# Patient Record
Sex: FEMALE | Race: WHITE | NOT HISPANIC OR LATINO | Employment: OTHER | URBAN - METROPOLITAN AREA
[De-identification: names, ages, dates, MRNs, and addresses within clinical notes are randomized per-mention and may not be internally consistent; named-entity substitution may affect disease eponyms.]

---

## 2023-08-30 ENCOUNTER — HOSPITAL ENCOUNTER (OUTPATIENT)
Dept: DATA CONVERSION | Facility: HOSPITAL | Age: 83
Discharge: HOME | End: 2023-08-30
Payer: MEDICARE

## 2023-08-30 DIAGNOSIS — I50.30 UNSPECIFIED DIASTOLIC (CONGESTIVE) HEART FAILURE (MULTI): ICD-10-CM

## 2023-08-30 DIAGNOSIS — E78.5 HYPERLIPIDEMIA, UNSPECIFIED: ICD-10-CM

## 2023-08-30 LAB
ANION GAP SERPL CALCULATED.3IONS-SCNC: 12 MMOL/L (ref 0–19)
APPEARANCE PLAS: CLEAR
BUN SERPL-MCNC: 20 MG/DL (ref 8–25)
BUN/CREAT SERPL: 22.2 RATIO (ref 8–21)
CALCIUM SERPL-MCNC: 9.7 MG/DL (ref 8.5–10.4)
CHLORIDE SERPL-SCNC: 99 MMOL/L (ref 97–107)
CHOLEST SERPL-MCNC: 151 MG/DL (ref 133–200)
CHOLEST/HDLC SERPL: 2.6 RATIO
CO2 SERPL-SCNC: 30 MMOL/L (ref 24–31)
COLOR SPUN FLD: YELLOW
CREAT SERPL-MCNC: 0.9 MG/DL (ref 0.4–1.6)
DEPRECATED RDW RBC AUTO: 47.6 FL (ref 37–54)
ERYTHROCYTE [DISTWIDTH] IN BLOOD BY AUTOMATED COUNT: 14.7 % (ref 11.7–15)
FASTING STATUS PATIENT QL REPORTED: NORMAL
GFR SERPL CREATININE-BSD FRML MDRD: 63 ML/MIN/1.73 M2
GLUCOSE SERPL-MCNC: 107 MG/DL (ref 65–99)
HCT VFR BLD AUTO: 36.3 % (ref 36–44)
HDLC SERPL-MCNC: 58 MG/DL
HGB BLD-MCNC: 11.9 GM/DL (ref 12–15)
LDLC SERPL CALC-MCNC: 74 MG/DL (ref 65–130)
MCH RBC QN AUTO: 29.2 PG (ref 26–34)
MCHC RBC AUTO-ENTMCNC: 32.8 % (ref 31–37)
MCV RBC AUTO: 89 FL (ref 80–100)
NRBC BLD-RTO: 0 /100 WBC
PLATELET # BLD AUTO: 165 K/UL (ref 150–450)
PMV BLD AUTO: 9.6 CU (ref 7–12.6)
POTASSIUM SERPL-SCNC: 3.7 MMOL/L (ref 3.4–5.1)
RBC # BLD AUTO: 4.08 M/UL (ref 4–4.9)
SODIUM SERPL-SCNC: 140 MMOL/L (ref 133–145)
TRIGL SERPL-MCNC: 93 MG/DL (ref 40–150)
WBC # BLD AUTO: 6.8 K/UL (ref 4.5–11)

## 2023-09-01 PROBLEM — K42.9 UMBILICAL HERNIA: Status: ACTIVE | Noted: 2023-09-01

## 2023-09-01 PROBLEM — E66.9 OBESITY: Status: ACTIVE | Noted: 2023-09-01

## 2023-09-01 PROBLEM — I50.30 DIASTOLIC HEART FAILURE (MULTI): Status: ACTIVE | Noted: 2023-09-01

## 2023-09-01 PROBLEM — E11.69 DIABETES MELLITUS TYPE 2 IN OBESE: Status: ACTIVE | Noted: 2023-09-01

## 2023-09-01 PROBLEM — I48.91 ATRIAL FIBRILLATION (MULTI): Status: ACTIVE | Noted: 2023-09-01

## 2023-09-01 PROBLEM — E11.9 DIABETES MELLITUS (MULTI): Status: ACTIVE | Noted: 2023-09-01

## 2023-09-01 PROBLEM — R53.1 ASTHENIA: Status: ACTIVE | Noted: 2023-09-01

## 2023-09-01 PROBLEM — I48.11 LONGSTANDING PERSISTENT ATRIAL FIBRILLATION (MULTI): Status: ACTIVE | Noted: 2023-09-01

## 2023-09-01 PROBLEM — N20.0 KIDNEY STONE: Status: ACTIVE | Noted: 2023-09-01

## 2023-09-01 PROBLEM — F41.9 ANXIETY: Status: ACTIVE | Noted: 2023-09-01

## 2023-09-01 PROBLEM — G89.4 CHRONIC PAIN DISORDER: Status: ACTIVE | Noted: 2023-09-01

## 2023-09-01 PROBLEM — I50.30 HEART FAILURE WITH PRESERVED EJECTION FRACTION (MULTI): Status: ACTIVE | Noted: 2023-09-01

## 2023-09-01 PROBLEM — E78.5 HYPERLIPIDEMIA: Status: ACTIVE | Noted: 2023-09-01

## 2023-09-01 PROBLEM — I50.9 CHRONIC CONGESTIVE HEART FAILURE (MULTI): Status: ACTIVE | Noted: 2023-09-01

## 2023-09-01 PROBLEM — K31.7 MULTIPLE GASTRIC POLYPS: Status: ACTIVE | Noted: 2023-09-01

## 2023-09-01 PROBLEM — R11.2 NAUSEA & VOMITING: Status: ACTIVE | Noted: 2023-09-01

## 2023-09-01 PROBLEM — I51.89 GRADE III DIASTOLIC DYSFUNCTION: Status: ACTIVE | Noted: 2023-09-01

## 2023-09-01 PROBLEM — I10 BENIGN ESSENTIAL HYPERTENSION: Status: ACTIVE | Noted: 2023-09-01

## 2023-09-01 PROBLEM — K58.0 IRRITABLE BOWEL SYNDROME WITH DIARRHEA: Status: ACTIVE | Noted: 2023-09-01

## 2023-09-01 PROBLEM — G25.81 RESTLESS LEG SYNDROME: Status: ACTIVE | Noted: 2023-09-01

## 2023-09-01 PROBLEM — R06.00 DYSPNEA: Status: ACTIVE | Noted: 2023-09-01

## 2023-09-01 PROBLEM — E55.9 VITAMIN D DEFICIENCY: Status: ACTIVE | Noted: 2023-09-01

## 2023-09-01 PROBLEM — R68.83 CHILL: Status: ACTIVE | Noted: 2023-09-01

## 2023-09-01 PROBLEM — R09.02 HYPOXEMIA: Status: ACTIVE | Noted: 2023-09-01

## 2023-09-01 PROBLEM — I20.9 ANGINA PECTORIS (CMS-HCC): Status: ACTIVE | Noted: 2023-09-01

## 2023-09-01 PROBLEM — G47.33 OSA (OBSTRUCTIVE SLEEP APNEA): Status: ACTIVE | Noted: 2023-09-01

## 2023-09-01 PROBLEM — R82.81 PYURIA: Status: ACTIVE | Noted: 2023-09-01

## 2023-09-01 PROBLEM — D50.9 MICROCYTIC ANEMIA: Status: ACTIVE | Noted: 2023-09-01

## 2023-09-01 PROBLEM — Z95.9 CARDIAC DEVICE IN SITU: Status: ACTIVE | Noted: 2023-09-01

## 2023-09-01 PROBLEM — E66.9 DIABETES MELLITUS TYPE 2 IN OBESE: Status: ACTIVE | Noted: 2023-09-01

## 2023-09-01 PROBLEM — F51.04 PSYCHOPHYSIOLOGIC INSOMNIA: Status: ACTIVE | Noted: 2023-09-01

## 2023-09-01 PROBLEM — G89.29 OTHER CHRONIC PAIN: Status: ACTIVE | Noted: 2023-09-01

## 2023-09-01 PROBLEM — I27.81 COR PULMONALE (MULTI): Status: ACTIVE | Noted: 2023-09-01

## 2023-09-01 PROBLEM — F51.04 CHRONIC INSOMNIA: Status: ACTIVE | Noted: 2023-09-01

## 2023-09-01 PROBLEM — K62.5 RECTAL HEMORRHAGE: Status: ACTIVE | Noted: 2023-09-01

## 2023-09-01 PROBLEM — R07.89 OTHER CHEST PAIN: Status: ACTIVE | Noted: 2023-09-01

## 2023-09-01 PROBLEM — G47.00 INSOMNIA: Status: ACTIVE | Noted: 2023-09-01

## 2023-09-01 PROBLEM — J18.9 PNEUMONIA: Status: ACTIVE | Noted: 2023-09-01

## 2023-09-01 PROBLEM — K92.2 LOWER GASTROINTESTINAL HEMORRHAGE: Status: ACTIVE | Noted: 2023-09-01

## 2023-09-01 PROBLEM — E87.6 HYPOKALEMIA: Status: ACTIVE | Noted: 2023-09-01

## 2023-09-01 PROBLEM — M17.11 UNILATERAL PRIMARY OSTEOARTHRITIS, RIGHT KNEE: Status: ACTIVE | Noted: 2023-09-01

## 2023-09-01 PROBLEM — R10.9 FLANK PAIN: Status: ACTIVE | Noted: 2023-09-01

## 2023-09-01 PROBLEM — I49.8 AV NODE ARRHYTHMIA: Status: ACTIVE | Noted: 2023-09-01

## 2023-09-01 PROBLEM — I10 HYPERTENSION: Status: ACTIVE | Noted: 2023-09-01

## 2023-09-01 PROBLEM — R89.9 ABNORMAL LABORATORY TEST RESULT: Status: ACTIVE | Noted: 2023-09-01

## 2023-09-01 PROBLEM — D62 ANEMIA DUE TO ACUTE BLOOD LOSS: Status: ACTIVE | Noted: 2023-09-01

## 2023-09-01 PROBLEM — K57.90 DIVERTICULOSIS: Status: ACTIVE | Noted: 2023-09-01

## 2023-09-01 PROBLEM — R93.89 ABNORMAL CT OF THE CHEST: Status: ACTIVE | Noted: 2023-09-01

## 2023-09-01 PROBLEM — Z95.0 S/P PLACEMENT OF CARDIAC PACEMAKER: Status: ACTIVE | Noted: 2023-09-01

## 2023-09-01 PROBLEM — R06.01 ORTHOPNEA: Status: ACTIVE | Noted: 2023-09-01

## 2023-09-01 PROBLEM — R94.5 ABNORMAL LIVER FUNCTION: Status: ACTIVE | Noted: 2023-09-01

## 2023-09-01 PROBLEM — K92.1 MELENA: Status: ACTIVE | Noted: 2023-09-01

## 2023-09-01 PROBLEM — I44.2 COMPLETE ATRIOVENTRICULAR BLOCK (MULTI): Status: ACTIVE | Noted: 2023-09-01

## 2023-09-01 PROBLEM — M19.90 OSTEOARTHRITIS: Status: ACTIVE | Noted: 2023-09-01

## 2023-09-01 PROBLEM — R09.02 HYPOXIA: Status: ACTIVE | Noted: 2023-09-01

## 2023-09-01 RX ORDER — METFORMIN HYDROCHLORIDE 500 MG/1
500 TABLET ORAL
COMMUNITY
Start: 2023-05-26 | End: 2023-10-05 | Stop reason: SDUPTHER

## 2023-09-01 RX ORDER — LORATADINE 10 MG/1
10 TABLET ORAL EVERY MORNING
COMMUNITY
Start: 2022-10-17 | End: 2024-01-23 | Stop reason: ALTCHOICE

## 2023-09-01 RX ORDER — LOSARTAN POTASSIUM 25 MG/1
25 TABLET ORAL DAILY
COMMUNITY

## 2023-09-01 RX ORDER — OSELTAMIVIR PHOSPHATE 75 MG/1
75 CAPSULE ORAL 2 TIMES DAILY
COMMUNITY
Start: 2022-12-29 | End: 2023-10-27 | Stop reason: ALTCHOICE

## 2023-09-01 RX ORDER — FLUTICASONE PROPIONATE 50 MCG
2 SPRAY, SUSPENSION (ML) NASAL DAILY
COMMUNITY
Start: 2022-10-17 | End: 2023-10-27 | Stop reason: ALTCHOICE

## 2023-09-01 RX ORDER — GUAIFENESIN 1200 MG
325 TABLET, EXTENDED RELEASE 12 HR ORAL EVERY 6 HOURS PRN
COMMUNITY

## 2023-09-01 RX ORDER — METOPROLOL SUCCINATE 50 MG/1
50 TABLET, EXTENDED RELEASE ORAL DAILY
COMMUNITY
Start: 2023-07-28

## 2023-09-01 RX ORDER — ALBUTEROL SULFATE 90 UG/1
2 AEROSOL, METERED RESPIRATORY (INHALATION) EVERY 4 HOURS
COMMUNITY
Start: 2023-01-05 | End: 2023-10-27 | Stop reason: ALTCHOICE

## 2023-09-01 RX ORDER — DILTIAZEM HYDROCHLORIDE 240 MG/1
240 CAPSULE, COATED, EXTENDED RELEASE ORAL DAILY
COMMUNITY
Start: 2023-04-26 | End: 2024-01-23 | Stop reason: ALTCHOICE

## 2023-09-01 RX ORDER — SERTRALINE HYDROCHLORIDE 100 MG/1
100 TABLET, FILM COATED ORAL DAILY
COMMUNITY
Start: 2023-06-14 | End: 2023-12-11 | Stop reason: SDUPTHER

## 2023-09-01 RX ORDER — WARFARIN SODIUM 3 MG/1
3 TABLET ORAL
COMMUNITY
End: 2023-12-11 | Stop reason: SDUPTHER

## 2023-09-01 RX ORDER — FUROSEMIDE 40 MG/1
80 TABLET ORAL EVERY MORNING
COMMUNITY

## 2023-09-01 RX ORDER — SIMVASTATIN 10 MG/1
10 TABLET, FILM COATED ORAL NIGHTLY
COMMUNITY
End: 2024-06-05 | Stop reason: SDUPTHER

## 2023-09-01 RX ORDER — TRAZODONE HYDROCHLORIDE 100 MG/1
200 TABLET ORAL NIGHTLY
COMMUNITY
End: 2023-10-05 | Stop reason: SDUPTHER

## 2023-09-01 RX ORDER — PRAMIPEXOLE DIHYDROCHLORIDE 1.5 MG/1
1.5 TABLET ORAL DAILY
COMMUNITY

## 2023-09-25 LAB
INR IN PPP BY COAGULATION ASSAY EXTERNAL: 2.1
PROTHROMBIN TIME (PT) IN PPP BY COAGULATION ASSAY EXTERNAL: NORMAL SECONDS

## 2023-09-26 DIAGNOSIS — I48.91 ATRIAL FIBRILLATION, UNSPECIFIED TYPE (MULTI): Primary | ICD-10-CM

## 2023-10-05 ENCOUNTER — CLINICAL SUPPORT (OUTPATIENT)
Dept: PRIMARY CARE | Facility: CLINIC | Age: 83
End: 2023-10-05
Payer: MEDICARE

## 2023-10-05 DIAGNOSIS — E66.9 DIABETES MELLITUS TYPE 2 IN OBESE: ICD-10-CM

## 2023-10-05 DIAGNOSIS — Z23 NEED FOR IMMUNIZATION AGAINST PERTUSSIS: ICD-10-CM

## 2023-10-05 DIAGNOSIS — E11.69 DIABETES MELLITUS TYPE 2 IN OBESE: ICD-10-CM

## 2023-10-05 PROCEDURE — 90662 IIV NO PRSV INCREASED AG IM: CPT | Performed by: INTERNAL MEDICINE

## 2023-10-05 RX ORDER — TRAZODONE HYDROCHLORIDE 100 MG/1
200 TABLET ORAL NIGHTLY
Qty: 90 TABLET | Refills: 1 | Status: SHIPPED | OUTPATIENT
Start: 2023-10-05 | End: 2024-01-09 | Stop reason: SDUPTHER

## 2023-10-05 RX ORDER — METFORMIN HYDROCHLORIDE 500 MG/1
500 TABLET ORAL
Qty: 180 TABLET | Refills: 1 | Status: SHIPPED | OUTPATIENT
Start: 2023-10-05 | End: 2024-01-23 | Stop reason: ALTCHOICE

## 2023-10-27 ENCOUNTER — OFFICE VISIT (OUTPATIENT)
Dept: PRIMARY CARE | Facility: CLINIC | Age: 83
End: 2023-10-27
Payer: MEDICARE

## 2023-10-27 VITALS
OXYGEN SATURATION: 96 % | SYSTOLIC BLOOD PRESSURE: 110 MMHG | HEART RATE: 78 BPM | BODY MASS INDEX: 40.32 KG/M2 | WEIGHT: 213.4 LBS | DIASTOLIC BLOOD PRESSURE: 58 MMHG

## 2023-10-27 DIAGNOSIS — T14.8XXA HEMATOMA: Primary | ICD-10-CM

## 2023-10-27 PROCEDURE — 99213 OFFICE O/P EST LOW 20 MIN: CPT | Performed by: INTERNAL MEDICINE

## 2023-10-27 PROCEDURE — 1036F TOBACCO NON-USER: CPT | Performed by: INTERNAL MEDICINE

## 2023-10-27 PROCEDURE — 1126F AMNT PAIN NOTED NONE PRSNT: CPT | Performed by: INTERNAL MEDICINE

## 2023-10-27 PROCEDURE — 3078F DIAST BP <80 MM HG: CPT | Performed by: INTERNAL MEDICINE

## 2023-10-27 PROCEDURE — 1159F MED LIST DOCD IN RCRD: CPT | Performed by: INTERNAL MEDICINE

## 2023-10-27 PROCEDURE — 3074F SYST BP LT 130 MM HG: CPT | Performed by: INTERNAL MEDICINE

## 2023-10-27 ASSESSMENT — ENCOUNTER SYMPTOMS
DEPRESSION: 0
OCCASIONAL FEELINGS OF UNSTEADINESS: 0
LOSS OF SENSATION IN FEET: 0

## 2023-10-27 ASSESSMENT — PAIN SCALES - GENERAL: PAINLEVEL: 0-NO PAIN

## 2023-10-27 ASSESSMENT — PATIENT HEALTH QUESTIONNAIRE - PHQ9
1. LITTLE INTEREST OR PLEASURE IN DOING THINGS: NOT AT ALL
2. FEELING DOWN, DEPRESSED OR HOPELESS: NOT AT ALL
SUM OF ALL RESPONSES TO PHQ9 QUESTIONS 1 AND 2: 0

## 2023-10-27 NOTE — PROGRESS NOTES
Subjective   Patient ID: Raúl Jordan is a 83 y.o. female who presents for bump on leg.    R shin was hit by corner of car door-now has big lump and it's tender. On coumadin chronically. Able to walk an denies SOB             Objective   There were no vitals taken for this visit.    Physical Exam  lateral mid right shin w/ 3 cm hematoma , sl tender, and raised about 1.5 cm. Not hot or having surrounding redness. Posterior calf non tender    Assessment/Plan        Raúl was seen today for bump on leg.  Diagnoses and all orders for this visit:  Hematoma (Primary)   Discussed the causing being the vascular covering of her shin bone while on coumadin. Advise ice for next 2 days then switch to heat. Do not see any risk for DVT.

## 2023-10-30 ENCOUNTER — ANTICOAGULATION - WARFARIN VISIT (OUTPATIENT)
Dept: CARDIOLOGY | Facility: HOSPITAL | Age: 83
End: 2023-10-30
Payer: MEDICARE

## 2023-10-30 DIAGNOSIS — I48.91 ATRIAL FIBRILLATION, UNSPECIFIED TYPE (MULTI): Primary | ICD-10-CM

## 2023-10-30 LAB
POC INR: 2.2
POC PROTHROMBIN TIME: NORMAL

## 2023-10-30 PROCEDURE — 85610 PROTHROMBIN TIME: CPT | Mod: QW

## 2023-10-30 PROCEDURE — 99211 OFF/OP EST MAY X REQ PHY/QHP: CPT | Performed by: INTERNAL MEDICINE

## 2023-10-30 NOTE — PROGRESS NOTES
Patient identification verified with 2 identifiers.    Location: [facility] - 1st Floor Anticoagulation Clinic [address] [address]    Referring Physician: Dr. Lozano  Enrollment/ Re-enrollment date: 08/09/24   INR Goal: 2.0-3.0  INR monitoring is per [facility] protocol.  Anticoagulation Medication: warfarin  Indication: Atrial Fibrillation/Atrial Flutter    Subjective   Bleeding signs/symptoms: No    Bruising: No   Major bleeding event: No  Thrombosis signs/symptoms: No  Thromboembolic event: No  Missed doses: No  Extra doses: No  Medication changes: No  Dietary changes: No  Change in health: No  Change in activity: No  Alcohol: No  Other concerns: No    Upcoming Surgeries:  Does the Patient Have any upcoming surgeries that require interruption in anticoagulation therapy? no  Does the patient require bridging? no      Anticoagulation Summary  As of 10/30/2023      INR goal:  2.0-3.0   TTR:  --   INR used for dosing:     Weekly warfarin total:  21 mg               Assessment/Plan   Therapeutic     1. New dose: no change    2. Next INR: 1 month      Education provided to patient during the visit:  Patient instructed to call in interim with questions, concerns and changes.

## 2023-11-27 ENCOUNTER — ANTICOAGULATION - WARFARIN VISIT (OUTPATIENT)
Dept: CARDIOLOGY | Facility: HOSPITAL | Age: 83
End: 2023-11-27
Payer: MEDICARE

## 2023-11-27 DIAGNOSIS — I48.91 ATRIAL FIBRILLATION, UNSPECIFIED TYPE (MULTI): Primary | ICD-10-CM

## 2023-11-27 LAB
POC INR: 2.5
POC PROTHROMBIN TIME: NORMAL

## 2023-11-27 PROCEDURE — 99211 OFF/OP EST MAY X REQ PHY/QHP: CPT | Performed by: INTERNAL MEDICINE

## 2023-11-27 NOTE — PROGRESS NOTES
Patient identification verified with 2 identifiers.    [facility] Anticoagulation Clinic [address] [address]    Referring Physician: Dr. angel  Enrollment/ Re-enrollment date:    INR Goal: 2.0-3.0  INR monitoring is per [facility] protocol.  Anticoagulation Medication: warfarin  Indication: Atrial Fibrillation/Atrial Flutter    Subjective   Bleeding signs/symptoms: No    Bruising: No   Major bleeding event: No  Thrombosis signs/symptoms: No  Thromboembolic event: No  Missed doses: No  Extra doses: No  Medication changes: No  Dietary changes: No  Change in health: No  Change in activity: No  Alcohol: No  Other concerns: No    Upcoming Surgeries:  Does the Patient Have any upcoming surgeries that require interruption in anticoagulation therapy? no  Does the patient require bridging? no      Anticoagulation Summary  As of 2023      INR goal:  2.0-3.0   TTR:  100.0 % (1.7 mo)   INR used for dosin.50 (2023)   Weekly warfarin total:  21 mg               Assessment/Plan   Therapeutic     1. New dose: no change    2. Next INR: 1 month      Education provided to patient during the visit:  Patient instructed to call in interim with questions, concerns and changes.

## 2023-12-11 DIAGNOSIS — F41.9 ANXIETY: ICD-10-CM

## 2023-12-11 DIAGNOSIS — E11.9 TYPE 2 DIABETES MELLITUS WITHOUT COMPLICATION, WITHOUT LONG-TERM CURRENT USE OF INSULIN (MULTI): Primary | ICD-10-CM

## 2023-12-11 DIAGNOSIS — I48.11 LONGSTANDING PERSISTENT ATRIAL FIBRILLATION (MULTI): ICD-10-CM

## 2023-12-11 RX ORDER — SERTRALINE HYDROCHLORIDE 100 MG/1
100 TABLET, FILM COATED ORAL DAILY
Qty: 30 TABLET | Refills: 1 | Status: SHIPPED | OUTPATIENT
Start: 2023-12-11 | End: 2024-02-26 | Stop reason: SDUPTHER

## 2023-12-11 RX ORDER — WARFARIN SODIUM 3 MG/1
3 TABLET ORAL EVERY EVENING
Qty: 30 TABLET | Refills: 1 | Status: SHIPPED | OUTPATIENT
Start: 2023-12-11 | End: 2024-03-06

## 2023-12-27 ENCOUNTER — ANTICOAGULATION - WARFARIN VISIT (OUTPATIENT)
Dept: CARDIOLOGY | Facility: HOSPITAL | Age: 83
End: 2023-12-27
Payer: MEDICARE

## 2023-12-27 DIAGNOSIS — I48.91 ATRIAL FIBRILLATION, UNSPECIFIED TYPE (MULTI): Primary | ICD-10-CM

## 2023-12-27 LAB
POC INR: 2.2
POC PROTHROMBIN TIME: NORMAL

## 2023-12-27 PROCEDURE — 99211 OFF/OP EST MAY X REQ PHY/QHP: CPT | Performed by: INTERNAL MEDICINE

## 2023-12-27 NOTE — PROGRESS NOTES
Patient identification verified with 2 identifiers.    Location: [facility] - 1st Floor Anticoagulation Clinic [address] [id number]    Referring Physician: Dr Lozano  Enrollment/ Re-enrollment date:    INR Goal: 2.0-3.0  INR monitoring is per [facility] protocol.  Anticoagulation Medication: warfarin  Indication: Atrial Fibrillation/Atrial Flutter    Subjective   Bleeding signs/symptoms: No    Bruising: No   Major bleeding event: No  Thrombosis signs/symptoms: No  Thromboembolic event: No  Missed doses: No  Extra doses: No  Medication changes: No  Dietary changes: No  Change in health: No  Change in activity: No  Alcohol: No  Other concerns: No    Upcoming Surgeries:  Does the Patient Have any upcoming surgeries that require interruption in anticoagulation therapy? no  Does the patient require bridging? no      Anticoagulation Summary  As of 2023      INR goal:  2.0-3.0   TTR:  100.0 % (2.7 mo)   INR used for dosin.20 (2023)   Weekly warfarin total:  21 mg               Assessment/Plan   Therapeutic     1. New dose: no change    2. Next INR: 1 month      Education provided to patient during the visit:  Patient instructed to call in interim with questions, concerns and changes.

## 2024-01-09 DIAGNOSIS — E66.9 DIABETES MELLITUS TYPE 2 IN OBESE: ICD-10-CM

## 2024-01-09 DIAGNOSIS — E11.69 DIABETES MELLITUS TYPE 2 IN OBESE: ICD-10-CM

## 2024-01-09 RX ORDER — TRAZODONE HYDROCHLORIDE 100 MG/1
200 TABLET ORAL NIGHTLY
Qty: 90 TABLET | Refills: 0 | Status: SHIPPED | OUTPATIENT
Start: 2024-01-09 | End: 2024-03-11

## 2024-01-23 ENCOUNTER — OFFICE VISIT (OUTPATIENT)
Dept: PRIMARY CARE | Facility: CLINIC | Age: 84
End: 2024-01-23
Payer: MEDICARE

## 2024-01-23 VITALS
SYSTOLIC BLOOD PRESSURE: 124 MMHG | OXYGEN SATURATION: 93 % | DIASTOLIC BLOOD PRESSURE: 78 MMHG | HEART RATE: 79 BPM | WEIGHT: 213 LBS | TEMPERATURE: 97.2 F | BODY MASS INDEX: 40.25 KG/M2

## 2024-01-23 DIAGNOSIS — G47.33 OSA (OBSTRUCTIVE SLEEP APNEA): ICD-10-CM

## 2024-01-23 DIAGNOSIS — I50.30 HEART FAILURE WITH PRESERVED EJECTION FRACTION, UNSPECIFIED HF CHRONICITY (MULTI): ICD-10-CM

## 2024-01-23 DIAGNOSIS — M81.0 POST-MENOPAUSAL OSTEOPOROSIS: ICD-10-CM

## 2024-01-23 DIAGNOSIS — E55.9 VITAMIN D DEFICIENCY: ICD-10-CM

## 2024-01-23 DIAGNOSIS — E66.01 CLASS 3 SEVERE OBESITY WITH SERIOUS COMORBIDITY AND BODY MASS INDEX (BMI) OF 40.0 TO 44.9 IN ADULT, UNSPECIFIED OBESITY TYPE (MULTI): ICD-10-CM

## 2024-01-23 DIAGNOSIS — Z00.00 MEDICARE ANNUAL WELLNESS VISIT, SUBSEQUENT: Primary | ICD-10-CM

## 2024-01-23 DIAGNOSIS — I48.91 ATRIAL FIBRILLATION, UNSPECIFIED TYPE (MULTI): ICD-10-CM

## 2024-01-23 DIAGNOSIS — I10 PRIMARY HYPERTENSION: ICD-10-CM

## 2024-01-23 DIAGNOSIS — M19.90 OSTEOARTHRITIS, UNSPECIFIED OSTEOARTHRITIS TYPE, UNSPECIFIED SITE: ICD-10-CM

## 2024-01-23 DIAGNOSIS — E11.9 DIABETES MELLITUS WITHOUT COMPLICATION (MULTI): ICD-10-CM

## 2024-01-23 PROBLEM — D62 ANEMIA DUE TO ACUTE BLOOD LOSS: Status: RESOLVED | Noted: 2023-09-01 | Resolved: 2024-01-23

## 2024-01-23 PROBLEM — R10.9 FLANK PAIN: Status: RESOLVED | Noted: 2023-09-01 | Resolved: 2024-01-23

## 2024-01-23 PROBLEM — R09.02 HYPOXEMIA: Status: RESOLVED | Noted: 2023-09-01 | Resolved: 2024-01-23

## 2024-01-23 PROBLEM — R89.9 ABNORMAL LABORATORY TEST RESULT: Status: RESOLVED | Noted: 2023-09-01 | Resolved: 2024-01-23

## 2024-01-23 PROBLEM — R68.83 CHILL: Status: RESOLVED | Noted: 2023-09-01 | Resolved: 2024-01-23

## 2024-01-23 PROBLEM — R09.02 HYPOXIA: Status: RESOLVED | Noted: 2023-09-01 | Resolved: 2024-01-23

## 2024-01-23 PROBLEM — J18.9 PNEUMONIA: Status: RESOLVED | Noted: 2023-09-01 | Resolved: 2024-01-23

## 2024-01-23 PROBLEM — K92.1 MELENA: Status: RESOLVED | Noted: 2023-09-01 | Resolved: 2024-01-23

## 2024-01-23 PROBLEM — R82.81 PYURIA: Status: RESOLVED | Noted: 2023-09-01 | Resolved: 2024-01-23

## 2024-01-23 PROBLEM — G89.29 OTHER CHRONIC PAIN: Status: RESOLVED | Noted: 2023-09-01 | Resolved: 2024-01-23

## 2024-01-23 LAB — POC HEMOGLOBIN A1C: 5.6 % (ref 4.2–6.5)

## 2024-01-23 PROCEDURE — G0439 PPPS, SUBSEQ VISIT: HCPCS | Performed by: INTERNAL MEDICINE

## 2024-01-23 PROCEDURE — 1159F MED LIST DOCD IN RCRD: CPT | Performed by: INTERNAL MEDICINE

## 2024-01-23 PROCEDURE — 3078F DIAST BP <80 MM HG: CPT | Performed by: INTERNAL MEDICINE

## 2024-01-23 PROCEDURE — 3074F SYST BP LT 130 MM HG: CPT | Performed by: INTERNAL MEDICINE

## 2024-01-23 PROCEDURE — 1157F ADVNC CARE PLAN IN RCRD: CPT | Performed by: INTERNAL MEDICINE

## 2024-01-23 PROCEDURE — 1126F AMNT PAIN NOTED NONE PRSNT: CPT | Performed by: INTERNAL MEDICINE

## 2024-01-23 PROCEDURE — 1036F TOBACCO NON-USER: CPT | Performed by: INTERNAL MEDICINE

## 2024-01-23 PROCEDURE — 99215 OFFICE O/P EST HI 40 MIN: CPT | Performed by: INTERNAL MEDICINE

## 2024-01-23 RX ORDER — ALBUTEROL SULFATE 90 UG/1
2 AEROSOL, METERED RESPIRATORY (INHALATION) EVERY 6 HOURS PRN
COMMUNITY

## 2024-01-23 ASSESSMENT — ENCOUNTER SYMPTOMS
COUGH: 0
DYSURIA: 0
VOMITING: 0
SINUS PRESSURE: 0
SEIZURES: 0
TROUBLE SWALLOWING: 0
FREQUENCY: 0
BLOOD IN STOOL: 0
ABDOMINAL PAIN: 0
UNEXPECTED WEIGHT CHANGE: 0
OCCASIONAL FEELINGS OF UNSTEADINESS: 0
SHORTNESS OF BREATH: 0
PALPITATIONS: 0
NUMBNESS: 0
MYALGIAS: 0
FATIGUE: 0
NAUSEA: 0
DEPRESSION: 0
POLYDIPSIA: 0
BACK PAIN: 0
ARTHRALGIAS: 1
TREMORS: 0
POLYPHAGIA: 0
CHILLS: 0
LOSS OF SENSATION IN FEET: 0
WHEEZING: 0

## 2024-01-23 ASSESSMENT — LIFESTYLE VARIABLES: HOW MANY STANDARD DRINKS CONTAINING ALCOHOL DO YOU HAVE ON A TYPICAL DAY: PATIENT DOES NOT DRINK

## 2024-01-23 ASSESSMENT — PATIENT HEALTH QUESTIONNAIRE - PHQ9
1. LITTLE INTEREST OR PLEASURE IN DOING THINGS: NOT AT ALL
SUM OF ALL RESPONSES TO PHQ9 QUESTIONS 1 AND 2: 0
2. FEELING DOWN, DEPRESSED OR HOPELESS: NOT AT ALL

## 2024-01-23 ASSESSMENT — PAIN SCALES - GENERAL: PAINLEVEL: 0-NO PAIN

## 2024-01-23 NOTE — PROGRESS NOTES
Subjective   Patient ID: Raúl Jordan is a 83 y.o. female who presents for Annual Exam.    HPI      She has a history of hypertension, hyperlipidemia, atrial fibrillation, diabetes mellitus, congestive heart failure, anxiety, status post ICD placement, obstructive sleep apnea         H/O congestive heart failure- Sees Dr Lozano. She was admitted in the hospital from 05/6- 05/10/23 for acute heart failure. Stated she stopped her Furosemide for few days, as she got busy with day-to-day activities.          Cardizem was switched to Metoprolol. Stopped Jardiance due to cost.         H/O Diabetes Mellitus-   On metformin 500 mg daily, since dose was reduced during last visit, had no hypoglycemic episodes                 H/O A fib- on Coumadin.   Injury of RLE after she closed car door on her leg accidentally, swelling much better, scab is dry    Review of Systems   Constitutional:  Negative for chills, fatigue and unexpected weight change.   HENT:  Negative for postnasal drip, sinus pressure and trouble swallowing.    Respiratory:  Negative for cough, shortness of breath and wheezing.    Cardiovascular:  Negative for chest pain, palpitations and leg swelling.   Gastrointestinal:  Negative for abdominal pain, blood in stool, nausea and vomiting.   Endocrine: Negative for polydipsia, polyphagia and polyuria.   Genitourinary:  Negative for dysuria and frequency.   Musculoskeletal:  Positive for arthralgias. Negative for back pain and myalgias.   Skin:  Negative for rash.   Neurological:  Negative for tremors, seizures and numbness.   Psychiatric/Behavioral:  Negative for behavioral problems.        Objective   /78 (BP Location: Right arm, Patient Position: Sitting, BP Cuff Size: Adult)   Pulse 79   Temp 36.2 °C (97.2 °F) (Temporal)   Wt 96.6 kg (213 lb)   SpO2 93%   BMI 40.25 kg/m²     Physical Exam  Constitutional:       General: She is not in acute distress.  HENT:      Head: Normocephalic and atraumatic.       Right Ear: Tympanic membrane normal.      Left Ear: Tympanic membrane normal.   Eyes:      Extraocular Movements: Extraocular movements intact.      Conjunctiva/sclera: Conjunctivae normal.      Pupils: Pupils are equal, round, and reactive to light.   Neck:      Vascular: No carotid bruit.   Cardiovascular:      Rate and Rhythm: Normal rate and regular rhythm.      Pulses: Normal pulses.      Heart sounds: No murmur heard.  Pulmonary:      Effort: Pulmonary effort is normal.      Breath sounds: Normal breath sounds. No wheezing or rales.   Abdominal:      General: Bowel sounds are normal.      Palpations: Abdomen is soft. There is no mass.      Tenderness: There is no abdominal tenderness. There is no guarding.   Musculoskeletal:      Cervical back: Normal range of motion and neck supple.      Comments: Trace pedal edema bilaterally, right lower extremity mid shin area of dry scab with surrounding mild erythema   Lymphadenopathy:      Cervical: No cervical adenopathy.   Skin:     General: Skin is warm and dry.      Capillary Refill: Capillary refill takes less than 2 seconds.      Findings: No lesion.   Neurological:      General: No focal deficit present.      Mental Status: She is alert and oriented to person, place, and time.      Sensory: No sensory deficit.      Gait: Gait normal.   Psychiatric:         Mood and Affect: Mood normal.         Assessment/Plan        Raúl was seen today for annual exam.  Diagnoses and all orders for this visit:  Medicare annual wellness visit, subsequent (Primary)  Diabetes mellitus without complication (CMS/HCC)  -     POCT glycosylated hemoglobin (Hb A1C) manually resulted  -     Albumin , Urine Random; Future  Primary hypertension  -     CBC and Auto Differential; Future  -     Comprehensive Metabolic Panel; Future  Heart failure with preserved ejection fraction, unspecified HF chronicity (CMS/HCC)  Atrial fibrillation, unspecified type (CMS/HCC)  Vitamin D deficiency  -      Vitamin D 25-Hydroxy,Total (for eval of Vitamin D levels); Future  Post-menopausal osteoporosis  -     XR DEXA bone density; Future  Osteoarthritis, unspecified osteoarthritis type, unspecified site  LAZARA (obstructive sleep apnea)  Class 3 severe obesity with serious comorbidity and body mass index (BMI) of 40.0 to 44.9 in adult, unspecified obesity type (CMS/[facility])     Stopped metfromin, she is currently taking 500 mg once daily    Lab Results   Component Value Date    HGBA1C 5.6 01/23/2024

## 2024-01-24 ENCOUNTER — ANTICOAGULATION - WARFARIN VISIT (OUTPATIENT)
Dept: CARDIOLOGY | Facility: HOSPITAL | Age: 84
End: 2024-01-24
Payer: MEDICARE

## 2024-01-24 DIAGNOSIS — I48.91 ATRIAL FIBRILLATION, UNSPECIFIED TYPE (MULTI): Primary | ICD-10-CM

## 2024-01-26 ENCOUNTER — HOSPITAL ENCOUNTER (OUTPATIENT)
Dept: RADIOLOGY | Facility: CLINIC | Age: 84
Discharge: HOME | End: 2024-01-26
Payer: MEDICARE

## 2024-01-26 ENCOUNTER — LAB (OUTPATIENT)
Dept: LAB | Facility: LAB | Age: 84
End: 2024-01-26
Payer: MEDICARE

## 2024-01-26 DIAGNOSIS — M81.0 POST-MENOPAUSAL OSTEOPOROSIS: ICD-10-CM

## 2024-01-26 DIAGNOSIS — I10 PRIMARY HYPERTENSION: ICD-10-CM

## 2024-01-26 DIAGNOSIS — E55.9 VITAMIN D DEFICIENCY: ICD-10-CM

## 2024-01-26 LAB
25(OH)D3 SERPL-MCNC: 29 NG/ML (ref 31–100)
ALBUMIN SERPL-MCNC: 4.7 G/DL (ref 3.5–5)
ALP BLD-CCNC: 126 U/L (ref 35–125)
ALT SERPL-CCNC: 15 U/L (ref 5–40)
ANION GAP SERPL CALC-SCNC: 18 MMOL/L
AST SERPL-CCNC: 20 U/L (ref 5–40)
BASOPHILS # BLD AUTO: 0.06 X10*3/UL (ref 0–0.1)
BASOPHILS NFR BLD AUTO: 0.9 %
BILIRUB SERPL-MCNC: 0.7 MG/DL (ref 0.1–1.2)
BUN SERPL-MCNC: 27 MG/DL (ref 8–25)
CALCIUM SERPL-MCNC: 9.6 MG/DL (ref 8.5–10.4)
CHLORIDE SERPL-SCNC: 103 MMOL/L (ref 97–107)
CO2 SERPL-SCNC: 26 MMOL/L (ref 24–31)
CREAT SERPL-MCNC: 1.2 MG/DL (ref 0.4–1.6)
EGFRCR SERPLBLD CKD-EPI 2021: 44 ML/MIN/1.73M*2
EOSINOPHIL # BLD AUTO: 0.2 X10*3/UL (ref 0–0.4)
EOSINOPHIL NFR BLD AUTO: 2.9 %
ERYTHROCYTE [DISTWIDTH] IN BLOOD BY AUTOMATED COUNT: 14.6 % (ref 11.5–14.5)
GLUCOSE SERPL-MCNC: 122 MG/DL (ref 65–99)
HCT VFR BLD AUTO: 37 % (ref 36–46)
HGB BLD-MCNC: 11.4 G/DL (ref 12–16)
IMM GRANULOCYTES # BLD AUTO: 0.04 X10*3/UL (ref 0–0.5)
IMM GRANULOCYTES NFR BLD AUTO: 0.6 % (ref 0–0.9)
LYMPHOCYTES # BLD AUTO: 0.68 X10*3/UL (ref 0.8–3)
LYMPHOCYTES NFR BLD AUTO: 9.8 %
MCH RBC QN AUTO: 29.2 PG (ref 26–34)
MCHC RBC AUTO-ENTMCNC: 30.8 G/DL (ref 32–36)
MCV RBC AUTO: 95 FL (ref 80–100)
MONOCYTES # BLD AUTO: 0.5 X10*3/UL (ref 0.05–0.8)
MONOCYTES NFR BLD AUTO: 7.2 %
NEUTROPHILS # BLD AUTO: 5.44 X10*3/UL (ref 1.6–5.5)
NEUTROPHILS NFR BLD AUTO: 78.6 %
NRBC BLD-RTO: 0 /100 WBCS (ref 0–0)
PLATELET # BLD AUTO: 178 X10*3/UL (ref 150–450)
POTASSIUM SERPL-SCNC: 4.3 MMOL/L (ref 3.4–5.1)
PROT SERPL-MCNC: 7 G/DL (ref 5.9–7.9)
RBC # BLD AUTO: 3.9 X10*6/UL (ref 4–5.2)
SODIUM SERPL-SCNC: 147 MMOL/L (ref 133–145)
WBC # BLD AUTO: 6.9 X10*3/UL (ref 4.4–11.3)

## 2024-01-26 PROCEDURE — 36415 COLL VENOUS BLD VENIPUNCTURE: CPT

## 2024-01-26 PROCEDURE — 82306 VITAMIN D 25 HYDROXY: CPT

## 2024-01-26 PROCEDURE — 85025 COMPLETE CBC W/AUTO DIFF WBC: CPT

## 2024-01-26 PROCEDURE — 77085 DXA BONE DENSITY AXL VRT FX: CPT

## 2024-01-26 PROCEDURE — 80053 COMPREHEN METABOLIC PANEL: CPT

## 2024-01-29 ENCOUNTER — OFFICE VISIT (OUTPATIENT)
Dept: CARDIOLOGY | Facility: CLINIC | Age: 84
End: 2024-01-29
Payer: MEDICARE

## 2024-01-29 ENCOUNTER — ANTICOAGULATION - WARFARIN VISIT (OUTPATIENT)
Dept: CARDIOLOGY | Facility: HOSPITAL | Age: 84
End: 2024-01-29
Payer: MEDICARE

## 2024-01-29 VITALS
WEIGHT: 221 LBS | BODY MASS INDEX: 41.76 KG/M2 | SYSTOLIC BLOOD PRESSURE: 104 MMHG | HEART RATE: 72 BPM | DIASTOLIC BLOOD PRESSURE: 70 MMHG

## 2024-01-29 DIAGNOSIS — E78.2 MIXED HYPERLIPIDEMIA: ICD-10-CM

## 2024-01-29 DIAGNOSIS — I10 PRIMARY HYPERTENSION: ICD-10-CM

## 2024-01-29 DIAGNOSIS — I50.32 CHRONIC HEART FAILURE WITH PRESERVED EJECTION FRACTION (MULTI): Primary | ICD-10-CM

## 2024-01-29 DIAGNOSIS — I48.11 LONGSTANDING PERSISTENT ATRIAL FIBRILLATION (MULTI): ICD-10-CM

## 2024-01-29 DIAGNOSIS — Z95.0 S/P PLACEMENT OF CARDIAC PACEMAKER: ICD-10-CM

## 2024-01-29 DIAGNOSIS — I48.11 LONGSTANDING PERSISTENT ATRIAL FIBRILLATION (MULTI): Primary | ICD-10-CM

## 2024-01-29 PROBLEM — I48.91 ATRIAL FIBRILLATION (MULTI): Status: RESOLVED | Noted: 2023-09-01 | Resolved: 2024-01-29

## 2024-01-29 PROBLEM — I50.30 DIASTOLIC HEART FAILURE (MULTI): Status: RESOLVED | Noted: 2023-09-01 | Resolved: 2024-01-29

## 2024-01-29 LAB
POC INR: 3
POC PROTHROMBIN TIME: NORMAL

## 2024-01-29 PROCEDURE — 1125F AMNT PAIN NOTED PAIN PRSNT: CPT | Performed by: INTERNAL MEDICINE

## 2024-01-29 PROCEDURE — 99213 OFFICE O/P EST LOW 20 MIN: CPT | Performed by: INTERNAL MEDICINE

## 2024-01-29 PROCEDURE — 3074F SYST BP LT 130 MM HG: CPT | Performed by: INTERNAL MEDICINE

## 2024-01-29 PROCEDURE — 99211 OFF/OP EST MAY X REQ PHY/QHP: CPT | Mod: 27 | Performed by: INTERNAL MEDICINE

## 2024-01-29 PROCEDURE — 3078F DIAST BP <80 MM HG: CPT | Performed by: INTERNAL MEDICINE

## 2024-01-29 PROCEDURE — 1157F ADVNC CARE PLAN IN RCRD: CPT | Performed by: INTERNAL MEDICINE

## 2024-01-29 PROCEDURE — 1036F TOBACCO NON-USER: CPT | Performed by: INTERNAL MEDICINE

## 2024-01-29 PROCEDURE — 1159F MED LIST DOCD IN RCRD: CPT | Performed by: INTERNAL MEDICINE

## 2024-01-29 ASSESSMENT — ENCOUNTER SYMPTOMS
DYSPNEA ON EXERTION: 0
PARESTHESIAS: 0
OCCASIONAL FEELINGS OF UNSTEADINESS: 1
LOSS OF SENSATION IN FEET: 0
COUGH: 0
NUMBNESS: 0
DEPRESSION: 0
ABDOMINAL PAIN: 0
DYSURIA: 0
PALPITATIONS: 0
HEMATURIA: 0
SHORTNESS OF BREATH: 0
BLURRED VISION: 0

## 2024-01-29 ASSESSMENT — PATIENT HEALTH QUESTIONNAIRE - PHQ9
2. FEELING DOWN, DEPRESSED OR HOPELESS: NOT AT ALL
SUM OF ALL RESPONSES TO PHQ9 QUESTIONS 1 & 2: 0
1. LITTLE INTEREST OR PLEASURE IN DOING THINGS: NOT AT ALL

## 2024-01-29 ASSESSMENT — PAIN SCALES - GENERAL: PAINLEVEL: 8

## 2024-01-29 NOTE — PROGRESS NOTES
Patient identification verified with 2 identifiers.    Location: [facility] - 1st Floor Anticoagulation Clinic [address] [address]    Referring Physician: Dr angel  Enrollment/ Re-enrollment date: 2024   INR Goal: 2.0-3.0  INR monitoring is per [facility] protocol.  Anticoagulation Medication: warfarin  Indication: Atrial Fibrillation/Atrial Flutter    Subjective   Bleeding signs/symptoms: No    Bruising: No   Major bleeding event: No  Thrombosis signs/symptoms: No  Thromboembolic event: No  Missed doses: No  Extra doses: No  Medication changes: No  Dietary changes: No  Change in health: No  Change in activity: No  Alcohol: No  Other concerns: No    Upcoming Surgeries:  Does the Patient Have any upcoming surgeries that require interruption in anticoagulation therapy? no  Does the patient require bridging? no      Anticoagulation Summary  As of 1/29/2024      INR goal:  2.0-3.0   TTR:  100.0 % (3.8 mo)   INR used for dosing:  3.00 (1/29/2024)   Weekly warfarin total:  21 mg               Assessment/Plan   Therapeutic     1. New dose: no change    2. Next INR: 1 month      Education provided to patient during the visit:  Patient instructed to call in interim with questions, concerns and changes.

## 2024-01-29 NOTE — PROGRESS NOTES
Subjective   Raúl Jordan is a 83 y.o. female.    Chief Complaint:  Follow-up (6 month follow up)    HPI  Doing relatively well overall.  Stays physically active.  More tired on some days compared to other days.    Review of Systems   Constitutional: Negative for malaise/fatigue.   HENT:  Negative for congestion.    Eyes:  Negative for blurred vision.   Cardiovascular:  Negative for chest pain, dyspnea on exertion and palpitations.   Respiratory:  Negative for cough and shortness of breath.    Musculoskeletal:  Negative for joint pain.   Gastrointestinal:  Negative for abdominal pain.   Genitourinary:  Negative for dysuria and hematuria.   Neurological:  Negative for numbness and paresthesias.       Objective   Constitutional:       Appearance: Not in distress.   Eyes:      Conjunctiva/sclera: Conjunctivae normal.   Neck:      Vascular: JVD normal.   Pulmonary:      Breath sounds: Normal breath sounds. No wheezing. No rhonchi. No rales.   Cardiovascular:      Normal rate. Irregularly irregular rhythm.      Murmurs: There is no murmur.      No gallop.  No click. No rub.   Abdominal:      Palpations: Abdomen is soft.   Neurological:      General: No focal deficit present.      Mental Status: Alert.         Lab Review:   Lab on 01/26/2024   Component Date Value    WBC 01/26/2024 6.9     nRBC 01/26/2024 0.0     RBC 01/26/2024 3.90 (L)     Hemoglobin 01/26/2024 11.4 (L)     Hematocrit 01/26/2024 37.0     MCV 01/26/2024 95     MCH 01/26/2024 29.2     MCHC 01/26/2024 30.8 (L)     RDW 01/26/2024 14.6 (H)     Platelets 01/26/2024 178     Neutrophils % 01/26/2024 78.6     Immature Granulocytes %,* 01/26/2024 0.6     Lymphocytes % 01/26/2024 9.8     Monocytes % 01/26/2024 7.2     Eosinophils % 01/26/2024 2.9     Basophils % 01/26/2024 0.9     Neutrophils Absolute 01/26/2024 5.44     Immature Granulocytes Ab* 01/26/2024 0.04     Lymphocytes Absolute 01/26/2024 0.68 (L)     Monocytes Absolute 01/26/2024 0.50     Eosinophils  Absolute 01/26/2024 0.20     Basophils Absolute 01/26/2024 0.06     Glucose 01/26/2024 122 (H)     Sodium 01/26/2024 147 (H)     Potassium 01/26/2024 4.3     Chloride 01/26/2024 103     Bicarbonate 01/26/2024 26     Urea Nitrogen 01/26/2024 27 (H)     Creatinine 01/26/2024 1.20     eGFR 01/26/2024 44 (L)     Calcium 01/26/2024 9.6     Albumin 01/26/2024 4.7     Alkaline Phosphatase 01/26/2024 126 (H)     Total Protein 01/26/2024 7.0     AST 01/26/2024 20     Bilirubin, Total 01/26/2024 0.7     ALT 01/26/2024 15     Anion Gap 01/26/2024 18     Vitamin D, 25-Hydroxy, T* 01/26/2024 29 (L)    Office Visit on 01/23/2024   Component Date Value    POC HEMOGLOBIN A1c 01/23/2024 5.6    Anticoagulation - Warfarin Visit on 12/27/2023   Component Date Value    POC INR 12/27/2023 2.20        Assessment/Plan   The primary encounter diagnosis was Chronic heart failure with preserved ejection fraction (CMS/HCC). Diagnoses of Longstanding persistent atrial fibrillation (CMS/HCC), Primary hypertension, Mixed hyperlipidemia, and S/P placement of cardiac pacemaker were also pertinent to this visit.    Hyperlipidemia  Reviewed lipid panel from 8/23:  Tchol 151 TG 93 HDL 58 LDL 74, very good.    Hypertension  Blood pressure very well-controlled, no changes need to be made.    Heart failure with preserved ejection fraction (CMS/HCC)  Some ALVARADO otherwise good.  No orthopnea or PND.    S/P placement of cardiac pacemaker  Dr. Sheth follows pacemaker.    Longstanding persistent atrial fibrillation (CMS/HCC)  Heart rate well controlled, INRs at the anticoagulation clinic.

## 2024-01-31 ENCOUNTER — HOSPITAL ENCOUNTER (OUTPATIENT)
Dept: CARDIOLOGY | Facility: CLINIC | Age: 84
Discharge: HOME | End: 2024-01-31
Payer: MEDICARE

## 2024-01-31 DIAGNOSIS — Z95.9 CARDIAC DEVICE IN SITU: ICD-10-CM

## 2024-01-31 DIAGNOSIS — I44.2 COMPLETE ATRIOVENTRICULAR BLOCK (MULTI): ICD-10-CM

## 2024-01-31 PROCEDURE — 93296 REM INTERROG EVL PM/IDS: CPT

## 2024-01-31 PROCEDURE — 93294 REM INTERROG EVL PM/LDLS PM: CPT | Performed by: INTERNAL MEDICINE

## 2024-02-26 ENCOUNTER — OFFICE VISIT (OUTPATIENT)
Dept: CARDIOLOGY | Facility: CLINIC | Age: 84
End: 2024-02-26
Payer: MEDICARE

## 2024-02-26 ENCOUNTER — ANTICOAGULATION - WARFARIN VISIT (OUTPATIENT)
Dept: CARDIOLOGY | Facility: HOSPITAL | Age: 84
End: 2024-02-26
Payer: MEDICARE

## 2024-02-26 VITALS — OXYGEN SATURATION: 90 % | BODY MASS INDEX: 41 KG/M2 | HEART RATE: 86 BPM | WEIGHT: 217 LBS

## 2024-02-26 DIAGNOSIS — I48.11 LONGSTANDING PERSISTENT ATRIAL FIBRILLATION (MULTI): Primary | ICD-10-CM

## 2024-02-26 DIAGNOSIS — E11.9 TYPE 2 DIABETES MELLITUS WITHOUT COMPLICATION, WITHOUT LONG-TERM CURRENT USE OF INSULIN (MULTI): ICD-10-CM

## 2024-02-26 DIAGNOSIS — Z95.0 S/P PLACEMENT OF CARDIAC PACEMAKER: ICD-10-CM

## 2024-02-26 DIAGNOSIS — I50.32 CHRONIC HEART FAILURE WITH PRESERVED EJECTION FRACTION (MULTI): ICD-10-CM

## 2024-02-26 DIAGNOSIS — E78.2 MIXED HYPERLIPIDEMIA: ICD-10-CM

## 2024-02-26 DIAGNOSIS — I10 PRIMARY HYPERTENSION: ICD-10-CM

## 2024-02-26 LAB
POC INR: 2.2
POC PROTHROMBIN TIME: NORMAL

## 2024-02-26 PROCEDURE — 1036F TOBACCO NON-USER: CPT | Performed by: INTERNAL MEDICINE

## 2024-02-26 PROCEDURE — 85610 PROTHROMBIN TIME: CPT | Mod: QW

## 2024-02-26 PROCEDURE — 99214 OFFICE O/P EST MOD 30 MIN: CPT | Performed by: INTERNAL MEDICINE

## 2024-02-26 PROCEDURE — 99211 OFF/OP EST MAY X REQ PHY/QHP: CPT | Performed by: INTERNAL MEDICINE

## 2024-02-26 PROCEDURE — 1157F ADVNC CARE PLAN IN RCRD: CPT | Performed by: INTERNAL MEDICINE

## 2024-02-26 PROCEDURE — 1126F AMNT PAIN NOTED NONE PRSNT: CPT | Performed by: INTERNAL MEDICINE

## 2024-02-26 PROCEDURE — 1159F MED LIST DOCD IN RCRD: CPT | Performed by: INTERNAL MEDICINE

## 2024-02-26 RX ORDER — SERTRALINE HYDROCHLORIDE 100 MG/1
100 TABLET, FILM COATED ORAL DAILY
Qty: 30 TABLET | Refills: 2 | Status: SHIPPED | OUTPATIENT
Start: 2024-02-26 | End: 2024-06-07

## 2024-02-26 ASSESSMENT — PAIN SCALES - GENERAL: PAINLEVEL: 0-NO PAIN

## 2024-02-26 ASSESSMENT — ENCOUNTER SYMPTOMS
DYSURIA: 0
DYSPNEA ON EXERTION: 0
COUGH: 0
BLURRED VISION: 0
HEMATURIA: 0
ABDOMINAL PAIN: 0
SHORTNESS OF BREATH: 0
OCCASIONAL FEELINGS OF UNSTEADINESS: 1
DEPRESSION: 1
PALPITATIONS: 0
LOSS OF SENSATION IN FEET: 0
NUMBNESS: 0
PARESTHESIAS: 0

## 2024-02-26 ASSESSMENT — PATIENT HEALTH QUESTIONNAIRE - PHQ9
SUM OF ALL RESPONSES TO PHQ9 QUESTIONS 1 AND 2: 0
1. LITTLE INTEREST OR PLEASURE IN DOING THINGS: NOT AT ALL
2. FEELING DOWN, DEPRESSED OR HOPELESS: NOT AT ALL

## 2024-02-26 NOTE — PROGRESS NOTES
Patient identification verified with 2 identifiers.    Location: [facility] - 1st Floor Anticoagulation Clinic [address] [address]    Referring Physician: dr. Lozano  Enrollment/ Re-enrollment date: 2024   INR Goal: 2.0-3.0  INR monitoring is per [facility] protocol.  Anticoagulation Medication: warfarin  Indication: Atrial Fibrillation/Atrial Flutter    Subjective   Bleeding signs/symptoms: No    Bruising: No   Major bleeding event: No  Thrombosis signs/symptoms: No  Thromboembolic event: No  Missed doses: No  Extra doses: No  Medication changes: No  Dietary changes: No  Change in health: No  Change in activity: No  Alcohol: No  Other concerns: No    Upcoming Procedures:  Does the Patient Have any upcoming procedures that require interruption in anticoagulation therapy? no  Does the patient require bridging? no      Anticoagulation Summary  As of 2/26/2024      INR goal:  2.0-3.0   TTR:  100.0 % (3.8 mo)   INR used for dosing:     Weekly warfarin total:  21 mg               Assessment/Plan   Therapeutic     1. New dose: no change    2. Next INR: 1 month      Education provided to patient during the visit:  Patient instructed to call in interim with questions, concerns and changes.

## 2024-02-26 NOTE — PROGRESS NOTES
Subjective   Raúl Jordan is a 83 y.o. female.    Chief Complaint:  <9>swollen left foot    HPI    Review of Systems   Constitutional: Positive for malaise/fatigue.   HENT:  Negative for congestion.    Eyes:  Negative for blurred vision.   Cardiovascular:  Negative for chest pain, dyspnea on exertion and palpitations.   Respiratory:  Negative for cough and shortness of breath.    Musculoskeletal:  Negative for joint pain.   Gastrointestinal:  Negative for abdominal pain.   Genitourinary:  Negative for dysuria and hematuria.   Neurological:  Negative for numbness and paresthesias.       Objective   Constitutional:       Appearance: Not in distress.   Eyes:      Conjunctiva/sclera: Conjunctivae normal.   Neck:      Vascular: JVD normal.   Pulmonary:      Breath sounds: Normal breath sounds. No wheezing. No rhonchi. No rales.   Cardiovascular:      Normal rate. Regular rhythm.      Murmurs: There is no murmur.      No gallop.  No click. No rub.   Abdominal:      Palpations: Abdomen is soft.   Neurological:      General: No focal deficit present.      Mental Status: Alert.         Lab Review:   Anticoagulation - Warfarin Visit on 01/29/2024   Component Date Value    POC INR 01/29/2024 3.00    Lab on 01/26/2024   Component Date Value    WBC 01/26/2024 6.9     nRBC 01/26/2024 0.0     RBC 01/26/2024 3.90 (L)     Hemoglobin 01/26/2024 11.4 (L)     Hematocrit 01/26/2024 37.0     MCV 01/26/2024 95     MCH 01/26/2024 29.2     MCHC 01/26/2024 30.8 (L)     RDW 01/26/2024 14.6 (H)     Platelets 01/26/2024 178     Neutrophils % 01/26/2024 78.6     Immature Granulocytes %,* 01/26/2024 0.6     Lymphocytes % 01/26/2024 9.8     Monocytes % 01/26/2024 7.2     Eosinophils % 01/26/2024 2.9     Basophils % 01/26/2024 0.9     Neutrophils Absolute 01/26/2024 5.44     Immature Granulocytes Ab* 01/26/2024 0.04     Lymphocytes Absolute 01/26/2024 0.68 (L)     Monocytes Absolute 01/26/2024 0.50     Eosinophils Absolute 01/26/2024 0.20      Basophils Absolute 01/26/2024 0.06     Glucose 01/26/2024 122 (H)     Sodium 01/26/2024 147 (H)     Potassium 01/26/2024 4.3     Chloride 01/26/2024 103     Bicarbonate 01/26/2024 26     Urea Nitrogen 01/26/2024 27 (H)     Creatinine 01/26/2024 1.20     eGFR 01/26/2024 44 (L)     Calcium 01/26/2024 9.6     Albumin 01/26/2024 4.7     Alkaline Phosphatase 01/26/2024 126 (H)     Total Protein 01/26/2024 7.0     AST 01/26/2024 20     Bilirubin, Total 01/26/2024 0.7     ALT 01/26/2024 15     Anion Gap 01/26/2024 18     Vitamin D, 25-Hydroxy, T* 01/26/2024 29 (L)    Office Visit on 01/23/2024   Component Date Value    POC HEMOGLOBIN A1c 01/23/2024 5.6    Anticoagulation - Warfarin Visit on 12/27/2023   Component Date Value    POC INR 12/27/2023 2.20    Anticoagulation - Warfarin Visit on 11/27/2023   Component Date Value    POC INR 11/27/2023 2.50    Anticoagulation - Warfarin Visit on 10/30/2023   Component Date Value    POC INR 10/30/2023 2.20    [facility] Encounter on 09/27/2023   Component Date Value    LH - Cartridge Lot # (Da* 09/27/2023 [id number]     LH - Cartridge Exp Date * 09/27/2023 05/10/2025     LH - Hemoglobin A1c Resu* 09/27/2023 5.6     LH - Time of Test (Data * 09/27/2023 11:55am     LH - Performed by (Data * 09/27/2023 TS    Abstract on 09/26/2023   Component Date Value    INR External 09/25/2023 2.10    Hospital Outpatient Visit on 08/30/2023   Component Date Value    SERUM COLOR 08/30/2023 YELLOW     SERUM CLARITY 08/30/2023 CLEAR     Is the patient fasting? 08/30/2023 FASTING     Cholesterol 08/30/2023 151     Triglycerides 08/30/2023 93     HDL 08/30/2023 58     LDL Calculated 08/30/2023 74     Cholesterol/HDL Ratio 08/30/2023 2.6    Hospital Outpatient Visit on 08/30/2023   Component Date Value    Glucose 08/30/2023 107 (H)     Urea Nitrogen 08/30/2023 20     Creatinine 08/30/2023 0.9     Urea Nitrogen/Creatinine* 08/30/2023 22.2 (H)     Sodium 08/30/2023 140     Potassium 08/30/2023 3.7     Chloride  08/30/2023 99     Bicarbonate 08/30/2023 30     Anion Gap 08/30/2023 12     Calcium 08/30/2023 9.7     ESTIMATED GFR 08/30/2023 63     WBC 08/30/2023 6.8     RBC 08/30/2023 4.08     Hemoglobin 08/30/2023 11.9 (L)     Hematocrit 08/30/2023 36.3     MCV 08/30/2023 89.0     MCH 08/30/2023 29.2     MCHC 08/30/2023 32.8     RDW-SD 08/30/2023 47.6     RDW-CV 08/30/2023 14.7     Platelets 08/30/2023 165     MPV 08/30/2023 9.6     nRBC 08/30/2023 0    There may be more visits with results that are not included.       Assessment/Plan   The primary encounter diagnosis was Longstanding persistent atrial fibrillation (CMS/HCC). Diagnoses of S/P placement of cardiac pacemaker, Chronic heart failure with preserved ejection fraction (CMS/HCC), Primary hypertension, and Mixed hyperlipidemia were also pertinent to this visit.    Longstanding persistent atrial fibrillation (CMS/HCC)  INRs at the anticoagulation clinic, heart rate very well controlled.    S/P placement of cardiac pacemaker  Dr. Sheth follows pacemaker    Hyperlipidemia  Lipids checked in August:  Tchol 151 TG 93 HDL 58 LDL 74, very good.    Hypertension  BP very well controlled.    Heart failure with preserved ejection fraction (CMS/HCC)  Well compensated, Jardiance too expensive.  Will follow clinically.

## 2024-03-06 DIAGNOSIS — I48.11 LONGSTANDING PERSISTENT ATRIAL FIBRILLATION (MULTI): ICD-10-CM

## 2024-03-06 RX ORDER — WARFARIN SODIUM 3 MG/1
3 TABLET ORAL EVERY EVENING
Qty: 30 TABLET | Refills: 1 | Status: SHIPPED | OUTPATIENT
Start: 2024-03-06 | End: 2024-05-20

## 2024-03-11 DIAGNOSIS — E66.9 DIABETES MELLITUS TYPE 2 IN OBESE: ICD-10-CM

## 2024-03-11 DIAGNOSIS — E11.69 DIABETES MELLITUS TYPE 2 IN OBESE: ICD-10-CM

## 2024-03-11 RX ORDER — TRAZODONE HYDROCHLORIDE 100 MG/1
200 TABLET ORAL NIGHTLY
Qty: 90 TABLET | Refills: 0 | Status: SHIPPED | OUTPATIENT
Start: 2024-03-11 | End: 2024-05-06 | Stop reason: SDUPTHER

## 2024-03-25 ENCOUNTER — APPOINTMENT (OUTPATIENT)
Dept: CARDIOLOGY | Facility: HOSPITAL | Age: 84
End: 2024-03-25
Payer: MEDICARE

## 2024-03-27 ENCOUNTER — ANTICOAGULATION - WARFARIN VISIT (OUTPATIENT)
Dept: CARDIOLOGY | Facility: HOSPITAL | Age: 84
End: 2024-03-27
Payer: MEDICARE

## 2024-04-01 ENCOUNTER — ANTICOAGULATION - WARFARIN VISIT (OUTPATIENT)
Dept: CARDIOLOGY | Facility: HOSPITAL | Age: 84
End: 2024-04-01
Payer: MEDICARE

## 2024-04-01 DIAGNOSIS — I48.11 LONGSTANDING PERSISTENT ATRIAL FIBRILLATION (MULTI): Primary | ICD-10-CM

## 2024-04-01 LAB
POC INR: 2.5
POC PROTHROMBIN TIME: NORMAL

## 2024-04-01 PROCEDURE — 85610 PROTHROMBIN TIME: CPT | Mod: QW

## 2024-04-01 PROCEDURE — 99211 OFF/OP EST MAY X REQ PHY/QHP: CPT | Performed by: INTERNAL MEDICINE

## 2024-04-01 NOTE — PROGRESS NOTES
Patient identification verified with 2 identifiers.    Location: [facility] - 1st Floor Anticoagulation Clinic [address] [id number]    Referring Physician: DR Lozano  Enrollment/ Re-enrollment date:    INR Goal: 2.0-3.0  INR monitoring is per [facility] protocol.  Anticoagulation Medication: warfarin  Indication: Atrial Fibrillation/Atrial Flutter    Subjective   Bleeding signs/symptoms: No    Bruising: No   Major bleeding event: No  Thrombosis signs/symptoms: No  Thromboembolic event: No  Missed doses: No  Extra doses: No  Medication changes: No  Dietary changes: No  Change in health: No  Change in activity: No  Alcohol: No  Other concerns: No    Upcoming Procedures:  Does the Patient Have any upcoming procedures that require interruption in anticoagulation therapy? no  Does the patient require bridging? no      Anticoagulation Summary  As of 2024      INR goal:  2.0-3.0   TTR:  100.0 % (5.9 mo)   INR used for dosin.50 (2024)   Weekly warfarin total:  21 mg               Assessment/Plan   Therapeutic     1. New dose: no change    2. Next INR: 1 month      Education provided to patient during the visit:  Patient instructed to call in interim with questions, concerns and changes.

## 2024-04-29 ENCOUNTER — ANTICOAGULATION - WARFARIN VISIT (OUTPATIENT)
Dept: CARDIOLOGY | Facility: HOSPITAL | Age: 84
End: 2024-04-29
Payer: MEDICARE

## 2024-04-29 DIAGNOSIS — I48.11 LONGSTANDING PERSISTENT ATRIAL FIBRILLATION (MULTI): Primary | ICD-10-CM

## 2024-04-29 LAB
POC INR: 2.3
POC PROTHROMBIN TIME: NORMAL

## 2024-04-29 PROCEDURE — 85610 PROTHROMBIN TIME: CPT | Mod: QW

## 2024-04-29 PROCEDURE — 99211 OFF/OP EST MAY X REQ PHY/QHP: CPT | Performed by: INTERNAL MEDICINE

## 2024-04-29 NOTE — PROGRESS NOTES
Patient identification verified with 2 identifiers.    Location: [facility] - 1st Floor Anticoagulation Clinic [address] [id number]    Referring Physician: Dr Lozano  Enrollment/ Re-enrollment date:    INR Goal: 2.0-3.0  INR monitoring is per [facility] protocol.  Anticoagulation Medication: warfarin  Indication: Atrial Fibrillation/Atrial Flutter    Subjective   Bleeding signs/symptoms: No    Bruising: No   Major bleeding event: No  Thrombosis signs/symptoms: No  Thromboembolic event: No  Missed doses: No  Extra doses: No  Medication changes: No  Dietary changes: No  Change in health: No  Change in activity: No  Alcohol: No  Other concerns: No    Upcoming Procedures:  Does the Patient Have any upcoming procedures that require interruption in anticoagulation therapy? no  Does the patient require bridging? no      Anticoagulation Summary  As of 2024      INR goal:  2.0-3.0   TTR:  100.0% (6.9 mo)   INR used for dosin.30 (2024)   Weekly warfarin total:  21 mg               Assessment/Plan   Therapeutic     1. New dose: no change    2. Next INR: 1 month      Education provided to patient during the visit:  Patient instructed to call in interim with questions, concerns and changes.

## 2024-05-01 ENCOUNTER — HOSPITAL ENCOUNTER (EMERGENCY)
Facility: HOSPITAL | Age: 84
Discharge: HOME | End: 2024-05-01
Attending: EMERGENCY MEDICINE
Payer: MEDICARE

## 2024-05-01 ENCOUNTER — APPOINTMENT (OUTPATIENT)
Dept: RADIOLOGY | Facility: HOSPITAL | Age: 84
End: 2024-05-01
Payer: MEDICARE

## 2024-05-01 ENCOUNTER — APPOINTMENT (OUTPATIENT)
Dept: CARDIOLOGY | Facility: HOSPITAL | Age: 84
End: 2024-05-01
Payer: MEDICARE

## 2024-05-01 VITALS
HEART RATE: 78 BPM | HEIGHT: 62 IN | RESPIRATION RATE: 17 BRPM | TEMPERATURE: 97.9 F | SYSTOLIC BLOOD PRESSURE: 132 MMHG | BODY MASS INDEX: 40.48 KG/M2 | DIASTOLIC BLOOD PRESSURE: 77 MMHG | WEIGHT: 220 LBS | OXYGEN SATURATION: 93 %

## 2024-05-01 DIAGNOSIS — N39.0 ACUTE UTI: ICD-10-CM

## 2024-05-01 DIAGNOSIS — R53.1 GENERALIZED WEAKNESS: Primary | ICD-10-CM

## 2024-05-01 LAB
ALBUMIN SERPL-MCNC: 4.2 G/DL (ref 3.5–5)
ALP BLD-CCNC: 123 U/L (ref 35–125)
ALT SERPL-CCNC: 12 U/L (ref 5–40)
ANION GAP SERPL CALC-SCNC: 11 MMOL/L
APPEARANCE UR: CLEAR
AST SERPL-CCNC: 19 U/L (ref 5–40)
BACTERIA #/AREA URNS AUTO: ABNORMAL /HPF
BASOPHILS # BLD AUTO: 0.04 X10*3/UL (ref 0–0.1)
BASOPHILS NFR BLD AUTO: 0.6 %
BILIRUB SERPL-MCNC: 0.7 MG/DL (ref 0.1–1.2)
BILIRUB UR STRIP.AUTO-MCNC: NEGATIVE MG/DL
BUN SERPL-MCNC: 23 MG/DL (ref 8–25)
CALCIUM SERPL-MCNC: 9.8 MG/DL (ref 8.5–10.4)
CHLORIDE SERPL-SCNC: 103 MMOL/L (ref 97–107)
CO2 SERPL-SCNC: 25 MMOL/L (ref 24–31)
COLOR UR: YELLOW
CREAT SERPL-MCNC: 0.9 MG/DL (ref 0.4–1.6)
EGFRCR SERPLBLD CKD-EPI 2021: 63 ML/MIN/1.73M*2
EOSINOPHIL # BLD AUTO: 0.12 X10*3/UL (ref 0–0.4)
EOSINOPHIL NFR BLD AUTO: 1.8 %
ERYTHROCYTE [DISTWIDTH] IN BLOOD BY AUTOMATED COUNT: 14.7 % (ref 11.5–14.5)
GLUCOSE SERPL-MCNC: 113 MG/DL (ref 65–99)
GLUCOSE UR STRIP.AUTO-MCNC: NORMAL MG/DL
HCT VFR BLD AUTO: 35.1 % (ref 36–46)
HGB BLD-MCNC: 11.5 G/DL (ref 12–16)
IMM GRANULOCYTES # BLD AUTO: 0.04 X10*3/UL (ref 0–0.5)
IMM GRANULOCYTES NFR BLD AUTO: 0.6 % (ref 0–0.9)
KETONES UR STRIP.AUTO-MCNC: NEGATIVE MG/DL
LEUKOCYTE ESTERASE UR QL STRIP.AUTO: ABNORMAL
LYMPHOCYTES # BLD AUTO: 0.53 X10*3/UL (ref 0.8–3)
LYMPHOCYTES NFR BLD AUTO: 8 %
MCH RBC QN AUTO: 29.6 PG (ref 26–34)
MCHC RBC AUTO-ENTMCNC: 32.8 G/DL (ref 32–36)
MCV RBC AUTO: 90 FL (ref 80–100)
MONOCYTES # BLD AUTO: 0.51 X10*3/UL (ref 0.05–0.8)
MONOCYTES NFR BLD AUTO: 7.7 %
NEUTROPHILS # BLD AUTO: 5.39 X10*3/UL (ref 1.6–5.5)
NEUTROPHILS NFR BLD AUTO: 81.3 %
NITRITE UR QL STRIP.AUTO: ABNORMAL
NRBC BLD-RTO: 0 /100 WBCS (ref 0–0)
PH UR STRIP.AUTO: 6 [PH]
PLATELET # BLD AUTO: 137 X10*3/UL (ref 150–450)
POTASSIUM SERPL-SCNC: 3.9 MMOL/L (ref 3.4–5.1)
PROT SERPL-MCNC: 6.8 G/DL (ref 5.9–7.9)
PROT UR STRIP.AUTO-MCNC: ABNORMAL MG/DL
RBC # BLD AUTO: 3.89 X10*6/UL (ref 4–5.2)
RBC # UR STRIP.AUTO: NEGATIVE /UL
RBC #/AREA URNS AUTO: ABNORMAL /HPF
SODIUM SERPL-SCNC: 139 MMOL/L (ref 133–145)
SP GR UR STRIP.AUTO: 1.02
SQUAMOUS #/AREA URNS AUTO: ABNORMAL /HPF
TROPONIN T SERPL-MCNC: 16 NG/L
TROPONIN T SERPL-MCNC: 16 NG/L
UROBILINOGEN UR STRIP.AUTO-MCNC: NORMAL MG/DL
WBC # BLD AUTO: 6.6 X10*3/UL (ref 4.4–11.3)
WBC #/AREA URNS AUTO: ABNORMAL /HPF
WBC CLUMPS #/AREA URNS AUTO: ABNORMAL /HPF

## 2024-05-01 PROCEDURE — 36415 COLL VENOUS BLD VENIPUNCTURE: CPT | Performed by: EMERGENCY MEDICINE

## 2024-05-01 PROCEDURE — 93005 ELECTROCARDIOGRAM TRACING: CPT

## 2024-05-01 PROCEDURE — 71045 X-RAY EXAM CHEST 1 VIEW: CPT | Performed by: RADIOLOGY

## 2024-05-01 PROCEDURE — 81001 URINALYSIS AUTO W/SCOPE: CPT | Performed by: EMERGENCY MEDICINE

## 2024-05-01 PROCEDURE — 87086 URINE CULTURE/COLONY COUNT: CPT | Mod: TRILAB,WESLAB | Performed by: EMERGENCY MEDICINE

## 2024-05-01 PROCEDURE — 71045 X-RAY EXAM CHEST 1 VIEW: CPT

## 2024-05-01 PROCEDURE — 2500000001 HC RX 250 WO HCPCS SELF ADMINISTERED DRUGS (ALT 637 FOR MEDICARE OP): Performed by: EMERGENCY MEDICINE

## 2024-05-01 PROCEDURE — 80053 COMPREHEN METABOLIC PANEL: CPT | Performed by: EMERGENCY MEDICINE

## 2024-05-01 PROCEDURE — 2500000001 HC RX 250 WO HCPCS SELF ADMINISTERED DRUGS (ALT 637 FOR MEDICARE OP)

## 2024-05-01 PROCEDURE — 84484 ASSAY OF TROPONIN QUANT: CPT | Performed by: EMERGENCY MEDICINE

## 2024-05-01 PROCEDURE — 85025 COMPLETE CBC W/AUTO DIFF WBC: CPT | Performed by: EMERGENCY MEDICINE

## 2024-05-01 PROCEDURE — 99283 EMERGENCY DEPT VISIT LOW MDM: CPT | Mod: 25

## 2024-05-01 RX ORDER — DIAZEPAM 5 MG/ML
INJECTION, SOLUTION INTRAMUSCULAR; INTRAVENOUS ONCE
Status: CANCELLED | OUTPATIENT
Start: 2024-05-01 | End: 2024-05-01

## 2024-05-01 RX ORDER — DIAZEPAM 2 MG/1
2 TABLET ORAL ONCE
Status: COMPLETED | OUTPATIENT
Start: 2024-05-01 | End: 2024-05-01

## 2024-05-01 RX ORDER — CEPHALEXIN 250 MG/1
250 CAPSULE ORAL ONCE
Status: COMPLETED | OUTPATIENT
Start: 2024-05-01 | End: 2024-05-01

## 2024-05-01 RX ORDER — CEPHALEXIN 250 MG/5ML
250 POWDER, FOR SUSPENSION ORAL ONCE
Status: DISCONTINUED | OUTPATIENT
Start: 2024-05-01 | End: 2024-05-01 | Stop reason: ALTCHOICE

## 2024-05-01 RX ORDER — DIAZEPAM 2 MG/1
TABLET ORAL ONCE
Status: CANCELLED | OUTPATIENT
Start: 2024-05-01 | End: 2024-05-01

## 2024-05-01 RX ORDER — DIAZEPAM 2 MG/1
2 TABLET ORAL ONCE
Status: CANCELLED | OUTPATIENT
Start: 2024-05-01 | End: 2024-05-01

## 2024-05-01 RX ORDER — CEPHALEXIN 250 MG/1
250 CAPSULE ORAL 2 TIMES DAILY
Qty: 14 CAPSULE | Refills: 0 | Status: SHIPPED | OUTPATIENT
Start: 2024-05-01 | End: 2024-05-08

## 2024-05-01 RX ADMIN — DIAZEPAM 2 MG: 2 TABLET ORAL at 08:27

## 2024-05-01 RX ADMIN — CEPHALEXIN 250 MG: 250 CAPSULE ORAL at 12:23

## 2024-05-01 ASSESSMENT — PAIN - FUNCTIONAL ASSESSMENT
PAIN_FUNCTIONAL_ASSESSMENT: 0-10
PAIN_FUNCTIONAL_ASSESSMENT: 0-10

## 2024-05-01 ASSESSMENT — COLUMBIA-SUICIDE SEVERITY RATING SCALE - C-SSRS
1. IN THE PAST MONTH, HAVE YOU WISHED YOU WERE DEAD OR WISHED YOU COULD GO TO SLEEP AND NOT WAKE UP?: NO
6. HAVE YOU EVER DONE ANYTHING, STARTED TO DO ANYTHING, OR PREPARED TO DO ANYTHING TO END YOUR LIFE?: NO
2. HAVE YOU ACTUALLY HAD ANY THOUGHTS OF KILLING YOURSELF?: NO

## 2024-05-01 ASSESSMENT — PAIN SCALES - GENERAL
PAINLEVEL_OUTOF10: 0 - NO PAIN
PAINLEVEL_OUTOF10: 0 - NO PAIN

## 2024-05-01 NOTE — ED PROVIDER NOTES
HPI   Chief Complaint   Patient presents with    Weakness, Gen     X 3 days of generalized weakness, restless, shaking, unable to sleep , no congestion, vomit or diarrhea, denies CP denies SOB       HPI     83-year-old female who presents emergency department with generalized weakness and not feeling well for the last 2 weeks or so.  She says her cough is any worse than normal.  She denies any headache, chest pain or shortness of breath.  She was concerned that her potassium might be low?  She says she has felt anxious and has not been sleeping well               No data recorded                   Patient History   Past Medical History:   Diagnosis Date    Abnormal laboratory test result 09/01/2023    Anemia due to acute blood loss 09/01/2023    CHF (congestive heart failure) (Multi)     Chill 09/01/2023    Diabetes mellitus (Multi)     Flank pain 09/01/2023    Hypertension     Hypoxemia 09/01/2023    Hypoxia 09/01/2023    Melena 09/01/2023    Other chronic pain 09/01/2023    Pneumonia 09/01/2023    Pyuria 09/01/2023     History reviewed. No pertinent surgical history.  Family History   Problem Relation Name Age of Onset    Leukemia Mother      Cancer Mother      Heart disease Father      Other (heart problems) Father       Social History     Tobacco Use    Smoking status: Never     Passive exposure: Never    Smokeless tobacco: Never   Vaping Use    Vaping status: Never Used   Substance Use Topics    Alcohol use: Never    Drug use: Never       Physical Exam   ED Triage Vitals [05/01/24 0750]   Temperature Heart Rate Respirations BP   36.8 °C (98.2 °F) 96 18 (!) 150/101      Pulse Ox Temp Source Heart Rate Source Patient Position   (!) 93 % Oral Monitor Sitting      BP Location FiO2 (%)     Right arm --       Physical Exam  Vitals and nursing note reviewed.   Constitutional:       General: She is not in acute distress.     Appearance: She is well-developed.      Comments: 83-year-old pleasant white female sitting  quietly and speaking clearly.  She is mildly anxious appearing.  She is accompanied by her daughter.  GCS is 15   HENT:      Head: Normocephalic and atraumatic.   Eyes:      Conjunctiva/sclera: Conjunctivae normal.   Cardiovascular:      Rate and Rhythm: Normal rate and regular rhythm.      Heart sounds: No murmur heard.  Pulmonary:      Effort: Pulmonary effort is normal. No respiratory distress.      Breath sounds: Normal breath sounds.   Abdominal:      Palpations: Abdomen is soft.      Tenderness: There is no abdominal tenderness.   Musculoskeletal:         General: No swelling.      Cervical back: Neck supple.   Skin:     General: Skin is warm and dry.      Capillary Refill: Capillary refill takes less than 2 seconds.   Neurological:      Mental Status: She is alert.   Psychiatric:         Mood and Affect: Mood is anxious.         Cognition and Memory: Cognition and memory normal.         Judgment: Judgment normal.         ED Course & MDM   ED Course as of 05/01/24 1200   Wed May 01, 2024   0840 Atrial fibrillation with premature ventricular or a aberrantly conducted complexes.  Rate of 90.  Rightward axis.  Low voltage QRS.  No evidence of ischemia  Similar to EKG 1/12/2023 [JN]      ED Course User Index  [JN] Mahendra Can MD         Diagnoses as of 05/01/24 1200   Generalized weakness   Acute UTI       Medical Decision Making       83-year-old female who presents emergency department with generalized weakness and not feeling well for the last 2 weeks or so.  She says her cough is any worse than normal.  She denies any headache, chest pain or shortness of breath.  She was concerned that her potassium might be low?  She says she has felt anxious and has not been sleeping well       Physical Exam  Vitals and nursing note reviewed.   Constitutional:       General: She is not in acute distress.     Appearance: She is well-developed.      Comments: 83-year-old pleasant white female sitting quietly and  speaking clearly.  She is mildly anxious appearing.  She is accompanied by her daughter.  GCS is 15   HENT:      Head: Normocephalic and atraumatic.   Eyes:      Conjunctiva/sclera: Conjunctivae normal.   Cardiovascular:      Rate and Rhythm: Normal rate and regular rhythm.      Heart sounds: No murmur heard.  Pulmonary:      Effort: Pulmonary effort is normal. No respiratory distress.      Breath sounds: Normal breath sounds.     11:58 AM update  Patient's had some generalized anxiety and has not been feeling well for the last several days.  Urine shows 21-50 white blood cells along with positive leukocytes and nitrites    Other chest x-ray shows some mild congestion her EKG does not show any evidence of ischemia.  High-sensitivity troponins are flat at 16 making an cardiac etiology very unlikely  CBC and CMP are unremarkable    Patient was given 2 mg of Valium which helped considerably.  We discussed that it looks like she has a UTI  She is started on Keflex in the emergency department and discharged with a prescription for the same    Daughter says she does fine with this drug    She is to follow-up with her family doctor or return if worse      Procedure  Procedures     Mahendra Can MD  05/01/24 1200

## 2024-05-04 LAB — BACTERIA UR CULT: ABNORMAL

## 2024-05-05 ENCOUNTER — TELEPHONE (OUTPATIENT)
Dept: PHARMACY | Facility: HOSPITAL | Age: 84
End: 2024-05-05
Payer: MEDICARE

## 2024-05-05 NOTE — PROGRESS NOTES
EDPD Note: Antibiotics Reviewed and Warranted    Contacted Ms. Raúl Jordan regarding a positive urine culture that was taken during their recent emergency room visit. I completed education with patient . The patient is being treated with cephalexin 250 mg BID x 7 days. Patient seen in the ED for weakness and decreased appetite. She did not endorse any urinary symptoms in the ED, though she believes her weakness is related to the UTI. The appropriate dose of cephalexin for acute cystitis is 500 mg BID x 7 days. However, the patient states that her symptoms are resolved and she is feeling much better, so no need to increase dose of cephalexin at this time. She will reach out to her PCP if she develops any new symptoms.     Susceptibility data from last 90 days.  Collected Specimen Info Organism Amoxicillin/Clavulanate Ampicillin Ampicillin/Sulbactam Cefazolin Cefazolin (uncomplicated UTIs only) Ciprofloxacin Gentamicin Nitrofurantoin Piperacillin/Tazobactam Trimethoprim/Sulfamethoxazole   05/01/24 Urine from Clean Catch/Voided Klebsiella pneumoniae/variicola S R S S S S S S S S        No further follow up needed from EDPD Team.     Christiane Saini, PharmD

## 2024-05-06 DIAGNOSIS — G47.00 INSOMNIA, UNSPECIFIED TYPE: Primary | ICD-10-CM

## 2024-05-06 RX ORDER — TRAZODONE HYDROCHLORIDE 100 MG/1
200 TABLET ORAL NIGHTLY
Qty: 90 TABLET | Refills: 0 | Status: SHIPPED | OUTPATIENT
Start: 2024-05-06

## 2024-05-14 ENCOUNTER — APPOINTMENT (OUTPATIENT)
Dept: PRIMARY CARE | Facility: CLINIC | Age: 84
End: 2024-05-14
Payer: MEDICARE

## 2024-05-20 DIAGNOSIS — I48.11 LONGSTANDING PERSISTENT ATRIAL FIBRILLATION (MULTI): ICD-10-CM

## 2024-05-20 RX ORDER — WARFARIN SODIUM 3 MG/1
TABLET ORAL
Qty: 90 TABLET | Refills: 0 | Status: SHIPPED | OUTPATIENT
Start: 2024-05-20

## 2024-06-03 ENCOUNTER — APPOINTMENT (OUTPATIENT)
Dept: CARDIOLOGY | Facility: HOSPITAL | Age: 84
End: 2024-06-03
Payer: MEDICARE

## 2024-06-03 ENCOUNTER — ANTICOAGULATION - WARFARIN VISIT (OUTPATIENT)
Dept: CARDIOLOGY | Facility: HOSPITAL | Age: 84
End: 2024-06-03
Payer: MEDICARE

## 2024-06-05 ENCOUNTER — OFFICE VISIT (OUTPATIENT)
Dept: PRIMARY CARE | Facility: CLINIC | Age: 84
End: 2024-06-05
Payer: MEDICARE

## 2024-06-05 ENCOUNTER — ANTICOAGULATION - WARFARIN VISIT (OUTPATIENT)
Dept: CARDIOLOGY | Facility: HOSPITAL | Age: 84
End: 2024-06-05
Payer: MEDICARE

## 2024-06-05 VITALS
SYSTOLIC BLOOD PRESSURE: 130 MMHG | TEMPERATURE: 97.2 F | HEART RATE: 100 BPM | WEIGHT: 221 LBS | DIASTOLIC BLOOD PRESSURE: 62 MMHG | OXYGEN SATURATION: 94 % | BODY MASS INDEX: 40.42 KG/M2

## 2024-06-05 DIAGNOSIS — I48.11 LONGSTANDING PERSISTENT ATRIAL FIBRILLATION (MULTI): Primary | ICD-10-CM

## 2024-06-05 DIAGNOSIS — E11.69 TYPE 2 DIABETES MELLITUS WITH OBESITY (MULTI): ICD-10-CM

## 2024-06-05 DIAGNOSIS — E78.2 MIXED HYPERLIPIDEMIA: ICD-10-CM

## 2024-06-05 DIAGNOSIS — R30.0 DYSURIA: Primary | ICD-10-CM

## 2024-06-05 DIAGNOSIS — E66.9 TYPE 2 DIABETES MELLITUS WITH OBESITY (MULTI): ICD-10-CM

## 2024-06-05 DIAGNOSIS — E11.9 DIABETES MELLITUS WITHOUT COMPLICATION (MULTI): ICD-10-CM

## 2024-06-05 LAB
HOLD SPECIMEN: NORMAL
POC APPEARANCE, URINE: CLEAR
POC BILIRUBIN, URINE: NEGATIVE
POC BLOOD, URINE: ABNORMAL
POC COLOR, URINE: ABNORMAL
POC GLUCOSE, URINE: NEGATIVE MG/DL
POC HEMOGLOBIN A1C: 6 % (ref 4.2–6.5)
POC INR: 3.2
POC KETONES, URINE: NEGATIVE MG/DL
POC LEUKOCYTES, URINE: ABNORMAL
POC NITRITE,URINE: NEGATIVE
POC PH, URINE: 5.5 PH
POC PROTEIN, URINE: ABNORMAL MG/DL
POC PROTHROMBIN TIME: NORMAL
POC SPECIFIC GRAVITY, URINE: 1.02
POC UROBILINOGEN, URINE: 0.2 EU/DL

## 2024-06-05 PROCEDURE — G2211 COMPLEX E/M VISIT ADD ON: HCPCS | Performed by: INTERNAL MEDICINE

## 2024-06-05 PROCEDURE — 1157F ADVNC CARE PLAN IN RCRD: CPT | Performed by: INTERNAL MEDICINE

## 2024-06-05 PROCEDURE — 3078F DIAST BP <80 MM HG: CPT | Performed by: INTERNAL MEDICINE

## 2024-06-05 PROCEDURE — 99213 OFFICE O/P EST LOW 20 MIN: CPT | Performed by: INTERNAL MEDICINE

## 2024-06-05 PROCEDURE — 3075F SYST BP GE 130 - 139MM HG: CPT | Performed by: INTERNAL MEDICINE

## 2024-06-05 PROCEDURE — 83036 HEMOGLOBIN GLYCOSYLATED A1C: CPT | Mod: QW | Performed by: INTERNAL MEDICINE

## 2024-06-05 PROCEDURE — 1158F ADVNC CARE PLAN TLK DOCD: CPT | Performed by: INTERNAL MEDICINE

## 2024-06-05 PROCEDURE — 85610 PROTHROMBIN TIME: CPT | Mod: QW

## 2024-06-05 PROCEDURE — 99211 OFF/OP EST MAY X REQ PHY/QHP: CPT | Performed by: INTERNAL MEDICINE

## 2024-06-05 PROCEDURE — 81002 URINALYSIS NONAUTO W/O SCOPE: CPT | Performed by: INTERNAL MEDICINE

## 2024-06-05 PROCEDURE — 1123F ACP DISCUSS/DSCN MKR DOCD: CPT | Performed by: INTERNAL MEDICINE

## 2024-06-05 PROCEDURE — 1159F MED LIST DOCD IN RCRD: CPT | Performed by: INTERNAL MEDICINE

## 2024-06-05 PROCEDURE — 1125F AMNT PAIN NOTED PAIN PRSNT: CPT | Performed by: INTERNAL MEDICINE

## 2024-06-05 RX ORDER — SIMVASTATIN 10 MG/1
10 TABLET, FILM COATED ORAL NIGHTLY
Qty: 90 TABLET | Refills: 3 | Status: SHIPPED | OUTPATIENT
Start: 2024-06-05 | End: 2025-05-31

## 2024-06-05 ASSESSMENT — PATIENT HEALTH QUESTIONNAIRE - PHQ9
2. FEELING DOWN, DEPRESSED OR HOPELESS: NOT AT ALL
1. LITTLE INTEREST OR PLEASURE IN DOING THINGS: NOT AT ALL
SUM OF ALL RESPONSES TO PHQ9 QUESTIONS 1 AND 2: 0

## 2024-06-05 ASSESSMENT — ENCOUNTER SYMPTOMS
VOMITING: 0
ABDOMINAL PAIN: 0
TREMORS: 0
BLOOD IN STOOL: 0
SHORTNESS OF BREATH: 0
UNEXPECTED WEIGHT CHANGE: 0
LOSS OF SENSATION IN FEET: 0
TROUBLE SWALLOWING: 0
WHEEZING: 0
OCCASIONAL FEELINGS OF UNSTEADINESS: 0
NUMBNESS: 0
SEIZURES: 0
FATIGUE: 0
POLYDIPSIA: 0
SINUS PRESSURE: 0
MYALGIAS: 0
BACK PAIN: 0
CHILLS: 0
DYSURIA: 0
DEPRESSION: 0
POLYPHAGIA: 0
NAUSEA: 0
FREQUENCY: 0
PALPITATIONS: 0
ARTHRALGIAS: 1
COUGH: 0

## 2024-06-05 ASSESSMENT — PAIN SCALES - GENERAL: PAINLEVEL: 2

## 2024-06-05 NOTE — PROGRESS NOTES
Patient identification verified with 2 identifiers.    Location: [facility] [place] - 1st Floor Anticoagulation Clinic [address] [id number]    Referring Physician: DR Lozano  Enrollment/ Re-enrollment date: 2024   INR Goal: 2.0-3.0  INR monitoring is per [facility] protocol.  Anticoagulation Medication: warfarin  Indication: Atrial Fibrillation/Atrial Flutter    Subjective   Bleeding signs/symptoms:      Bruising:     Major bleeding event:    Thrombosis signs/symptoms:    Thromboembolic event:    Missed doses:    Extra doses:    Medication changes:    Dietary changes:    Change in health:    Change in activity:    Alcohol:    Other concerns:      Upcoming Procedures:  Does the Patient Have any upcoming procedures that require interruption in anticoagulation therapy? no  Does the patient require bridging? no      Anticoagulation Summary  As of 6/5/2024      INR goal:  2.0-3.0   TTR:  100.0% (6.9 mo)   INR used for dosing:                 Assessment/Plan   Therapeutic     1. New dose: no change    2. Next INR: 1 month      Education provided to patient during the visit:  Patient instructed to call in interim with questions, concerns and changes.

## 2024-06-05 NOTE — PROGRESS NOTES
Subjective   Patient ID: Raúl Jordan is a 83 y.o. female who presents for Follow-up (4MON FOLLOW UP, possible uti).    HPI      She has a history of hypertension, hyperlipidemia, atrial fibrillation, diabetes mellitus, congestive heart failure, anxiety, status post ICD placement, obstructive sleep apnea         H/O congestive heart failure- Sees Dr Lozano. She was admitted in the hospital from 05/6- 05/10/23 for acute heart failure.          Cardizem was switched to Metoprolol. Stopped Jardiance due to cost.         H/O Diabetes Mellitus-   Stopped metformin early 2024                 H/O A fib- on Coumadin.   Injury of RLE after she closed car door on her leg accidentally in October 2023, swelling much better, scab is dry    Last month was treated for UTI went to ER, feels like she has similar symptoms    Review of Systems   Constitutional:  Negative for chills, fatigue and unexpected weight change.   HENT:  Negative for postnasal drip, sinus pressure and trouble swallowing.    Respiratory:  Negative for cough, shortness of breath and wheezing.    Cardiovascular:  Negative for chest pain, palpitations and leg swelling.   Gastrointestinal:  Negative for abdominal pain, blood in stool, nausea and vomiting.   Endocrine: Negative for polydipsia, polyphagia and polyuria.   Genitourinary:  Negative for dysuria and frequency.   Musculoskeletal:  Positive for arthralgias. Negative for back pain and myalgias.   Skin:  Negative for rash.   Neurological:  Negative for tremors, seizures and numbness.   Psychiatric/Behavioral:  Negative for behavioral problems.        Objective   /62 (BP Location: Left arm, Patient Position: Sitting, BP Cuff Size: Adult)   Pulse 100   Temp 36.2 °C (97.2 °F) (Temporal)   Wt 100 kg (221 lb)   SpO2 94%   BMI 40.42 kg/m²     Physical Exam  Constitutional:       General: She is not in acute distress.  HENT:      Head: Normocephalic and atraumatic.   Eyes:      Extraocular Movements:  Extraocular movements intact.      Conjunctiva/sclera: Conjunctivae normal.   Cardiovascular:      Rate and Rhythm: Normal rate and regular rhythm.      Pulses: Normal pulses.      Heart sounds: No murmur heard.  Pulmonary:      Effort: Pulmonary effort is normal.      Breath sounds: Normal breath sounds. No wheezing or rales.   Abdominal:      General: Bowel sounds are normal.      Palpations: Abdomen is soft. There is no mass.      Tenderness: There is no abdominal tenderness. There is no guarding.   Musculoskeletal:      Comments: Trace pedal edema bilaterally, right lower extremity mid shin area hyperpigmentation   Skin:     General: Skin is warm and dry.      Capillary Refill: Capillary refill takes less than 2 seconds.      Findings: No lesion.   Neurological:      General: No focal deficit present.      Mental Status: She is alert and oriented to person, place, and time.      Cranial Nerves: No cranial nerve deficit.      Gait: Gait normal.   Psychiatric:         Mood and Affect: Mood normal.         Assessment/Plan        Raúl was seen today for follow-up.  Diagnoses and all orders for this visit:  Dysuria (Primary)  -     Cancel: Urinalysis with Reflex Culture and Microscopic; Future  -     Urinalysis with Reflex Culture and Microscopic  -     POCT UA (nonautomated) manually resulted  Diabetes mellitus without complication (Multi)  -     POCT glycosylated hemoglobin (Hb A1C) manually resulted  Mixed hyperlipidemia  -     simvastatin (Zocor) 10 mg tablet; Take 1 tablet (10 mg) by mouth once daily at bedtime.  Type 2 diabetes mellitus with obesity (Multi)           Lab Results   Component Value Date    HGBA1C 6.0 06/05/2024        Lab Results   Component Value Date    WBC 6.6 05/01/2024    HGB 11.5 (L) 05/01/2024    HCT 35.1 (L) 05/01/2024    MCV 90 05/01/2024     (L) 05/01/2024        Lab Results   Component Value Date    GLUCOSE 113 (H) 05/01/2024    CALCIUM 9.8 05/01/2024     05/01/2024    K  3.9 05/01/2024    CO2 25 05/01/2024     05/01/2024    BUN 23 05/01/2024    CREATININE 0.90 05/01/2024       Current Outpatient Medications   Medication Instructions    acetaminophen (TYLENOL) 325 mg, oral, Every 6 hours PRN    albuterol 90 mcg/actuation inhaler 2 puffs, inhalation, Every 6 hours PRN    furosemide (LASIX) 80 mg, oral, Every morning,  AS DIRECTED<BR>    Jantoven 3 mg tablet TAKE ONE TABLET BY MOUTH ONCE A DAY IN THE EVENING    losartan (COZAAR) 25 mg, oral, Daily    metoprolol succinate XL (TOPROL-XL) 50 mg, oral, Daily    pramipexole (MIRAPEX) 1.5 mg, oral, Daily    sertraline (ZOLOFT) 100 mg, oral, Daily    simvastatin (ZOCOR) 10 mg, oral, Nightly    traZODone (DESYREL) 200 mg, oral, Nightly

## 2024-06-07 DIAGNOSIS — E11.9 TYPE 2 DIABETES MELLITUS WITHOUT COMPLICATION, WITHOUT LONG-TERM CURRENT USE OF INSULIN (MULTI): ICD-10-CM

## 2024-06-07 PROBLEM — I20.9 ANGINA PECTORIS (CMS-HCC): Status: RESOLVED | Noted: 2023-09-01 | Resolved: 2024-06-07

## 2024-06-07 RX ORDER — SERTRALINE HYDROCHLORIDE 100 MG/1
100 TABLET, FILM COATED ORAL DAILY
Qty: 90 TABLET | Refills: 2 | Status: SHIPPED | OUTPATIENT
Start: 2024-06-07

## 2024-06-25 DIAGNOSIS — G47.00 INSOMNIA, UNSPECIFIED TYPE: ICD-10-CM

## 2024-06-25 DIAGNOSIS — G25.81 RESTLESS LEGS: Primary | ICD-10-CM

## 2024-06-25 RX ORDER — PRAMIPEXOLE DIHYDROCHLORIDE 1.5 MG/1
1.5 TABLET ORAL DAILY
Qty: 90 TABLET | Refills: 1 | Status: SHIPPED | OUTPATIENT
Start: 2024-06-25 | End: 2024-12-22

## 2024-06-25 RX ORDER — TRAZODONE HYDROCHLORIDE 100 MG/1
200 TABLET ORAL NIGHTLY
Qty: 180 TABLET | Refills: 1 | Status: SHIPPED | OUTPATIENT
Start: 2024-06-25 | End: 2024-12-22

## 2024-06-26 LAB
ATRIAL RATE: 312 BPM
Q ONSET: 227 MS
QRS COUNT: 15 BEATS
QRS DURATION: 70 MS
QT INTERVAL: 384 MS
QTC CALCULATION(BAZETT): 469 MS
QTC FREDERICIA: 439 MS
R AXIS: 100 DEGREES
T AXIS: 74 DEGREES
T OFFSET: 419 MS
VENTRICULAR RATE: 90 BPM

## 2024-07-03 ENCOUNTER — ANTICOAGULATION - WARFARIN VISIT (OUTPATIENT)
Dept: CARDIOLOGY | Facility: HOSPITAL | Age: 84
End: 2024-07-03
Payer: MEDICARE

## 2024-07-03 DIAGNOSIS — I48.11 LONGSTANDING PERSISTENT ATRIAL FIBRILLATION (MULTI): Primary | ICD-10-CM

## 2024-07-03 LAB
POC INR: 3.7
POC PROTHROMBIN TIME: NORMAL

## 2024-07-03 PROCEDURE — 99211 OFF/OP EST MAY X REQ PHY/QHP: CPT | Performed by: INTERNAL MEDICINE

## 2024-07-03 PROCEDURE — 85610 PROTHROMBIN TIME: CPT | Mod: QW

## 2024-07-03 NOTE — PROGRESS NOTES
Patient identification verified with 2 identifiers.    Location: [facility] [place] - 1st Floor Anticoagulation Clinic [address] [id number]    Referring Physician: DR Lozano  Enrollment/ Re-enrollment date: 2024   INR Goal: 2.0-3.0  INR monitoring is per [facility] protocol.  Anticoagulation Medication: warfarin  Indication: Atrial Fibrillation/Atrial Flutter    Subjective   Bleeding signs/symptoms: No    Bruising: No   Major bleeding event: No  Thrombosis signs/symptoms: No  Thromboembolic event: No  Missed doses: No  Extra doses: No  Medication changes: No  Dietary changes: No  Change in health: No  Change in activity: No  Alcohol: No  Other concerns: No    Upcoming Procedures:  Does the Patient Have any upcoming procedures that require interruption in anticoagulation therapy? no  Does the patient require bridging? no      Anticoagulation Summary  As of 7/3/2024      INR goal:  2.0-3.0   TTR:  86.6% (9 mo)   INR used for dosing:  3.70 (7/3/2024)   Weekly warfarin total:  21 mg               Assessment/Plan   Supratherapeutic     1. New dose: no change    2. Next INR: 2 weeks      Education provided to patient during the visit:  Patient instructed to call in interim with questions, concerns and changes.

## 2024-07-15 ENCOUNTER — APPOINTMENT (OUTPATIENT)
Dept: RADIOLOGY | Facility: HOSPITAL | Age: 84
End: 2024-07-15
Payer: MEDICARE

## 2024-07-15 ENCOUNTER — HOSPITAL ENCOUNTER (EMERGENCY)
Facility: HOSPITAL | Age: 84
Discharge: HOME | End: 2024-07-15
Payer: MEDICARE

## 2024-07-15 VITALS
HEIGHT: 64 IN | WEIGHT: 223.11 LBS | HEART RATE: 80 BPM | BODY MASS INDEX: 38.09 KG/M2 | RESPIRATION RATE: 15 BRPM | TEMPERATURE: 98.7 F | DIASTOLIC BLOOD PRESSURE: 55 MMHG | OXYGEN SATURATION: 94 % | SYSTOLIC BLOOD PRESSURE: 137 MMHG

## 2024-07-15 DIAGNOSIS — M54.41 ACUTE RIGHT-SIDED LOW BACK PAIN WITH RIGHT-SIDED SCIATICA: Primary | ICD-10-CM

## 2024-07-15 PROCEDURE — 99284 EMERGENCY DEPT VISIT MOD MDM: CPT

## 2024-07-15 PROCEDURE — 73502 X-RAY EXAM HIP UNI 2-3 VIEWS: CPT | Mod: RT

## 2024-07-15 PROCEDURE — 72100 X-RAY EXAM L-S SPINE 2/3 VWS: CPT | Performed by: RADIOLOGY

## 2024-07-15 PROCEDURE — 2500000004 HC RX 250 GENERAL PHARMACY W/ HCPCS (ALT 636 FOR OP/ED): Performed by: PHYSICIAN ASSISTANT

## 2024-07-15 PROCEDURE — 73502 X-RAY EXAM HIP UNI 2-3 VIEWS: CPT | Mod: RIGHT SIDE | Performed by: RADIOLOGY

## 2024-07-15 PROCEDURE — 2500000005 HC RX 250 GENERAL PHARMACY W/O HCPCS: Performed by: PHYSICIAN ASSISTANT

## 2024-07-15 PROCEDURE — 72100 X-RAY EXAM L-S SPINE 2/3 VWS: CPT

## 2024-07-15 PROCEDURE — 2500000001 HC RX 250 WO HCPCS SELF ADMINISTERED DRUGS (ALT 637 FOR MEDICARE OP): Performed by: PHYSICIAN ASSISTANT

## 2024-07-15 RX ORDER — LIDOCAINE 50 MG/G
1 PATCH TOPICAL DAILY
Qty: 10 PATCH | Refills: 0 | Status: SHIPPED | OUTPATIENT
Start: 2024-07-15 | End: 2024-07-25

## 2024-07-15 RX ORDER — PREDNISONE 20 MG/1
40 TABLET ORAL ONCE
Status: COMPLETED | OUTPATIENT
Start: 2024-07-15 | End: 2024-07-15

## 2024-07-15 RX ORDER — LIDOCAINE 560 MG/1
1 PATCH PERCUTANEOUS; TOPICAL; TRANSDERMAL DAILY
Status: DISCONTINUED | OUTPATIENT
Start: 2024-07-15 | End: 2024-07-15 | Stop reason: HOSPADM

## 2024-07-15 RX ORDER — PREDNISONE 20 MG/1
20 TABLET ORAL DAILY
Qty: 10 TABLET | Refills: 0 | Status: SHIPPED | OUTPATIENT
Start: 2024-07-15 | End: 2024-07-25

## 2024-07-15 RX ORDER — ACETAMINOPHEN 325 MG/1
975 TABLET ORAL ONCE
Status: COMPLETED | OUTPATIENT
Start: 2024-07-15 | End: 2024-07-15

## 2024-07-15 RX ORDER — ACETAMINOPHEN 325 MG/1
650 TABLET ORAL EVERY 6 HOURS PRN
Qty: 30 TABLET | Refills: 0 | Status: SHIPPED | OUTPATIENT
Start: 2024-07-15 | End: 2024-07-25

## 2024-07-15 RX ORDER — TRAMADOL HYDROCHLORIDE 50 MG/1
50 TABLET ORAL EVERY 6 HOURS PRN
Qty: 10 TABLET | Refills: 0 | Status: SHIPPED | OUTPATIENT
Start: 2024-07-15 | End: 2024-07-18

## 2024-07-15 RX ORDER — TRAMADOL HYDROCHLORIDE 50 MG/1
50 TABLET ORAL ONCE
Status: COMPLETED | OUTPATIENT
Start: 2024-07-15 | End: 2024-07-15

## 2024-07-15 ASSESSMENT — PAIN SCALES - GENERAL
PAINLEVEL_OUTOF10: 4
PAINLEVEL_OUTOF10: 9

## 2024-07-15 ASSESSMENT — PAIN - FUNCTIONAL ASSESSMENT: PAIN_FUNCTIONAL_ASSESSMENT: 0-10

## 2024-07-15 NOTE — ED PROVIDER NOTES
HPI   Chief Complaint   Patient presents with    Back Pain     Pt having pain in right lower back/buttock area shooting down her right leg.        83-year-old female presented emergency department with a chief complaint of pain that starts in her right low back radiates down the right lateral leg.  No specific injury or trauma but does have some knee pain that has been altering her gait as of recent.  She denies saddle anesthesia or bowel bladder dysfunction.  She is ambulatory.  She denies numbness or weakness.  Denies any direct injury or trauma.  No other complaint.                          Harris Coma Scale Score: 15                     Patient History   Past Medical History:   Diagnosis Date    Abnormal laboratory test result 09/01/2023    Anemia due to acute blood loss 09/01/2023    CHF (congestive heart failure) (Multi)     Chill 09/01/2023    Diabetes mellitus (Multi)     Flank pain 09/01/2023    Hypertension     Hypoxemia 09/01/2023    Hypoxia 09/01/2023    Melena 09/01/2023    Other chronic pain 09/01/2023    Pneumonia 09/01/2023    Pyuria 09/01/2023     History reviewed. No pertinent surgical history.  Family History   Problem Relation Name Age of Onset    Leukemia Mother      Cancer Mother      Heart disease Father      Other (heart problems) Father       Social History     Tobacco Use    Smoking status: Never     Passive exposure: Never    Smokeless tobacco: Never   Vaping Use    Vaping status: Never Used   Substance Use Topics    Alcohol use: Never    Drug use: Never       Physical Exam   ED Triage Vitals [07/15/24 1332]   Temperature Heart Rate Respirations BP   37.1 °C (98.7 °F) 80 15 137/55      Pulse Ox Temp Source Heart Rate Source Patient Position   94 % Temporal Monitor Lying      BP Location FiO2 (%)     Left arm --       Physical Exam  Vitals and nursing note reviewed.   Constitutional:       Appearance: Normal appearance.   HENT:      Head: Normocephalic.      Nose: Nose normal.       Mouth/Throat:      Mouth: Mucous membranes are moist.   Cardiovascular:      Rate and Rhythm: Normal rate.      Pulses: Normal pulses.   Pulmonary:      Effort: Pulmonary effort is normal.   Abdominal:      General: Abdomen is flat.   Musculoskeletal:      Cervical back: Normal range of motion.      Comments: Tenderness over right sacroiliac joint, forage motion of lower extremity bilaterally   Skin:     General: Skin is warm.   Neurological:      General: No focal deficit present.      Mental Status: She is alert and oriented to person, place, and time.   Psychiatric:         Mood and Affect: Mood normal.         ED Course & MDM   Diagnoses as of 07/15/24 1624   Acute right-sided low back pain with right-sided sciatica       Medical Decision Making  I have seen and evaluated this patient.  Physician available for consultation.  Vital signs have been reviewed.  All laboratory and diagnostic imaging is reviewed by myself and interpreted by myself unless otherwise stated.  Additionally imaging is interpreted by radiologist.    X-ray showed degenerative changes of sacroiliac joint and hip.  No acute fracture or dislocation overall impression is sacroiliac joint inflammation sciatica.  Placed on symptomatic pain control and steroid.  Released in good condition from emergency standpoint.  No evidence of cauda equina or compression injury.        Labs Reviewed - No data to display  XR lumbar spine 2-3 views   Final Result   Osteopenia degenerative changes             MACRO:   None        Signed by: Erna Min 7/15/2024 4:03 PM   Dictation workstation:   [id number]      XR hip right with pelvis when performed 2 or 3 views   Final Result   Degenerative changes of bilateral hips.        Degenerative changes of bilateral SI joints and lower lumbar spine.             MACRO:   None        Signed by: Erna Min 7/15/2024 4:03 PM   Dictation workstation:   [id number]        Medications   lidocaine 4 % patch 1 patch (1  patch transdermal Medication Applied 7/15/24 1929)   traMADol (Ultram) tablet 50 mg (50 mg oral Given 7/15/24 1524)   predniSONE (Deltasone) tablet 40 mg (40 mg oral Given 7/15/24 1524)   acetaminophen (Tylenol) tablet 975 mg (975 mg oral Given 7/15/24 1523)     New Prescriptions    ACETAMINOPHEN (TYLENOL) 325 MG TABLET    Take 2 tablets (650 mg) by mouth every 6 hours if needed for mild pain (1 - 3) for up to 10 days.    LIDOCAINE (LIDODERM) 5 % PATCH    Place 1 patch over 12 hours on the skin once daily for 10 doses. Remove & discard patch within 12 hours or as directed by MD.    PREDNISONE (DELTASONE) 20 MG TABLET    Take 1 tablet (20 mg) by mouth once daily for 10 days.    TRAMADOL (ULTRAM) 50 MG TABLET    Take 1 tablet (50 mg) by mouth every 6 hours if needed for severe pain (7 - 10) for up to 3 days.         Procedure  Procedures     Mahendra Koch PA-C  07/15/24 5974

## 2024-07-18 ENCOUNTER — APPOINTMENT (OUTPATIENT)
Dept: CARDIOLOGY | Facility: CLINIC | Age: 84
End: 2024-07-18
Payer: MEDICARE

## 2024-07-22 DIAGNOSIS — I48.11 LONGSTANDING PERSISTENT ATRIAL FIBRILLATION (MULTI): ICD-10-CM

## 2024-07-22 RX ORDER — WARFARIN SODIUM 3 MG/1
TABLET ORAL
Qty: 90 TABLET | Refills: 0 | Status: SHIPPED | OUTPATIENT
Start: 2024-07-22 | End: 2024-07-26 | Stop reason: SDUPTHER

## 2024-07-26 DIAGNOSIS — I48.11 LONGSTANDING PERSISTENT ATRIAL FIBRILLATION (MULTI): ICD-10-CM

## 2024-07-26 RX ORDER — WARFARIN SODIUM 3 MG/1
3 TABLET ORAL EVERY EVENING
Qty: 90 TABLET | Refills: 1 | Status: SHIPPED | OUTPATIENT
Start: 2024-07-26 | End: 2025-01-22

## 2024-07-31 ENCOUNTER — HOSPITAL ENCOUNTER (OUTPATIENT)
Dept: CARDIOLOGY | Facility: CLINIC | Age: 84
Discharge: HOME | End: 2024-07-31
Payer: MEDICARE

## 2024-07-31 ENCOUNTER — APPOINTMENT (OUTPATIENT)
Dept: CARDIOLOGY | Facility: HOSPITAL | Age: 84
End: 2024-07-31
Payer: MEDICARE

## 2024-07-31 DIAGNOSIS — I44.2 CHB (COMPLETE HEART BLOCK) (MULTI): ICD-10-CM

## 2024-07-31 DIAGNOSIS — Z95.0 PRESENCE OF CARDIAC PACEMAKER: ICD-10-CM

## 2024-07-31 PROCEDURE — 93294 REM INTERROG EVL PM/LDLS PM: CPT | Performed by: INTERNAL MEDICINE

## 2024-07-31 PROCEDURE — 93296 REM INTERROG EVL PM/IDS: CPT

## 2024-08-02 PROBLEM — Z95.9 CARDIAC DEVICE IN SITU: Status: RESOLVED | Noted: 2023-09-01 | Resolved: 2024-08-02

## 2024-08-02 NOTE — ASSESSMENT & PLAN NOTE
Last LDL chol 74 on panel from 8/23  Patient will be seeing Dr. Ricketts in October, I will place an order for lipid panel to be checked at that time.

## 2024-08-05 ENCOUNTER — ANTICOAGULATION - WARFARIN VISIT (OUTPATIENT)
Dept: CARDIOLOGY | Facility: HOSPITAL | Age: 84
End: 2024-08-05
Payer: MEDICARE

## 2024-08-05 DIAGNOSIS — I48.91 ATRIAL FIBRILLATION, UNSPECIFIED TYPE (MULTI): Primary | ICD-10-CM

## 2024-08-05 LAB
POC INR: 3.1
POC PROTHROMBIN TIME: NORMAL

## 2024-08-05 PROCEDURE — 99211 OFF/OP EST MAY X REQ PHY/QHP: CPT | Performed by: INTERNAL MEDICINE

## 2024-08-05 PROCEDURE — 85610 PROTHROMBIN TIME: CPT | Mod: QW

## 2024-08-05 NOTE — PROGRESS NOTES
Patient identification verified with 2 identifiers.    Location: [facility] - 1st Floor Anticoagulation Clinic [address] [id number]    Referring Physician: DR angel  Enrollment/ Re-enrollment date: 2024   INR Goal: 2.0-3.0  INR monitoring is per [facility] protocol.  Anticoagulation Medication: warfarin  Indication: Atrial Fibrillation/Atrial Flutter    Subjective   Bleeding signs/symptoms: No    Bruising: No   Major bleeding event: No  Thrombosis signs/symptoms: No  Thromboembolic event: No  Missed doses: No  Extra doses: No  Medication changes: No  Dietary changes: No  Change in health: No  Change in activity: No  Alcohol: No  Other concerns: No    Upcoming Procedures:  Does the Patient Have any upcoming procedures that require interruption in anticoagulation therapy? no  Does the patient require bridging? no      Anticoagulation Summary  As of 8/5/2024      INR goal:  2.0-3.0   TTR:  77.2% (10.1 mo)   INR used for dosing:  3.10 (8/5/2024)   Weekly warfarin total:  19.5 mg               Assessment/Plan   Therapeutic     1. New dose: no change    2. Next INR: 1 month      Education provided to patient during the visit:  Patient instructed to call in interim with questions, concerns and changes.

## 2024-08-12 ENCOUNTER — OFFICE VISIT (OUTPATIENT)
Dept: CARDIOLOGY | Facility: CLINIC | Age: 84
End: 2024-08-12
Payer: MEDICARE

## 2024-08-12 VITALS
WEIGHT: 228 LBS | SYSTOLIC BLOOD PRESSURE: 126 MMHG | HEART RATE: 70 BPM | DIASTOLIC BLOOD PRESSURE: 60 MMHG | BODY MASS INDEX: 39.14 KG/M2

## 2024-08-12 DIAGNOSIS — I50.32 CHRONIC HEART FAILURE WITH PRESERVED EJECTION FRACTION (MULTI): ICD-10-CM

## 2024-08-12 DIAGNOSIS — E78.2 MIXED HYPERLIPIDEMIA: ICD-10-CM

## 2024-08-12 DIAGNOSIS — Z95.0 S/P PLACEMENT OF CARDIAC PACEMAKER: ICD-10-CM

## 2024-08-12 DIAGNOSIS — I48.11 LONGSTANDING PERSISTENT ATRIAL FIBRILLATION (MULTI): Primary | ICD-10-CM

## 2024-08-12 DIAGNOSIS — I10 PRIMARY HYPERTENSION: ICD-10-CM

## 2024-08-12 PROCEDURE — 99213 OFFICE O/P EST LOW 20 MIN: CPT | Performed by: INTERNAL MEDICINE

## 2024-08-12 PROCEDURE — 1157F ADVNC CARE PLAN IN RCRD: CPT | Performed by: INTERNAL MEDICINE

## 2024-08-12 PROCEDURE — 1159F MED LIST DOCD IN RCRD: CPT | Performed by: INTERNAL MEDICINE

## 2024-08-12 PROCEDURE — 3074F SYST BP LT 130 MM HG: CPT | Performed by: INTERNAL MEDICINE

## 2024-08-12 PROCEDURE — 1125F AMNT PAIN NOTED PAIN PRSNT: CPT | Performed by: INTERNAL MEDICINE

## 2024-08-12 PROCEDURE — 3078F DIAST BP <80 MM HG: CPT | Performed by: INTERNAL MEDICINE

## 2024-08-12 PROCEDURE — 1036F TOBACCO NON-USER: CPT | Performed by: INTERNAL MEDICINE

## 2024-08-12 ASSESSMENT — ENCOUNTER SYMPTOMS
HEMATURIA: 0
SHORTNESS OF BREATH: 0
BLURRED VISION: 0
DEPRESSION: 0
DYSPNEA ON EXERTION: 0
NUMBNESS: 0
OCCASIONAL FEELINGS OF UNSTEADINESS: 1
DYSURIA: 0
PALPITATIONS: 0
LOSS OF SENSATION IN FEET: 0
PARESTHESIAS: 0
ABDOMINAL PAIN: 0
COUGH: 0

## 2024-08-12 ASSESSMENT — PAIN SCALES - GENERAL: PAINLEVEL: 8

## 2024-08-12 NOTE — PROGRESS NOTES
Subjective   Raúl Jordan is a 84 y.o. female.    Chief Complaint:  Follow-up    HPI  Patient doing well with the right knee pain which limits her mobility significantly.    Review of Systems   Constitutional: Negative for malaise/fatigue.   HENT:  Negative for congestion.    Eyes:  Negative for blurred vision.   Cardiovascular:  Negative for chest pain, dyspnea on exertion and palpitations.   Respiratory:  Negative for cough and shortness of breath.    Musculoskeletal:  Positive for arthritis and joint pain.   Gastrointestinal:  Negative for abdominal pain.   Genitourinary:  Negative for dysuria and hematuria.   Neurological:  Negative for numbness and paresthesias.       Objective   Constitutional:       Appearance: Not in distress.   Eyes:      Conjunctiva/sclera: Conjunctivae normal.   Neck:      Vascular: JVD normal.   Pulmonary:      Breath sounds: Normal breath sounds. No wheezing. No rhonchi. No rales.   Cardiovascular:      Normal rate. Irregularly irregular rhythm.      Murmurs: There is no murmur.      No gallop.  No click. No rub.   Abdominal:      Palpations: Abdomen is soft.   Neurological:      General: No focal deficit present.      Mental Status: Alert.         Lab Review:   Anticoagulation - Warfarin Visit on 08/05/2024   Component Date Value    POC INR 08/05/2024 3.10    Anticoagulation - Warfarin Visit on 07/03/2024   Component Date Value    POC INR 07/03/2024 3.70        Assessment/Plan   The primary encounter diagnosis was Longstanding persistent atrial fibrillation (Multi). Diagnoses of Chronic heart failure with preserved ejection fraction (Multi), Mixed hyperlipidemia, Primary hypertension, and S/P placement of cardiac pacemaker were also pertinent to this visit.    S/P placement of cardiac pacemaker  Dr. Sheth follows pacemaker    Hyperlipidemia  Last LDL chol 74 on panel from 8/23  Patient will be seeing Dr. Ricketts in October, I will place an order for lipid panel to be checked at that  time.    Heart failure with preserved ejection fraction (Multi)  Appears to be well compensated at this time.  We work on to attempt a trial of Jardiance but medication just cost prohibitive.  Will thus continue to follow on a clinical basis on current medications.    Hypertension  Blood pressure adequately controlled on current regimen will not make a change.    Longstanding persistent atrial fibrillation (Multi)  Heart rate well-controlled on current regimen.  INR to be checked at the anticoagulation clinic.

## 2024-08-12 NOTE — ASSESSMENT & PLAN NOTE
Appears to be well compensated at this time.  We work on to attempt a trial of Jardiance but medication just cost prohibitive.  Will thus continue to follow on a clinical basis on current medications.

## 2024-09-09 ENCOUNTER — APPOINTMENT (OUTPATIENT)
Dept: CARDIOLOGY | Facility: HOSPITAL | Age: 84
End: 2024-09-09
Payer: MEDICARE

## 2024-09-10 ENCOUNTER — ANTICOAGULATION - WARFARIN VISIT (OUTPATIENT)
Dept: CARDIOLOGY | Facility: HOSPITAL | Age: 84
End: 2024-09-10
Payer: MEDICARE

## 2024-09-11 ENCOUNTER — ANTICOAGULATION - WARFARIN VISIT (OUTPATIENT)
Dept: CARDIOLOGY | Facility: HOSPITAL | Age: 84
End: 2024-09-11
Payer: MEDICARE

## 2024-09-11 DIAGNOSIS — I48.11 LONGSTANDING PERSISTENT ATRIAL FIBRILLATION (MULTI): ICD-10-CM

## 2024-09-11 DIAGNOSIS — I48.11 LONGSTANDING PERSISTENT ATRIAL FIBRILLATION (MULTI): Primary | ICD-10-CM

## 2024-09-11 LAB
POC INR: 4.3
POC PROTHROMBIN TIME: NORMAL

## 2024-09-11 PROCEDURE — 85610 PROTHROMBIN TIME: CPT | Mod: QW

## 2024-09-11 PROCEDURE — 99211 OFF/OP EST MAY X REQ PHY/QHP: CPT | Performed by: INTERNAL MEDICINE

## 2024-09-11 NOTE — PROGRESS NOTES
Patient identification verified with 2 identifiers.    Location: [facility] - 1st Floor Anticoagulation Clinic [address] [id number]    Referring Physician: Dr Lozano  Enrollment/ Re-enrollment date: 2024   INR Goal: 2.0-3.0  INR monitoring is per [facility] protocol.  Anticoagulation Medication: warfarin  Indication: Atrial Fibrillation/Atrial Flutter    Subjective   Bleeding signs/symptoms:      Bruising:     Major bleeding event:    Thrombosis signs/symptoms:    Thromboembolic event:    Missed doses:    Extra doses:    Medication changes:    Dietary changes:    Change in health:    Change in activity:    Alcohol:    Other concerns:      Upcoming Procedures:  Does the Patient Have any upcoming procedures that require interruption in anticoagulation therapy? no  Does the patient require bridging? no      Anticoagulation Summary  As of 9/11/2024      INR goal:  2.0-3.0   TTR:  77.2% (10.1 mo)   INR used for dosing:  --               Assessment/Plan   Supratherapeutic     1. New dose:  dose decreased     2. Next INR: 1 week      Education provided to patient during the visit:  Patient instructed to call in interim with questions, concerns and changes.

## 2024-09-17 ENCOUNTER — APPOINTMENT (OUTPATIENT)
Dept: RADIOLOGY | Facility: HOSPITAL | Age: 84
End: 2024-09-17
Payer: MEDICARE

## 2024-09-17 ENCOUNTER — HOSPITAL ENCOUNTER (EMERGENCY)
Facility: HOSPITAL | Age: 84
Discharge: HOME | End: 2024-09-17
Payer: MEDICARE

## 2024-09-17 VITALS
SYSTOLIC BLOOD PRESSURE: 116 MMHG | TEMPERATURE: 98.6 F | HEIGHT: 63 IN | DIASTOLIC BLOOD PRESSURE: 70 MMHG | HEART RATE: 69 BPM | RESPIRATION RATE: 18 BRPM | WEIGHT: 220 LBS | OXYGEN SATURATION: 91 % | BODY MASS INDEX: 38.98 KG/M2

## 2024-09-17 DIAGNOSIS — R60.9 EDEMA, UNSPECIFIED TYPE: Primary | ICD-10-CM

## 2024-09-17 LAB
ANION GAP SERPL CALCULATED.3IONS-SCNC: 14 MMOL/L (ref 10–20)
BASOPHILS # BLD AUTO: 0.05 X10*3/UL (ref 0–0.1)
BASOPHILS NFR BLD AUTO: 0.8 %
BUN SERPL-MCNC: 32 MG/DL (ref 6–23)
CALCIUM SERPL-MCNC: 9.4 MG/DL (ref 8.6–10.3)
CHLORIDE SERPL-SCNC: 100 MMOL/L (ref 98–107)
CO2 SERPL-SCNC: 30 MMOL/L (ref 21–32)
CREAT SERPL-MCNC: 1.3 MG/DL (ref 0.5–1.05)
EGFRCR SERPLBLD CKD-EPI 2021: 40 ML/MIN/1.73M*2
EOSINOPHIL # BLD AUTO: 0.08 X10*3/UL (ref 0–0.4)
EOSINOPHIL NFR BLD AUTO: 1.3 %
ERYTHROCYTE [DISTWIDTH] IN BLOOD BY AUTOMATED COUNT: 14.8 % (ref 11.5–14.5)
GLUCOSE SERPL-MCNC: 105 MG/DL (ref 74–99)
HCT VFR BLD AUTO: 34.5 % (ref 36–46)
HGB BLD-MCNC: 11 G/DL (ref 12–16)
IMM GRANULOCYTES # BLD AUTO: 0.04 X10*3/UL (ref 0–0.5)
IMM GRANULOCYTES NFR BLD AUTO: 0.6 % (ref 0–0.9)
LYMPHOCYTES # BLD AUTO: 0.49 X10*3/UL (ref 0.8–3)
LYMPHOCYTES NFR BLD AUTO: 7.9 %
MCH RBC QN AUTO: 29.7 PG (ref 26–34)
MCHC RBC AUTO-ENTMCNC: 31.9 G/DL (ref 32–36)
MCV RBC AUTO: 93 FL (ref 80–100)
MONOCYTES # BLD AUTO: 0.57 X10*3/UL (ref 0.05–0.8)
MONOCYTES NFR BLD AUTO: 9.2 %
NEUTROPHILS # BLD AUTO: 4.95 X10*3/UL (ref 1.6–5.5)
NEUTROPHILS NFR BLD AUTO: 80.2 %
NRBC BLD-RTO: 0 /100 WBCS (ref 0–0)
PLATELET # BLD AUTO: 137 X10*3/UL (ref 150–450)
POTASSIUM SERPL-SCNC: 3.1 MMOL/L (ref 3.5–5.3)
RBC # BLD AUTO: 3.7 X10*6/UL (ref 4–5.2)
SODIUM SERPL-SCNC: 141 MMOL/L (ref 136–145)
WBC # BLD AUTO: 6.2 X10*3/UL (ref 4.4–11.3)

## 2024-09-17 PROCEDURE — 2550000001 HC RX 255 CONTRASTS: Performed by: NURSE PRACTITIONER

## 2024-09-17 PROCEDURE — 96360 HYDRATION IV INFUSION INIT: CPT | Mod: 59

## 2024-09-17 PROCEDURE — 70491 CT SOFT TISSUE NECK W/DYE: CPT | Performed by: RADIOLOGY

## 2024-09-17 PROCEDURE — 2500000004 HC RX 250 GENERAL PHARMACY W/ HCPCS (ALT 636 FOR OP/ED): Performed by: NURSE PRACTITIONER

## 2024-09-17 PROCEDURE — 85025 COMPLETE CBC W/AUTO DIFF WBC: CPT | Performed by: NURSE PRACTITIONER

## 2024-09-17 PROCEDURE — 99284 EMERGENCY DEPT VISIT MOD MDM: CPT | Mod: 25

## 2024-09-17 PROCEDURE — 36415 COLL VENOUS BLD VENIPUNCTURE: CPT | Performed by: NURSE PRACTITIONER

## 2024-09-17 PROCEDURE — 80048 BASIC METABOLIC PNL TOTAL CA: CPT | Performed by: NURSE PRACTITIONER

## 2024-09-17 PROCEDURE — 70491 CT SOFT TISSUE NECK W/DYE: CPT

## 2024-09-17 ASSESSMENT — PAIN - FUNCTIONAL ASSESSMENT: PAIN_FUNCTIONAL_ASSESSMENT: 0-10

## 2024-09-17 ASSESSMENT — COLUMBIA-SUICIDE SEVERITY RATING SCALE - C-SSRS
6. HAVE YOU EVER DONE ANYTHING, STARTED TO DO ANYTHING, OR PREPARED TO DO ANYTHING TO END YOUR LIFE?: NO
1. IN THE PAST MONTH, HAVE YOU WISHED YOU WERE DEAD OR WISHED YOU COULD GO TO SLEEP AND NOT WAKE UP?: NO
2. HAVE YOU ACTUALLY HAD ANY THOUGHTS OF KILLING YOURSELF?: NO

## 2024-09-17 ASSESSMENT — PAIN SCALES - GENERAL: PAINLEVEL_OUTOF10: 4

## 2024-09-17 NOTE — ED PROVIDER NOTES
HPI   Chief Complaint   Patient presents with    Mass     I noticed a lump in my rt neck it is not painful        HPI  See my MDM      Patient History   Past Medical History:   Diagnosis Date    Abnormal laboratory test result 09/01/2023    Anemia due to acute blood loss 09/01/2023    CHF (congestive heart failure) (Multi)     Chill 09/01/2023    Diabetes mellitus (Multi)     Flank pain 09/01/2023    Hypertension     Hypoxemia 09/01/2023    Hypoxia 09/01/2023    Melena 09/01/2023    Other chronic pain 09/01/2023    Pneumonia 09/01/2023    Pyuria 09/01/2023     No past surgical history on file.  Family History   Problem Relation Name Age of Onset    Leukemia Mother      Cancer Mother      Heart disease Father      Other (heart problems) Father       Social History     Tobacco Use    Smoking status: Never     Passive exposure: Never    Smokeless tobacco: Never   Vaping Use    Vaping status: Never Used   Substance Use Topics    Alcohol use: Never    Drug use: Never       Physical Exam   ED Triage Vitals [09/17/24 1243]   Temperature Heart Rate Respirations BP   37 °C (98.6 °F) 69 18 116/70      Pulse Ox Temp Source Heart Rate Source Patient Position   (!) 91 % Temporal Monitor Sitting      BP Location FiO2 (%)     Left arm --       Physical Exam  CONSTITUTIONAL: Vital signs reviewed as charted, well-developed and in no distress  Eyes: Extraocular muscles are intact. Pupils equal round and reactive to light. Conjunctiva are pink.    ENT: Mucous membranes are moist. Tongue in the midline. Pharynx was without erythema or exudates, uvula midline.  Exam the right side of the neck that shows some edema present what appears to be surrounding the sternocleidomastoid muscle.  No tenderness to palpation no fluctuance.  There is a varicose vein in the area as well.  LUNGS: Breath sounds equal and clear to auscultation. Good air exchange, no wheezes rales or retractions, pulse oximetry is charted.  HEART: Regular rate and rhythm  without murmur thrill or rub, strong tones, auscultation is normal.  ABDOMEN: Soft and nontender without guarding rebound rigidity or mass. Bowel sounds are present and normal in all quadrants. There is no palpable masses or aneurysms identified. No hepatosplenomegaly, normal abdominal exam.  Neuro: The patient is awake, alert and oriented ×3. Moving all 4 extremities and answering questions appropriately.   MUSCULOSKELETAL: The calves are nontender to palpation. Full gross active range of motion.   PSYCH: Awake alert oriented, normal mood and affect.  Skin:  Dry, normal color, warm to the touch, no rash present.        ED Course & MDM   Diagnoses as of 09/17/24 1449   Edema, unspecified type                 No data recorded     Harris Coma Scale Score: 15 (09/17/24 1255 : Gem Smith RN)                           Medical Decision Making  History obtained from: patient    Vital signs, nursing notes, current medications, past medical history, Surgical history, allergies, social history, family History were reviewed.         HPI:  Patient 84-year-old female present emergency room today for evaluation of swelling of the right side of her neck.  States she just noticed it today.  Atraumatic, nonpainful.  No discoloration.  Patient denies dizziness, chest pain, shortness of breath, abdominal pain extremity edema.  Nontoxic well-appearing denies visual changes.  Denies feeling off balance denies headache      10 point ROS was reviewed and negative except Noted above in HPI.  DDX: as listed above          MDM Summary/considerations:  Labs Reviewed   CBC WITH AUTO DIFFERENTIAL - Abnormal       Result Value    WBC 6.2      nRBC 0.0      RBC 3.70 (*)     Hemoglobin 11.0 (*)     Hematocrit 34.5 (*)     MCV 93      MCH 29.7      MCHC 31.9 (*)     RDW 14.8 (*)     Platelets 137 (*)     Neutrophils % 80.2      Immature Granulocytes %, Automated 0.6      Lymphocytes % 7.9      Monocytes % 9.2      Eosinophils % 1.3       Basophils % 0.8      Neutrophils Absolute 4.95      Immature Granulocytes Absolute, Automated 0.04      Lymphocytes Absolute 0.49 (*)     Monocytes Absolute 0.57      Eosinophils Absolute 0.08      Basophils Absolute 0.05     BASIC METABOLIC PANEL - Abnormal    Glucose 105 (*)     Sodium 141      Potassium 3.1 (*)     Chloride 100      Bicarbonate 30      Anion Gap 14      Urea Nitrogen 32 (*)     Creatinine 1.30 (*)     eGFR 40 (*)     Calcium 9.4       CT soft tissue neck w IV contrast   Final Result   1. No right-sided neck mass identified with no soft tissue marker   provided to indicate area of concern. No significant soft tissue   edema or focal fluid collection within the neck soft tissues. No   lymphadenopathy.   2. There is slight asymmetric enhancement along the right palatine   tonsil with extensive beam hardening artifact also evident in this   region, possibly artifactual. There is no significant masslike   component identified. Based on clinical concern, correlate with   direct visualization.        I personally reviewed the images/study and I agree with the findings   as stated above by resident physician, Dr. Jon Brito.        MACRO:   None        Signed by: Car Ross 9/17/2024 2:45 PM   Dictation workstation:   [id number]        Medications   sodium chloride 0.9 % bolus 500 mL (500 mL intravenous New Bag 9/17/24 1343)   iohexol (OMNIPaque) 350 mg iodine/mL solution 75 mL (75 mL intravenous Given 9/17/24 1410)     New Prescriptions    No medications on file       I estimate there is LOW risk for CAUDA EQUINA or CENTRAL CORD SYNDROME, EPIDURAL MASS LESION, MENINGITIS, SPINAL STENOSIS, OR HERNIATED DISK CAUSING SEVERE STENOSIS, thus I consider the discharge disposition reasonable. We have discussed the diagnosis and risks, and we agree with discharging home to follow- up with their primary doctor. We also discussed returning to the Emergency Department immediately if new or worsening  symptoms occur. We have discussed the symptoms which are most concerning (e.g., saddle anesthesia, urinary or bowel incontinence or retention, changing or worsening pain) that necessitate immediate return.      CT scan shows no gross pathology.  Incidental note of a small thyroid nodule discussed with the patient and her daughter.  Will follow with PCP for ultrasound for further evaluation.  Was discharged home in stable condition.        All of the patient's questions were answered to the best of my ability.  Patient states understanding that they have been screened for an emergency today and we have not found any etiology of symptoms that requires emergent treatment or admission to the hospital at this point. They understand that they have not had definitive care day and require follow-up for treatment of their condition. They also state understanding that they may have an emergent condition that may potentially have not of detected at this visit and they must return to the emergency department if they develop any worsening of symptoms or new complaints.      I have evaluated this patient, my supervising physician was available for consultation.  Not warranted at this time          Critical Care:        This chart was completed using voice recognition transcription software. Please excuse any errors of transcription including grammatical, punctuation, syntax and spelling errors.  Please contact me with any questions regarding this chart.    Procedure  Procedures     THERESA Luna-LYDIA  09/17/24 4722

## 2024-09-17 NOTE — DISCHARGE INSTRUCTIONS
Please follow-up with your primary care doctor for an ultrasound of your thyroid for the nodule that we discussed.

## 2024-09-23 ENCOUNTER — APPOINTMENT (OUTPATIENT)
Dept: RADIOLOGY | Facility: HOSPITAL | Age: 84
DRG: 193 | End: 2024-09-23
Payer: MEDICARE

## 2024-09-23 ENCOUNTER — HOSPITAL ENCOUNTER (INPATIENT)
Facility: HOSPITAL | Age: 84
DRG: 193 | End: 2024-09-23
Attending: STUDENT IN AN ORGANIZED HEALTH CARE EDUCATION/TRAINING PROGRAM | Admitting: INTERNAL MEDICINE
Payer: MEDICARE

## 2024-09-23 DIAGNOSIS — J18.9 PNEUMONIA OF BOTH LOWER LOBES DUE TO INFECTIOUS ORGANISM: Primary | ICD-10-CM

## 2024-09-23 DIAGNOSIS — J18.9 PNEUMONIA OF LEFT LOWER LOBE DUE TO INFECTIOUS ORGANISM: ICD-10-CM

## 2024-09-23 DIAGNOSIS — B59: ICD-10-CM

## 2024-09-23 DIAGNOSIS — I50.33 ACUTE ON CHRONIC DIASTOLIC HEART FAILURE: ICD-10-CM

## 2024-09-23 DIAGNOSIS — J18.9 PNEUMONIA OF BOTH UPPER LOBES DUE TO INFECTIOUS ORGANISM: ICD-10-CM

## 2024-09-23 DIAGNOSIS — I50.32 CHRONIC HEART FAILURE WITH PRESERVED EJECTION FRACTION: ICD-10-CM

## 2024-09-23 DIAGNOSIS — J96.01 ACUTE HYPOXIC RESPIRATORY FAILURE (MULTI): ICD-10-CM

## 2024-09-23 PROBLEM — K30 INDIGESTION: Status: ACTIVE | Noted: 2024-09-23

## 2024-09-23 PROBLEM — D50.0 IRON DEFICIENCY ANEMIA DUE TO CHRONIC BLOOD LOSS: Status: ACTIVE | Noted: 2024-09-23

## 2024-09-23 PROBLEM — D62 ANEMIA DUE TO ACUTE BLOOD LOSS: Status: ACTIVE | Noted: 2024-09-23

## 2024-09-23 PROBLEM — K74.60 HEPATIC CIRRHOSIS (MULTI): Status: ACTIVE | Noted: 2024-09-23

## 2024-09-23 PROBLEM — Z20.822 CONTACT WITH AND (SUSPECTED) EXPOSURE TO COVID-19: Status: ACTIVE | Noted: 2023-01-12

## 2024-09-23 PROBLEM — R05.9 COUGH, UNSPECIFIED: Status: ACTIVE | Noted: 2022-10-17

## 2024-09-23 LAB
ALBUMIN SERPL BCP-MCNC: 4.1 G/DL (ref 3.4–5)
ALBUMIN SERPL BCP-MCNC: 4.1 G/DL (ref 3.4–5)
ALP SERPL-CCNC: 88 U/L (ref 33–136)
ALP SERPL-CCNC: 91 U/L (ref 33–136)
ALT SERPL W P-5'-P-CCNC: 11 U/L (ref 7–45)
ALT SERPL W P-5'-P-CCNC: 11 U/L (ref 7–45)
ANION GAP BLDA CALCULATED.4IONS-SCNC: 8 MMO/L (ref 10–25)
ANION GAP SERPL CALCULATED.3IONS-SCNC: 13 MMOL/L (ref 10–20)
ANION GAP SERPL CALCULATED.3IONS-SCNC: 13 MMOL/L (ref 10–20)
APPARATUS: ABNORMAL
APPEARANCE UR: CLEAR
ARTERIAL PATENCY WRIST A: POSITIVE
AST SERPL W P-5'-P-CCNC: 16 U/L (ref 9–39)
AST SERPL W P-5'-P-CCNC: 16 U/L (ref 9–39)
BACTERIA #/AREA URNS AUTO: ABNORMAL /HPF
BASE EXCESS BLDA CALC-SCNC: 7.3 MMOL/L (ref -2–3)
BASOPHILS # BLD AUTO: 0.03 X10*3/UL (ref 0–0.1)
BASOPHILS NFR BLD AUTO: 0.5 %
BILIRUB SERPL-MCNC: 1.1 MG/DL (ref 0–1.2)
BILIRUB SERPL-MCNC: 1.2 MG/DL (ref 0–1.2)
BILIRUB UR STRIP.AUTO-MCNC: NEGATIVE MG/DL
BNP SERPL-MCNC: 242 PG/ML (ref 0–99)
BODY TEMPERATURE: 37 DEGREES CELSIUS
BUN SERPL-MCNC: 28 MG/DL (ref 6–23)
BUN SERPL-MCNC: 31 MG/DL (ref 6–23)
CA-I BLDA-SCNC: 1.24 MMOL/L (ref 1.1–1.33)
CALCIUM SERPL-MCNC: 9.2 MG/DL (ref 8.6–10.3)
CALCIUM SERPL-MCNC: 9.4 MG/DL (ref 8.6–10.3)
CARDIAC TROPONIN I PNL SERPL HS: 17 NG/L (ref 0–13)
CARDIAC TROPONIN I PNL SERPL HS: 18 NG/L (ref 0–13)
CHLORIDE BLDA-SCNC: 102 MMOL/L (ref 98–107)
CHLORIDE SERPL-SCNC: 102 MMOL/L (ref 98–107)
CHLORIDE SERPL-SCNC: 102 MMOL/L (ref 98–107)
CO2 SERPL-SCNC: 30 MMOL/L (ref 21–32)
CO2 SERPL-SCNC: 31 MMOL/L (ref 21–32)
COLOR UR: YELLOW
CREAT SERPL-MCNC: 1.2 MG/DL (ref 0.5–1.05)
CREAT SERPL-MCNC: 1.28 MG/DL (ref 0.5–1.05)
EGFRCR SERPLBLD CKD-EPI 2021: 41 ML/MIN/1.73M*2
EGFRCR SERPLBLD CKD-EPI 2021: 44 ML/MIN/1.73M*2
EOSINOPHIL # BLD AUTO: 0.1 X10*3/UL (ref 0–0.4)
EOSINOPHIL NFR BLD AUTO: 1.7 %
ERYTHROCYTE [DISTWIDTH] IN BLOOD BY AUTOMATED COUNT: 14.8 % (ref 11.5–14.5)
FLOW: 3 LPM
FLUAV RNA RESP QL NAA+PROBE: NOT DETECTED
FLUBV RNA RESP QL NAA+PROBE: NOT DETECTED
GLUCOSE BLDA-MCNC: 97 MG/DL (ref 74–99)
GLUCOSE SERPL-MCNC: 112 MG/DL (ref 74–99)
GLUCOSE SERPL-MCNC: 121 MG/DL (ref 74–99)
GLUCOSE UR STRIP.AUTO-MCNC: NORMAL MG/DL
HCO3 BLDA-SCNC: 32 MMOL/L (ref 22–26)
HCT VFR BLD AUTO: 32.9 % (ref 36–46)
HCT VFR BLD EST: 32 % (ref 36–46)
HGB BLD-MCNC: 10.5 G/DL (ref 12–16)
HGB BLDA-MCNC: 10.6 G/DL (ref 12–16)
IMM GRANULOCYTES # BLD AUTO: 0.04 X10*3/UL (ref 0–0.5)
IMM GRANULOCYTES NFR BLD AUTO: 0.7 % (ref 0–0.9)
INHALED O2 CONCENTRATION: 32 %
INR PPP: 2.5 (ref 0.9–1.2)
KETONES UR STRIP.AUTO-MCNC: NEGATIVE MG/DL
LACTATE BLDA-SCNC: 0.5 MMOL/L (ref 0.4–2)
LEUKOCYTE ESTERASE UR QL STRIP.AUTO: ABNORMAL
LYMPHOCYTES # BLD AUTO: 0.43 X10*3/UL (ref 0.8–3)
LYMPHOCYTES NFR BLD AUTO: 7.5 %
MAGNESIUM SERPL-MCNC: 2.11 MG/DL (ref 1.6–2.4)
MCH RBC QN AUTO: 29.7 PG (ref 26–34)
MCHC RBC AUTO-ENTMCNC: 31.9 G/DL (ref 32–36)
MCV RBC AUTO: 93 FL (ref 80–100)
MONOCYTES # BLD AUTO: 0.47 X10*3/UL (ref 0.05–0.8)
MONOCYTES NFR BLD AUTO: 8.1 %
NEUTROPHILS # BLD AUTO: 4.7 X10*3/UL (ref 1.6–5.5)
NEUTROPHILS NFR BLD AUTO: 81.5 %
NITRITE UR QL STRIP.AUTO: NEGATIVE
NRBC BLD-RTO: 0 /100 WBCS (ref 0–0)
OXYHGB MFR BLDA: 95.5 % (ref 94–98)
PCO2 BLDA: 45 MM HG (ref 38–42)
PH BLDA: 7.46 PH (ref 7.38–7.42)
PH UR STRIP.AUTO: 6 [PH]
PLATELET # BLD AUTO: 132 X10*3/UL (ref 150–450)
PO2 BLDA: 116 MM HG (ref 85–95)
POTASSIUM BLDA-SCNC: 3.6 MMOL/L (ref 3.5–5.3)
POTASSIUM SERPL-SCNC: 3.5 MMOL/L (ref 3.5–5.3)
POTASSIUM SERPL-SCNC: 3.5 MMOL/L (ref 3.5–5.3)
PROT SERPL-MCNC: 6.2 G/DL (ref 6.4–8.2)
PROT SERPL-MCNC: 6.9 G/DL (ref 6.4–8.2)
PROT UR STRIP.AUTO-MCNC: ABNORMAL MG/DL
PROTHROMBIN TIME: 24.4 SECONDS (ref 9.3–12.7)
RBC # BLD AUTO: 3.53 X10*6/UL (ref 4–5.2)
RBC # UR STRIP.AUTO: NEGATIVE /UL
RBC #/AREA URNS AUTO: ABNORMAL /HPF
SAO2 % BLDA: 99 % (ref 94–100)
SARS-COV-2 RNA RESP QL NAA+PROBE: NOT DETECTED
SODIUM BLDA-SCNC: 138 MMOL/L (ref 136–145)
SODIUM SERPL-SCNC: 141 MMOL/L (ref 136–145)
SODIUM SERPL-SCNC: 142 MMOL/L (ref 136–145)
SP GR UR STRIP.AUTO: 1.02
SPECIMEN DRAWN FROM PATIENT: ABNORMAL
SQUAMOUS #/AREA URNS AUTO: ABNORMAL /HPF
UROBILINOGEN UR STRIP.AUTO-MCNC: NORMAL MG/DL
WBC # BLD AUTO: 5.8 X10*3/UL (ref 4.4–11.3)
WBC #/AREA URNS AUTO: ABNORMAL /HPF

## 2024-09-23 PROCEDURE — 96365 THER/PROPH/DIAG IV INF INIT: CPT

## 2024-09-23 PROCEDURE — 84484 ASSAY OF TROPONIN QUANT: CPT

## 2024-09-23 PROCEDURE — 80053 COMPREHEN METABOLIC PANEL: CPT

## 2024-09-23 PROCEDURE — 96367 TX/PROPH/DG ADDL SEQ IV INF: CPT

## 2024-09-23 PROCEDURE — 83880 ASSAY OF NATRIURETIC PEPTIDE: CPT

## 2024-09-23 PROCEDURE — 1200000002 HC GENERAL ROOM WITH TELEMETRY DAILY

## 2024-09-23 PROCEDURE — 2500000001 HC RX 250 WO HCPCS SELF ADMINISTERED DRUGS (ALT 637 FOR MEDICARE OP): Performed by: NURSE PRACTITIONER

## 2024-09-23 PROCEDURE — 84132 ASSAY OF SERUM POTASSIUM: CPT

## 2024-09-23 PROCEDURE — 71045 X-RAY EXAM CHEST 1 VIEW: CPT

## 2024-09-23 PROCEDURE — 87040 BLOOD CULTURE FOR BACTERIA: CPT | Mod: TRILAB

## 2024-09-23 PROCEDURE — 36600 WITHDRAWAL OF ARTERIAL BLOOD: CPT

## 2024-09-23 PROCEDURE — 2500000004 HC RX 250 GENERAL PHARMACY W/ HCPCS (ALT 636 FOR OP/ED)

## 2024-09-23 PROCEDURE — 99285 EMERGENCY DEPT VISIT HI MDM: CPT

## 2024-09-23 PROCEDURE — 36415 COLL VENOUS BLD VENIPUNCTURE: CPT

## 2024-09-23 PROCEDURE — 94640 AIRWAY INHALATION TREATMENT: CPT

## 2024-09-23 PROCEDURE — 2500000002 HC RX 250 W HCPCS SELF ADMINISTERED DRUGS (ALT 637 FOR MEDICARE OP, ALT 636 FOR OP/ED): Performed by: NURSE PRACTITIONER

## 2024-09-23 PROCEDURE — 84132 ASSAY OF SERUM POTASSIUM: CPT | Performed by: NURSE PRACTITIONER

## 2024-09-23 PROCEDURE — 85025 COMPLETE CBC W/AUTO DIFF WBC: CPT

## 2024-09-23 PROCEDURE — 87086 URINE CULTURE/COLONY COUNT: CPT | Mod: TRILAB,WESLAB

## 2024-09-23 PROCEDURE — 9420000001 HC RT PATIENT EDUCATION 5 MIN

## 2024-09-23 PROCEDURE — 81003 URINALYSIS AUTO W/O SCOPE: CPT

## 2024-09-23 PROCEDURE — 85610 PROTHROMBIN TIME: CPT

## 2024-09-23 PROCEDURE — 83735 ASSAY OF MAGNESIUM: CPT

## 2024-09-23 PROCEDURE — 96366 THER/PROPH/DIAG IV INF ADDON: CPT

## 2024-09-23 PROCEDURE — 71045 X-RAY EXAM CHEST 1 VIEW: CPT | Performed by: RADIOLOGY

## 2024-09-23 PROCEDURE — 87636 SARSCOV2 & INF A&B AMP PRB: CPT

## 2024-09-23 PROCEDURE — 94664 DEMO&/EVAL PT USE INHALER: CPT

## 2024-09-23 RX ORDER — POLYETHYLENE GLYCOL 3350 17 G/17G
17 POWDER, FOR SOLUTION ORAL AS NEEDED
Status: DISCONTINUED | OUTPATIENT
Start: 2024-09-23 | End: 2024-10-01 | Stop reason: HOSPADM

## 2024-09-23 RX ORDER — PROCHLORPERAZINE 25 MG/1
25 SUPPOSITORY RECTAL EVERY 12 HOURS PRN
Status: DISCONTINUED | OUTPATIENT
Start: 2024-09-23 | End: 2024-10-01 | Stop reason: HOSPADM

## 2024-09-23 RX ORDER — ONDANSETRON 4 MG/1
4 TABLET, ORALLY DISINTEGRATING ORAL EVERY 8 HOURS PRN
Status: DISCONTINUED | OUTPATIENT
Start: 2024-09-23 | End: 2024-10-01 | Stop reason: HOSPADM

## 2024-09-23 RX ORDER — PANTOPRAZOLE SODIUM 40 MG/1
40 TABLET, DELAYED RELEASE ORAL
Status: DISCONTINUED | OUTPATIENT
Start: 2024-09-24 | End: 2024-10-01 | Stop reason: HOSPADM

## 2024-09-23 RX ORDER — ACETAMINOPHEN 325 MG/1
325 TABLET ORAL EVERY 6 HOURS PRN
Status: DISCONTINUED | OUTPATIENT
Start: 2024-09-23 | End: 2024-09-25

## 2024-09-23 RX ORDER — PRAMIPEXOLE DIHYDROCHLORIDE 1.5 MG/1
1.5 TABLET ORAL DAILY
Status: DISCONTINUED | OUTPATIENT
Start: 2024-09-23 | End: 2024-10-01 | Stop reason: HOSPADM

## 2024-09-23 RX ORDER — PANTOPRAZOLE SODIUM 40 MG/10ML
40 INJECTION, POWDER, LYOPHILIZED, FOR SOLUTION INTRAVENOUS
Status: DISCONTINUED | OUTPATIENT
Start: 2024-09-24 | End: 2024-10-01 | Stop reason: HOSPADM

## 2024-09-23 RX ORDER — CEFTRIAXONE 1 G/50ML
1 INJECTION, SOLUTION INTRAVENOUS EVERY 24 HOURS
Status: COMPLETED | OUTPATIENT
Start: 2024-09-24 | End: 2024-09-29

## 2024-09-23 RX ORDER — TRAZODONE HYDROCHLORIDE 100 MG/1
200 TABLET ORAL NIGHTLY
Status: DISCONTINUED | OUTPATIENT
Start: 2024-09-23 | End: 2024-10-01 | Stop reason: HOSPADM

## 2024-09-23 RX ORDER — GUAIFENESIN/DEXTROMETHORPHAN 100-10MG/5
5 SYRUP ORAL EVERY 4 HOURS PRN
Status: DISCONTINUED | OUTPATIENT
Start: 2024-09-23 | End: 2024-10-01 | Stop reason: HOSPADM

## 2024-09-23 RX ORDER — SIMVASTATIN 10 MG/1
10 TABLET, FILM COATED ORAL NIGHTLY
Status: DISCONTINUED | OUTPATIENT
Start: 2024-09-23 | End: 2024-10-01 | Stop reason: HOSPADM

## 2024-09-23 RX ORDER — ALBUTEROL SULFATE 0.83 MG/ML
2.5 SOLUTION RESPIRATORY (INHALATION)
Status: DISCONTINUED | OUTPATIENT
Start: 2024-09-23 | End: 2024-09-24

## 2024-09-23 RX ORDER — ALBUTEROL SULFATE 90 UG/1
2 INHALANT RESPIRATORY (INHALATION)
Status: DISCONTINUED | OUTPATIENT
Start: 2024-09-23 | End: 2024-09-23

## 2024-09-23 RX ORDER — BISACODYL 5 MG
10 TABLET, DELAYED RELEASE (ENTERIC COATED) ORAL DAILY PRN
Status: DISCONTINUED | OUTPATIENT
Start: 2024-09-23 | End: 2024-10-01 | Stop reason: HOSPADM

## 2024-09-23 RX ORDER — LOSARTAN POTASSIUM 25 MG/1
25 TABLET ORAL DAILY
Status: DISCONTINUED | OUTPATIENT
Start: 2024-09-23 | End: 2024-10-01 | Stop reason: HOSPADM

## 2024-09-23 RX ORDER — ALBUTEROL SULFATE 90 UG/1
2 INHALANT RESPIRATORY (INHALATION) EVERY 6 HOURS PRN
Status: DISCONTINUED | OUTPATIENT
Start: 2024-09-23 | End: 2024-09-23

## 2024-09-23 RX ORDER — FUROSEMIDE 80 MG/1
80 TABLET ORAL EVERY MORNING
Status: DISCONTINUED | OUTPATIENT
Start: 2024-09-24 | End: 2024-10-01 | Stop reason: HOSPADM

## 2024-09-23 RX ORDER — CEFTRIAXONE 1 G/50ML
1 INJECTION, SOLUTION INTRAVENOUS ONCE
Status: COMPLETED | OUTPATIENT
Start: 2024-09-23 | End: 2024-09-23

## 2024-09-23 RX ORDER — WARFARIN 3 MG/1
3 TABLET ORAL DAILY
Status: DISCONTINUED | OUTPATIENT
Start: 2024-09-23 | End: 2024-10-01 | Stop reason: HOSPADM

## 2024-09-23 RX ORDER — PROCHLORPERAZINE MALEATE 10 MG
10 TABLET ORAL EVERY 6 HOURS PRN
Status: DISCONTINUED | OUTPATIENT
Start: 2024-09-23 | End: 2024-10-01 | Stop reason: HOSPADM

## 2024-09-23 RX ORDER — SERTRALINE HYDROCHLORIDE 100 MG/1
100 TABLET, FILM COATED ORAL DAILY
Status: DISCONTINUED | OUTPATIENT
Start: 2024-09-23 | End: 2024-10-01 | Stop reason: HOSPADM

## 2024-09-23 RX ORDER — ALBUTEROL SULFATE 0.83 MG/ML
2.5 SOLUTION RESPIRATORY (INHALATION) EVERY 6 HOURS PRN
Status: DISCONTINUED | OUTPATIENT
Start: 2024-09-23 | End: 2024-10-01 | Stop reason: HOSPADM

## 2024-09-23 RX ORDER — BISACODYL 10 MG/1
10 SUPPOSITORY RECTAL DAILY PRN
Status: DISCONTINUED | OUTPATIENT
Start: 2024-09-23 | End: 2024-10-01 | Stop reason: HOSPADM

## 2024-09-23 RX ORDER — PROCHLORPERAZINE EDISYLATE 5 MG/ML
10 INJECTION INTRAMUSCULAR; INTRAVENOUS EVERY 6 HOURS PRN
Status: DISCONTINUED | OUTPATIENT
Start: 2024-09-23 | End: 2024-10-01 | Stop reason: HOSPADM

## 2024-09-23 RX ORDER — ONDANSETRON HYDROCHLORIDE 2 MG/ML
4 INJECTION, SOLUTION INTRAVENOUS EVERY 8 HOURS PRN
Status: DISCONTINUED | OUTPATIENT
Start: 2024-09-23 | End: 2024-10-01 | Stop reason: HOSPADM

## 2024-09-23 SDOH — SOCIAL STABILITY: SOCIAL INSECURITY: DOES ANYONE TRY TO KEEP YOU FROM HAVING/CONTACTING OTHER FRIENDS OR DOING THINGS OUTSIDE YOUR HOME?: NO

## 2024-09-23 SDOH — SOCIAL STABILITY: SOCIAL INSECURITY: DO YOU FEEL ANYONE HAS EXPLOITED OR TAKEN ADVANTAGE OF YOU FINANCIALLY OR OF YOUR PERSONAL PROPERTY?: NO

## 2024-09-23 SDOH — SOCIAL STABILITY: SOCIAL INSECURITY: ARE THERE ANY APPARENT SIGNS OF INJURIES/BEHAVIORS THAT COULD BE RELATED TO ABUSE/NEGLECT?: NO

## 2024-09-23 SDOH — SOCIAL STABILITY: SOCIAL INSECURITY: HAVE YOU HAD ANY THOUGHTS OF HARMING ANYONE ELSE?: NO

## 2024-09-23 SDOH — SOCIAL STABILITY: SOCIAL INSECURITY: ABUSE: ADULT

## 2024-09-23 SDOH — SOCIAL STABILITY: SOCIAL INSECURITY: HAVE YOU HAD THOUGHTS OF HARMING ANYONE ELSE?: NO

## 2024-09-23 SDOH — SOCIAL STABILITY: SOCIAL INSECURITY: ARE YOU OR HAVE YOU BEEN THREATENED OR ABUSED PHYSICALLY, EMOTIONALLY, OR SEXUALLY BY ANYONE?: NO

## 2024-09-23 SDOH — SOCIAL STABILITY: SOCIAL INSECURITY: DO YOU FEEL UNSAFE GOING BACK TO THE PLACE WHERE YOU ARE LIVING?: NO

## 2024-09-23 SDOH — SOCIAL STABILITY: SOCIAL INSECURITY: WERE YOU ABLE TO COMPLETE ALL THE BEHAVIORAL HEALTH SCREENINGS?: YES

## 2024-09-23 SDOH — SOCIAL STABILITY: SOCIAL INSECURITY: HAS ANYONE EVER THREATENED TO HURT YOUR FAMILY OR YOUR PETS?: NO

## 2024-09-23 ASSESSMENT — COGNITIVE AND FUNCTIONAL STATUS - GENERAL
DRESSING REGULAR LOWER BODY CLOTHING: A LITTLE
PATIENT BASELINE BEDBOUND: NO
MOVING FROM LYING ON BACK TO SITTING ON SIDE OF FLAT BED WITH BEDRAILS: A LITTLE
DAILY ACTIVITIY SCORE: 20
TURNING FROM BACK TO SIDE WHILE IN FLAT BAD: A LITTLE
MOVING TO AND FROM BED TO CHAIR: A LITTLE
CLIMB 3 TO 5 STEPS WITH RAILING: A LITTLE
TOILETING: A LITTLE
HELP NEEDED FOR BATHING: A LITTLE
WALKING IN HOSPITAL ROOM: A LITTLE
DRESSING REGULAR UPPER BODY CLOTHING: A LITTLE
MOBILITY SCORE: 18
STANDING UP FROM CHAIR USING ARMS: A LITTLE

## 2024-09-23 ASSESSMENT — ACTIVITIES OF DAILY LIVING (ADL)
HEARING - RIGHT EAR: FUNCTIONAL
BATHING: INDEPENDENT
GROOMING: INDEPENDENT
LACK_OF_TRANSPORTATION: NO
ASSISTIVE_DEVICE: WALKER;CANE
HEARING - LEFT EAR: FUNCTIONAL
WALKS IN HOME: INDEPENDENT
ADEQUATE_TO_COMPLETE_ADL: YES
DRESSING YOURSELF: INDEPENDENT
TOILETING: INDEPENDENT
JUDGMENT_ADEQUATE_SAFELY_COMPLETE_DAILY_ACTIVITIES: YES
FEEDING YOURSELF: INDEPENDENT
PATIENT'S MEMORY ADEQUATE TO SAFELY COMPLETE DAILY ACTIVITIES?: YES
LACK_OF_TRANSPORTATION: NO

## 2024-09-23 ASSESSMENT — PAIN SCALES - GENERAL
PAINLEVEL_OUTOF10: 0 - NO PAIN

## 2024-09-23 ASSESSMENT — ENCOUNTER SYMPTOMS
PSYCHIATRIC NEGATIVE: 1
SORE THROAT: 0
CHEST TIGHTNESS: 0
DIFFICULTY URINATING: 0
MYALGIAS: 0
RESPIRATORY NEGATIVE: 1
SEIZURES: 0
PALPITATIONS: 0
APNEA: 0
JOINT SWELLING: 0
TREMORS: 0
DIARRHEA: 0
APPETITE CHANGE: 1
NUMBNESS: 0
NAUSEA: 0
RHINORRHEA: 0
FREQUENCY: 0
BACK PAIN: 0
HEADACHES: 0
FEVER: 0
EYES NEGATIVE: 1
DIZZINESS: 0
VOICE CHANGE: 0
HEMATURIA: 0
COUGH: 0
ALLERGIC/IMMUNOLOGIC NEGATIVE: 1
CHILLS: 1
SPEECH DIFFICULTY: 0
UNEXPECTED WEIGHT CHANGE: 0
NECK STIFFNESS: 0
ACTIVITY CHANGE: 1
NECK PAIN: 0
MUSCULOSKELETAL NEGATIVE: 1
WEAKNESS: 0
HEMATOLOGIC/LYMPHATIC NEGATIVE: 1
FATIGUE: 1
VOMITING: 0
ABDOMINAL DISTENTION: 0

## 2024-09-23 ASSESSMENT — PAIN - FUNCTIONAL ASSESSMENT
PAIN_FUNCTIONAL_ASSESSMENT: 0-10
PAIN_FUNCTIONAL_ASSESSMENT: 0-10

## 2024-09-23 ASSESSMENT — LIFESTYLE VARIABLES
AUDIT-C TOTAL SCORE: 0
HOW OFTEN DO YOU HAVE 6 OR MORE DRINKS ON ONE OCCASION: NEVER
SUBSTANCE_ABUSE_PAST_12_MONTHS: NO
HOW MANY STANDARD DRINKS CONTAINING ALCOHOL DO YOU HAVE ON A TYPICAL DAY: PATIENT DOES NOT DRINK
AUDIT-C TOTAL SCORE: 0
PRESCIPTION_ABUSE_PAST_12_MONTHS: NO
HOW OFTEN DO YOU HAVE A DRINK CONTAINING ALCOHOL: NEVER
SKIP TO QUESTIONS 9-10: 1

## 2024-09-23 ASSESSMENT — COLUMBIA-SUICIDE SEVERITY RATING SCALE - C-SSRS
6. HAVE YOU EVER DONE ANYTHING, STARTED TO DO ANYTHING, OR PREPARED TO DO ANYTHING TO END YOUR LIFE?: NO
1. IN THE PAST MONTH, HAVE YOU WISHED YOU WERE DEAD OR WISHED YOU COULD GO TO SLEEP AND NOT WAKE UP?: NO
2. HAVE YOU ACTUALLY HAD ANY THOUGHTS OF KILLING YOURSELF?: NO
2. HAVE YOU ACTUALLY HAD ANY THOUGHTS OF KILLING YOURSELF?: NO
6. HAVE YOU EVER DONE ANYTHING, STARTED TO DO ANYTHING, OR PREPARED TO DO ANYTHING TO END YOUR LIFE?: NO
1. IN THE PAST MONTH, HAVE YOU WISHED YOU WERE DEAD OR WISHED YOU COULD GO TO SLEEP AND NOT WAKE UP?: NO

## 2024-09-23 ASSESSMENT — PATIENT HEALTH QUESTIONNAIRE - PHQ9
2. FEELING DOWN, DEPRESSED OR HOPELESS: NOT AT ALL
1. LITTLE INTEREST OR PLEASURE IN DOING THINGS: NOT AT ALL
SUM OF ALL RESPONSES TO PHQ9 QUESTIONS 1 & 2: 0

## 2024-09-23 NOTE — ASSESSMENT & PLAN NOTE
Chronic shortness of breath with exertion  Last EF 55--60%   I held furosemide for now  Follow-up with Dr. Lozano as outpatient

## 2024-09-23 NOTE — CARE PLAN
The patient's goals for the shift include Wean off O2    The clinical goals for the shift include Safety, monitor labs and vitals antibiotics      Problem: Diabetes  Goal: Achieve decreasing blood glucose levels by end of shift  Outcome: Progressing  Goal: Increase stability of blood glucose readings by end of shift  Outcome: Progressing  Goal: Decrease in ketones present in urine by end of shift  Outcome: Progressing  Goal: Maintain electrolyte levels within acceptable range throughout shift  Outcome: Progressing  Goal: Maintain glucose levels >70mg/dl to <250mg/dl throughout shift  Outcome: Progressing  Goal: No changes in neurological exam by end of shift  Outcome: Progressing  Goal: Learn about and adhere to nutrition recommendations by end of shift  Outcome: Progressing  Goal: Vital signs within normal range for age by end of shift  Outcome: Progressing  Goal: Increase self care and/or family involovement by end of shift  Outcome: Progressing  Goal: Receive DSME education by end of shift  Outcome: Progressing     Problem: Pain - Adult  Goal: Verbalizes/displays adequate comfort level or baseline comfort level  Outcome: Progressing     Problem: Safety - Adult  Goal: Free from fall injury  Outcome: Progressing     Problem: Discharge Planning  Goal: Discharge to home or other facility with appropriate resources  Outcome: Progressing     Problem: Chronic Conditions and Co-morbidities  Goal: Patient's chronic conditions and co-morbidity symptoms are monitored and maintained or improved  Outcome: Progressing     Problem: Fall/Injury  Goal: Not fall by end of shift  Outcome: Progressing  Goal: Be free from injury by end of the shift  Outcome: Progressing  Goal: Verbalize understanding of personal risk factors for fall in the hospital  Outcome: Progressing  Goal: Verbalize understanding of risk factor reduction measures to prevent injury from fall in the home  Outcome: Progressing  Goal: Use assistive devices by end of  the shift  Outcome: Progressing  Goal: Pace activities to prevent fatigue by end of the shift  Outcome: Progressing     Problem: Heart Failure  Goal: Improved gas exchange this shift  Outcome: Progressing  Goal: Improved urinary output this shift  Outcome: Progressing  Goal: Reduction in peripheral edema within 24 hours  Outcome: Progressing  Goal: Report improvement of dyspnea/breathlessness this shift  Outcome: Progressing  Goal: Weight from fluid excess reduced over 2-3 days, then stabilize  Outcome: Progressing  Goal: Increase self care and/or family involvement in 24 hours  Outcome: Progressing

## 2024-09-23 NOTE — ED PROVIDER NOTES
Patient was seen by both myself and advanced practitioner.  I personally saw the patient and made/approved the management plan and take responsibility for the patient management     Patient is an 84-year-old female that presents emergency room for evaluation of generalized bodyaches, chills, fatigue and decreased appetite.  Patient states that his symptoms have been going on for the last week with increased fatigue and some shortness of breath on exertion.  She has had a mild intermittent cough.  She states she has had similar symptoms with urinary tract infections in the past and came in for further evaluation.  She denies abdominal pain, dysuria, hematuria, fevers.    On exam patient uncomfortable appearing but in no obvious distress.  She was found to be hypoxic on initial presentation with an oxygen saturation down to 87% on room air and was placed on 3 L nasal cannula.  Remainder of vital signs are stable at this time.  She is in no respiratory distress and is speaking in full sentences.  Blood work ordered including CBC, CMP, COVID-19, influenza testing as well as arterial blood gas, PT/INR, blood cultures.  Blood work was remarkable for minimally elevated creatinine of 1.28 however this is consistent with patient's baseline.  She does have normal electrolytes, normal white count of 5.8.  She did test negative for COVID-19 and influenza.  Chest x-ray does show bilateral infiltrates and given her symptoms I am concerned for likely pneumonia.  Urinalysis does show findings concerning for also a slight urinary tract infection.  Patient treated with IV ceftriaxone, IV azithromycin.  I have very low suspicion for pulmonary embolism at this time as patient is on Coumadin and has a therapeutic INR of 2.5.  She only has mildly elevated BNP of 242 and I have low suspicion for congestive heart failure at this time as patient source of shortness of breath.  Given her new oxygen requirements to be admitted for further  antibiotic treatment.    I independently interpreted the following study(s) chest x-ray which show bilateral diffuse interstitial infiltrates throughout the lungs without evidence of pneumothorax, rib fracture.         Mahendra Pulido, DO  09/23/24 6415

## 2024-09-23 NOTE — ED PROVIDER NOTES
HPI   Chief Complaint   Patient presents with    Chills     For the past week I have had the chills weakness I have a hard time laying down and I feel anxiety these are the signs and symptoms I get when I get a uti        HPI  Patient is an 84-year-old female who presents to ED for multiple complaints.  She reports chills, generalized weakness, shortness of breath.  Patient believes that she may have a UTI.  She states the symptoms are similar to UTIs in her past.  She denies any specific urinary symptoms.  She denies any chest pain, abdominal pain, NVD.  Denies any recent hospitalizations or surgeries.  Denies any recent travel or sick contacts.  Patient does have a history of CHF, diabetes, hypertension.  Does not use blood thinners.      Patient History   Past Medical History:   Diagnosis Date    Abnormal laboratory test result 09/01/2023    Anemia due to acute blood loss 09/01/2023    CHF (congestive heart failure) (Multi)     Chill 09/01/2023    Diabetes mellitus (Multi)     Flank pain 09/01/2023    Hypertension     Hypoxemia 09/01/2023    Hypoxia 09/01/2023    Melena 09/01/2023    Other chronic pain 09/01/2023    Pneumonia 09/01/2023    Pyuria 09/01/2023     History reviewed. No pertinent surgical history.  Family History   Problem Relation Name Age of Onset    Leukemia Mother      Cancer Mother      Heart disease Father      Other (heart problems) Father       Social History     Tobacco Use    Smoking status: Never     Passive exposure: Never    Smokeless tobacco: Never   Vaping Use    Vaping status: Never Used   Substance Use Topics    Alcohol use: Never    Drug use: Never       Physical Exam   ED Triage Vitals [09/23/24 1023]   Temp Heart Rate Resp BP   36.4 °C (97.5 °F) 80 18 128/80      SpO2 Temp Source Heart Rate Source Patient Position   (!) 90 % Oral Monitor Sitting      BP Location FiO2 (%)     Left arm --       Physical Exam  Vitals reviewed.   Constitutional:       General: She is not in acute  distress.     Appearance: Normal appearance. She is ill-appearing.   HENT:      Head: Normocephalic and atraumatic.   Eyes:      Extraocular Movements: Extraocular movements intact.   Cardiovascular:      Rate and Rhythm: Normal rate and regular rhythm.      Heart sounds: Normal heart sounds.   Pulmonary:      Effort: Pulmonary effort is normal.      Breath sounds: Normal breath sounds.   Abdominal:      General: Abdomen is flat.      Palpations: Abdomen is soft.      Tenderness: There is no abdominal tenderness.   Musculoskeletal:         General: Normal range of motion.      Cervical back: Normal range of motion and neck supple.   Skin:     General: Skin is warm and dry.   Neurological:      General: No focal deficit present.      Mental Status: She is alert and oriented to person, place, and time.   Psychiatric:         Mood and Affect: Mood normal.         Behavior: Behavior normal.           ED Course & MDM   Diagnoses as of 09/23/24 1728   Acute hypoxic respiratory failure (Multi)   Pneumonia of both upper lobes due to infectious organism                 No data recorded     Harris Coma Scale Score: 15 (09/23/24 1036 : Kaylee Flynn RN)                           Medical Decision Making  Parts of this chart have been completed using voice recognition software. Please excuse any errors of transcription.  My thought process and reason for plan has been formulated from the time that I saw the patient until the time of disposition and is not specific to one specific moment during their visit and furthermore my MDM encompasses this entire chart and not only this text box.    HPI:   Detailed above.    Exam:   A medically appropriate exam performed, outlined above, given the known history and presentation.    History obtained from:   Patient    EKG/Cardiac monitor:   Interpreted by attending physician, see their note for ED course for more detail.    Social Determinants of Health considered during this visit:    Housing, Family or social support    Medications given during visit:  Medications   acetaminophen (Tylenol) tablet 325 mg ( oral Held by provider 9/23/24 1714)   furosemide (Lasix) tablet 80 mg ( oral Dose Auto Held 9/28/24 0900)   warfarin (Coumadin) tablet 3 mg (has no administration in time range)   losartan (Cozaar) tablet 25 mg (has no administration in time range)   pramipexole (Mirapex) tablet 1.5 mg (has no administration in time range)   sertraline (Zoloft) tablet 100 mg (has no administration in time range)   simvastatin (Zocor) tablet 10 mg (has no administration in time range)   traZODone (Desyrel) tablet 200 mg (has no administration in time range)   prochlorperazine (Compazine) tablet 10 mg (has no administration in time range)     Or   prochlorperazine (Compazine) injection 10 mg (has no administration in time range)     Or   prochlorperazine (Compazine) suppository 25 mg (has no administration in time range)   polyethylene glycol (Glycolax, Miralax) packet 17 g (has no administration in time range)   bisacodyl (Dulcolax) EC tablet 10 mg (has no administration in time range)   bisacodyl (Dulcolax) suppository 10 mg (has no administration in time range)   benzocaine-menthol (Cepastat Sore Throat) lozenge 1 lozenge (has no administration in time range)   dextromethorphan-guaifenesin (Robitussin DM)  mg/5 mL oral liquid 5 mL (has no administration in time range)   pantoprazole (ProtoNix) EC tablet 40 mg (has no administration in time range)     Or   pantoprazole (ProtoNix) injection 40 mg (has no administration in time range)   ondansetron ODT (Zofran-ODT) disintegrating tablet 4 mg (has no administration in time range)     Or   ondansetron (Zofran) injection 4 mg (has no administration in time range)   azithromycin (Zithromax) 500 mg in dextrose 5% 250 mL IV (has no administration in time range)   cefTRIAXone (Rocephin) 1 g in dextrose (iso) IV 50 mL (has no administration in time range)    albuterol 2.5 mg /3 mL (0.083 %) nebulizer solution 2.5 mg (has no administration in time range)   albuterol 2.5 mg /3 mL (0.083 %) nebulizer solution 2.5 mg (has no administration in time range)   cefTRIAXone (Rocephin) 1 g in dextrose (iso) IV 50 mL (0 g intravenous Stopped 9/23/24 1446)   azithromycin 500 mg in dextrose 5% 250 mL IV (0 mg intravenous Stopped 9/23/24 1650)        Diagnostic/tests:  Labs Reviewed   URINALYSIS WITH REFLEX CULTURE AND MICROSCOPIC - Abnormal       Result Value    Color, Urine Yellow      Appearance, Urine Clear      Specific Gravity, Urine 1.019      pH, Urine 6.0      Protein, Urine 30 (1+) (*)     Glucose, Urine Normal      Blood, Urine NEGATIVE      Ketones, Urine NEGATIVE      Bilirubin, Urine NEGATIVE      Urobilinogen, Urine Normal      Nitrite, Urine NEGATIVE      Leukocyte Esterase, Urine 250 Padmini/µL (*)    CBC WITH AUTO DIFFERENTIAL - Abnormal    WBC 5.8      nRBC 0.0      RBC 3.53 (*)     Hemoglobin 10.5 (*)     Hematocrit 32.9 (*)     MCV 93      MCH 29.7      MCHC 31.9 (*)     RDW 14.8 (*)     Platelets 132 (*)     Neutrophils % 81.5      Immature Granulocytes %, Automated 0.7      Lymphocytes % 7.5      Monocytes % 8.1      Eosinophils % 1.7      Basophils % 0.5      Neutrophils Absolute 4.70      Immature Granulocytes Absolute, Automated 0.04      Lymphocytes Absolute 0.43 (*)     Monocytes Absolute 0.47      Eosinophils Absolute 0.10      Basophils Absolute 0.03     COMPREHENSIVE METABOLIC PANEL - Abnormal    Glucose 112 (*)     Sodium 142      Potassium 3.5      Chloride 102      Bicarbonate 31      Anion Gap 13      Urea Nitrogen 31 (*)     Creatinine 1.28 (*)     eGFR 41 (*)     Calcium 9.2      Albumin 4.1      Alkaline Phosphatase 91      Total Protein 6.2 (*)     AST 16      Bilirubin, Total 1.2      ALT 11     B-TYPE NATRIURETIC PEPTIDE - Abnormal     (*)     Narrative:        <100 pg/mL - Heart failure unlikely  100-299 pg/mL - Intermediate probability  of acute heart                  failure exacerbation. Correlate with clinical                  context and patient history.    >=300 pg/mL - Heart Failure likely. Correlate with clinical                  context and patient history.    BNP testing is performed using different testing methodology at [facility] than at other [facility]. Direct result comparisons should only be made within the same method.      SERIAL TROPONIN-INITIAL - Abnormal    Troponin I, High Sensitivity 18 (*)     Narrative:     Less than 99th percentile of normal range cutoff-  Female and children under 18 years old <14 ng/L; Male <21 ng/L: Negative  Repeat testing should be performed if clinically indicated.     Female and children under 18 years old 14-50 ng/L; Male 21-50 ng/L:  Consistent with possible cardiac damage and possible increased clinical   risk. Serial measurements may help to assess extent of myocardial damage.     >50 ng/L: Consistent with cardiac damage, increased clinical risk and  myocardial infarction. Serial measurements may help assess extent of   myocardial damage.      NOTE: Children less than 1 year old may have higher baseline troponin   levels and results should be interpreted in conjunction with the overall   clinical context.     NOTE: Troponin I testing is performed using a different   testing methodology at [facility] than at other   [facility]. Direct result comparisons should only   be made within the same method.   MICROSCOPIC ONLY, URINE - Abnormal    WBC, Urine 11-20 (*)     RBC, Urine 1-2      Squamous Epithelial Cells, Urine 1-9 (SPARSE)      Bacteria, Urine 1+ (*)    SERIAL TROPONIN, 1 HOUR - Abnormal    Troponin I, High Sensitivity 17 (*)     Narrative:     Less than 99th percentile of normal range cutoff-  Female and children under 18 years old <14 ng/L; Male <21 ng/L: Negative  Repeat testing should be performed if clinically indicated.     Female and children  under 18 years old 14-50 ng/L; Male 21-50 ng/L:  Consistent with possible cardiac damage and possible increased clinical   risk. Serial measurements may help to assess extent of myocardial damage.     >50 ng/L: Consistent with cardiac damage, increased clinical risk and  myocardial infarction. Serial measurements may help assess extent of   myocardial damage.      NOTE: Children less than 1 year old may have higher baseline troponin   levels and results should be interpreted in conjunction with the overall   clinical context.     NOTE: Troponin I testing is performed using a different   testing methodology at [facility] than at other   [facility]. Direct result comparisons should only   be made within the same method.   PROTIME-INR - Abnormal    Protime 24.4 (*)     INR 2.5 (*)     Narrative:     INR Therapeutic Range: 2.0-3.5   BLOOD GAS ARTERIAL FULL PANEL - Abnormal    POCT pH, Arterial 7.46 (*)     POCT pCO2, Arterial 45 (*)     POCT pO2, Arterial 116 (*)     POCT SO2, Arterial 99      POCT Oxy Hemoglobin, Arterial 95.5      POCT Hematocrit Calculated, Arterial 32.0 (*)     POCT Sodium, Arterial 138      POCT Potassium, Arterial 3.6      POCT Chloride, Arterial 102      POCT Ionized Calcium, Arterial 1.24      POCT Glucose, Arterial 97      POCT Lactate, Arterial 0.5      POCT Base Excess, Arterial 7.3 (*)     POCT HCO3 Calculated, Arterial 32.0 (*)     POCT Hemoglobin, Arterial 10.6 (*)     POCT Anion Gap, Arterial 8 (*)     Patient Temperature 37.0      FiO2 32      Apparatus CANNULA      Flow 3.0      Site of Arterial Puncture Radial Right      Kentrell's Test Positive     MAGNESIUM - Normal    Magnesium 2.11     SARS-COV-2 PCR - Normal    Coronavirus 2019, PCR Not Detected      Narrative:     This assay has received FDA Emergency Use Authorization (EUA) and is only authorized for the duration of time that circumstances exist to justify the authorization of the emergency use of in vitro  diagnostic tests for the detection of SARS-CoV-2 virus and/or diagnosis of COVID-19 infection under section 564(b)(1) of the Act, 21 U.S.C. 360bbb-3(b)(1). This assay is an in vitro diagnostic nucleic acid amplification test for the qualitative detection of SARS-CoV-2 from nasopharyngeal specimens and has been validated for use at [facility]. Negative results do not preclude COVID-19 infections and should not be used as the sole basis for diagnosis, treatment, or other management decisions.     INFLUENZA A AND B PCR - Normal    Flu A Result Not Detected      Flu B Result Not Detected      Narrative:     This assay is an in vitro diagnostic multiplex nucleic acid amplification test for the detection and discrimination of Influenza A & B from nasopharyngeal specimens, and has been validated for use at [facility]. Negative results do not preclude Influenza A/B infections, and should not be used as the sole basis for diagnosis, treatment, or other management decisions. If Influenza A/B and RSV PCR results are negative, testing for Parainfluenza virus, Adenovirus and Metapneumovirus is routinely performed for [facility] pediatric oncology and intensive care inpatients, and is available on other patients by placing an add-on request.   URINE CULTURE   BLOOD CULTURE   BLOOD CULTURE   RESPIRATORY CULTURE/SMEAR   BLOOD CULTURE   TROPONIN SERIES- (INITIAL, 1 HR)    Narrative:     The following orders were created for panel order Troponin I Series, High Sensitivity (0, 1 HR).  Procedure                               Abnormality         Status                     ---------                               -----------         ------                     Troponin I, High Sensiti...[[id number]]  Abnormal            Final result               Troponin, High Sensitivi...[[id number]]  Abnormal            Final result                 Please view results for these tests on the individual orders.    URINALYSIS WITH REFLEX CULTURE AND MICROSCOPIC    Narrative:     The following orders were created for panel order Urinalysis with Reflex Culture and Microscopic.  Procedure                               Abnormality         Status                     ---------                               -----------         ------                     Urinalysis with Reflex C...[[id number]]  Abnormal            Final result               Extra Urine Gray Tube[[id number]]                                                         Please view results for these tests on the individual orders.   EXTRA URINE GRAY TUBE   COMPREHENSIVE METABOLIC PANEL      XR chest 1 view   Final Result   Diffuse interstitial infiltrates bilaterally, as above. Clinical   correlation and continued follow-up until clearing is recommended.        MACRO:   None.        Signed by: Mike Calixto 9/23/2024 12:57 PM   Dictation workstation:   [id number]           Critical Care:  Upon my evaluation, this patient had a high probability of imminent or life-threatening deterioration due to respiratory failure, which required my direct attention, intervention, and personal management.    I have personally provided 30 minutes of non-concurrent critical care time exclusive of time spent on separately billable procedures. Time includes review of laboratory data, radiology results, discussion with consultants, and monitoring for potential decompensation. Interventions were performed as documented above.    MDM Summary:  Patient has new oxygen requirement.  Will be full admission to medical service.  Chest x-ray shows diffuse bilateral interstitial infiltrates.  Will start patient on azithromycin and ceftriaxone.  Lab workup is otherwise unremarkable.  I spoke with the admitting physician Dr. Cabello. We thoroughly discussed the patient's medical history, physical exam, laboratory and imaging studies, as well as, emergency department course. This also includes the  patient's comorbidities, surgical history, medications, and living situation. Based upon that discussion, we've decided to admit for further management and evaluation of their respiratory failure. The patient will be admitted to RNF unit as determined by myself and the admitting physician. The admitting physician has been fully informed regarding this case and agreed to place the admission order for the patient. Please see the admission H&P authored by Dr. Cabello for further detail.      Procedure  Procedures     Huy Boland PA-C  09/23/24 3469

## 2024-09-23 NOTE — ASSESSMENT & PLAN NOTE
Pt is on 3 L nasal cannula normally does not wear oxygen at home no history of COPD asthma.  Might be caused by pneumonia  wean patient from O2 as tolerated

## 2024-09-23 NOTE — H&P
History Of Present Illness  Raúl Jordan is a 84 y.o. female presenting with of chills, fatigue and shortness of breath exertion.  This is an 84 years old female with past medical history of CHF diabetes hypertension who presented to [facility] ED with complaint with general fatigue shortness of breath with exertion.  She believes that she has an UTI.  She could not sleep last night and not feeling good.  Denies any nausea vomiting or abdominal pain.  Denies any change in her medication.  Denies any neurological deficit able to move all her extremities, good strength and no issues with the sensation.  No headache or light headache.  No dizziness or fall. No neurological deficit   Past Medical History  Past Medical History:   Diagnosis Date    Abnormal laboratory test result 09/01/2023    Anemia due to acute blood loss 09/01/2023    CHF (congestive heart failure) (Multi)     Chill 09/01/2023    Diabetes mellitus (Multi)     Flank pain 09/01/2023    Hypertension     Hypoxemia 09/01/2023    Hypoxia 09/01/2023    Melena 09/01/2023    Other chronic pain 09/01/2023    Pneumonia 09/01/2023    Pyuria 09/01/2023       Surgical History  History reviewed. No pertinent surgical history.     Social History  She reports that she has never smoked. She has never been exposed to tobacco smoke. She has never used smokeless tobacco. She reports that she does not drink alcohol and does not use drugs.    Family History  Family History   Problem Relation Name Age of Onset    Leukemia Mother      Cancer Mother      Heart disease Father      Other (heart problems) Father          Allergies  Penicillin v, Sulfamethoxazole-trimethoprim, Levofloxacin, Oxycodone-acetaminophen, and Penicillin    Review of Systems   Constitutional:  Positive for activity change, appetite change, chills and fatigue. Negative for fever and unexpected weight change.   HENT:  Negative for congestion, mouth sores, rhinorrhea, sore throat and voice change.    Eyes:  "Negative.    Respiratory: Negative.  Negative for apnea, cough and chest tightness.    Cardiovascular:  Negative for chest pain, palpitations and leg swelling.   Gastrointestinal:  Negative for abdominal distention, diarrhea, nausea and vomiting.   Genitourinary:  Negative for decreased urine volume, difficulty urinating, frequency, hematuria and urgency.   Musculoskeletal: Negative.  Negative for back pain, joint swelling, myalgias, neck pain and neck stiffness.   Skin: Negative.    Allergic/Immunologic: Negative.    Neurological:  Negative for dizziness, tremors, seizures, syncope, speech difficulty, weakness, numbness and headaches.   Hematological: Negative.    Psychiatric/Behavioral: Negative.          Physical Exam  Vitals and nursing note reviewed.   Constitutional:       Appearance: Normal appearance.   HENT:      Head: Normocephalic and atraumatic.      Nose: Nose normal.   Eyes:      Extraocular Movements: Extraocular movements intact.      Pupils: Pupils are equal, round, and reactive to light.   Cardiovascular:      Rate and Rhythm: Normal rate.      Pulses: Normal pulses.   Pulmonary:      Effort: Pulmonary effort is normal. No respiratory distress.      Breath sounds: Rhonchi present. No rales.   Chest:      Chest wall: No tenderness.   Abdominal:      General: Bowel sounds are normal.      Palpations: Abdomen is soft.      Tenderness: There is no right CVA tenderness or left CVA tenderness.   Musculoskeletal:         General: Normal range of motion.      Cervical back: Normal range of motion and neck supple.   Skin:     General: Skin is warm and dry.   Neurological:      General: No focal deficit present.      Mental Status: She is alert and oriented to person, place, and time.   Psychiatric:         Mood and Affect: Mood normal.          Last Recorded Vitals  Blood pressure 130/70, pulse 69, temperature 36.4 °C (97.5 °F), temperature source Oral, resp. rate 18, height 1.6 m (5' 3\"), weight 92.1 kg " (203 lb), SpO2 94%.    Relevant Results             Assessment/Plan   Admitted with acute respiratory failure and pneumonia started on IV antibiotic.  Patient is on Coumadin due to A-fib. I held her Lasix for now due to JC.  Assessment & Plan  Acute hypoxic respiratory failure (Multi)  Pt is on 3 L nasal cannula normally does not wear oxygen at home no history of COPD asthma.  Might be caused by pneumonia  wean patient from O2 as tolerated  Type 2 diabetes mellitus with obesity (Multi)  Not on any medication  Diastolic heart failure  Chronic shortness of breath with exertion  Last EF 55--60%   I held furosemide for now  Follow-up with Dr. Lozano as outpatient  Hypertension  Continue with home meds  Pneumonia of both lower lobes  Chest x-ray shows possible pneumonia bilaterally  Order blood cultures and sputum  Start on azithromycin and Rocephin    Longstanding persistent atrial fibrillation (Multi)  Patient on Coumadin therapy on 3 L milligram of Coumadin daily INR today 2.5.  PT/INR daily    DVT prophylaxis  Patient already on Coumadin       I spent 65 minutes in the professional and overall care of this patient.      Alfreda Maki, APRN-CNP

## 2024-09-23 NOTE — NURSING NOTE
Patient arrived to room from ED. Not in any acute distress. Oriented to Call light system and room. Denies any needs. Call light and possessions within reach. Bed alarm on.

## 2024-09-23 NOTE — ASSESSMENT & PLAN NOTE
Chest x-ray shows possible pneumonia bilaterally  Order blood cultures and sputum  Start on azithromycin and Rocephin

## 2024-09-24 LAB
ANION GAP SERPL CALCULATED.3IONS-SCNC: 11 MMOL/L (ref 10–20)
BACTERIA UR CULT: NORMAL
BUN SERPL-MCNC: 27 MG/DL (ref 6–23)
CALCIUM SERPL-MCNC: 8.7 MG/DL (ref 8.6–10.3)
CHLORIDE SERPL-SCNC: 103 MMOL/L (ref 98–107)
CO2 SERPL-SCNC: 32 MMOL/L (ref 21–32)
CREAT SERPL-MCNC: 1.11 MG/DL (ref 0.5–1.05)
EGFRCR SERPLBLD CKD-EPI 2021: 49 ML/MIN/1.73M*2
ERYTHROCYTE [DISTWIDTH] IN BLOOD BY AUTOMATED COUNT: 14.8 % (ref 11.5–14.5)
GLUCOSE SERPL-MCNC: 99 MG/DL (ref 74–99)
HCT VFR BLD AUTO: 32.1 % (ref 36–46)
HGB BLD-MCNC: 10.1 G/DL (ref 12–16)
INR PPP: 2.6 (ref 0.9–1.2)
MCH RBC QN AUTO: 29.8 PG (ref 26–34)
MCHC RBC AUTO-ENTMCNC: 31.5 G/DL (ref 32–36)
MCV RBC AUTO: 95 FL (ref 80–100)
NRBC BLD-RTO: 0 /100 WBCS (ref 0–0)
PLATELET # BLD AUTO: 126 X10*3/UL (ref 150–450)
POTASSIUM SERPL-SCNC: 3.6 MMOL/L (ref 3.5–5.3)
PROTHROMBIN TIME: 25.6 SECONDS (ref 9.3–12.7)
RBC # BLD AUTO: 3.39 X10*6/UL (ref 4–5.2)
SODIUM SERPL-SCNC: 142 MMOL/L (ref 136–145)
WBC # BLD AUTO: 6.5 X10*3/UL (ref 4.4–11.3)

## 2024-09-24 PROCEDURE — 80048 BASIC METABOLIC PNL TOTAL CA: CPT | Performed by: NURSE PRACTITIONER

## 2024-09-24 PROCEDURE — 85610 PROTHROMBIN TIME: CPT | Performed by: NURSE PRACTITIONER

## 2024-09-24 PROCEDURE — 1200000002 HC GENERAL ROOM WITH TELEMETRY DAILY

## 2024-09-24 PROCEDURE — 97165 OT EVAL LOW COMPLEX 30 MIN: CPT | Mod: GO

## 2024-09-24 PROCEDURE — 94640 AIRWAY INHALATION TREATMENT: CPT

## 2024-09-24 PROCEDURE — 36415 COLL VENOUS BLD VENIPUNCTURE: CPT | Performed by: NURSE PRACTITIONER

## 2024-09-24 PROCEDURE — 2500000004 HC RX 250 GENERAL PHARMACY W/ HCPCS (ALT 636 FOR OP/ED): Performed by: NURSE PRACTITIONER

## 2024-09-24 PROCEDURE — 85027 COMPLETE CBC AUTOMATED: CPT | Performed by: NURSE PRACTITIONER

## 2024-09-24 PROCEDURE — 2500000002 HC RX 250 W HCPCS SELF ADMINISTERED DRUGS (ALT 637 FOR MEDICARE OP, ALT 636 FOR OP/ED): Performed by: INTERNAL MEDICINE

## 2024-09-24 PROCEDURE — 2500000001 HC RX 250 WO HCPCS SELF ADMINISTERED DRUGS (ALT 637 FOR MEDICARE OP): Performed by: NURSE PRACTITIONER

## 2024-09-24 PROCEDURE — 2500000002 HC RX 250 W HCPCS SELF ADMINISTERED DRUGS (ALT 637 FOR MEDICARE OP, ALT 636 FOR OP/ED): Performed by: NURSE PRACTITIONER

## 2024-09-24 PROCEDURE — 97161 PT EVAL LOW COMPLEX 20 MIN: CPT | Mod: GP

## 2024-09-24 PROCEDURE — 9420000001 HC RT PATIENT EDUCATION 5 MIN

## 2024-09-24 PROCEDURE — 94760 N-INVAS EAR/PLS OXIMETRY 1: CPT

## 2024-09-24 RX ORDER — ALBUTEROL SULFATE 0.83 MG/ML
2.5 SOLUTION RESPIRATORY (INHALATION)
Status: DISCONTINUED | OUTPATIENT
Start: 2024-09-24 | End: 2024-10-01 | Stop reason: HOSPADM

## 2024-09-24 ASSESSMENT — COGNITIVE AND FUNCTIONAL STATUS - GENERAL
HELP NEEDED FOR BATHING: A LITTLE
PERSONAL GROOMING: A LITTLE
TOILETING: A LITTLE
DAILY ACTIVITIY SCORE: 18
EATING MEALS: A LITTLE
MOBILITY SCORE: 20
WALKING IN HOSPITAL ROOM: A LITTLE
STANDING UP FROM CHAIR USING ARMS: A LITTLE
CLIMB 3 TO 5 STEPS WITH RAILING: A LITTLE
WALKING IN HOSPITAL ROOM: A LITTLE
DRESSING REGULAR LOWER BODY CLOTHING: A LITTLE
DAILY ACTIVITIY SCORE: 20
DAILY ACTIVITIY SCORE: 20
TOILETING: A LITTLE
MOBILITY SCORE: 19
CLIMB 3 TO 5 STEPS WITH RAILING: A LITTLE
WALKING IN HOSPITAL ROOM: A LITTLE
DRESSING REGULAR UPPER BODY CLOTHING: A LITTLE
DRESSING REGULAR LOWER BODY CLOTHING: A LITTLE
MOVING TO AND FROM BED TO CHAIR: A LITTLE
STANDING UP FROM CHAIR USING ARMS: A LITTLE
MOVING TO AND FROM BED TO CHAIR: A LITTLE
DRESSING REGULAR UPPER BODY CLOTHING: A LITTLE
MOVING TO AND FROM BED TO CHAIR: A LITTLE
TURNING FROM BACK TO SIDE WHILE IN FLAT BAD: A LITTLE
TOILETING: A LITTLE
HELP NEEDED FOR BATHING: A LITTLE
CLIMB 3 TO 5 STEPS WITH RAILING: A LITTLE
MOBILITY SCORE: 19
HELP NEEDED FOR BATHING: A LITTLE
TURNING FROM BACK TO SIDE WHILE IN FLAT BAD: A LITTLE
DRESSING REGULAR LOWER BODY CLOTHING: A LITTLE
DRESSING REGULAR UPPER BODY CLOTHING: A LITTLE
STANDING UP FROM CHAIR USING ARMS: A LITTLE

## 2024-09-24 ASSESSMENT — ACTIVITIES OF DAILY LIVING (ADL)
BATHING_ASSISTANCE: STAND BY
ADL_ASSISTANCE: INDEPENDENT
ADL_ASSISTANCE: INDEPENDENT

## 2024-09-24 ASSESSMENT — PAIN SCALES - GENERAL
PAINLEVEL_OUTOF10: 0 - NO PAIN

## 2024-09-24 ASSESSMENT — PAIN - FUNCTIONAL ASSESSMENT
PAIN_FUNCTIONAL_ASSESSMENT: 0-10
PAIN_FUNCTIONAL_ASSESSMENT: 0-10

## 2024-09-24 NOTE — CARE PLAN
The patient's goals for the shift include Wean off O2    The clinical goals for the shift include IV antibiotics, breathing treatment, remain free from falls      Problem: Safety - Adult  Goal: Free from fall injury  Outcome: Progressing     Problem: Discharge Planning  Goal: Discharge to home or other facility with appropriate resources  Outcome: Progressing     Problem: Chronic Conditions and Co-morbidities  Goal: Patient's chronic conditions and co-morbidity symptoms are monitored and maintained or improved  Outcome: Progressing     Problem: Fall/Injury  Goal: Not fall by end of shift  Outcome: Progressing  Goal: Be free from injury by end of the shift  Outcome: Progressing  Goal: Verbalize understanding of personal risk factors for fall in the hospital  Outcome: Progressing  Goal: Verbalize understanding of risk factor reduction measures to prevent injury from fall in the home  Outcome: Progressing  Goal: Use assistive devices by end of the shift  Outcome: Progressing  Goal: Pace activities to prevent fatigue by end of the shift  Outcome: Progressing     Problem: Respiratory  Goal: No signs of respiratory distress (eg. Use of accessory muscles. Peds grunting)  Outcome: Progressing

## 2024-09-24 NOTE — PROGRESS NOTES
Physical Therapy    Physical Therapy Evaluation    Patient Name: Raúl Jordan  MRN: [id number]  Department: [address]  Room: [address] [place] [address]  Today's Date: 9/24/2024   Time Calculation  Start Time: 0825  Stop Time: 0845  Time Calculation (min): 20 min    Assessment/Plan   PT Assessment  PT Assessment Results: Decreased strength, Decreased range of motion, Decreased endurance  Rehab Prognosis: Good  Barriers to Discharge: none  Evaluation/Treatment Tolerance: Patient tolerated treatment well  Medical Staff Made Aware: Yes  Strengths: Ability to acquire knowledge, Attitude of self, Housing layout, Premorbid level of function, Support of Caregivers  Barriers to Participation: Comorbidities  End of Session Communication: Bedside nurse  Assessment Comment: Pt moving well, reports feeling better, currently on 2 L O2 w/ amb < household distance and O2 sat ~95%. Pt not quite to IND baseline, would benefit from continued PT services to progress functional endurance and mobility.  End of Session Patient Position: Up in chair, Alarm on  IP OR SWING BED PT PLAN  Inpatient or Swing Bed: Inpatient  PT Plan  Treatment/Interventions: Transfer training, Gait training, Stair training, Strengthening, Endurance training, Range of motion, Therapeutic exercise, Therapeutic activity  PT Plan: Ongoing PT  PT Frequency: 4 times per week  PT Discharge Recommendations: Low intensity level of continued care  Equipment Recommended upon Discharge: Wheeled walker  PT Recommended Transfer Status: Assist x1, Assistive device  PT - OK to Discharge: Yes      Subjective   General Visit Information:  General  Reason for Referral: impaired mobility, acute hypoxic resp failure  Referred By: Dr Cabello  Past Medical History Relevant to Rehab: CHF, DM, HTN, Rt TKA, OA  Family/Caregiver Present: No  Prior to Session Communication: Bedside nurse  Patient Position Received: Bed, 3 rail up, Alarm on (pt sitting EOB)  Preferred Learning Style: verbal, visual  General  Comment: Pt is an 84 y.o. female adm for c/o chills, fatigue, ALVARADO. Pt agreeable to PT eval.  Home Living:  Home Living  Type of Home: House  Lives With: Adult children (dtr, S-I-L)  Home Adaptive Equipment: Walker rolling or standard, Reacher, Long-handled shoehorn, Cane (rollator)  Home Layout: Two level, Able to live on main level with bedroom/bathroom (has basement, does not need to use)  Home Access: Stairs to enter with rails  Entrance Stairs-Rails:  (unilateral)  Entrance Stairs-Number of Steps: 2  Bathroom Shower/Tub: Walk-in shower  Bathroom Toilet: Standard  Bathroom Equipment: Grab bars in shower (has a shower chair in basement if needed)  Home Living Comments: reports has her own living area/suite that she maintains  Prior Level of Function:  Prior Function Per Pt/Caregiver Report  Level of [place]: Independent with ADLs and functional transfers, Independent with homemaking with ambulation  Receives Help From: Family  ADL Assistance: Independent  Homemaking Assistance: Independent  Ambulatory Assistance:  (uses cane or rollator prn when outside)  Hand Dominance: Right  Prior Function Comments: Pt drives, denies falls, IND w/ own living area w/in home  Precautions:  Precautions  Hearing/Visual Limitations: reading glasses, hearing decreased  Medical Precautions: Fall precautions, Oxygen therapy device and L/min (2L O2)    Vital Signs (Past 2hrs)        Date/Time Vitals Session Patient Position Pulse Resp SpO2 BP MAP (mmHg)    09/24/24 0749 --  --  86  18  98 %  123/65  90     09/24/24 0825 During PT  Sitting  94  --  95 %  --  --     09/24/24 0850 --  --  --  --  95 %  --  --                   Objective   Pain:  Pain Assessment  Pain Assessment: 0-10  0-10 (Numeric) Pain Score: 0 - No pain  Cognition:  Cognition  Overall Cognitive Status: Within Functional Limits  Orientation Level: Oriented X4    General Assessments:  Activity Tolerance  Endurance:  (Fair, mild SOB, SpO2 stable)  Activity Tolerance  Comments: Good    Sensation  Sensation Comment: pt denied abn sensation    Strength  Strength Comments: BLEs grossly 3/5 or >; Rt knee w/in available but limited knee flexion ROM (to ~ 80 degrees flexion)  Coordination  Movements are Fluid and Coordinated: Yes    Dynamic Standing Balance  Dynamic Standing-Balance Support: Bilateral upper extremity supported  Dynamic Standing-Level of Assistance: Close supervision  Dynamic Standing-Balance: Turning (amb)  Dynamic Standing-Comments: Fair, no LOB, no dizziness  Functional Assessments:  Bed Mobility  Bed Mobility: No (pt sitting EOB upon therapist's arrival)    Transfers  Transfer: Yes  Transfer 1  Technique 1: Sit to stand, Stand to sit  Transfer Device 1: Walker  Transfer Level of Assistance 1: Close supervision  Trials/Comments 1: from EOB, to/from chair w/ arms. Limited Rt knee flexion w/ transfers.    Ambulation/Gait Training  Ambulation/Gait Training Performed: Yes  Ambulation/Gait Training 1  Surface 1: Level tile  Device 1: Rolling walker  Assistance 1: Close supervision  Comments/Distance (ft) 1: Pt amb ~ 30' x 1, slow to fair pace, steady, mild SOB on 2 L O2, no complaints per pt, SpO2 95% after seated ~ 1 minute w/ difficulty obtaining SpO2 reading.  Extremity/Trunk Assessments:  RLE   RLE :  (limited Rt knee flexion to ~ 80 degrees sitting in bedside chair.)  LLE   LLE : Within Functional Limits  Outcome Measures:  [facility] Basic Mobility  Turning from your back to your side while in a flat bed without using bedrails: None  Moving from lying on your back to sitting on the side of a flat bed without using bedrails: None  Moving to and from bed to chair (including a wheelchair): A little  Standing up from a chair using your arms (e.g. wheelchair or bedside chair): A little  To walk in hospital room: A little  Climbing 3-5 steps with railing: A little  Basic Mobility - Total Score: 20    Encounter Problems       Encounter Problems (Active)       Mobility       STG  - Patient will ambulate 100' w/ LRAD prn and distant supervision  (Progressing)       Start:  09/24/24    Expected End:  10/07/24            STG - Patient will ascend and descend four stairs w/ supervision, 1 rail and cane prn (Progressing)       Start:  09/24/24    Expected End:  10/07/24            Pt will tolerate BLE exercises consistently to promote functional strength, ROM and endurance (Progressing)       Start:  09/24/24    Expected End:  10/07/24               PT Transfers       STG - Patient to transfer to and from sit to supine IND (Progressing)       Start:  09/24/24    Expected End:  10/07/24            STG - Patient will transfer sit to and from stand MOD I (Progressing)       Start:  09/24/24    Expected End:  10/07/24                   Education Documentation  Mobility Training, taught by Sue Gusman, PT at 9/24/2024  9:17 AM.  Learner: Patient  Readiness: Acceptance  Method: Explanation  Response: Verbalizes Understanding    Education Comments  No comments found.

## 2024-09-24 NOTE — PROGRESS NOTES
Raúl Jordan is a 84 y.o. female on day 1 of admission presenting with Acute hypoxic respiratory failure (Multi).      Subjective   Patient resting in bed, she denies pain or sob. She endorses a harsh nonproductive cough.       Objective     Last Recorded Vitals  /58 (BP Location: Left arm, Patient Position: Lying)   Pulse 85   Temp 36.9 °C (98.4 °F) (Temporal)   Resp 17   Wt 92.1 kg (203 lb)   SpO2 97%   Intake/Output last 3 Shifts:    Intake/Output Summary (Last 24 hours) at 9/24/2024 1809  Last data filed at 9/24/2024 1806  Gross per 24 hour   Intake 560 ml   Output 600 ml   Net -40 ml       Admission Weight  Weight: 92.1 kg (203 lb) (09/23/24 1023)    Daily Weight  09/23/24 : 92.1 kg (203 lb)    Image Results  XR chest 1 view  Narrative: Interpreted By:  Mike Calixto,   STUDY:  XR CHEST 1 VIEW  9/23/2024 12:48 pm      INDICATION:  Signs/Symptoms:hypoxic      COMPARISON:  05/01/2024      ACCESSION NUMBER(S):  [id number]      ORDERING CLINICIAN:  TANNER MILAN      TECHNIQUE:  A single AP portable radiograph of the chest was obtained.      FINDINGS:  Multiple cardiac monitoring leads are seen over the chest.  Mild-to-moderate diffuse interstitial infiltrates are seen throughout  the lungs bilaterally, and may represent fibrosis, edema and/or  pneumonia. No pneumothorax is identified. The cardiac silhouette is  enlarged, similar to prior studies.      Impression: Diffuse interstitial infiltrates bilaterally, as above. Clinical  correlation and continued follow-up until clearing is recommended.      MACRO:  None.      Signed by: Mike Calixto 9/23/2024 12:57 PM  Dictation workstation:   [id number]      Physical Exam  Constitutional:       Appearance: Normal appearance.   Cardiovascular:      Rate and Rhythm: Normal rate and regular rhythm.      Pulses: Normal pulses.      Heart sounds: Normal heart sounds.   Pulmonary:      Effort: Pulmonary effort is normal.      Breath sounds: Rhonchi present.    Abdominal:      General: Bowel sounds are normal.      Palpations: Abdomen is soft.   Musculoskeletal:         General: Normal range of motion.   Skin:     General: Skin is warm and dry.   Neurological:      Mental Status: She is alert and oriented to person, place, and time.         Relevant Results             Results for orders placed or performed during the hospital encounter of 09/23/24 (from the past 24 hour(s))   Comprehensive metabolic panel   Result Value Ref Range    Glucose 121 (H) 74 - 99 mg/dL    Sodium 141 136 - 145 mmol/L    Potassium 3.5 3.5 - 5.3 mmol/L    Chloride 102 98 - 107 mmol/L    Bicarbonate 30 21 - 32 mmol/L    Anion Gap 13 10 - 20 mmol/L    Urea Nitrogen 28 (H) 6 - 23 mg/dL    Creatinine 1.20 (H) 0.50 - 1.05 mg/dL    eGFR 44 (L) >60 mL/min/1.73m*2    Calcium 9.4 8.6 - 10.3 mg/dL    Albumin 4.1 3.4 - 5.0 g/dL    Alkaline Phosphatase 88 33 - 136 U/L    Total Protein 6.9 6.4 - 8.2 g/dL    AST 16 9 - 39 U/L    Bilirubin, Total 1.1 0.0 - 1.2 mg/dL    ALT 11 7 - 45 U/L   CBC   Result Value Ref Range    WBC 6.5 4.4 - 11.3 x10*3/uL    nRBC 0.0 0.0 - 0.0 /100 WBCs    RBC 3.39 (L) 4.00 - 5.20 x10*6/uL    Hemoglobin 10.1 (L) 12.0 - 16.0 g/dL    Hematocrit 32.1 (L) 36.0 - 46.0 %    MCV 95 80 - 100 fL    MCH 29.8 26.0 - 34.0 pg    MCHC 31.5 (L) 32.0 - 36.0 g/dL    RDW 14.8 (H) 11.5 - 14.5 %    Platelets 126 (L) 150 - 450 x10*3/uL   Basic metabolic panel   Result Value Ref Range    Glucose 99 74 - 99 mg/dL    Sodium 142 136 - 145 mmol/L    Potassium 3.6 3.5 - 5.3 mmol/L    Chloride 103 98 - 107 mmol/L    Bicarbonate 32 21 - 32 mmol/L    Anion Gap 11 10 - 20 mmol/L    Urea Nitrogen 27 (H) 6 - 23 mg/dL    Creatinine 1.11 (H) 0.50 - 1.05 mg/dL    eGFR 49 (L) >60 mL/min/1.73m*2    Calcium 8.7 8.6 - 10.3 mg/dL   Protime-INR   Result Value Ref Range    Protime 25.6 (H) 9.3 - 12.7 seconds    INR 2.6 (H) 0.9 - 1.2         Assessment/Plan                  Assessment & Plan  Acute hypoxic respiratory failure  (Multi)  Pt is on 3 L nasal cannula normally does not wear oxygen at home no history of COPD asthma.  Might be caused by pneumonia  wean patient from O2 as tolerated  Type 2 diabetes mellitus with obesity (Multi)  Not on any medication  Diastolic heart failure  Chronic shortness of breath with exertion  Last EF 55--60%   Restart lasix tomorrow  Follow-up with Dr. Lozano as outpatient  Hypertension  Continue with home meds  Pneumonia of both lower lobes  Chest x-ray shows possible pneumonia bilaterally  Order blood cultures and sputum  Start on azithromycin and Rocephin    Longstanding persistent atrial fibrillation (Multi)  Patient on Coumadin therapy 2.5 INR  PT/INR daily  Plan of Care  Patient lives at home with family she plans to return there at discharge without any needs.    Plan of care discussed with patient and collaborating physician.                  Jazz Melton, APRN-CNP

## 2024-09-24 NOTE — PROGRESS NOTES
Occupational Therapy    Evaluation    Patient Name: Raúl Jordan  MRN: [id number]  Department: [address]  Room: [address] [place] [address]  Today's Date: 9/24/2024  Time Calculation  Start Time: 0848  Stop Time: 0911  Time Calculation (min): 23 min    Assessment  IP OT Assessment  OT Assessment: Pt is 83 y/o female admitted for ac. hypoxic resp. failure. Pt presents w/ decreased ADL skills/ functional mobility, decreased activity tolerance, decreased BUE strength. Recommend OT services to address above deficits.  Prognosis: Good  Barriers to Discharge: None  Evaluation/Treatment Tolerance: Patient tolerated treatment well  End of Session Communication: Bedside nurse  End of Session Patient Position: Up in chair, Alarm on  Plan:  Treatment Interventions: ADL retraining, Functional transfer training, UE strengthening/ROM, Endurance training, Patient/family training, Equipment evaluation/education, Compensatory technique education  OT Frequency: 3 times per week  OT Discharge Recommendations: Low intensity level of continued care  Equipment Recommended upon Discharge: Wheeled walker  OT - OK to Discharge: Yes    Subjective   Current Problem:  1. Acute hypoxic respiratory failure (Multi)        2. Pneumonia of both upper lobes due to infectious organism          General:  General  Reason for Referral: decreased ADL's  Referred By: Dr Cabello  Past Medical History Relevant to Rehab: CHF, DM, HTN, Rt TKA, OA  Family/Caregiver Present: No  Prior to Session Communication: Bedside nurse  Patient Position Received: Up in chair, Alarm on  Preferred Learning Style: verbal, visual  General Comment: Cleared to see for eval, pt agreeable to therapy  Precautions:  Hearing/Visual Limitations: reading glasses, decreased hearing  Medical Precautions: Fall precautions, Oxygen therapy device and L/min (on 2L O2)           Pain:  Pain Assessment  Pain Assessment: 0-10  0-10 (Numeric) Pain Score: 0 - No pain    Objective   Cognition:  Overall Cognitive  Status: Within Functional Limits  Orientation Level: Oriented X4           Home Living:  Type of Home: House  Lives With: Adult children (Dtr, SOHAIL)  Home Adaptive Equipment: Walker rolling or standard, Cane, Reacher, Long-handled shoehorn (rollator)  Home Layout: Two level, Able to live on main level with bedroom/bathroom  Home Access: Stairs to enter with rails  Entrance Stairs-Rails:  (unilateral)  Entrance Stairs-Number of Steps: 2  Bathroom Shower/Tub: Walk-in shower  Bathroom Toilet: Standard  Bathroom Equipment: Grab bars in shower (has shower seat in basement if needed)  Home Living Comments: Pt reports that she has her own suite that she takes care of   Prior Function:  Level of [place]: Independent with ADLs and functional transfers, Independent with homemaking with ambulation  Receives Help From: Family  ADL Assistance: Independent  Homemaking Assistance: Independent  Ambulatory Assistance:  (uses cane or rollator as needed when out side)  Hand Dominance: Right  Prior Function Comments: Pt reports that she still drives  IADL History:  Homemaking Responsibilities: Yes  Meal Prep Responsibility: Secondary  Laundry Responsibility: Primary  Cleaning Responsibility: Primary (cleans her own suite)  Current License: Yes  Mode of Transportation: Car  ADL:  Eating Assistance: Stand by  Eating Deficit: Setup (per clinical judgement)  Grooming Assistance: Stand by  Grooming Deficit: Supervision/safety, Wash/dry hands, Wash/dry face, Teeth care, Brushing hair  Bathing Assistance: Stand by  Bathing Deficit: Supervision/safety, Setup (for UB bathing in stance at sink)  UE Dressing Assistance: Minimal  UE Dressing Deficit: Pull around back, Fasteners (to don/ doff gown d/t telemetry ,O2 line)  LE Dressing Assistance: Minimal  LE Dressing Deficit: Steadying, Supervision/safety (to don/ doff underwear, pull underwear up/ down)  Toileting Assistance with Device: Not performed  Functional Assistance: Stand  by  Functional Deficit: Toilet transfer, Supervision/safety  Activity Tolerance:  Endurance:  (mild shortness of breath w/ increased activity in stance)  Activity Tolerance Comments: good  Bed Mobility/Transfers: Transfers  Transfer: Yes  Transfer 1  Transfer From 1: Chair with arms to  Transfer to 1: Stand  Technique 1: Sit to stand, Stand to sit  Transfer Device 1: Walker  Transfer Level of Assistance 1: Close supervision  Trials/Comments 1: to/from chair ,verbal cuesa for hand placement and to align completely with chair , keeping walker with her  Transfers 2  Transfer From 2: Stand to  Transfer to 2: Sit, Toilet  Technique 2: Stand to sit, Sit to stand  Transfer Device 2: Walker  Transfer Level of Assistance 2: Close supervision  Trials/Comments 2: to/ from toilet, verbal cues for hand placement      Functional Mobility:  Functional Mobility  Functional Mobility Performed: Yes  Functional Mobility 1  Surface 1: Level tile  Device 1: Rolling walker  Assistance 1: Close supervision  Comments 1: Pt ambulated from chair to bathroom and back to chair w/ FWW and close supeervison  Modalities:     IADL's:   Homemaking Responsibilities: Yes  Meal Prep Responsibility: Secondary  Laundry Responsibility: Primary  Cleaning Responsibility: Primary (cleans her own suite)  Current License: Yes  Mode of Transportation: Car  Vision: Vision - Basic Assessment  Current Vision: Wears glasses only for reading  Sensation:  Light Touch: No apparent deficits  Strength:  Strength Comments: INO's 4-/5  Perception:     Coordination:  Movements are Fluid and Coordinated: Yes   Hand Function:  Hand Function  Gross Grasp: Functional  Coordination: Functional  Extremities: RUE   RUE : Within Functional Limits and LUE   LUE: Within Functional Limits      Outcome Measures: [facility] Daily Activity  Putting on and taking off regular lower body clothing: A little  Bathing (including washing, rinsing, drying): A little  Putting on and taking off  regular upper body clothing: A little  Toileting, which includes using toilet, bedpan or urinal: A little  Taking care of personal grooming such as brushing teeth: A little  Eating Meals: A little  Daily Activity - Total Score: 18      Education Documentation  ADL Training, taught by Ce Rubio OT at 9/24/2024 10:49 AM.  Learner: Patient  Readiness: Acceptance  Method: Explanation  Response: Demonstrated Understanding, Needs Reinforcement    Education Comments  No comments found.      Goals:   Encounter Problems       Encounter Problems (Active)       OT Goals       ADL's (Progressing)       Start:  09/24/24    Expected End:  10/08/24       Pt will complete ADL tasks w/ Mod I w/ AE/ compensatory tech for safety and increased independence in self care tasks.         Functional transfers (Progressing)       Start:  09/24/24    Expected End:  10/08/24       Pt will demon bed, chair and toilet transfers w/ Mod I w/ LRD, elev seat heights using B arm supports for safety and increased independence in daily tasks.         Functional endurance  (Progressing)       Start:  09/24/24    Expected End:  10/08/24       Pt will tolerate 20 minutes of functional activity to improve functional endurance for daily tasks and functional mobility.            OT Goals       BUE strengthening (Progressing)       Start:  09/24/24    Expected End:  10/08/24       Pt will demon BUE exercises to improve strength to 4/5 for functional transfers.

## 2024-09-24 NOTE — ASSESSMENT & PLAN NOTE
Chronic shortness of breath with exertion  Last EF 55--60%   Restart lasix tomorrow  Follow-up with Dr. Lozano as outpatient

## 2024-09-24 NOTE — ASSESSMENT & PLAN NOTE
Patient on Coumadin therapy 2.5 INR  PT/INR daily  Plan of Care  Patient lives at home with family she plans to return there at discharge without any needs.    Plan of care discussed with patient and collaborating physician.

## 2024-09-25 ENCOUNTER — APPOINTMENT (OUTPATIENT)
Dept: CARDIOLOGY | Facility: HOSPITAL | Age: 84
End: 2024-09-25
Payer: MEDICARE

## 2024-09-25 LAB
ERYTHROCYTE [DISTWIDTH] IN BLOOD BY AUTOMATED COUNT: 14.7 % (ref 11.5–14.5)
HCT VFR BLD AUTO: 31 % (ref 36–46)
HGB BLD-MCNC: 9.7 G/DL (ref 12–16)
MCH RBC QN AUTO: 30.1 PG (ref 26–34)
MCHC RBC AUTO-ENTMCNC: 31.3 G/DL (ref 32–36)
MCV RBC AUTO: 96 FL (ref 80–100)
NRBC BLD-RTO: 0 /100 WBCS (ref 0–0)
PLATELET # BLD AUTO: 118 X10*3/UL (ref 150–450)
RBC # BLD AUTO: 3.22 X10*6/UL (ref 4–5.2)
WBC # BLD AUTO: 5.7 X10*3/UL (ref 4.4–11.3)

## 2024-09-25 PROCEDURE — 2500000002 HC RX 250 W HCPCS SELF ADMINISTERED DRUGS (ALT 637 FOR MEDICARE OP, ALT 636 FOR OP/ED): Performed by: INTERNAL MEDICINE

## 2024-09-25 PROCEDURE — 2500000002 HC RX 250 W HCPCS SELF ADMINISTERED DRUGS (ALT 637 FOR MEDICARE OP, ALT 636 FOR OP/ED): Performed by: NURSE PRACTITIONER

## 2024-09-25 PROCEDURE — 2500000001 HC RX 250 WO HCPCS SELF ADMINISTERED DRUGS (ALT 637 FOR MEDICARE OP): Performed by: NURSE PRACTITIONER

## 2024-09-25 PROCEDURE — 94760 N-INVAS EAR/PLS OXIMETRY 1: CPT

## 2024-09-25 PROCEDURE — 97110 THERAPEUTIC EXERCISES: CPT | Mod: GP,CQ

## 2024-09-25 PROCEDURE — 2500000004 HC RX 250 GENERAL PHARMACY W/ HCPCS (ALT 636 FOR OP/ED): Performed by: NURSE PRACTITIONER

## 2024-09-25 PROCEDURE — 97110 THERAPEUTIC EXERCISES: CPT | Mod: GO,CO

## 2024-09-25 PROCEDURE — 85027 COMPLETE CBC AUTOMATED: CPT | Performed by: NURSE PRACTITIONER

## 2024-09-25 PROCEDURE — 97535 SELF CARE MNGMENT TRAINING: CPT | Mod: GO,CO

## 2024-09-25 PROCEDURE — 9420000001 HC RT PATIENT EDUCATION 5 MIN

## 2024-09-25 PROCEDURE — 94640 AIRWAY INHALATION TREATMENT: CPT

## 2024-09-25 PROCEDURE — 36415 COLL VENOUS BLD VENIPUNCTURE: CPT | Performed by: NURSE PRACTITIONER

## 2024-09-25 PROCEDURE — 1200000002 HC GENERAL ROOM WITH TELEMETRY DAILY

## 2024-09-25 PROCEDURE — 97116 GAIT TRAINING THERAPY: CPT | Mod: GP,CQ

## 2024-09-25 RX ORDER — ACETAMINOPHEN 325 MG/1
325 TABLET ORAL EVERY 6 HOURS PRN
Status: DISCONTINUED | OUTPATIENT
Start: 2024-09-25 | End: 2024-10-01 | Stop reason: HOSPADM

## 2024-09-25 SDOH — ECONOMIC STABILITY: FOOD INSECURITY: WITHIN THE PAST 12 MONTHS, YOU WORRIED THAT YOUR FOOD WOULD RUN OUT BEFORE YOU GOT MONEY TO BUY MORE.: NEVER TRUE

## 2024-09-25 SDOH — ECONOMIC STABILITY: FOOD INSECURITY: WITHIN THE PAST 12 MONTHS, THE FOOD YOU BOUGHT JUST DIDN'T LAST AND YOU DIDN'T HAVE MONEY TO GET MORE.: NEVER TRUE

## 2024-09-25 SDOH — HEALTH STABILITY: MENTAL HEALTH
HOW OFTEN DO YOU NEED TO HAVE SOMEONE HELP YOU WHEN YOU READ INSTRUCTIONS, PAMPHLETS, OR OTHER WRITTEN MATERIAL FROM YOUR DOCTOR OR PHARMACY?: NEVER

## 2024-09-25 SDOH — SOCIAL STABILITY: SOCIAL INSECURITY: WITHIN THE LAST YEAR, HAVE YOU BEEN AFRAID OF YOUR PARTNER OR EX-PARTNER?: NO

## 2024-09-25 SDOH — ECONOMIC STABILITY: INCOME INSECURITY: IN THE PAST 12 MONTHS, HAS THE ELECTRIC, GAS, OIL, OR WATER COMPANY THREATENED TO SHUT OFF SERVICE IN YOUR HOME?: NO

## 2024-09-25 SDOH — SOCIAL STABILITY: SOCIAL NETWORK
IN A TYPICAL WEEK, HOW MANY TIMES DO YOU TALK ON THE PHONE WITH FAMILY, FRIENDS, OR NEIGHBORS?: MORE THAN THREE TIMES A WEEK

## 2024-09-25 SDOH — ECONOMIC STABILITY: TRANSPORTATION INSECURITY
IN THE PAST 12 MONTHS, HAS LACK OF TRANSPORTATION KEPT YOU FROM MEETINGS, WORK, OR FROM GETTING THINGS NEEDED FOR DAILY LIVING?: NO

## 2024-09-25 SDOH — HEALTH STABILITY: MENTAL HEALTH
STRESS IS WHEN SOMEONE FEELS TENSE, NERVOUS, ANXIOUS, OR CAN'T SLEEP AT NIGHT BECAUSE THEIR MIND IS TROUBLED. HOW STRESSED ARE YOU?: NOT AT ALL

## 2024-09-25 SDOH — SOCIAL STABILITY: SOCIAL INSECURITY
WITHIN THE LAST YEAR, HAVE TO BEEN RAPED OR FORCED TO HAVE ANY KIND OF SEXUAL ACTIVITY BY YOUR PARTNER OR EX-PARTNER?: NO

## 2024-09-25 SDOH — SOCIAL STABILITY: SOCIAL NETWORK
DO YOU BELONG TO ANY CLUBS OR ORGANIZATIONS SUCH AS CHURCH GROUPS UNIONS, FRATERNAL OR ATHLETIC GROUPS, OR SCHOOL GROUPS?: PATIENT DECLINED

## 2024-09-25 SDOH — HEALTH STABILITY: PHYSICAL HEALTH: ON AVERAGE, HOW MANY DAYS PER WEEK DO YOU ENGAGE IN MODERATE TO STRENUOUS EXERCISE (LIKE A BRISK WALK)?: 0 DAYS

## 2024-09-25 SDOH — ECONOMIC STABILITY: INCOME INSECURITY: IN THE LAST 12 MONTHS, WAS THERE A TIME WHEN YOU WERE NOT ABLE TO PAY THE MORTGAGE OR RENT ON TIME?: NO

## 2024-09-25 SDOH — SOCIAL STABILITY: SOCIAL NETWORK: ARE YOU MARRIED, WIDOWED, DIVORCED, SEPARATED, NEVER MARRIED, OR LIVING WITH A PARTNER?: WIDOWED

## 2024-09-25 SDOH — SOCIAL STABILITY: SOCIAL INSECURITY
WITHIN THE LAST YEAR, HAVE YOU BEEN KICKED, HIT, SLAPPED, OR OTHERWISE PHYSICALLY HURT BY YOUR PARTNER OR EX-PARTNER?: NO

## 2024-09-25 SDOH — SOCIAL STABILITY: SOCIAL NETWORK: HOW OFTEN DO YOU GET TOGETHER WITH FRIENDS OR RELATIVES?: MORE THAN THREE TIMES A WEEK

## 2024-09-25 SDOH — SOCIAL STABILITY: SOCIAL INSECURITY: WITHIN THE LAST YEAR, HAVE YOU BEEN HUMILIATED OR EMOTIONALLY ABUSED IN OTHER WAYS BY YOUR PARTNER OR EX-PARTNER?: NO

## 2024-09-25 SDOH — ECONOMIC STABILITY: TRANSPORTATION INSECURITY
IN THE PAST 12 MONTHS, HAS THE LACK OF TRANSPORTATION KEPT YOU FROM MEDICAL APPOINTMENTS OR FROM GETTING MEDICATIONS?: NO

## 2024-09-25 SDOH — ECONOMIC STABILITY: HOUSING INSECURITY: IN THE PAST 12 MONTHS, HOW MANY TIMES HAVE YOU MOVED WHERE YOU WERE LIVING?: 0

## 2024-09-25 SDOH — SOCIAL STABILITY: SOCIAL NETWORK: HOW OFTEN DO YOU ATTEND CHURCH OR RELIGIOUS SERVICES?: PATIENT DECLINED

## 2024-09-25 SDOH — HEALTH STABILITY: PHYSICAL HEALTH: ON AVERAGE, HOW MANY MINUTES DO YOU ENGAGE IN EXERCISE AT THIS LEVEL?: 0 MIN

## 2024-09-25 SDOH — SOCIAL STABILITY: SOCIAL NETWORK: HOW OFTEN DO YOU ATTENT MEETINGS OF THE CLUB OR ORGANIZATION YOU BELONG TO?: PATIENT DECLINED

## 2024-09-25 ASSESSMENT — COGNITIVE AND FUNCTIONAL STATUS - GENERAL
DRESSING REGULAR UPPER BODY CLOTHING: A LITTLE
CLIMB 3 TO 5 STEPS WITH RAILING: A LITTLE
WALKING IN HOSPITAL ROOM: A LITTLE
DAILY ACTIVITIY SCORE: 20
MOBILITY SCORE: 20
MOVING TO AND FROM BED TO CHAIR: A LITTLE
HELP NEEDED FOR BATHING: A LITTLE
MOBILITY SCORE: 19
DRESSING REGULAR UPPER BODY CLOTHING: A LITTLE
DRESSING REGULAR LOWER BODY CLOTHING: A LITTLE
DAILY ACTIVITIY SCORE: 20
MOVING TO AND FROM BED TO CHAIR: A LITTLE
MOVING FROM LYING ON BACK TO SITTING ON SIDE OF FLAT BED WITH BEDRAILS: A LITTLE
HELP NEEDED FOR BATHING: A LITTLE
TURNING FROM BACK TO SIDE WHILE IN FLAT BAD: A LITTLE
DRESSING REGULAR LOWER BODY CLOTHING: A LITTLE
STANDING UP FROM CHAIR USING ARMS: A LITTLE
TOILETING: A LITTLE
STANDING UP FROM CHAIR USING ARMS: A LITTLE
TOILETING: A LITTLE
WALKING IN HOSPITAL ROOM: A LITTLE

## 2024-09-25 ASSESSMENT — PAIN SCALES - GENERAL
PAINLEVEL_OUTOF10: 0 - NO PAIN

## 2024-09-25 ASSESSMENT — PAIN - FUNCTIONAL ASSESSMENT
PAIN_FUNCTIONAL_ASSESSMENT: 0-10

## 2024-09-25 NOTE — PROGRESS NOTES
Occupational Therapy    OT Treatment    Patient Name: Raúl Jordan  MRN: [id number]  Department: [address]  Room: [address] [place] [address]  Today's Date: 9/25/2024  Time Calculation  Start Time: 1427  Stop Time: 1450  Time Calculation (min): 23 min        Assessment:  OT Assessment: Pt making good progress toward POC goals, presents with fair activity tolerance on 2Lnc. 02 sat 93%, HR 74 bpm post mobility.  Barriers to Discharge: None  End of Session Communication: Bedside nurse  End of Session Patient Position:  (Pt sitting edge of bed)  OT Assessment Results: Decreased ADL status, Decreased upper extremity strength, Decreased endurance, Decreased functional mobility  Barriers to Discharge: None  Plan:  Treatment Interventions: ADL retraining, Functional transfer training, UE strengthening/ROM  OT Frequency: 3 times per week  OT Discharge Recommendations: Low intensity level of continued care  Equipment Recommended upon Discharge: Wheeled walker  OT Recommended Transfer Status: Assist of 1  OT - OK to Discharge: Yes  Treatment Interventions: ADL retraining, Functional transfer training, UE strengthening/ROM    Subjective   Previous Visit Info:  OT Last Visit  OT Received On: 09/25/24  General:  General  Reason for Referral: Impaired mobility, acute hypoxic resp failure.  Referred By: Dr Cabello  Past Medical History Relevant to Rehab: CHF, DM, HTN, Rt TKA, OA  Prior to Session Communication: Bedside nurse  Patient Position Received:  (sitting on edge of bed)  General Comment: Agreeable to treatment  Precautions:  Hearing/Visual Limitations: reading glasses, decreased hearing  Medical Precautions: Fall precautions, Oxygen therapy device and L/min    Pain:  Pain Assessment  Pain Assessment: 0-10  0-10 (Numeric) Pain Score: 0 - No pain    Objective    Cognition:  Cognition  Overall Cognitive Status: Within Functional Limits  Orientation Level: Oriented X4     Activities of Daily Living: Grooming  Grooming Level of Assistance: Close  supervision  Grooming Where Assessed: Standing sinkside  Grooming Comments: hand hygiene, cues for safe walker placement    Toileting  Toileting Level of Assistance: Close supervision  Where Assessed: Toilet  Toileting Comments: hygiene, clothing management  Bed Mobility/Transfers: Transfer 1  Transfer From 1: Bed to  Transfer to 1: Bed  Technique 1: To right, To left  Transfer Device 1: Walker  Transfer Level of Assistance 1: Close supervision    Toilet Transfers  Toilet Transfer From: Rolling walker  Toilet Transfer Type: To and from  Toilet Transfer to: Standard toilet  Toilet Transfer Technique: Ambulating  Toilet Transfers: Supervision  Toilet Transfers Comments: use of grab bars    Functional Mobility:  Functional Mobility 1  Surface 1: Level tile  Device 1: Rolling walker  Assistance 1: Close supervision  Quality of Functional Mobility 1:  (steady pace, reciprocal steps, fair balance)  Comments 1: short household distance in room     Therapy/Activity: Therapeutic Exercise  Therapeutic Exercise Activity 1: scap protraction/retraction  Therapeutic Exercise Activity 2: shoulder flexion  Therapeutic Exercise Activity 3: shoulder IR/ER  Therapeutic Exercise Activity 4: 1 set x 15 reps, 1 lb free wt.  Outcome Measures:[facility] Daily Activity  Putting on and taking off regular lower body clothing: A little  Bathing (including washing, rinsing, drying): A little  Putting on and taking off regular upper body clothing: A little  Toileting, which includes using toilet, bedpan or urinal: A little  Taking care of personal grooming such as brushing teeth: None  Eating Meals: None  Daily Activity - Total Score: 20    IP EDUCATION:  Education  Individual(s) Educated: Patient  Education Provided: Risk and benefits of OT discussed with patient or other, POC discussed and agreed upon, Fall precautons  Patient Response to Education: Patient/Caregiver Verbalized Understanding of Information, Patient/Caregiver Performed Return  Demonstration of Exercises/Activities    Goals:  Encounter Problems       Encounter Problems (Active)       OT Goals       ADL's (Progressing)       Start:  09/24/24    Expected End:  10/08/24       Pt will complete ADL tasks w/ Mod I w/ AE/ compensatory tech for safety and increased independence in self care tasks.         Functional transfers (Progressing)       Start:  09/24/24    Expected End:  10/08/24       Pt will demon bed, chair and toilet transfers w/ Mod I w/ LRD, elev seat heights using B arm supports for safety and increased independence in daily tasks.         Functional endurance  (Progressing)       Start:  09/24/24    Expected End:  10/08/24       Pt will tolerate 20 minutes of functional activity to improve functional endurance for daily tasks and functional mobility.            OT Goals       BUE strengthening (Progressing)       Start:  09/24/24    Expected End:  10/08/24       Pt will demon BUE exercises to improve strength to 4/5 for functional transfers.

## 2024-09-25 NOTE — PROGRESS NOTES
09/25/24 1540   Discharge Planning   Living Arrangements Spouse/significant other   Support Systems Spouse/significant other   Assistance Needed independent - cane out in community   Type of Residence Private residence   Number of Stairs to Enter Residence 2   Number of Stairs Within Residence 0  (all needs first floor)   Do you have animals or pets at home? Yes   Type of Animals or Pets 1 dog 1 cat   Who is requesting discharge planning? Provider   Home or Post Acute Services None   Expected Discharge Disposition Home   Does the patient need discharge transport arranged? No     Home no needs

## 2024-09-25 NOTE — NURSING NOTE
Agree with previous assessment. Patient awake OOB sitting in chair watching TV. Call light within reach. Will continue plan of care.

## 2024-09-25 NOTE — CARE PLAN
The patient's goals for the shift include see providers.    The clinical goals for the shift include comfort, maintain safety, IV antibiotics, breathing treatment, attempt to wean off supplemental O2, encourage OOB in chair, and monitor labs/VS.     Problem: Diabetes  Goal: Achieve decreasing blood glucose levels by end of shift  Outcome: Progressing     Problem: Diabetes  Goal: Increase stability of blood glucose readings by end of shift  Outcome: Progressing     Problem: Diabetes  Goal: Maintain electrolyte levels within acceptable range throughout shift  Outcome: Progressing     Problem: Diabetes  Goal: No changes in neurological exam by end of shift  Outcome: Progressing     Problem: Diabetes  Goal: Learn about and adhere to nutrition recommendations by end of shift  Outcome: Progressing     Problem: Diabetes  Goal: Vital signs within normal range for age by end of shift  Outcome: Progressing     Problem: Diabetes  Goal: Increase self care and/or family involovement by end of shift  Outcome: Progressing     Problem: Safety - Adult  Goal: Free from fall injury  Outcome: Progressing     Problem: Chronic Conditions and Co-morbidities  Goal: Patient's chronic conditions and co-morbidity symptoms are monitored and maintained or improved  Outcome: Progressing     Problem: Fall/Injury  Goal: Not fall by end of shift  Outcome: Progressing  Goal: Be free from injury by end of the shift  Outcome: Progressing  Goal: Verbalize understanding of personal risk factors for fall in the hospital  Outcome: Progressing  Goal: Verbalize understanding of risk factor reduction measures to prevent injury from fall in the home  Outcome: Progressing  Goal: Use assistive devices by end of the shift  Outcome: Progressing  Goal: Pace activities to prevent fatigue by end of the shift  Outcome: Progressing     Problem: Heart Failure  Goal: Improved gas exchange this shift  Outcome: Progressing  Goal: Improved urinary output this  shift  Outcome: Progressing  Goal: Reduction in peripheral edema within 24 hours  Outcome: Progressing  Goal: Report improvement of dyspnea/breathlessness this shift  Outcome: Progressing  Goal: Weight from fluid excess reduced over 2-3 days, then stabilize  Outcome: Progressing  Goal: Increase self care and/or family involvement in 24 hours  Outcome: Progressing     Problem: Respiratory  Goal: No signs of respiratory distress (eg. Use of accessory muscles. Peds grunting)  Outcome: Progressing     Problem: Deep Vein Thrombosis  Goal: I will remain free from complications of deep vein thrombosis and maintain current level of mobility  Outcome: Progressing     Problem: Discharge Planning  Goal: Discharge to home or other facility with appropriate resources  Outcome: Progressing

## 2024-09-26 ENCOUNTER — APPOINTMENT (OUTPATIENT)
Dept: RADIOLOGY | Facility: HOSPITAL | Age: 84
DRG: 193 | End: 2024-09-26
Payer: MEDICARE

## 2024-09-26 ENCOUNTER — ANTICOAGULATION - WARFARIN VISIT (OUTPATIENT)
Dept: CARDIOLOGY | Facility: HOSPITAL | Age: 84
End: 2024-09-26
Payer: MEDICARE

## 2024-09-26 DIAGNOSIS — I48.11 LONGSTANDING PERSISTENT ATRIAL FIBRILLATION (MULTI): ICD-10-CM

## 2024-09-26 LAB
ANION GAP SERPL CALCULATED.3IONS-SCNC: 11 MMOL/L (ref 10–20)
BUN SERPL-MCNC: 26 MG/DL (ref 6–23)
CALCIUM SERPL-MCNC: 8.4 MG/DL (ref 8.6–10.3)
CHLORIDE SERPL-SCNC: 102 MMOL/L (ref 98–107)
CO2 SERPL-SCNC: 32 MMOL/L (ref 21–32)
CREAT SERPL-MCNC: 1.31 MG/DL (ref 0.5–1.05)
EGFRCR SERPLBLD CKD-EPI 2021: 40 ML/MIN/1.73M*2
ERYTHROCYTE [DISTWIDTH] IN BLOOD BY AUTOMATED COUNT: 14.7 % (ref 11.5–14.5)
GLUCOSE SERPL-MCNC: 104 MG/DL (ref 74–99)
HCT VFR BLD AUTO: 31.1 % (ref 36–46)
HGB BLD-MCNC: 9.6 G/DL (ref 12–16)
INR PPP: 2.8 (ref 0.9–1.2)
LACTATE SERPL-SCNC: 1.2 MMOL/L (ref 0.4–2)
MCH RBC QN AUTO: 29.5 PG (ref 26–34)
MCHC RBC AUTO-ENTMCNC: 30.9 G/DL (ref 32–36)
MCV RBC AUTO: 96 FL (ref 80–100)
NRBC BLD-RTO: 0 /100 WBCS (ref 0–0)
PLATELET # BLD AUTO: 114 X10*3/UL (ref 150–450)
POTASSIUM SERPL-SCNC: 3.9 MMOL/L (ref 3.5–5.3)
PROTHROMBIN TIME: 27.5 SECONDS (ref 9.3–12.7)
RBC # BLD AUTO: 3.25 X10*6/UL (ref 4–5.2)
SODIUM SERPL-SCNC: 141 MMOL/L (ref 136–145)
WBC # BLD AUTO: 5.5 X10*3/UL (ref 4.4–11.3)

## 2024-09-26 PROCEDURE — 2500000001 HC RX 250 WO HCPCS SELF ADMINISTERED DRUGS (ALT 637 FOR MEDICARE OP): Performed by: NURSE PRACTITIONER

## 2024-09-26 PROCEDURE — 87899 AGENT NOS ASSAY W/OPTIC: CPT | Mod: TRILAB,WESLAB | Performed by: NURSE PRACTITIONER

## 2024-09-26 PROCEDURE — 94762 N-INVAS EAR/PLS OXIMTRY CONT: CPT

## 2024-09-26 PROCEDURE — 2500000005 HC RX 250 GENERAL PHARMACY W/O HCPCS: Performed by: NURSE PRACTITIONER

## 2024-09-26 PROCEDURE — 83605 ASSAY OF LACTIC ACID: CPT | Performed by: INTERNAL MEDICINE

## 2024-09-26 PROCEDURE — 87449 NOS EACH ORGANISM AG IA: CPT | Mod: TRILAB,WESLAB | Performed by: NURSE PRACTITIONER

## 2024-09-26 PROCEDURE — 1200000002 HC GENERAL ROOM WITH TELEMETRY DAILY

## 2024-09-26 PROCEDURE — 71046 X-RAY EXAM CHEST 2 VIEWS: CPT

## 2024-09-26 PROCEDURE — 94760 N-INVAS EAR/PLS OXIMETRY 1: CPT

## 2024-09-26 PROCEDURE — 2500000004 HC RX 250 GENERAL PHARMACY W/ HCPCS (ALT 636 FOR OP/ED): Performed by: NURSE PRACTITIONER

## 2024-09-26 PROCEDURE — 85027 COMPLETE CBC AUTOMATED: CPT | Performed by: NURSE PRACTITIONER

## 2024-09-26 PROCEDURE — 36415 COLL VENOUS BLD VENIPUNCTURE: CPT | Performed by: NURSE PRACTITIONER

## 2024-09-26 PROCEDURE — 94640 AIRWAY INHALATION TREATMENT: CPT

## 2024-09-26 PROCEDURE — 2500000002 HC RX 250 W HCPCS SELF ADMINISTERED DRUGS (ALT 637 FOR MEDICARE OP, ALT 636 FOR OP/ED): Performed by: NURSE PRACTITIONER

## 2024-09-26 PROCEDURE — 94660 CPAP INITIATION&MGMT: CPT

## 2024-09-26 PROCEDURE — 87081 CULTURE SCREEN ONLY: CPT | Mod: TRILAB,WESLAB | Performed by: NURSE PRACTITIONER

## 2024-09-26 PROCEDURE — 2500000002 HC RX 250 W HCPCS SELF ADMINISTERED DRUGS (ALT 637 FOR MEDICARE OP, ALT 636 FOR OP/ED): Performed by: INTERNAL MEDICINE

## 2024-09-26 PROCEDURE — 9420000001 HC RT PATIENT EDUCATION 5 MIN

## 2024-09-26 PROCEDURE — 99222 1ST HOSP IP/OBS MODERATE 55: CPT | Performed by: NURSE PRACTITIONER

## 2024-09-26 PROCEDURE — 85610 PROTHROMBIN TIME: CPT | Performed by: NURSE PRACTITIONER

## 2024-09-26 PROCEDURE — 71046 X-RAY EXAM CHEST 2 VIEWS: CPT | Performed by: RADIOLOGY

## 2024-09-26 PROCEDURE — 80048 BASIC METABOLIC PNL TOTAL CA: CPT | Performed by: NURSE PRACTITIONER

## 2024-09-26 PROCEDURE — 97116 GAIT TRAINING THERAPY: CPT | Mod: GP,CQ

## 2024-09-26 PROCEDURE — 97110 THERAPEUTIC EXERCISES: CPT | Mod: GP,CQ

## 2024-09-26 ASSESSMENT — COGNITIVE AND FUNCTIONAL STATUS - GENERAL
MOBILITY SCORE: 20
DAILY ACTIVITIY SCORE: 19
MOVING FROM LYING ON BACK TO SITTING ON SIDE OF FLAT BED WITH BEDRAILS: A LITTLE
DAILY ACTIVITIY SCORE: 20
HELP NEEDED FOR BATHING: A LITTLE
CLIMB 3 TO 5 STEPS WITH RAILING: A LITTLE
CLIMB 3 TO 5 STEPS WITH RAILING: A LITTLE
STANDING UP FROM CHAIR USING ARMS: A LITTLE
PERSONAL GROOMING: A LITTLE
DRESSING REGULAR LOWER BODY CLOTHING: A LITTLE
MOBILITY SCORE: 18
CLIMB 3 TO 5 STEPS WITH RAILING: A LITTLE
DRESSING REGULAR UPPER BODY CLOTHING: A LITTLE
HELP NEEDED FOR BATHING: A LITTLE
TOILETING: A LITTLE
STANDING UP FROM CHAIR USING ARMS: A LITTLE
TURNING FROM BACK TO SIDE WHILE IN FLAT BAD: A LITTLE
DRESSING REGULAR LOWER BODY CLOTHING: A LITTLE
TURNING FROM BACK TO SIDE WHILE IN FLAT BAD: A LITTLE
STANDING UP FROM CHAIR USING ARMS: A LITTLE
DRESSING REGULAR UPPER BODY CLOTHING: A LITTLE
TOILETING: A LITTLE
MOVING TO AND FROM BED TO CHAIR: A LITTLE
WALKING IN HOSPITAL ROOM: A LITTLE
MOVING FROM LYING ON BACK TO SITTING ON SIDE OF FLAT BED WITH BEDRAILS: A LITTLE
MOBILITY SCORE: 18
MOVING TO AND FROM BED TO CHAIR: A LITTLE
WALKING IN HOSPITAL ROOM: A LITTLE
WALKING IN HOSPITAL ROOM: A LITTLE
MOVING TO AND FROM BED TO CHAIR: A LITTLE

## 2024-09-26 ASSESSMENT — ENCOUNTER SYMPTOMS
SHORTNESS OF BREATH: 1
ENDOCRINE NEGATIVE: 1
GASTROINTESTINAL NEGATIVE: 1
FATIGUE: 1
EYES NEGATIVE: 1
APPETITE CHANGE: 1
ALLERGIC/IMMUNOLOGIC NEGATIVE: 1
PSYCHIATRIC NEGATIVE: 1
HEMATOLOGIC/LYMPHATIC NEGATIVE: 1
WEAKNESS: 1
CARDIOVASCULAR NEGATIVE: 1
CHILLS: 1
MYALGIAS: 1
COUGH: 1

## 2024-09-26 ASSESSMENT — PAIN SCALES - GENERAL
PAINLEVEL_OUTOF10: 0 - NO PAIN

## 2024-09-26 ASSESSMENT — PAIN - FUNCTIONAL ASSESSMENT
PAIN_FUNCTIONAL_ASSESSMENT: 0-10
PAIN_FUNCTIONAL_ASSESSMENT: 0-10
PAIN_FUNCTIONAL_ASSESSMENT: UNABLE TO SELF-REPORT

## 2024-09-26 NOTE — PROGRESS NOTES
Raúl Jordan is a 84 y.o. female on day 3 of admission presenting with Acute hypoxic respiratory failure (Multi).      Subjective   Patient laying in bed after walking in halls on room air. She has a dusky color and is sob, lowest SPO2 86%, returned to 2 liters and slowly returned to 92%       Objective     Last Recorded Vitals  /62 (BP Location: Left arm, Patient Position: Sitting)   Pulse 91   Temp 36.4 °C (97.5 °F) (Temporal)   Resp 18   Wt 92.1 kg (203 lb)   SpO2 95%   Intake/Output last 3 Shifts:    Intake/Output Summary (Last 24 hours) at 9/26/2024 1709  Last data filed at 9/26/2024 1300  Gross per 24 hour   Intake 640 ml   Output 950 ml   Net -310 ml       Admission Weight  Weight: 92.1 kg (203 lb) (09/23/24 1023)    Daily Weight  09/23/24 : 92.1 kg (203 lb)    Image Results  XR chest 2 views  Narrative: Interpreted By:  Daniel Ny,   STUDY:  XR CHEST 2 VIEWS; 9/26/2024 2:09 pm      INDICATION:  Signs/Symptoms:Acute hypoxia.      COMPARISON:  09/23/2024      ACCESSION NUMBER(S):  [id number]      ORDERING CLINICIAN:  SLOAN FLORENCE      TECHNIQUE:  AP and lateral views of the chest were obtained.      FINDINGS:  The cardiac silhouette is quite prominent suggesting cardiomegaly.  The pulmonary vasculature is slightly indistinct suggesting mild  pulmonary vascular congestion. Right pleural effusion is suspected on  the lateral view. Wireless cardiac pacemaker overlies the right  ventricle. No alveolar consolidation is noted.      Impression: 1. Probable cardiomegaly with pulmonary vascular congestion and right  effusion suggesting CHF.  2. Electronic device overlying the heart likely representing a  wireless internal pacemaker.          MACRO:  none      Signed by: Daniel Ny 9/26/2024 4:15 PM  Dictation workstation:   [id number]      Physical Exam  Constitutional:       Appearance: Normal appearance.   Cardiovascular:      Rate and Rhythm: Tachycardia present. Rhythm irregular.      Pulses:  Normal pulses.      Heart sounds: Normal heart sounds.   Pulmonary:      Effort: Pulmonary effort is normal.      Breath sounds: Rhonchi present.   Abdominal:      General: Bowel sounds are normal.      Palpations: Abdomen is soft.   Musculoskeletal:         General: Normal range of motion.   Skin:     General: Skin is warm and dry.   Neurological:      Mental Status: She is alert and oriented to person, place, and time.         Relevant Results             Results for orders placed or performed during the hospital encounter of 09/23/24 (from the past 24 hour(s))   CBC   Result Value Ref Range    WBC 5.5 4.4 - 11.3 x10*3/uL    nRBC 0.0 0.0 - 0.0 /100 WBCs    RBC 3.25 (L) 4.00 - 5.20 x10*6/uL    Hemoglobin 9.6 (L) 12.0 - 16.0 g/dL    Hematocrit 31.1 (L) 36.0 - 46.0 %    MCV 96 80 - 100 fL    MCH 29.5 26.0 - 34.0 pg    MCHC 30.9 (L) 32.0 - 36.0 g/dL    RDW 14.7 (H) 11.5 - 14.5 %    Platelets 114 (L) 150 - 450 x10*3/uL   Basic Metabolic Panel   Result Value Ref Range    Glucose 104 (H) 74 - 99 mg/dL    Sodium 141 136 - 145 mmol/L    Potassium 3.9 3.5 - 5.3 mmol/L    Chloride 102 98 - 107 mmol/L    Bicarbonate 32 21 - 32 mmol/L    Anion Gap 11 10 - 20 mmol/L    Urea Nitrogen 26 (H) 6 - 23 mg/dL    Creatinine 1.31 (H) 0.50 - 1.05 mg/dL    eGFR 40 (L) >60 mL/min/1.73m*2    Calcium 8.4 (L) 8.6 - 10.3 mg/dL       Assessment/Plan                  Assessment & Plan  Acute hypoxic respiratory failure (Multi)  Pt is on 3 L nasal cannula normally does not wear oxygen at home no history of COPD asthma.  Might be caused by pneumonia  wean patient from O2 as tolerated, SPO2 86% on room air with ambulation  Type 2 diabetes mellitus with obesity (Multi)  Not on any medication  Diastolic heart failure  Chronic shortness of breath with exertion  Last EF 55--60%   Lasix restarted today   Follow-up with Dr. Lozano as outpatient  Hypertension  Continue with home meds  Pneumonia of both lower lobes  Chest x-ray shows possible pneumonia  bilaterally  Order blood cultures and sputum  Start on azithromycin and Rocephin    Longstanding persistent atrial fibrillation (Multi)  Patient on Coumadin therapy 2.5 INR  PT/INR daily  Plan of Care  Patient lives at home with family she plans to return there at discharge without any needs.    Plan of care discussed with patient and collaborating physician.                  Jazz Melton, APRN-CNP

## 2024-09-26 NOTE — ASSESSMENT & PLAN NOTE
Pt is on 3 L nasal cannula normally does not wear oxygen at home no history of COPD asthma.  Might be caused by pneumonia  wean patient from O2 as tolerated, SPO2 86% on room air with ambulation

## 2024-09-26 NOTE — PROGRESS NOTES
09/26/24 1133   Discharge Planning   Living Arrangements Children   Support Systems Children   Home or Post Acute Services None   Expected Discharge Disposition Home   Does the patient need discharge transport arranged? No     Weening oxygen today.  Was on 2 LNC.    Home no needs when medically clear.

## 2024-09-26 NOTE — CONSULTS
Inpatient consult to Pulmonology  Consult performed by: ROSIBEL Berry  Consult ordered by: ROSIBEL Green  Reason for consult: Pneumonia, hypoxia          Reason For Consult  Pneumonia, hypoxia    History Of Present Illness  Raúl Jordan is a 84 y.o. female to [facility] 9/23 for evaluation of generalized malaise, myalgias, shortness of breath on exertion mild intermittent cough, chills and decreased appetite.  She was placed on 3 L nasal cannula oxygen for hypoxia; 87% on room air.  Chest x-ray was obtained and showed mild-to-moderate diffuse interstitial infiltrates throughout the lungs bilaterally.  She denies fevers, nausea, vomiting, diarrhea, chest or abdominal pain.     Past Medical History  Past Medical History:   Diagnosis Date    Abnormal laboratory test result 09/01/2023    Anemia due to acute blood loss 09/01/2023    CHF (congestive heart failure) (Multi)     Chill 09/01/2023    Diabetes mellitus (Multi)     Flank pain 09/01/2023    Hypertension     Hypoxemia 09/01/2023    Hypoxia 09/01/2023    Melena 09/01/2023    Other chronic pain 09/01/2023    Pneumonia 09/01/2023    Pyuria 09/01/2023       Surgical History  History reviewed. No pertinent surgical history.     Social History  Social History     Tobacco Use    Smoking status: Never     Passive exposure: Never    Smokeless tobacco: Never   Vaping Use    Vaping status: Never Used   Substance Use Topics    Alcohol use: Never    Drug use: Never       Family History  Family History   Problem Relation Name Age of Onset    Leukemia Mother      Cancer Mother      Heart disease Father      Other (heart problems) Father           Allergies  Penicillin v, Sulfamethoxazole-trimethoprim, Levofloxacin, Oxycodone-acetaminophen, and Penicillin    Review of Systems   Constitutional:  Positive for appetite change, chills and fatigue.   HENT: Negative.     Eyes: Negative.    Respiratory:  Positive for cough and  "shortness of breath.    Cardiovascular: Negative.    Gastrointestinal: Negative.    Endocrine: Negative.    Genitourinary: Negative.    Musculoskeletal:  Positive for myalgias.   Allergic/Immunologic: Negative.    Neurological:  Positive for weakness.   Hematological: Negative.    Psychiatric/Behavioral: Negative.          Physical Exam  Vitals and nursing note reviewed.   Constitutional:       Appearance: She is obese.   HENT:      Head: Normocephalic and atraumatic.      Nose: Nose normal.      Mouth/Throat:      Mouth: Mucous membranes are moist.   Eyes:      Extraocular Movements: Extraocular movements intact.      Conjunctiva/sclera: Conjunctivae normal.      Pupils: Pupils are equal, round, and reactive to light.   Cardiovascular:      Rate and Rhythm: Normal rate and regular rhythm.      Pulses: Normal pulses.      Heart sounds: Normal heart sounds.   Pulmonary:      Effort: Pulmonary effort is normal.      Breath sounds: Rales present.      Comments: Bibasilar rales.  Abdominal:      General: Bowel sounds are normal.      Palpations: Abdomen is soft.   Musculoskeletal:         General: Normal range of motion.      Right lower leg: Edema present.      Left lower leg: Edema present.   Skin:     General: Skin is warm and dry.      Capillary Refill: Capillary refill takes less than 2 seconds.   Neurological:      General: No focal deficit present.      Mental Status: She is alert and oriented to person, place, and time.   Psychiatric:         Mood and Affect: Mood normal.         Behavior: Behavior normal.          Vital Signs  Blood pressure 124/71, pulse 98, temperature 36.5 °C (97.7 °F), temperature source Temporal, resp. rate 18, height 1.6 m (5' 3\"), weight 92.1 kg (203 lb), SpO2 95%.  Oxygen Therapy  SpO2: 95 %  Medical Gas Therapy: Supplemental oxygen  Medical Gas Delivery Method: Nasal cannula  FiO2 (%): 28 %    Intake/Output Summary (Last 24 hours) at 9/26/2024 1311  Last data filed at 9/26/2024 " 1300  Gross per 24 hour   Intake 240 ml   Output 2350 ml   Net -2110 ml     Scheduled medications:  albuterol, 2.5 mg, nebulization, TID  azithromycin, 500 mg, intravenous, q24h  cefTRIAXone, 1 g, intravenous, q24h  furosemide, 80 mg, oral, q AM  losartan, 25 mg, oral, Daily  pantoprazole, 40 mg, oral, Daily before breakfast   Or  pantoprazole, 40 mg, intravenous, Daily before breakfast  pramipexole, 1.5 mg, oral, Daily  sertraline, 100 mg, oral, Daily  simvastatin, 10 mg, oral, Nightly  traZODone, 200 mg, oral, Nightly  warfarin, 3 mg, oral, Daily     PRN medications: acetaminophen, albuterol, benzocaine-menthol, bisacodyl, bisacodyl, dextromethorphan-guaifenesin, ondansetron ODT **OR** ondansetron, oxygen, polyethylene glycol, prochlorperazine **OR** prochlorperazine **OR** prochlorperazine       Relevant Result   Results for orders placed or performed during the hospital encounter of 09/23/24 (from the past 24 hour(s))   CBC   Result Value Ref Range    WBC 5.5 4.4 - 11.3 x10*3/uL    nRBC 0.0 0.0 - 0.0 /100 WBCs    RBC 3.25 (L) 4.00 - 5.20 x10*6/uL    Hemoglobin 9.6 (L) 12.0 - 16.0 g/dL    Hematocrit 31.1 (L) 36.0 - 46.0 %    MCV 96 80 - 100 fL    MCH 29.5 26.0 - 34.0 pg    MCHC 30.9 (L) 32.0 - 36.0 g/dL    RDW 14.7 (H) 11.5 - 14.5 %    Platelets 114 (L) 150 - 450 x10*3/uL   Basic Metabolic Panel   Result Value Ref Range    Glucose 104 (H) 74 - 99 mg/dL    Sodium 141 136 - 145 mmol/L    Potassium 3.9 3.5 - 5.3 mmol/L    Chloride 102 98 - 107 mmol/L    Bicarbonate 32 21 - 32 mmol/L    Anion Gap 11 10 - 20 mmol/L    Urea Nitrogen 26 (H) 6 - 23 mg/dL    Creatinine 1.31 (H) 0.50 - 1.05 mg/dL    eGFR 40 (L) >60 mL/min/1.73m*2    Calcium 8.4 (L) 8.6 - 10.3 mg/dL      XR chest 1 view  Result Date: 9/23/2024  FINDINGS: Multiple cardiac monitoring leads are seen over the chest. Mild-to-moderate diffuse interstitial infiltrates are seen throughout the lungs bilaterally, and may represent fibrosis, edema and/or pneumonia. No  pneumothorax is identified. The cardiac silhouette is enlarged, similar to prior studies.  Diffuse interstitial infiltrates bilaterally, as above. Clinical correlation and continued follow-up until clearing is recommended.       Assessment/Plan   Acute hypoxic respiratory failure  Continue IBD/ICS  Continuous pulse oximetry  Incentive spirometry/pulmonary hygiene    Pneumonia  Continue IV azithromycin, ceftriaxone  Sputum if able  Urine Legionella, urine strep pneumo, MRSA, procalcitonin  Follow-up cultures  PA and lateral chest x-ray     Urinary tract infection  Continue antibiotics  Follow-up urine culture    Acute on chronic diastolic congestive heart failure/Longstanding persistent atrial fibrillation  Oral Lasix   Coumadin  Follow-up with Dr. Mclaughlin as outpatient    Obstructive sleep apnea/Obesity  CPAP nightly-patient admits to inconsistent use at home but agrees to wear hospital unit  Polysomnogram as outpatient    Prophylaxis  Coumadin   Protonix    PT/OT/out of bed    THERESA Berry-CNP

## 2024-09-26 NOTE — CARE PLAN
Problem: Diabetes  Goal: Achieve decreasing blood glucose levels by end of shift  Outcome: Progressing  Goal: Increase stability of blood glucose readings by end of shift  Outcome: Progressing  Goal: Decrease in ketones present in urine by end of shift  Outcome: Progressing  Goal: Maintain electrolyte levels within acceptable range throughout shift  Outcome: Progressing  Goal: Maintain glucose levels >70mg/dl to <250mg/dl throughout shift  Outcome: Progressing  Goal: No changes in neurological exam by end of shift  Outcome: Progressing  Goal: Learn about and adhere to nutrition recommendations by end of shift  Outcome: Progressing  Goal: Vital signs within normal range for age by end of shift  Outcome: Progressing  Goal: Increase self care and/or family involovement by end of shift  Outcome: Progressing  Goal: Receive DSME education by end of shift  Outcome: Progressing     Problem: Safety - Adult  Goal: Free from fall injury  Outcome: Progressing     Problem: Discharge Planning  Goal: Discharge to home or other facility with appropriate resources  Outcome: Progressing     Problem: Chronic Conditions and Co-morbidities  Goal: Patient's chronic conditions and co-morbidity symptoms are monitored and maintained or improved  Outcome: Progressing     Problem: Fall/Injury  Goal: Not fall by end of shift  Outcome: Progressing  Goal: Be free from injury by end of the shift  Outcome: Progressing  Goal: Verbalize understanding of personal risk factors for fall in the hospital  Outcome: Progressing  Goal: Verbalize understanding of risk factor reduction measures to prevent injury from fall in the home  Outcome: Progressing  Goal: Use assistive devices by end of the shift  Outcome: Progressing  Goal: Pace activities to prevent fatigue by end of the shift  Outcome: Progressing     Problem: Heart Failure  Goal: Improved gas exchange this shift  Outcome: Progressing  Goal: Improved urinary output this shift  Outcome:  Progressing  Goal: Reduction in peripheral edema within 24 hours  Outcome: Progressing  Goal: Report improvement of dyspnea/breathlessness this shift  Outcome: Progressing  Goal: Weight from fluid excess reduced over 2-3 days, then stabilize  Outcome: Progressing  Goal: Increase self care and/or family involvement in 24 hours  Outcome: Progressing     Problem: Respiratory  Goal: No signs of respiratory distress (eg. Use of accessory muscles. Peds grunting)  Outcome: Progressing     Problem: Deep Vein Thrombosis  Goal: I will remain free from complications of deep vein thrombosis and maintain current level of mobility  Outcome: Progressing   The patient's goals for the shift include Wean off O2    The clinical goals for the shift include comfort, maintain safety, IV antibiotics, breathing treatment, attempt to wean off supplemental O2, encourage OOB in chair, and monitor labs/VS    Over the shift, the patient did not make progress toward the following goals. Barriers to progression include . Recommendations to address these barriers include .

## 2024-09-26 NOTE — PROGRESS NOTES
"Physical Therapy    Physical Therapy Treatment    Patient Name: Raúl Jordan  MRN: [id number]  Department: [address]  Room: [address] [place] [address]  Today's Date: 9/26/2024  Time Calculation  Start Time: 0847  Stop Time: 0918  Time Calculation (min): 31 min         Assessment/Plan   PT Assessment  PT Assessment Results: Decreased strength, Decreased range of motion, Decreased endurance  Rehab Prognosis: Good  Barriers to Discharge: none  Evaluation/Treatment Tolerance: Patient tolerated treatment well  Medical Staff Made Aware: Yes  End of Session Communication: Bedside nurse  End of Session Patient Position: Up in chair, Alarm on     PT Plan  Treatment/Interventions: Bed mobility, Transfer training, Gait training, Strengthening, Endurance training, Therapeutic exercise, Therapeutic activity  PT Plan: Ongoing PT  PT Frequency: 4 times per week  PT Discharge Recommendations: Low intensity level of continued care  Equipment Recommended upon Discharge: Wheeled walker  PT Recommended Transfer Status: Stand by assist, Assistive device  PT - OK to Discharge: Yes      General Visit Information:   PT  Visit  PT Received On: 09/26/24  Response to Previous Treatment: Patient with no complaints from previous session.  General  Reason for Referral: Impaired mobility, acute hypoxic resp failure.  Referred By: Dr Cabello  Past Medical History Relevant to Rehab: CHF, DM, HTN, Rt TKA, OA  Prior to Session Communication: Bedside nurse  Patient Position Received: Bed, 3 rail up, Alarm off, not on at start of session  Preferred Learning Style: verbal, visual  General Comment: Agreeable to treatment    Subjective   Precautions:  Precautions  Hearing/Visual Limitations: reading glasses, decreased hearing  Medical Precautions: Fall precautions, Oxygen therapy device and L/min (2L oxygen via nc.)        Objective   Pain:  Pain Assessment  Pain Assessment: 0-10  0-10 (Numeric) Pain Score: 0 - No pain (\"Just stiff knee\" " Right)  Cognition:  Cognition  Overall Cognitive Status: Within Functional Limits  Orientation Level: Oriented X4    Treatments:  Therapeutic Exercise  Therapeutic Exercise Performed: Yes  Therapeutic Exercise Activity 1: Pt performed seated bilat LE ankle pumps, heel raises, LAQ, cas hip adduction and resisted hip abduction x20 reps each. Seated hip flexion x15-less reps due to fatigue and c/o pain.    Bed Mobility 1  Bed Mobility 1: Supine to sitting  Level of Assistance 1: Modified independent  Bed Mobility Comments 1: Head of bed elevated, pt uses bedrail to assist.    Ambulation/Gait Training 1  Surface 1: Level tile  Device 1: Rolling walker  Assistance 1: Close supervision  Comments/Distance (ft) 1: Pt ambulated with RW and 2L oxygen ~60' x1, ~85' x1 and ~15' x1 close supervision, seated rest break between trials. Pt ambulated at slow pace, mild fatigue. Mild SOB, pulse ox down to 89%, up to 91% with seated rest and cues for pursed lip breathing.    Transfer 1  Transfer From 1: Bed to  Transfer to 1: Stand  Technique 1: Sit to stand  Transfer Device 1: Walker  Transfer Level of Assistance 1: Close supervision  Transfers 2  Transfer From 2: Stand to  Transfer to 2: Sit, Chair with arms  Technique 2: Stand to sit, Sit to stand  Transfer Device 2: Walker  Transfer Level of Assistance 2: Close supervision  Trials/Comments 2: x2 STS trials from chair with arms.  Transfers 3  Transfer From 3: Stand to  Transfer to 3: Sit, Toilet  Technique 3: Stand to sit, Sit to stand  Transfer Device 3: Walker  Transfer Level of Assistance 3: Close supervision    Outcome Measures:  [facility] Basic Mobility  Turning from your back to your side while in a flat bed without using bedrails: None  Moving from lying on your back to sitting on the side of a flat bed without using bedrails: None  Moving to and from bed to chair (including a wheelchair): A little  Standing up from a chair using your arms (e.g. wheelchair or bedside chair): A  little  To walk in hospital room: A little  Climbing 3-5 steps with railing: A little  Basic Mobility - Total Score: 20    Education Documentation  Home Exercise Program, taught by Arelis Dozier PTA at 9/26/2024  9:39 AM.  Learner: Patient  Readiness: Acceptance  Method: Explanation  Response: Verbalizes Understanding    Mobility Training, taught by Arelis Dozier PTA at 9/26/2024  9:39 AM.  Learner: Patient  Readiness: Acceptance  Method: Explanation  Response: Verbalizes Understanding    Education Comments  No comments found.        OP EDUCATION:       Encounter Problems       Encounter Problems (Active)       Mobility       STG - Patient will ambulate 100' w/ LRAD prn and distant supervision  (Progressing)       Start:  09/24/24    Expected End:  10/07/24            STG - Patient will ascend and descend four stairs w/ supervision, 1 rail and cane prn (Progressing)       Start:  09/24/24    Expected End:  10/07/24            Pt will tolerate BLE exercises consistently to promote functional strength, ROM and endurance (Progressing)       Start:  09/24/24    Expected End:  10/07/24               PT Transfers       STG - Patient to transfer to and from sit to supine IND (Progressing)       Start:  09/24/24    Expected End:  10/07/24            STG - Patient will transfer sit to and from stand MOD I (Progressing)       Start:  09/24/24    Expected End:  10/07/24

## 2024-09-26 NOTE — PROGRESS NOTES
Patient called to cancel appointment as she was admitted with UTI and pneumonia. She will call back with discharge plan and to reschedule

## 2024-09-26 NOTE — ASSESSMENT & PLAN NOTE
Chronic shortness of breath with exertion  Last EF 55--60%   Lasix restarted today   Follow-up with Dr. Lozano as outpatient

## 2024-09-27 ENCOUNTER — APPOINTMENT (OUTPATIENT)
Dept: CARDIOLOGY | Facility: HOSPITAL | Age: 84
DRG: 193 | End: 2024-09-27
Payer: MEDICARE

## 2024-09-27 LAB
BACTERIA BLD CULT: NORMAL
BACTERIA BLD CULT: NORMAL
EJECTION FRACTION APICAL 4 CHAMBER: 56.9
EJECTION FRACTION: 68 %
LEFT ATRIUM VOLUME AREA LENGTH INDEX BSA: 60.6 ML/M2
LEFT VENTRICULAR OUTFLOW TRACT DIAMETER: 2 CM
LEGIONELLA AG UR QL: NEGATIVE
LV EJECTION FRACTION BIPLANE: 59 %
MITRAL VALVE E/A RATIO: 3.63
PROCALCITONIN SERPL-MCNC: 0.11 NG/ML
RIGHT VENTRICLE FREE WALL PEAK S': 12.3 CM/S
RIGHT VENTRICLE PEAK SYSTOLIC PRESSURE: 37.4 MMHG
S PNEUM AG UR QL: NEGATIVE
TRICUSPID ANNULAR PLANE SYSTOLIC EXCURSION: 2.1 CM

## 2024-09-27 PROCEDURE — 94762 N-INVAS EAR/PLS OXIMTRY CONT: CPT

## 2024-09-27 PROCEDURE — 93325 DOPPLER ECHO COLOR FLOW MAPG: CPT | Performed by: INTERNAL MEDICINE

## 2024-09-27 PROCEDURE — 93321 DOPPLER ECHO F-UP/LMTD STD: CPT | Performed by: INTERNAL MEDICINE

## 2024-09-27 PROCEDURE — 36415 COLL VENOUS BLD VENIPUNCTURE: CPT | Performed by: NURSE PRACTITIONER

## 2024-09-27 PROCEDURE — 97110 THERAPEUTIC EXERCISES: CPT | Mod: GO

## 2024-09-27 PROCEDURE — 97530 THERAPEUTIC ACTIVITIES: CPT | Mod: GP,CQ

## 2024-09-27 PROCEDURE — 2500000005 HC RX 250 GENERAL PHARMACY W/O HCPCS: Performed by: NURSE PRACTITIONER

## 2024-09-27 PROCEDURE — 94640 AIRWAY INHALATION TREATMENT: CPT

## 2024-09-27 PROCEDURE — 97116 GAIT TRAINING THERAPY: CPT | Mod: GP,CQ

## 2024-09-27 PROCEDURE — 97110 THERAPEUTIC EXERCISES: CPT | Mod: GP,CQ

## 2024-09-27 PROCEDURE — 94660 CPAP INITIATION&MGMT: CPT

## 2024-09-27 PROCEDURE — 84145 PROCALCITONIN (PCT): CPT | Mod: TRILAB,WESLAB | Performed by: NURSE PRACTITIONER

## 2024-09-27 PROCEDURE — 93325 DOPPLER ECHO COLOR FLOW MAPG: CPT

## 2024-09-27 PROCEDURE — 2500000002 HC RX 250 W HCPCS SELF ADMINISTERED DRUGS (ALT 637 FOR MEDICARE OP, ALT 636 FOR OP/ED): Performed by: NURSE PRACTITIONER

## 2024-09-27 PROCEDURE — 2500000001 HC RX 250 WO HCPCS SELF ADMINISTERED DRUGS (ALT 637 FOR MEDICARE OP): Performed by: NURSE PRACTITIONER

## 2024-09-27 PROCEDURE — 1200000002 HC GENERAL ROOM WITH TELEMETRY DAILY

## 2024-09-27 PROCEDURE — 9420000001 HC RT PATIENT EDUCATION 5 MIN

## 2024-09-27 PROCEDURE — 2500000004 HC RX 250 GENERAL PHARMACY W/ HCPCS (ALT 636 FOR OP/ED): Performed by: NURSE PRACTITIONER

## 2024-09-27 PROCEDURE — 99233 SBSQ HOSP IP/OBS HIGH 50: CPT | Performed by: STUDENT IN AN ORGANIZED HEALTH CARE EDUCATION/TRAINING PROGRAM

## 2024-09-27 PROCEDURE — 2500000002 HC RX 250 W HCPCS SELF ADMINISTERED DRUGS (ALT 637 FOR MEDICARE OP, ALT 636 FOR OP/ED): Performed by: INTERNAL MEDICINE

## 2024-09-27 PROCEDURE — 93308 TTE F-UP OR LMTD: CPT | Performed by: INTERNAL MEDICINE

## 2024-09-27 RX ORDER — FUROSEMIDE 10 MG/ML
20 INJECTION INTRAMUSCULAR; INTRAVENOUS ONCE
Status: COMPLETED | OUTPATIENT
Start: 2024-09-27 | End: 2024-09-27

## 2024-09-27 ASSESSMENT — COGNITIVE AND FUNCTIONAL STATUS - GENERAL
HELP NEEDED FOR BATHING: A LITTLE
STANDING UP FROM CHAIR USING ARMS: A LITTLE
TOILETING: A LITTLE
DRESSING REGULAR LOWER BODY CLOTHING: A LITTLE
CLIMB 3 TO 5 STEPS WITH RAILING: A LITTLE
DAILY ACTIVITIY SCORE: 20
MOVING TO AND FROM BED TO CHAIR: A LITTLE
CLIMB 3 TO 5 STEPS WITH RAILING: A LITTLE
MOVING FROM LYING ON BACK TO SITTING ON SIDE OF FLAT BED WITH BEDRAILS: A LITTLE
WALKING IN HOSPITAL ROOM: A LITTLE
MOVING TO AND FROM BED TO CHAIR: A LITTLE
HELP NEEDED FOR BATHING: A LITTLE
DRESSING REGULAR LOWER BODY CLOTHING: A LITTLE
WALKING IN HOSPITAL ROOM: A LITTLE
HELP NEEDED FOR BATHING: A LITTLE
DRESSING REGULAR UPPER BODY CLOTHING: A LITTLE
MOVING TO AND FROM BED TO CHAIR: A LITTLE
TOILETING: A LITTLE
DAILY ACTIVITIY SCORE: 20
MOBILITY SCORE: 20
MOBILITY SCORE: 18
TURNING FROM BACK TO SIDE WHILE IN FLAT BAD: A LITTLE
TOILETING: A LITTLE
DRESSING REGULAR UPPER BODY CLOTHING: A LITTLE
STANDING UP FROM CHAIR USING ARMS: A LITTLE
CLIMB 3 TO 5 STEPS WITH RAILING: A LITTLE
DRESSING REGULAR UPPER BODY CLOTHING: A LITTLE
WALKING IN HOSPITAL ROOM: A LITTLE
MOBILITY SCORE: 20
STANDING UP FROM CHAIR USING ARMS: A LITTLE
DAILY ACTIVITIY SCORE: 20
DRESSING REGULAR LOWER BODY CLOTHING: A LITTLE

## 2024-09-27 ASSESSMENT — PAIN SCALES - GENERAL
PAINLEVEL_OUTOF10: 0 - NO PAIN
PAINLEVEL_OUTOF10: 2

## 2024-09-27 ASSESSMENT — PAIN - FUNCTIONAL ASSESSMENT
PAIN_FUNCTIONAL_ASSESSMENT: 0-10

## 2024-09-27 NOTE — PROGRESS NOTES
Occupational Therapy    Occupational Therapy Treatment    Name: Raúl Jordan  MRN: [id number]  Department: [address]  Room: [address] [place] [address]  Date: 09/27/24  Time Calculation  Start Time: 1249  Stop Time: 1305  Time Calculation (min): 16 min    Assessment:  OT Assessment: Patient continues to demonstrate progress towards OT POC; primarily limited today by decreased activity tolerance. Will continue to follow OT POC to maximize pt’s independence in ADL/IADL’s, endurance, functional mobility and transfers.  Prognosis: Good  Barriers to Discharge:  (decreased  activity tolerance)  Evaluation/Treatment Tolerance: Patient limited by fatigue  Medical Staff Made Aware: Yes  End of Session Communication: Bedside nurse  End of Session Patient Position: Up in chair, Alarm on  Plan:  Treatment Interventions: ADL retraining, Functional transfer training, UE strengthening/ROM, Endurance training, Patient/family training, Equipment evaluation/education, Neuromuscular reeducation, Compensatory technique education  OT Frequency: 3 times per week  OT Discharge Recommendations: Low intensity level of continued care  Equipment Recommended upon Discharge: Wheeled walker  OT Recommended Transfer Status: Assist of 1  OT - OK to Discharge: Yes    Subjective   Previous Visit Info:  OT Last Visit  OT Received On: 09/27/24  General:  General  Reason for Referral: decreased ADLs d/t acute hypoxic resp failure  Referred By: Dr Cabello  Past Medical History Relevant to Rehab: CHF, DM, HTN, Rt TKA, OA  Family/Caregiver Present: Yes  Caregiver Feedback: daughter, sister  Prior to Session Communication: Bedside nurse  Patient Position Received: Up in chair, Alarm on  Preferred Learning Style: verbal, visual  General Comment: pt cleared by RN, pt seated chair, agreeable for OT  Precautions:  Hearing/Visual Limitations: reading glasses, decreased hearing  Medical Precautions: Fall precautions, Oxygen therapy device and L/min (O2 2L via NC)    Vital Signs  (Past 2hrs)        Date/Time Vitals Session Patient Position Pulse Resp SpO2 BP MAP (mmHg)    09/27/24 1246 --  --  --  --  92 %  --  --     09/27/24 1249 --  --  --  --  94 %  --  --                   Pain Assessment:  Pain Assessment  Pain Assessment: 0-10  0-10 (Numeric) Pain Score: 2  Pain Type: Chronic pain  Pain Location: Knee  Pain Orientation: Left     Objective   Cognition:  Overall Cognitive Status: Within Functional Limits  Orientation Level: Oriented X4    Bed Mobility/Transfers: Bed Mobility  Bed Mobility: No    Transfers  Transfer: No    Functional Mobility:  Functional Mobility  Functional Mobility Performed: No    Therapy/Activity: Therapeutic Exercise  Therapeutic Exercise Performed: Yes  Therapeutic Exercise Activity 1: Pt completed 1 sets x 10 reps (green band), shoulder and back resistance band stretch (midline pull apart), resistance band front rise/ lateral raise/ triceps extension, seated, close supervision. OT educated and demonstrated. Pt with decreased activity tolerance. Pt O2 88% with ther ex. 4 x Rest breaks with ther ex. to maximize participation    Therapeutic Activity  Therapeutic Activity Performed: Yes  Therapeutic Activity 1: pt educated on pursed lip breathing activity to maximize participation in ther ex, seated chair. OT educated and demo. Pt demonstrate understanding by demo pursed lip breathing back.    Outcome Measures:  [facility] Daily Activity  Putting on and taking off regular lower body clothing: A little  Bathing (including washing, rinsing, drying): A little  Putting on and taking off regular upper body clothing: A little  Toileting, which includes using toilet, bedpan or urinal: A little  Taking care of personal grooming such as brushing teeth: None  Eating Meals: None  Daily Activity - Total Score: 20    Education Documentation  Body Mechanics, taught by Jose Forbes OT at 9/27/2024  1:21 PM.  Learner: Patient  Readiness: Acceptance  Method: Explanation  Response:  Verbalizes Understanding    Home Exercise Program, taught by Jose Forbes OT at 9/27/2024  1:21 PM.  Learner: Patient  Readiness: Acceptance  Method: Explanation  Response: Verbalizes Understanding    Education Comments  No comments found.      Goals:  Encounter Problems       Encounter Problems (Active)       OT Goals       ADL's (Progressing)       Start:  09/24/24    Expected End:  10/08/24       Pt will complete ADL tasks w/ Mod I w/ AE/ compensatory tech for safety and increased independence in self care tasks.         Functional transfers (Progressing)       Start:  09/24/24    Expected End:  10/08/24       Pt will demon bed, chair and toilet transfers w/ Mod I w/ LRD, elev seat heights using B arm supports for safety and increased independence in daily tasks.         Functional endurance  (Progressing)       Start:  09/24/24    Expected End:  10/08/24       Pt will tolerate 20 minutes of functional activity to improve functional endurance for daily tasks and functional mobility.            OT Goals       BUE strengthening (Progressing)       Start:  09/24/24    Expected End:  10/08/24       Pt will demon BUE exercises to improve strength to 4/5 for functional transfers.

## 2024-09-27 NOTE — ASSESSMENT & PLAN NOTE
- patient states she has had increased swelling of her LE for the last few weeks despite taking her lasix at home  - agree with diuresis, would aim to be about 1L net negative for the next few days

## 2024-09-27 NOTE — CARE PLAN
Problem: Diabetes  Goal: Increase stability of blood glucose readings by end of shift  Outcome: Progressing  Goal: Maintain glucose levels >70mg/dl to <250mg/dl throughout shift  Outcome: Progressing  Goal: No changes in neurological exam by end of shift  Outcome: Progressing  Goal: Vital signs within normal range for age by end of shift  Outcome: Progressing     Problem: Discharge Planning  Goal: Discharge to home or other facility with appropriate resources  Outcome: Progressing     Problem: Chronic Conditions and Co-morbidities  Goal: Patient's chronic conditions and co-morbidity symptoms are monitored and maintained or improved  Outcome: Progressing     Problem: Fall/Injury  Goal: Not fall by end of shift  Outcome: Progressing  Goal: Be free from injury by end of the shift  Outcome: Progressing  Goal: Verbalize understanding of personal risk factors for fall in the hospital  Outcome: Progressing  Goal: Verbalize understanding of risk factor reduction measures to prevent injury from fall in the home  Outcome: Progressing  Goal: Use assistive devices by end of the shift  Outcome: Progressing  Goal: Pace activities to prevent fatigue by end of the shift  Outcome: Progressing     Problem: Heart Failure  Goal: Improved gas exchange this shift  Outcome: Progressing  Goal: Improved urinary output this shift  Outcome: Progressing  Goal: Report improvement of dyspnea/breathlessness this shift  Outcome: Progressing     Problem: Deep Vein Thrombosis  Goal: I will remain free from complications of deep vein thrombosis and maintain current level of mobility  Outcome: Progressing   The patient's goals for the shift include Wean off O2, rest    The clinical goals for the shift include comfort, promote rest, monitor oxygen demand, monitor vitals      09/27/24 at 5:15 AM - Janessa Cunningham RN

## 2024-09-27 NOTE — PROGRESS NOTES
"Raúl Jordan is a 84 y.o. female on day 4 of admission presenting with Acute hypoxic respiratory failure (Multi).    Subjective   Feeling better today       Objective     GENERAL APPEARANCE: Healthy appearing, in no acute distress  SKIN: no rashes, skin discoloration on shins and ankles consistent with chronic venous status  HEAD, EYES, EARS, NOSE, THROAT: Without sinus tenderness. Conjunctiva and sclera clear. No rhinitis Oropharynx unremarkable  NECK: No lymphadenopathy  CHEST: AP Diameter normal. No retractions. Auscultation reveals good air exchange with bibasilar fine crackles. No wheezing.  HEART: Normal rhythm, II/VI holosystolic murmur  ABDOMEN: Not distended. Normal bowel sounds. Soft and non-tender to palpation, without hepatomegaly or splenomegaly. No masses  EXTREMITIES: Without cyanosis. No clubbing. 2+ pitting edema in lower extremities  LYMPHATIC: No lymphadenopathy  MUSCULOSKELETAL: Unremarkable   NEUROLOGIC: Grossly intact      Last Recorded Vitals  Blood pressure (!) 112/46, pulse 79, temperature 37 °C (98.6 °F), temperature source Temporal, resp. rate 16, height 1.6 m (5' 3\"), weight 92.1 kg (203 lb), SpO2 90%.  Intake/Output last 3 Shifts:  I/O last 3 completed shifts:  In: 1040 (11.3 mL/kg) [P.O.:1040]  Out: 900 (9.8 mL/kg) [Urine:900 (0.3 mL/kg/hr)]  Weight: 92.1 kg     Relevant Results  Echo done today with normal systolic function but plump IVC with minimal respiratory variation      Assessment/Plan   Assessment & Plan  Acute hypoxic respiratory failure (Multi)  - currently requiring 2L NC, does not need O2 at home  - CXR and exam are consistent with pulmonary edema  - agree with gentle diuresis  Diastolic heart failure  - patient states she has had increased swelling of her LE for the last few weeks despite taking her lasix at home  - agree with diuresis, would aim to be about 1L net negative for the next few days  Pneumonia of both lower lobes  - Pt denies productive cough   - Imaging more " consistent with pulmonary edema vs. Infection  - On abx for UTI which will also treat pna  - agree with urine legionella and strep antigen - if negative can discontinue azithromycin         I spent 45 minutes in the professional and overall care of this patient.      Karelne Maloney MD PhD

## 2024-09-27 NOTE — PROGRESS NOTES
Physical Therapy    Physical Therapy Treatment    Patient Name: Raúl Jordan  MRN: [id number]  Department: [address]  Room: [address] [place] [address]  Today's Date: 9/27/2024  Time Calculation  Start Time: 0834  Stop Time: 0913  Time Calculation (min): 39 min         Assessment/Plan   PT Assessment  PT Assessment Results: Decreased strength, Decreased range of motion, Decreased endurance  Rehab Prognosis: Good  Barriers to Discharge: none  Evaluation/Treatment Tolerance: Patient tolerated treatment well  Assessment Comment: Pt moving well, tolerating extended ambulation distances. Pt mildly SOB with activity on 2L oxygen. SpO2 dipping to 88-89%, quick recovery with rest and cues for pursed lip breathing.  End of Session Patient Position: Up in chair, Alarm on     PT Plan  Treatment/Interventions: Transfer training, Gait training, Strengthening, Endurance training, Therapeutic exercise, Therapeutic activity, Home exercise program  PT Plan: Ongoing PT  PT Frequency: 4 times per week  PT Discharge Recommendations: Low intensity level of continued care  Equipment Recommended upon Discharge: Wheeled walker  PT Recommended Transfer Status: Stand by assist, Assistive device  PT - OK to Discharge: Yes      General Visit Information:   PT  Visit  PT Received On: 09/27/24  Response to Previous Treatment: Patient with no complaints from previous session.  General  Reason for Referral: Impaired mobility, acute hypoxic resp failure.  Referred By: Dr Cabello  Past Medical History Relevant to Rehab: CHF, DM, HTN, Rt TKA, OA  Prior to Session Communication: Bedside nurse  Patient Position Received:  (Pt sitting edge of bed, PCA present. Pt neeeding to get to bathroom.)  Preferred Learning Style: verbal, visual  General Comment: Agreeable to treatment    Subjective   Precautions:  Precautions  Hearing/Visual Limitations: reading glasses, decreased hearing  Medical Precautions: Fall precautions, Oxygen therapy device and L/min (2L oxygen.)         Objective   Pain:  Pain Assessment  Pain Assessment: 0-10  0-10 (Numeric) Pain Score: 0 - No pain  Cognition:  Cognition  Overall Cognitive Status: Within Functional Limits  Orientation Level: Oriented X4     Treatments:  Therapeutic Exercise  Therapeutic Exercise Activity 1: Pt performed seated bilat LE ankle pumps, heel raises, LAQ, hip flexion, cas hip adduction and resisted hip abduction 2 x10 reps each.    Ambulation/Gait Training 1  Surface 1: Level tile  Device 1: Rolling walker  Assistance 1: Close supervision  Quality of Gait 1: Decreased step length  Comments/Distance (ft) 1: Pt ambulated with RW and 2L oxygen ~15' x2 to/from bathroom close supervision. Slow debbie.  Ambulation/Gait Training 2  Surface 2: Level tile  Device 2: Rolling walker  Assistance 2: Close supervision  Quality of Gait 2: Decreased step length  Comments/Distance (ft) 2: Pt ambulated with RW and 2L oxygen ~70' x2 trials, close supervision. Seated rest between trials. Pt demonstrates slow, reciprocal gait. Decreased bilat step height and length. No LOB.    Transfer 1  Transfer From 1: Bed to  Transfer to 1: Stand  Technique 1: Sit to stand  Transfer Device 1: Walker  Transfer Level of Assistance 1: Close supervision  Transfers 2  Transfer From 2: Stand to  Transfer to 2: Sit, Toilet  Technique 2: Stand to sit  Transfer Level of Assistance 2: Close supervision, Minimal verbal cues  Trials/Comments 2: Verbal cues for fully backing up to toilet.  Transfers 3  Transfer From 3: Toilet to  Transfer to 3: Stand  Technique 3: Sit to stand  Transfer Device 3: Walker  Transfer Level of Assistance 3: Close supervision  Transfers 4  Transfer From 4: Stand to  Transfer to 4: Sit, Chair with arms  Technique 4: Stand to sit, Sit to stand  Transfer Device 4: Walker  Transfer Level of Assistance 4: Close supervision  Trials/Comments 4: x3 STS trial from chair with arms.    Outcome Measures:  [facility] Basic Mobility  Turning from your back to your  side while in a flat bed without using bedrails: None  Moving from lying on your back to sitting on the side of a flat bed without using bedrails: None  Moving to and from bed to chair (including a wheelchair): A little  Standing up from a chair using your arms (e.g. wheelchair or bedside chair): A little  To walk in hospital room: A little  Climbing 3-5 steps with railing: A little  Basic Mobility - Total Score: 20    Education Documentation  Home Exercise Program, taught by Arelis Dozier PTA at 9/27/2024  9:22 AM.  Learner: Patient  Readiness: Acceptance  Method: Explanation  Response: Verbalizes Understanding    Mobility Training, taught by Arelis Dozier PTA at 9/27/2024  9:22 AM.  Learner: Patient  Readiness: Acceptance  Method: Explanation  Response: Verbalizes Understanding    Education Comments  No comments found.        OP EDUCATION:       Encounter Problems       Encounter Problems (Active)       Mobility       STG - Patient will ambulate 100' w/ LRAD prn and distant supervision  (Progressing)       Start:  09/24/24    Expected End:  10/07/24            STG - Patient will ascend and descend four stairs w/ supervision, 1 rail and cane prn (Progressing)       Start:  09/24/24    Expected End:  10/07/24            Pt will tolerate BLE exercises consistently to promote functional strength, ROM and endurance (Progressing)       Start:  09/24/24    Expected End:  10/07/24               PT Transfers       STG - Patient to transfer to and from sit to supine IND (Progressing)       Start:  09/24/24    Expected End:  10/07/24            STG - Patient will transfer sit to and from stand MOD I (Progressing)       Start:  09/24/24    Expected End:  10/07/24

## 2024-09-27 NOTE — ASSESSMENT & PLAN NOTE
Pt is on 3 L nasal cannula normally does not wear oxygen at home no history of COPD asthma.   Pneumonia and fluid volume overload  -proalcitonin elevated original xray showed infiltrates, BNP elevated, seconcond xray shows pleural effusion and vascular congestion  wean patient from O2 as tolerated, SPO2 86% on room air with ambulation

## 2024-09-27 NOTE — ASSESSMENT & PLAN NOTE
- currently requiring 2L NC, does not need O2 at home  - CXR and exam are consistent with pulmonary edema  - agree with gentle diuresis

## 2024-09-27 NOTE — PROGRESS NOTES
09/27/24 1107   Discharge Planning   Expected Discharge Disposition Home   Does the patient need discharge transport arranged? No     May need home oxygen at discharge.  No care coordination needs per patient.  Patient stil on 2L.

## 2024-09-27 NOTE — ASSESSMENT & PLAN NOTE
- Pt denies productive cough   - Imaging more consistent with pulmonary edema vs. Infection  - On abx for UTI which will also treat pna  - agree with urine legionella and strep antigen - if negative can discontinue azithromycin

## 2024-09-27 NOTE — ASSESSMENT & PLAN NOTE
Chronic shortness of breath with exertion  Last EF 55--60%   Lasix IV given x1 today  Follow-up with Dr. Lozano as outpatient

## 2024-09-28 LAB
ANION GAP SERPL CALCULATED.3IONS-SCNC: 11 MMOL/L (ref 10–20)
ANION GAP SERPL CALCULATED.3IONS-SCNC: 11 MMOL/L (ref 10–20)
BUN SERPL-MCNC: 29 MG/DL (ref 6–23)
BUN SERPL-MCNC: 29 MG/DL (ref 6–23)
CALCIUM SERPL-MCNC: 8.8 MG/DL (ref 8.6–10.3)
CALCIUM SERPL-MCNC: 9.1 MG/DL (ref 8.6–10.3)
CARDIAC TROPONIN I PNL SERPL HS: 18 NG/L (ref 0–13)
CHLORIDE SERPL-SCNC: 102 MMOL/L (ref 98–107)
CHLORIDE SERPL-SCNC: 98 MMOL/L (ref 98–107)
CO2 SERPL-SCNC: 32 MMOL/L (ref 21–32)
CO2 SERPL-SCNC: 32 MMOL/L (ref 21–32)
CREAT SERPL-MCNC: 1.25 MG/DL (ref 0.5–1.05)
CREAT SERPL-MCNC: 1.27 MG/DL (ref 0.5–1.05)
EGFRCR SERPLBLD CKD-EPI 2021: 41 ML/MIN/1.73M*2
EGFRCR SERPLBLD CKD-EPI 2021: 42 ML/MIN/1.73M*2
ERYTHROCYTE [DISTWIDTH] IN BLOOD BY AUTOMATED COUNT: 14.7 % (ref 11.5–14.5)
GLUCOSE SERPL-MCNC: 106 MG/DL (ref 74–99)
GLUCOSE SERPL-MCNC: 90 MG/DL (ref 74–99)
HCT VFR BLD AUTO: 31.6 % (ref 36–46)
HGB BLD-MCNC: 9.8 G/DL (ref 12–16)
INR PPP: 2.8 (ref 0.9–1.2)
MAGNESIUM SERPL-MCNC: 2.1 MG/DL (ref 1.6–2.4)
MCH RBC QN AUTO: 30.2 PG (ref 26–34)
MCHC RBC AUTO-ENTMCNC: 31 G/DL (ref 32–36)
MCV RBC AUTO: 97 FL (ref 80–100)
NRBC BLD-RTO: 0 /100 WBCS (ref 0–0)
PLATELET # BLD AUTO: 120 X10*3/UL (ref 150–450)
POTASSIUM SERPL-SCNC: 3.7 MMOL/L (ref 3.5–5.3)
POTASSIUM SERPL-SCNC: 3.8 MMOL/L (ref 3.5–5.3)
PROTHROMBIN TIME: 27.3 SECONDS (ref 9.3–12.7)
RBC # BLD AUTO: 3.25 X10*6/UL (ref 4–5.2)
SODIUM SERPL-SCNC: 137 MMOL/L (ref 136–145)
SODIUM SERPL-SCNC: 141 MMOL/L (ref 136–145)
STAPHYLOCOCCUS SPEC CULT: NORMAL
WBC # BLD AUTO: 5.9 X10*3/UL (ref 4.4–11.3)

## 2024-09-28 PROCEDURE — 99231 SBSQ HOSP IP/OBS SF/LOW 25: CPT | Performed by: STUDENT IN AN ORGANIZED HEALTH CARE EDUCATION/TRAINING PROGRAM

## 2024-09-28 PROCEDURE — 2500000001 HC RX 250 WO HCPCS SELF ADMINISTERED DRUGS (ALT 637 FOR MEDICARE OP): Performed by: NURSE PRACTITIONER

## 2024-09-28 PROCEDURE — 83735 ASSAY OF MAGNESIUM: CPT | Performed by: NURSE PRACTITIONER

## 2024-09-28 PROCEDURE — 80048 BASIC METABOLIC PNL TOTAL CA: CPT | Performed by: NURSE PRACTITIONER

## 2024-09-28 PROCEDURE — 2500000002 HC RX 250 W HCPCS SELF ADMINISTERED DRUGS (ALT 637 FOR MEDICARE OP, ALT 636 FOR OP/ED): Performed by: INTERNAL MEDICINE

## 2024-09-28 PROCEDURE — 94762 N-INVAS EAR/PLS OXIMTRY CONT: CPT

## 2024-09-28 PROCEDURE — 36415 COLL VENOUS BLD VENIPUNCTURE: CPT | Performed by: NURSE PRACTITIONER

## 2024-09-28 PROCEDURE — 84484 ASSAY OF TROPONIN QUANT: CPT | Performed by: NURSE PRACTITIONER

## 2024-09-28 PROCEDURE — 2500000005 HC RX 250 GENERAL PHARMACY W/O HCPCS: Performed by: INTERNAL MEDICINE

## 2024-09-28 PROCEDURE — 85610 PROTHROMBIN TIME: CPT | Performed by: NURSE PRACTITIONER

## 2024-09-28 PROCEDURE — 94668 MNPJ CHEST WALL SBSQ: CPT

## 2024-09-28 PROCEDURE — 2500000004 HC RX 250 GENERAL PHARMACY W/ HCPCS (ALT 636 FOR OP/ED): Performed by: NURSE PRACTITIONER

## 2024-09-28 PROCEDURE — 2500000002 HC RX 250 W HCPCS SELF ADMINISTERED DRUGS (ALT 637 FOR MEDICARE OP, ALT 636 FOR OP/ED): Performed by: NURSE PRACTITIONER

## 2024-09-28 PROCEDURE — 9420000001 HC RT PATIENT EDUCATION 5 MIN

## 2024-09-28 PROCEDURE — 94667 MNPJ CHEST WALL 1ST: CPT

## 2024-09-28 PROCEDURE — 94640 AIRWAY INHALATION TREATMENT: CPT

## 2024-09-28 PROCEDURE — 2500000005 HC RX 250 GENERAL PHARMACY W/O HCPCS: Performed by: NURSE PRACTITIONER

## 2024-09-28 PROCEDURE — 1200000002 HC GENERAL ROOM WITH TELEMETRY DAILY

## 2024-09-28 PROCEDURE — 85027 COMPLETE CBC AUTOMATED: CPT | Performed by: NURSE PRACTITIONER

## 2024-09-28 RX ORDER — FUROSEMIDE 10 MG/ML
40 INJECTION INTRAMUSCULAR; INTRAVENOUS DAILY
Status: DISCONTINUED | OUTPATIENT
Start: 2024-09-28 | End: 2024-09-29

## 2024-09-28 ASSESSMENT — COGNITIVE AND FUNCTIONAL STATUS - GENERAL
CLIMB 3 TO 5 STEPS WITH RAILING: A LOT
MOBILITY SCORE: 19
DAILY ACTIVITIY SCORE: 23
DRESSING REGULAR LOWER BODY CLOTHING: A LITTLE
STANDING UP FROM CHAIR USING ARMS: A LITTLE
MOVING TO AND FROM BED TO CHAIR: A LITTLE
WALKING IN HOSPITAL ROOM: A LITTLE

## 2024-09-28 ASSESSMENT — PAIN - FUNCTIONAL ASSESSMENT
PAIN_FUNCTIONAL_ASSESSMENT: 0-10

## 2024-09-28 ASSESSMENT — PAIN SCALES - GENERAL
PAINLEVEL_OUTOF10: 0 - NO PAIN

## 2024-09-28 NOTE — SIGNIFICANT EVENT
RN at bedside called about arrhythmia on telemetry. The telemetry has some artifact and one run may be V tach 4-5 beats, asymptomatic. 12 lead is atrial fib rate 94 nonspecific st and t wave abnormality, prolonged QT. Will hold trazadone because of the prolonged QT. WE will check troponin, potassium, magnesium and consult cardiology.

## 2024-09-28 NOTE — NURSING NOTE
Patient telemetry alarmed arrhythmia's multiple times. Upon assessment, some of them seem to be legit, while others are definitely artifact. Alarm strips printed and placed in front of patient chart. Patient assessed and asymptomatic. VS stable, no c/o any pain. ECG done; printed and placed in front of chart. Hospitalist notified of events via secure chat and promptly arrived to patient bedside to assess the patient as well as the ECG and telemetry strips. New orders received shortly after. Plan of care ongoing, no additional needs at this time.

## 2024-09-28 NOTE — CARE PLAN
The patient's goals for the shift include improved breathing, comfort and safety, antibiotics, rest.     The clinical goals for the shift include Monitor labs and VS, improve oxygenation, decrease O2 needs, IV antibx and lasix, maintain safety, encourage activity, promote rest, discharge planning.    No barriers towards meeting these goals. Plan of care ongoing. No additional needs at this time.       Problem: Diabetes  Goal: Achieve decreasing blood glucose levels by end of shift  Outcome: Progressing  Goal: Increase stability of blood glucose readings by end of shift  Outcome: Progressing  Goal: Decrease in ketones present in urine by end of shift  Outcome: Progressing  Goal: Maintain electrolyte levels within acceptable range throughout shift  Outcome: Progressing  Goal: Maintain glucose levels >70mg/dl to <250mg/dl throughout shift  Outcome: Progressing  Goal: No changes in neurological exam by end of shift  Outcome: Progressing  Goal: Learn about and adhere to nutrition recommendations by end of shift  Outcome: Progressing  Goal: Vital signs within normal range for age by end of shift  Outcome: Progressing  Goal: Increase self care and/or family involovement by end of shift  Outcome: Progressing  Goal: Receive DSME education by end of shift  Outcome: Progressing     Problem: Safety - Adult  Goal: Free from fall injury  Outcome: Progressing     Problem: Discharge Planning  Goal: Discharge to home or other facility with appropriate resources  Outcome: Progressing     Problem: Chronic Conditions and Co-morbidities  Goal: Patient's chronic conditions and co-morbidity symptoms are monitored and maintained or improved  Outcome: Progressing     Problem: Fall/Injury  Goal: Not fall by end of shift  Outcome: Progressing  Goal: Be free from injury by end of the shift  Outcome: Progressing  Goal: Verbalize understanding of personal risk factors for fall in the hospital  Outcome: Progressing  Goal: Verbalize understanding  of risk factor reduction measures to prevent injury from fall in the home  Outcome: Progressing  Goal: Use assistive devices by end of the shift  Outcome: Progressing  Goal: Pace activities to prevent fatigue by end of the shift  Outcome: Progressing     Problem: Heart Failure  Goal: Improved gas exchange this shift  Outcome: Progressing  Goal: Improved urinary output this shift  Outcome: Progressing  Goal: Reduction in peripheral edema within 24 hours  Outcome: Progressing  Goal: Report improvement of dyspnea/breathlessness this shift  Outcome: Progressing  Goal: Weight from fluid excess reduced over 2-3 days, then stabilize  Outcome: Progressing  Goal: Increase self care and/or family involvement in 24 hours  Outcome: Progressing     Problem: Respiratory  Goal: No signs of respiratory distress (eg. Use of accessory muscles. Peds grunting)  Outcome: Progressing     Problem: Deep Vein Thrombosis  Goal: I will remain free from complications of deep vein thrombosis and maintain current level of mobility  Outcome: Progressing

## 2024-09-28 NOTE — ASSESSMENT & PLAN NOTE
- Pt denies productive cough   - Imaging more consistent with pulmonary edema vs. Infection  - On abx for UTI which will also treat pna  - negative urinary legionella, pastora seasr

## 2024-09-28 NOTE — ASSESSMENT & PLAN NOTE
Pt is on 3 L nasal cannula normally does not wear oxygen at home no history of COPD asthma.   Pneumonia and fluid volume overload  -proalcitonin elevated original xray showed infiltrates, BNP elevated, second xray shows pleural effusion and vascular congestion  wean patient from O2 as tolerated, SPO2 86% on room air with ambulation

## 2024-09-28 NOTE — NURSING NOTE
Patient refused cpap for hs. Patient states she doesn't like wearing the mask and it bothers her and feels like she is suffocating. I asked the patient what mask she uses at home she states she has a machine at home but does not use it.

## 2024-09-28 NOTE — PROGRESS NOTES
Raúl Gil is a 84 y.o. female on day 5 of admission presenting with Acute hypoxic respiratory failure (Multi).      Subjective   Patient resting in bed.  She denies any shortness of breath both at rest and while walking to the bathroom.  She has no pain.  She does have tenderness in bilateral lower extremities       Objective     Last Recorded Vitals  /56 (BP Location: Left arm, Patient Position: Lying)   Pulse 85   Temp 36.4 °C (97.5 °F) (Temporal)   Resp 20   Wt 92.1 kg (203 lb)   SpO2 94%   Intake/Output last 3 Shifts:    Intake/Output Summary (Last 24 hours) at 9/28/2024 1514  Last data filed at 9/28/2024 1340  Gross per 24 hour   Intake 1320 ml   Output 2250 ml   Net -930 ml       Admission Weight  Weight: 92.1 kg (203 lb) (09/23/24 1023)    Daily Weight  09/23/24 : 92.1 kg (203 lb)    Image Results  Transthoracic Echo (TTE) Limited              [place] [id number]              Phone [phone]    TRANSTHORACIC ECHOCARDIOGRAM REPORT    Patient Name:     RAÚL GIL        Reading Physician:  [id number] Paul Hamm DO  Study Date:       9/27/2024            Ordering Provider:  [id number] EDOUARD MCHUGH  MRN/PID:          [id number]             Fellow:  Accession#:       [id number]         Nurse:  Date of           1940 / 84 years Sonographer:        Edouard Umanzor RDCS  Birth/Age:  Gender:           F                    Additional Staff:  Height:           160.02 cm            Admit Date:  Weight:           92.08 kg             Admission Status:   Inpatient - Routine  BSA / BMI:        1.95 m2 / 35.96      Department          [facility]                    kg/m2                Location:  Blood Pressure: 110 /48 mmHg    Study Type:    TRANSTHORACIC ECHO (TTE) LIMITED  Diagnosis/ICD: Chronic diastolic (congestive) heart failure  (CHF)-I50.32  Indication:    Congestive Heart Failure  CPT Codes:     Echo Limited-81245; Doppler Full-44363; Color Doppler-07142    Patient History:  Pertinent History: CHF, AFIB, chest pain, HLD, HTN.    Study Detail: The following Echo studies were performed: 2D, M-Mode, Doppler and                color flow.       PHYSICIAN INTERPRETATION:  Left Ventricle: Left ventricular ejection fraction is normal, by visual estimate at 65-70%. There are no regional wall motion abnormalities. The left ventricular cavity size is normal. Spectral Doppler shows an impaired relaxation pattern of left ventricular diastolic filling.  Left Atrium: The left atrium is moderately dilated.  Right Ventricle: The right ventricle is normal in size. There is normal right ventricular global systolic function. A device is visualized in the right ventricle. Micra pacemaker implanted in RV.  Right Atrium: The right atrium is mild to moderately dilated.  Aortic Valve: The aortic valve appears abnormal. There is moderate aortic valve cusp calcification. There is mild aortic valve thickening. There is no evidence of aortic valve regurgitation.  Mitral Valve: The mitral valve is abnormal. There is severe mitral annular calcification. There is mild to moderate mitral valve regurgitation.  Tricuspid Valve: The tricuspid valve is structurally normal. There is mild tricuspid regurgitation. The Doppler estimated RVSP is mildly elevated right ventricular systolic pressure at 37.4 mmHg.  Pulmonic Valve: The pulmonic valve is structurally normal. There is no indication of pulmonic valve regurgitation.  Pericardium: No pericardial effusion noted.  Aorta: The aortic root is normal.       CONCLUSIONS:   1. Left ventricular ejection fraction is normal, by visual estimate at 65-70%.   2. Spectral Doppler shows an impaired relaxation pattern of left ventricular diastolic filling.   3. There is normal right ventricular global systolic function.   4. Micra  pacemaker implanted in RV.   5. The left atrium is moderately dilated.   6. The right atrium is mild to moderately dilated.   7. There is severe mitral annular calcification.   8. Mild to moderate mitral valve regurgitation.   9. Mildly elevated right ventricular systolic pressure.  10. There is moderate aortic valve cusp calcification.    QUANTITATIVE DATA SUMMARY:     LA VOLUME:                    Normal Ranges:  LA Vol A4C:        104.9 ml   (22+/-6mL/m2)  LA Vol A2C:        130.0 ml  LA Vol BP:         118.0 ml  LA Vol Index A4C:  53.9ml/m2  LA Vol Index A2C:  66.8 ml/m2  LA Vol Index BP:   60.6 ml/m2  LA Area A4C:       30.4 cm2  LA Area A2C:       34.2 cm2  LA Major Axis A4C: 7.5 cm  LA Major Axis A2C: 7.6 cm  LA Volume Index:   56.4 ml/m2  LA Vol A4C:        98.4 ml  LA Vol A2C:        124.0 ml  LA Vol Index BSA:  57.1 ml/m2       RA VOLUME BY A/L METHOD:            Normal Ranges:  RA Vol A4C:              68.6 ml    (8.3-19.5ml)  RA Vol Index A4C:        35.2 ml/m2  RA Area A4C:             24.2 cm2  RA Major Axis A4C:       7.3 cm       LV SYSTOLIC FUNCTION BY 2D PLANIMETRY (MOD):                       Normal Ranges:  EF-A4C View:    57 % (>=55%)  EF-A2C View:    60 %  EF-Biplane:     59 %  EF-Visual:      68 %  LV EF Reported: 68 %       LV DIASTOLIC FUNCTION:           Normal Ranges:  MV Peak E:             1.29 m/s  (0.7-1.2 m/s)  MV Peak A:             0.36 m/s  (0.42-0.7 m/s)  E/A Ratio:             3.63      (1.0-2.2)  MV e'                  0.071 m/s (>8.0)  MV lateral e'          0.07 m/s  MV medial e'           0.07 m/s  E/e' Ratio:            18.25     (<8.0)       MITRAL VALVE:          Normal Ranges:  MV DT:        190 msec (150-240msec)       AORTIC VALVE:          Normal Ranges:  LVOT Diameter: 2.00 cm (1.8-2.4cm)       RIGHT VENTRICLE:  RV Basal 4.03 cm  RV Mid   3.31 cm  RV Major 7.8 cm  TAPSE:   21.2 mm  RV s'    0.12 m/s       TRICUSPID VALVE/RVSP:          Normal Ranges:  Peak TR  Velocity:     2.71 m/s  RV Syst Pressure:     37 mmHg  (< 30mmHg)  IVC Diam:             2.50 cm       [id number] Paul Hamm DO  Electronically signed on 9/27/2024 at 2:21:08 PM       ** Final **      Physical Exam  Constitutional:       Appearance: She is obese. She is ill-appearing.   Cardiovascular:      Rate and Rhythm: Normal rate. Rhythm irregular.      Pulses: Normal pulses.      Heart sounds: Normal heart sounds.   Pulmonary:      Effort: Pulmonary effort is normal.      Breath sounds: Normal breath sounds.   Musculoskeletal:         General: Normal range of motion.   Skin:     General: Skin is warm and dry.   Neurological:      Mental Status: She is alert and oriented to person, place, and time.         Relevant Results             Results for orders placed or performed during the hospital encounter of 09/23/24 (from the past 24 hour(s))   CBC   Result Value Ref Range    WBC 5.9 4.4 - 11.3 x10*3/uL    nRBC 0.0 0.0 - 0.0 /100 WBCs    RBC 3.25 (L) 4.00 - 5.20 x10*6/uL    Hemoglobin 9.8 (L) 12.0 - 16.0 g/dL    Hematocrit 31.6 (L) 36.0 - 46.0 %    MCV 97 80 - 100 fL    MCH 30.2 26.0 - 34.0 pg    MCHC 31.0 (L) 32.0 - 36.0 g/dL    RDW 14.7 (H) 11.5 - 14.5 %    Platelets 120 (L) 150 - 450 x10*3/uL   Basic Metabolic Panel   Result Value Ref Range    Glucose 90 74 - 99 mg/dL    Sodium 141 136 - 145 mmol/L    Potassium 3.8 3.5 - 5.3 mmol/L    Chloride 102 98 - 107 mmol/L    Bicarbonate 32 21 - 32 mmol/L    Anion Gap 11 10 - 20 mmol/L    Urea Nitrogen 29 (H) 6 - 23 mg/dL    Creatinine 1.27 (H) 0.50 - 1.05 mg/dL    eGFR 41 (L) >60 mL/min/1.73m*2    Calcium 8.8 8.6 - 10.3 mg/dL   Protime-INR   Result Value Ref Range    Protime 27.3 (H) 9.3 - 12.7 seconds    INR 2.8 (H) 0.9 - 1.2   Troponin I, High Sensitivity   Result Value Ref Range    Troponin I, High Sensitivity 18 (H) 0 - 13 ng/L   Magnesium   Result Value Ref Range    Magnesium 2.10 1.60 - 2.40 mg/dL   Basic Metabolic Panel   Result Value Ref Range    Glucose 106 (H)  74 - 99 mg/dL    Sodium 137 136 - 145 mmol/L    Potassium 3.7 3.5 - 5.3 mmol/L    Chloride 98 98 - 107 mmol/L    Bicarbonate 32 21 - 32 mmol/L    Anion Gap 11 10 - 20 mmol/L    Urea Nitrogen 29 (H) 6 - 23 mg/dL    Creatinine 1.25 (H) 0.50 - 1.05 mg/dL    eGFR 42 (L) >60 mL/min/1.73m*2    Calcium 9.1 8.6 - 10.3 mg/dL       Assessment/Plan                  Assessment & Plan  Acute hypoxic respiratory failure (Multi)  Pt is on 3 L nasal cannula normally does not wear oxygen at home no history of COPD asthma.   Pneumonia and fluid volume overload  -proalcitonin elevated original xray showed infiltrates, BNP elevated, second xray shows pleural effusion and vascular congestion  wean patient from O2 as tolerated, SPO2 86% on room air with ambulation  Type 2 diabetes mellitus with obesity (Multi)  Not on any medication  Diastolic heart failure  Chronic shortness of breath with exertion  Last EF 55--60%   Lasix IV daily  Cardiology consulted  Hypertension  Continue with home meds  Pneumonia of both lower lobes  Chest x-ray shows possible pneumonia bilaterally  Order blood cultures and sputum  Continue Rocephin, given azithromycin for 5 days    Longstanding persistent atrial fibrillation (Multi)  Patient on Coumadin therapy   PT/INR daily  Plan of Care  Patient lives at home with family she plans to return there at discharge without any needs.    Plan of care discussed with patient and collaborating physician.                  Jazz Melton, APRN-CNP

## 2024-09-28 NOTE — CARE PLAN
The patient's goals for the shift include improved breathing    The clinical goals for the shift include antibiotics

## 2024-09-28 NOTE — ASSESSMENT & PLAN NOTE
Patient on Coumadin therapy   PT/INR daily  Plan of Care  Patient lives at home with family she plans to return there at discharge without any needs.    Plan of care discussed with patient and collaborating physician.

## 2024-09-28 NOTE — ASSESSMENT & PLAN NOTE
Chest x-ray shows possible pneumonia bilaterally  Order blood cultures and sputum  Continue Rocephin, given azithromycin for 5 days

## 2024-09-28 NOTE — ASSESSMENT & PLAN NOTE
- currently requiring 2L NC, does not need O2 at home  - CXR and exam are consistent with pulmonary edema  - agree with gentle diuresis  - would recommend incentive spirometer use, vpep, ambulation

## 2024-09-28 NOTE — PROGRESS NOTES
"Raúl Jordan is a 84 y.o. female on day 5 of admission presenting with Acute hypoxic respiratory failure (Multi).    Subjective   Feeling better today, has been walking around some.        Objective     GENERAL APPEARANCE: Healthy appearing, in no acute distress  SKIN: no rashes, skin discoloration on shins and ankles consistent with chronic venous status  HEAD, EYES, EARS, NOSE, THROAT: Without sinus tenderness. Conjunctiva and sclera clear. No rhinitis Oropharynx unremarkable  NECK: No lymphadenopathy  CHEST: AP Diameter normal. No retractions. Auscultation reveals good air exchange with bibasilar fine crackles. No wheezing.  HEART: Normal rhythm, II/VI holosystolic murmur  ABDOMEN: Not distended. Normal bowel sounds. Soft and non-tender to palpation, without hepatomegaly or splenomegaly. No masses  EXTREMITIES: Without cyanosis. No clubbing. 2+ pitting edema in lower extremities  LYMPHATIC: No lymphadenopathy  MUSCULOSKELETAL: Unremarkable   NEUROLOGIC: Grossly intact      Last Recorded Vitals  Blood pressure 115/56, pulse 85, temperature 36.4 °C (97.5 °F), temperature source Temporal, resp. rate 20, height 1.6 m (5' 3\"), weight 92.1 kg (203 lb), SpO2 94%.  Intake/Output last 3 Shifts:  I/O last 3 completed shifts:  In: 1000 (10.9 mL/kg) [P.O.:1000]  Out: 2650 (28.8 mL/kg) [Urine:2650 (0.8 mL/kg/hr)]  Weight: 92.1 kg     Relevant Results  Echo done today with normal systolic function but plump IVC with minimal respiratory variation  CONCLUSIONS:   1. Left ventricular ejection fraction is normal, by visual estimate at 65-70%.   2. Spectral Doppler shows an impaired relaxation pattern of left ventricular diastolic filling.   3. There is normal right ventricular global systolic function.   4. Micra pacemaker implanted in RV.   5. The left atrium is moderately dilated.   6. The right atrium is mild to moderately dilated.   7. There is severe mitral annular calcification.   8. Mild to moderate mitral valve " regurgitation.   9. Mildly elevated right ventricular systolic pressure.  10. There is moderate aortic valve cusp calcification.         Assessment/Plan   Assessment & Plan  Acute hypoxic respiratory failure (Multi)  - currently requiring 2L NC, does not need O2 at home  - CXR and exam are consistent with pulmonary edema  - agree with gentle diuresis  - would recommend incentive spirometer use, vpep, ambulation  Diastolic heart failure  - patient states she has had increased swelling of her LE for the last few weeks despite taking her lasix at home  - agree with diuresis, would aim to be about 1L net negative for the next few days  Pneumonia of both lower lobes  - Pt denies productive cough   - Imaging more consistent with pulmonary edema vs. Infection  - On abx for UTI which will also treat pna  - negative urinary legionella, pastora sears I spent 45 minutes in the professional and overall care of this patient.      Hugo Sow MD

## 2024-09-29 ENCOUNTER — APPOINTMENT (OUTPATIENT)
Dept: RADIOLOGY | Facility: HOSPITAL | Age: 84
DRG: 193 | End: 2024-09-29
Payer: MEDICARE

## 2024-09-29 VITALS
HEART RATE: 87 BPM | RESPIRATION RATE: 15 BRPM | WEIGHT: 203 LBS | DIASTOLIC BLOOD PRESSURE: 73 MMHG | BODY MASS INDEX: 35.97 KG/M2 | TEMPERATURE: 95.9 F | OXYGEN SATURATION: 92 % | SYSTOLIC BLOOD PRESSURE: 118 MMHG | HEIGHT: 63 IN

## 2024-09-29 LAB
CARDIAC TROPONIN I PNL SERPL HS: 16 NG/L (ref 0–13)
INR PPP: 2.6 (ref 0.9–1.2)
PROTHROMBIN TIME: 25.7 SECONDS (ref 9.3–12.7)

## 2024-09-29 PROCEDURE — 1200000002 HC GENERAL ROOM WITH TELEMETRY DAILY

## 2024-09-29 PROCEDURE — 99222 1ST HOSP IP/OBS MODERATE 55: CPT | Performed by: INTERNAL MEDICINE

## 2024-09-29 PROCEDURE — 2500000004 HC RX 250 GENERAL PHARMACY W/ HCPCS (ALT 636 FOR OP/ED): Performed by: NURSE PRACTITIONER

## 2024-09-29 PROCEDURE — 2500000005 HC RX 250 GENERAL PHARMACY W/O HCPCS: Performed by: INTERNAL MEDICINE

## 2024-09-29 PROCEDURE — 9420000001 HC RT PATIENT EDUCATION 5 MIN

## 2024-09-29 PROCEDURE — 94640 AIRWAY INHALATION TREATMENT: CPT

## 2024-09-29 PROCEDURE — 99231 SBSQ HOSP IP/OBS SF/LOW 25: CPT | Performed by: STUDENT IN AN ORGANIZED HEALTH CARE EDUCATION/TRAINING PROGRAM

## 2024-09-29 PROCEDURE — 2500000001 HC RX 250 WO HCPCS SELF ADMINISTERED DRUGS (ALT 637 FOR MEDICARE OP): Performed by: NURSE PRACTITIONER

## 2024-09-29 PROCEDURE — 2500000002 HC RX 250 W HCPCS SELF ADMINISTERED DRUGS (ALT 637 FOR MEDICARE OP, ALT 636 FOR OP/ED): Performed by: NURSE PRACTITIONER

## 2024-09-29 PROCEDURE — 36415 COLL VENOUS BLD VENIPUNCTURE: CPT | Performed by: NURSE PRACTITIONER

## 2024-09-29 PROCEDURE — 2500000004 HC RX 250 GENERAL PHARMACY W/ HCPCS (ALT 636 FOR OP/ED): Performed by: HOSPITALIST

## 2024-09-29 PROCEDURE — 94762 N-INVAS EAR/PLS OXIMTRY CONT: CPT

## 2024-09-29 PROCEDURE — 71250 CT THORAX DX C-: CPT | Performed by: RADIOLOGY

## 2024-09-29 PROCEDURE — 84484 ASSAY OF TROPONIN QUANT: CPT | Performed by: NURSE PRACTITIONER

## 2024-09-29 PROCEDURE — 2500000002 HC RX 250 W HCPCS SELF ADMINISTERED DRUGS (ALT 637 FOR MEDICARE OP, ALT 636 FOR OP/ED): Performed by: INTERNAL MEDICINE

## 2024-09-29 PROCEDURE — 2500000005 HC RX 250 GENERAL PHARMACY W/O HCPCS: Performed by: NURSE PRACTITIONER

## 2024-09-29 PROCEDURE — 71250 CT THORAX DX C-: CPT

## 2024-09-29 PROCEDURE — 2500000001 HC RX 250 WO HCPCS SELF ADMINISTERED DRUGS (ALT 637 FOR MEDICARE OP): Performed by: STUDENT IN AN ORGANIZED HEALTH CARE EDUCATION/TRAINING PROGRAM

## 2024-09-29 PROCEDURE — 85610 PROTHROMBIN TIME: CPT | Performed by: HOSPITALIST

## 2024-09-29 PROCEDURE — 36415 COLL VENOUS BLD VENIPUNCTURE: CPT | Performed by: HOSPITALIST

## 2024-09-29 RX ORDER — FUROSEMIDE 10 MG/ML
60 INJECTION INTRAMUSCULAR; INTRAVENOUS
Status: DISCONTINUED | OUTPATIENT
Start: 2024-09-29 | End: 2024-10-01

## 2024-09-29 RX ORDER — TRAZODONE HYDROCHLORIDE 50 MG/1
25 TABLET ORAL ONCE
Status: COMPLETED | OUTPATIENT
Start: 2024-09-30 | End: 2024-09-29

## 2024-09-29 RX ORDER — ACETAMINOPHEN 500 MG
5 TABLET ORAL NIGHTLY PRN
Status: DISCONTINUED | OUTPATIENT
Start: 2024-09-29 | End: 2024-10-01 | Stop reason: HOSPADM

## 2024-09-29 ASSESSMENT — COGNITIVE AND FUNCTIONAL STATUS - GENERAL
STANDING UP FROM CHAIR USING ARMS: A LITTLE
CLIMB 3 TO 5 STEPS WITH RAILING: A LOT
WALKING IN HOSPITAL ROOM: A LITTLE
DRESSING REGULAR LOWER BODY CLOTHING: A LITTLE
MOVING TO AND FROM BED TO CHAIR: A LITTLE
DAILY ACTIVITIY SCORE: 23
MOBILITY SCORE: 19

## 2024-09-29 ASSESSMENT — PAIN SCALES - GENERAL
PAINLEVEL_OUTOF10: 0 - NO PAIN
PAINLEVEL_OUTOF10: 5 - MODERATE PAIN
PAINLEVEL_OUTOF10: 0 - NO PAIN
PAINLEVEL_OUTOF10: 0 - NO PAIN

## 2024-09-29 ASSESSMENT — PAIN - FUNCTIONAL ASSESSMENT
PAIN_FUNCTIONAL_ASSESSMENT: 0-10
PAIN_FUNCTIONAL_ASSESSMENT: 0-10

## 2024-09-29 ASSESSMENT — PAIN DESCRIPTION - DESCRIPTORS: DESCRIPTORS: ACHING

## 2024-09-29 NOTE — CARE PLAN
The patient's goals for the shift include improved breathing    The clinical goals for the shift include monitor labs and respiratory staus

## 2024-09-29 NOTE — ASSESSMENT & PLAN NOTE
Pt is on 3 L nasal cannula normally does not wear oxygen at home no history of COPD asthma.   Initially treated for pneumonia and fluid overload.  His symptoms and presentation more consistent with fluid overload.  She is completing 5 days of antibiotics today and we will discontinue her antibiotics after today's dose  -proalcitonin elevated original xray showed infiltrates, BNP elevated, second xray shows pleural effusion and vascular congestion  wean patient from O2 as tolerated, SPO2 86% on room air with ambulation  Repeat imaging of the chest today as patient is not having significant improvement

## 2024-09-29 NOTE — ASSESSMENT & PLAN NOTE
Acute on chronic  Chronic shortness of breath with exertion  Ejection fraction is normal  He normally takes 80 mg of Lasix orally per day but here is only ordered 40 IV.  These are equivalent doses.  Will increase her IV Lasix  Cardiology has been consulted

## 2024-09-29 NOTE — ASSESSMENT & PLAN NOTE
Patient on Coumadin therapy   PT/INR daily--awaiting today's draw  Some concern for episode of 5 beat run of VT yesterday.  On my review this is not VT but artifact

## 2024-09-29 NOTE — CONSULTS
Consults  History Of Present Illness:    Ralú Jordan is a 84 y.o. female presenting with generalized malaise, myalgias, shortness of breath, and intermittent cough worse with exertion, chills and a decreased appetite.  She was hypoxic in the emergency room at 87% now on 3 L nasal cannula and she will wear a CPAP mask at nighttime despite being noncompliant with this at home.  She is being treated for pneumonia now with intravenous antibiotic therapy.  She was noting increased shortness of breath and some increased leg edema recently and is on intravenous furosemide presently after her chest x-ray suggested diffuse interstitial infiltrates/prominence bilaterally consistent with heart failure or fibrosis or pneumonia.    Echocardiography from May 2023 showed evidence of normal LV systolic function with left ventricular ejection fraction 55 to 60%, mild right ventricular enlargement with preserved RV function and moderate right atrial enlargement consistent with some degree of cor pulmonale from obesity and LAZARA     Last Recorded Vitals:  Vitals:    09/29/24 0114 09/29/24 0407 09/29/24 0705 09/29/24 1100   BP: 107/55 116/61 (!) 131/49 132/61   BP Location: Left arm Left arm Left arm Left arm   Patient Position: Lying Lying Lying Lying   Pulse: 99 79 96 94   Resp: 19 15 18 18   Temp: 36.2 °C (97.2 °F) 36.2 °C (97.2 °F) 36.8 °C (98.2 °F) 36.8 °C (98.2 °F)   TempSrc: Temporal Temporal Temporal Temporal   SpO2: 93% 92% 93% 95%   Weight:       Height:           Last Labs:  CBC - 9/28/2024:  5:00 AM  5.9 9.8 120    31.6      CMP - 9/28/2024:  4:16 PM  9.1 6.9 16 --- 1.1   _ 4.1 11 88      PTT - No results in last year.  2.8   27.3 _     Troponin I, High Sensitivity   Date/Time Value Ref Range Status   09/29/2024 04:34 AM 16 (H) 0 - 13 ng/L Final   09/28/2024 04:16 PM 18 (H) 0 - 13 ng/L Final   09/23/2024 12:28 PM 17 (H) 0 - 13 ng/L Final     BNP   Date/Time Value Ref Range Status   09/23/2024 11:40  (H) 0 - 99 pg/mL  Final     POC HEMOGLOBIN A1c   Date/Time Value Ref Range Status   06/05/2024 10:22 AM 6.0 4.2 - 6.5 % Final   01/23/2024 11:01 AM 5.6 4.2 - 6.5 % Final     LDL Calculated   Date/Time Value Ref Range Status   08/30/2023 10:00 AM 74 65 - 130 MG/DL Final   12/12/2022 10:05 AM 69 65 - 130 MG/DL Final   03/08/2019 08:25 AM 76 65 - 130 MG/DL Final      Last I/O:  I/O last 3 completed shifts:  In: 1380 (15 mL/kg) [P.O.:1380]  Out: 2950 (32 mL/kg) [Urine:2950 (0.9 mL/kg/hr)]  Weight: 92.1 kg     Past Cardiology Tests (Last 3 Years):  EKG:  ECG 12 lead 05/01/2024    Echo:  Transthoracic Echo (TTE) Limited 09/27/2024    Ejection Fractions:  EF   Date/Time Value Ref Range Status   09/27/2024 11:44 AM 68 %      Cath:  No results found for this or any previous visit from the past 1095 days.    Stress Test:  No results found for this or any previous visit from the past 1095 days.    Cardiac Imaging:  No results found for this or any previous visit from the past 1095 days.      Past Medical History:  She has a past medical history of Abnormal laboratory test result (09/01/2023), Anemia due to acute blood loss (09/01/2023), CHF (congestive heart failure) (Multi), Chill (09/01/2023), Diabetes mellitus (Multi), Flank pain (09/01/2023), Hypertension, Hypoxemia (09/01/2023), Hypoxia (09/01/2023), Melena (09/01/2023), Other chronic pain (09/01/2023), Pneumonia (09/01/2023), and Pyuria (09/01/2023).    Past Surgical History:  She has no past surgical history on file.      Social History:  She reports that she has never smoked. She has never been exposed to tobacco smoke. She has never used smokeless tobacco. She reports that she does not drink alcohol and does not use drugs.    Family History:  Family History   Problem Relation Name Age of Onset    Leukemia Mother      Cancer Mother      Heart disease Father      Other (heart problems) Father          Allergies:  Penicillin v, Sulfamethoxazole-trimethoprim, Levofloxacin,  Oxycodone-acetaminophen, and Penicillin    Inpatient Medications:  Scheduled medications   Medication Dose Route Frequency    albuterol  2.5 mg nebulization TID    cefTRIAXone  1 g intravenous q24h    furosemide  60 mg intravenous BID AC    [Held by provider] furosemide  80 mg oral q AM    losartan  25 mg oral Daily    oxygen   inhalation Nightly    pantoprazole  40 mg oral Daily before breakfast    Or    pantoprazole  40 mg intravenous Daily before breakfast    pramipexole  1.5 mg oral Daily    sertraline  100 mg oral Daily    simvastatin  10 mg oral Nightly    [Held by provider] traZODone  200 mg oral Nightly    warfarin  3 mg oral Daily     PRN medications   Medication    acetaminophen    albuterol    benzocaine-menthol    bisacodyl    bisacodyl    dextromethorphan-guaifenesin    ondansetron ODT    Or    ondansetron    oxygen    polyethylene glycol    prochlorperazine    Or    prochlorperazine    Or    prochlorperazine     Continuous Medications   Medication Dose Last Rate     Outpatient Medications:  Current Outpatient Medications   Medication Instructions    acetaminophen (TYLENOL) 325 mg, oral, Every 6 hours PRN    albuterol 108 (90 Base) MCG/ACT inhaler 2 puffs, inhalation, Every 4 hours RT    albuterol 90 mcg/actuation inhaler 2 puffs, inhalation, Every 6 hours PRN    empagliflozin (Jardiance) 10 mg oral    furosemide (LASIX) 80 mg, oral, Every morning,  AS DIRECTED<BR>    Jantoven 3 mg, oral, Every evening    losartan (COZAAR) 25 mg, oral, Daily    metFORMIN (GLUCOPHAGE) 500 mg, oral, Every 24 hours    metoprolol succinate XL (TOPROL-XL) 50 mg, oral, Daily    ondansetron ODT (ZOFRAN-ODT) 4 mg, oral    pramipexole (MIRAPEX) 1.5 mg, oral, Daily    sertraline (ZOLOFT) 100 mg, oral, Daily    simvastatin (ZOCOR) 10 mg, oral, Nightly    spironolactone (ALDACTONE) 25 mg, oral    traZODone (DESYREL) 200 mg, oral, Nightly       Physical Exam:  Neck:  Normal jugular venous pressure, carotid upstrokes brisk with no  bruits  Lungs: Bilateral Rales  Heart:  Regular rate and rhythm normal S1 and S2, no murmurs gallops rubs or clicks, PMI normal, no RV lift  Abdomen:  Soft, good bowel sounds, nontender, no hepatosplenomegaly, no pulsatile mass or bruits.  Very overweight  Extremities:  Good radial, femoral, pulses with mild bilateral pretibial edema  Neurological:  No focal sensory or motor deficits, pupils equal and reactive     Assessment/Plan   #1 toxic respiratory failure    2.  COPD and sleep apnea with chronic 3 L nasal cannula at home    3.  Obstructive sleep apnea noncompliant with her sleep mask.    4.  Acute and chronic diastolic heart failure, left ventricular ejection fraction 55 to 60%.    5.  Pneumonia, bilateral    6.  Longstanding persistent atrial fibrillation, good rate control    9/29: Agree with the intravenous furosemide but once she appears euvolemic I would convert her back to her oral furosemide 80 mg daily.  No other cardiac interventions needed at this time        Paul Hamm, DO

## 2024-09-29 NOTE — ASSESSMENT & PLAN NOTE
Chest x-ray shows possible pneumonia bilaterally  Order blood cultures and sputum  Continue Rocephin, given azithromycin for 5 days  Complete antibiotics today

## 2024-09-29 NOTE — PROGRESS NOTES
"Raúl Jordan is a 84 y.o. female on day 6 of admission presenting with Acute hypoxic respiratory failure (Multi).    Subjective   Stable today still requiring oxygen.        Objective     GENERAL APPEARANCE: Healthy appearing, in no acute distress  SKIN: no rashes, skin discoloration on shins and ankles consistent with chronic venous status  HEAD, EYES, EARS, NOSE, THROAT: Without sinus tenderness. Conjunctiva and sclera clear. No rhinitis Oropharynx unremarkable  NECK: No lymphadenopathy  CHEST: AP Diameter normal. No retractions. Auscultation reveals good air exchange with bibasilar fine crackles. No wheezing.  HEART: Normal rhythm, II/VI holosystolic murmur  ABDOMEN: Not distended. Normal bowel sounds. Soft and non-tender to palpation, without hepatomegaly or splenomegaly. No masses  EXTREMITIES: Without cyanosis. No clubbing. 2+ pitting edema in lower extremities  LYMPHATIC: No lymphadenopathy  MUSCULOSKELETAL: Unremarkable   NEUROLOGIC: Grossly intact      Last Recorded Vitals  Blood pressure 132/61, pulse 94, temperature 36.8 °C (98.2 °F), temperature source Temporal, resp. rate 18, height 1.6 m (5' 3\"), weight 92.1 kg (203 lb), SpO2 95%.  Intake/Output last 3 Shifts:  I/O last 3 completed shifts:  In: 1380 (15 mL/kg) [P.O.:1380]  Out: 2950 (32 mL/kg) [Urine:2950 (0.9 mL/kg/hr)]  Weight: 92.1 kg     Relevant Results  Echo done today with normal systolic function but plump IVC with minimal respiratory variation  CONCLUSIONS:   1. Left ventricular ejection fraction is normal, by visual estimate at 65-70%.   2. Spectral Doppler shows an impaired relaxation pattern of left ventricular diastolic filling.   3. There is normal right ventricular global systolic function.   4. Micra pacemaker implanted in RV.   5. The left atrium is moderately dilated.   6. The right atrium is mild to moderately dilated.   7. There is severe mitral annular calcification.   8. Mild to moderate mitral valve regurgitation.   9. Mildly " elevated right ventricular systolic pressure.  10. There is moderate aortic valve cusp calcification.         Assessment/Plan   Assessment & Plan  Acute hypoxic respiratory failure (Multi)  - currently requiring 2L NC, does not need O2 at home  - CXR and exam are consistent with pulmonary edema  - agree with gentle diuresis, cardiology was consulted  - would recommend incentive spirometer use, vpep, ambulation    Pneumonia of both lower lobes  - Pt denies productive cough   - Imaging more consistent with pulmonary edema vs. Infection  - On abx for UTI which will also treat pna, agree with completing 5 days for pna with ctx  - negative urinary legionella, dc renu ELLIS spent 30 minutes in the professional and overall care of this patient.      Hugo Sow MD

## 2024-09-29 NOTE — PROGRESS NOTES
Raúl Gil is a 84 y.o. female on day 6 of admission presenting with Acute hypoxic respiratory failure (Multi).      Subjective   Says she is getting weak.  Says her breathing feels fine but still on 2 L.  Pulse ox 90% in the room.  Heart rate occasionally increases with exertion into the low 100s.  Denies any pain.    Eduardo on 2 L without significant improvement.       Objective     Last Recorded Vitals  /61 (BP Location: Left arm, Patient Position: Lying)   Pulse 94   Temp 36.8 °C (98.2 °F) (Temporal)   Resp 18   Wt 92.1 kg (203 lb)   SpO2 95%   Intake/Output last 3 Shifts:    Intake/Output Summary (Last 24 hours) at 9/29/2024 1219  Last data filed at 9/29/2024 1120  Gross per 24 hour   Intake 990 ml   Output 1900 ml   Net -910 ml       Admission Weight  Weight: 92.1 kg (203 lb) (09/23/24 1023)    Daily Weight  09/23/24 : 92.1 kg (203 lb)    Image Results  Transthoracic Echo (TTE) [place] [address]              Phone [phone]    TRANSTHORACIC ECHOCARDIOGRAM REPORT    Patient Name:     RAÚL GIL        Reading Physician:  [id number] Paul Hamm DO  Study Date:       9/27/2024            Ordering Provider:  [id number] EDOUARD MCHUGH  MRN/PID:          [id number]             Fellow:  Accession#:       [id number]         Nurse:  Date of           1940 / 84 years Sonographer:        Edouard Umanzor RDCS  Birth/Age:  Gender:           F                    Additional Staff:  Height:           160.02 cm            Admit Date:  Weight:           92.08 kg             Admission Status:   Inpatient - Routine  BSA / BMI:        1.95 m2 / 35.96      Department          [facility]                    kg/m2                Location:  Blood Pressure: 110 /48 mmHg    Study Type:    TRANSTHORACIC ECHO (TTE)  LIMITED  Diagnosis/ICD: Chronic diastolic (congestive) heart failure (CHF)-I50.32  Indication:    Congestive Heart Failure  CPT Codes:     Echo Limited-11879; Doppler Full-48414; Color Doppler-55770    Patient History:  Pertinent History: CHF, AFIB, chest pain, HLD, HTN.    Study Detail: The following Echo studies were performed: 2D, M-Mode, Doppler and                color flow.       PHYSICIAN INTERPRETATION:  Left Ventricle: Left ventricular ejection fraction is normal, by visual estimate at 65-70%. There are no regional wall motion abnormalities. The left ventricular cavity size is normal. Spectral Doppler shows an impaired relaxation pattern of left ventricular diastolic filling.  Left Atrium: The left atrium is moderately dilated.  Right Ventricle: The right ventricle is normal in size. There is normal right ventricular global systolic function. A device is visualized in the right ventricle. Micra pacemaker implanted in RV.  Right Atrium: The right atrium is mild to moderately dilated.  Aortic Valve: The aortic valve appears abnormal. There is moderate aortic valve cusp calcification. There is mild aortic valve thickening. There is no evidence of aortic valve regurgitation.  Mitral Valve: The mitral valve is abnormal. There is severe mitral annular calcification. There is mild to moderate mitral valve regurgitation.  Tricuspid Valve: The tricuspid valve is structurally normal. There is mild tricuspid regurgitation. The Doppler estimated RVSP is mildly elevated right ventricular systolic pressure at 37.4 mmHg.  Pulmonic Valve: The pulmonic valve is structurally normal. There is no indication of pulmonic valve regurgitation.  Pericardium: No pericardial effusion noted.  Aorta: The aortic root is normal.       CONCLUSIONS:   1. Left ventricular ejection fraction is normal, by visual estimate at 65-70%.   2. Spectral Doppler shows an impaired relaxation pattern of left ventricular diastolic filling.   3. There is  normal right ventricular global systolic function.   4. Micra pacemaker implanted in RV.   5. The left atrium is moderately dilated.   6. The right atrium is mild to moderately dilated.   7. There is severe mitral annular calcification.   8. Mild to moderate mitral valve regurgitation.   9. Mildly elevated right ventricular systolic pressure.  10. There is moderate aortic valve cusp calcification.    QUANTITATIVE DATA SUMMARY:     LA VOLUME:                    Normal Ranges:  LA Vol A4C:        104.9 ml   (22+/-6mL/m2)  LA Vol A2C:        130.0 ml  LA Vol BP:         118.0 ml  LA Vol Index A4C:  53.9ml/m2  LA Vol Index A2C:  66.8 ml/m2  LA Vol Index BP:   60.6 ml/m2  LA Area A4C:       30.4 cm2  LA Area A2C:       34.2 cm2  LA Major Axis A4C: 7.5 cm  LA Major Axis A2C: 7.6 cm  LA Volume Index:   56.4 ml/m2  LA Vol A4C:        98.4 ml  LA Vol A2C:        124.0 ml  LA Vol Index BSA:  57.1 ml/m2       RA VOLUME BY A/L METHOD:            Normal Ranges:  RA Vol A4C:              68.6 ml    (8.3-19.5ml)  RA Vol Index A4C:        35.2 ml/m2  RA Area A4C:             24.2 cm2  RA Major Axis A4C:       7.3 cm       LV SYSTOLIC FUNCTION BY 2D PLANIMETRY (MOD):                       Normal Ranges:  EF-A4C View:    57 % (>=55%)  EF-A2C View:    60 %  EF-Biplane:     59 %  EF-Visual:      68 %  LV EF Reported: 68 %       LV DIASTOLIC FUNCTION:           Normal Ranges:  MV Peak E:             1.29 m/s  (0.7-1.2 m/s)  MV Peak A:             0.36 m/s  (0.42-0.7 m/s)  E/A Ratio:             3.63      (1.0-2.2)  MV e'                  0.071 m/s (>8.0)  MV lateral e'          0.07 m/s  MV medial e'           0.07 m/s  E/e' Ratio:            18.25     (<8.0)       MITRAL VALVE:          Normal Ranges:  MV DT:        190 msec (150-240msec)       AORTIC VALVE:          Normal Ranges:  LVOT Diameter: 2.00 cm (1.8-2.4cm)       RIGHT VENTRICLE:  RV Basal 4.03 cm  RV Mid   3.31 cm  RV Major 7.8 cm  TAPSE:   21.2 mm  RV s'    0.12 m/s        TRICUSPID VALVE/RVSP:          Normal Ranges:  Peak TR Velocity:     2.71 m/s  RV Syst Pressure:     37 mmHg  (< 30mmHg)  IVC Diam:             2.50 cm       [id number] Paul Hamm DO  Electronically signed on 9/27/2024 at 2:21:08 PM       ** Final **      Physical Exam  General: alert, no diaphoresis   HENT: mucous membranes moist, external ears normal, no rhinorrhea   Eyes: no icterus or injection, no discharge   Lungs: CTA BL no rales   Heart: Irregularly irregular, bilateral lower extremities wrapped in Ace   GI: abdomen soft, nontender, nondistended, BS present   MSK: no joint effusion or deformity   Skin: no rashes, erythema, or ecchymosis   Neuro: grossly normal cognition, motor strength, sensation  Relevant Results               Assessment/Plan                  Assessment & Plan  Acute hypoxic respiratory failure (Multi)  Pt is on 3 L nasal cannula normally does not wear oxygen at home no history of COPD asthma.   Initially treated for pneumonia and fluid overload.  His symptoms and presentation more consistent with fluid overload.  She is completing 5 days of antibiotics today and we will discontinue her antibiotics after today's dose  -proalcitonin elevated original xray showed infiltrates, BNP elevated, second xray shows pleural effusion and vascular congestion  wean patient from O2 as tolerated, SPO2 86% on room air with ambulation  Repeat imaging of the chest today as patient is not having significant improvement  Type 2 diabetes mellitus with obesity (Multi)  Not on any medication  Diastolic heart failure  Acute on chronic  Chronic shortness of breath with exertion  Ejection fraction is normal  He normally takes 80 mg of Lasix orally per day but here is only ordered 40 IV.  These are equivalent doses.  Will increase her IV Lasix  Cardiology has been consulted  Hypertension  Continue with home meds  Pneumonia of both lower lobes  Chest x-ray shows possible pneumonia bilaterally  Order blood cultures and  sputum  Continue Rocephin, given azithromycin for 5 days  Complete antibiotics today    Longstanding persistent atrial fibrillation (Multi)  Patient on Coumadin therapy   PT/INR daily--awaiting today's draw  Some concern for episode of 5 beat run of VT yesterday.  On my review this is not VT but artifact       Plan of Care  Patient lives at home with family she plans to return there at discharge without any needs.    Repeat imaging today. Increase lasix. Abx to finish today. Cardio consulted and still to see.              Mayra Pennington, DO

## 2024-09-30 LAB
ANION GAP SERPL CALCULATED.3IONS-SCNC: 11 MMOL/L (ref 10–20)
BUN SERPL-MCNC: 31 MG/DL (ref 6–23)
CALCIUM SERPL-MCNC: 8.4 MG/DL (ref 8.6–10.3)
CHLORIDE SERPL-SCNC: 98 MMOL/L (ref 98–107)
CO2 SERPL-SCNC: 36 MMOL/L (ref 21–32)
CREAT SERPL-MCNC: 1.35 MG/DL (ref 0.5–1.05)
EGFRCR SERPLBLD CKD-EPI 2021: 38 ML/MIN/1.73M*2
ERYTHROCYTE [DISTWIDTH] IN BLOOD BY AUTOMATED COUNT: 14.6 % (ref 11.5–14.5)
GLUCOSE SERPL-MCNC: 95 MG/DL (ref 74–99)
HCT VFR BLD AUTO: 30.3 % (ref 36–46)
HGB BLD-MCNC: 9.4 G/DL (ref 12–16)
INR PPP: 2.6 (ref 0.9–1.2)
MCH RBC QN AUTO: 29.5 PG (ref 26–34)
MCHC RBC AUTO-ENTMCNC: 31 G/DL (ref 32–36)
MCV RBC AUTO: 95 FL (ref 80–100)
NRBC BLD-RTO: 0 /100 WBCS (ref 0–0)
PLATELET # BLD AUTO: 125 X10*3/UL (ref 150–450)
POTASSIUM SERPL-SCNC: 3.9 MMOL/L (ref 3.5–5.3)
PROTHROMBIN TIME: 25.1 SECONDS (ref 9.3–12.7)
RBC # BLD AUTO: 3.19 X10*6/UL (ref 4–5.2)
SODIUM SERPL-SCNC: 141 MMOL/L (ref 136–145)
WBC # BLD AUTO: 5.4 X10*3/UL (ref 4.4–11.3)

## 2024-09-30 PROCEDURE — 94760 N-INVAS EAR/PLS OXIMETRY 1: CPT

## 2024-09-30 PROCEDURE — 1200000002 HC GENERAL ROOM WITH TELEMETRY DAILY

## 2024-09-30 PROCEDURE — 94640 AIRWAY INHALATION TREATMENT: CPT

## 2024-09-30 PROCEDURE — 97535 SELF CARE MNGMENT TRAINING: CPT | Mod: GO,CO

## 2024-09-30 PROCEDURE — 2500000001 HC RX 250 WO HCPCS SELF ADMINISTERED DRUGS (ALT 637 FOR MEDICARE OP): Performed by: STUDENT IN AN ORGANIZED HEALTH CARE EDUCATION/TRAINING PROGRAM

## 2024-09-30 PROCEDURE — 2500000002 HC RX 250 W HCPCS SELF ADMINISTERED DRUGS (ALT 637 FOR MEDICARE OP, ALT 636 FOR OP/ED): Performed by: INTERNAL MEDICINE

## 2024-09-30 PROCEDURE — 85610 PROTHROMBIN TIME: CPT | Performed by: HOSPITALIST

## 2024-09-30 PROCEDURE — 97110 THERAPEUTIC EXERCISES: CPT | Mod: GP,CQ

## 2024-09-30 PROCEDURE — 2500000001 HC RX 250 WO HCPCS SELF ADMINISTERED DRUGS (ALT 637 FOR MEDICARE OP): Performed by: NURSE PRACTITIONER

## 2024-09-30 PROCEDURE — 80048 BASIC METABOLIC PNL TOTAL CA: CPT | Performed by: HOSPITALIST

## 2024-09-30 PROCEDURE — 9420000001 HC RT PATIENT EDUCATION 5 MIN

## 2024-09-30 PROCEDURE — 99232 SBSQ HOSP IP/OBS MODERATE 35: CPT

## 2024-09-30 PROCEDURE — 2500000005 HC RX 250 GENERAL PHARMACY W/O HCPCS: Performed by: NURSE PRACTITIONER

## 2024-09-30 PROCEDURE — 94660 CPAP INITIATION&MGMT: CPT

## 2024-09-30 PROCEDURE — 2500000005 HC RX 250 GENERAL PHARMACY W/O HCPCS: Performed by: HOSPITALIST

## 2024-09-30 PROCEDURE — 97110 THERAPEUTIC EXERCISES: CPT | Mod: GO,CO

## 2024-09-30 PROCEDURE — 97530 THERAPEUTIC ACTIVITIES: CPT | Mod: GO,CO

## 2024-09-30 PROCEDURE — 99232 SBSQ HOSP IP/OBS MODERATE 35: CPT | Performed by: NURSE PRACTITIONER

## 2024-09-30 PROCEDURE — 2500000002 HC RX 250 W HCPCS SELF ADMINISTERED DRUGS (ALT 637 FOR MEDICARE OP, ALT 636 FOR OP/ED): Performed by: NURSE PRACTITIONER

## 2024-09-30 PROCEDURE — 2500000004 HC RX 250 GENERAL PHARMACY W/ HCPCS (ALT 636 FOR OP/ED): Performed by: HOSPITALIST

## 2024-09-30 PROCEDURE — 36415 COLL VENOUS BLD VENIPUNCTURE: CPT | Performed by: HOSPITALIST

## 2024-09-30 PROCEDURE — 85027 COMPLETE CBC AUTOMATED: CPT | Performed by: HOSPITALIST

## 2024-09-30 PROCEDURE — 94762 N-INVAS EAR/PLS OXIMTRY CONT: CPT

## 2024-09-30 PROCEDURE — 97116 GAIT TRAINING THERAPY: CPT | Mod: GP,CQ

## 2024-09-30 ASSESSMENT — COGNITIVE AND FUNCTIONAL STATUS - GENERAL
WALKING IN HOSPITAL ROOM: A LITTLE
PERSONAL GROOMING: A LITTLE
CLIMB 3 TO 5 STEPS WITH RAILING: A LITTLE
HELP NEEDED FOR BATHING: A LITTLE
WALKING IN HOSPITAL ROOM: A LITTLE
WALKING IN HOSPITAL ROOM: A LITTLE
MOBILITY SCORE: 19
DRESSING REGULAR UPPER BODY CLOTHING: A LITTLE
MOBILITY SCORE: 23
MOVING TO AND FROM BED TO CHAIR: A LITTLE
CLIMB 3 TO 5 STEPS WITH RAILING: A LOT
DAILY ACTIVITIY SCORE: 24
DAILY ACTIVITIY SCORE: 19
MOBILITY SCORE: 22
STANDING UP FROM CHAIR USING ARMS: A LITTLE
DRESSING REGULAR LOWER BODY CLOTHING: A LITTLE
TOILETING: A LITTLE
DAILY ACTIVITIY SCORE: 24

## 2024-09-30 ASSESSMENT — PAIN - FUNCTIONAL ASSESSMENT
PAIN_FUNCTIONAL_ASSESSMENT: 0-10
PAIN_FUNCTIONAL_ASSESSMENT: 0-10
PAIN_FUNCTIONAL_ASSESSMENT: WONG-BAKER FACES

## 2024-09-30 ASSESSMENT — PAIN SCALES - GENERAL
PAINLEVEL_OUTOF10: 0 - NO PAIN
PAINLEVEL_OUTOF10: 2

## 2024-09-30 ASSESSMENT — PAIN SCALES - WONG BAKER: WONGBAKER_NUMERICALRESPONSE: NO HURT

## 2024-09-30 ASSESSMENT — PAIN DESCRIPTION - DESCRIPTORS: DESCRIPTORS: ACHING

## 2024-09-30 ASSESSMENT — ACTIVITIES OF DAILY LIVING (ADL): HOME_MANAGEMENT_TIME_ENTRY: 15

## 2024-09-30 NOTE — PROGRESS NOTES
"Raúl Jordan is a 84 y.o. female on day 7 of admission presenting with Acute hypoxic respiratory failure (Multi).    Subjective   Patient alert and oriented x 3.  She is sitting in chair.  No complaints at this time.  No events overnight.       Objective     Physical Exam  Constitutional:       Appearance: Normal appearance.   Cardiovascular:      Pulses: Normal pulses.      Heart sounds: Normal heart sounds. No murmur heard.     Comments: Irregularly irregular.  Pulmonary:      Effort: Pulmonary effort is normal.      Breath sounds: Normal breath sounds.   Abdominal:      General: Abdomen is flat.   Musculoskeletal:      Right lower leg: Edema present.      Left lower leg: Edema present.      Comments: Left lower extremity wrapped in Ace wrap.   Skin:     General: Skin is warm and dry.   Neurological:      Mental Status: She is alert and oriented to person, place, and time.         Last Recorded Vitals  Blood pressure 102/53, pulse 73, temperature 35.9 °C (96.6 °F), temperature source Temporal, resp. rate 16, height 1.6 m (5' 3\"), weight 92.1 kg (203 lb), SpO2 95%.  Intake/Output last 3 Shifts:  I/O last 3 completed shifts:  In: 1250 (13.6 mL/kg) [P.O.:1250]  Out: 2050 (22.3 mL/kg) [Urine:2050 (0.6 mL/kg/hr)]  Weight: 92.1 kg     Relevant Results       Results for orders placed or performed during the hospital encounter of 09/23/24 (from the past 24 hour(s))   Protime-INR   Result Value Ref Range    Protime 25.7 (H) 9.3 - 12.7 seconds    INR 2.6 (H) 0.9 - 1.2   Basic Metabolic Panel   Result Value Ref Range    Glucose 95 74 - 99 mg/dL    Sodium 141 136 - 145 mmol/L    Potassium 3.9 3.5 - 5.3 mmol/L    Chloride 98 98 - 107 mmol/L    Bicarbonate 36 (H) 21 - 32 mmol/L    Anion Gap 11 10 - 20 mmol/L    Urea Nitrogen 31 (H) 6 - 23 mg/dL    Creatinine 1.35 (H) 0.50 - 1.05 mg/dL    eGFR 38 (L) >60 mL/min/1.73m*2    Calcium 8.4 (L) 8.6 - 10.3 mg/dL   CBC   Result Value Ref Range    WBC 5.4 4.4 - 11.3 x10*3/uL    nRBC 0.0 " 0.0 - 0.0 /100 WBCs    RBC 3.19 (L) 4.00 - 5.20 x10*6/uL    Hemoglobin 9.4 (L) 12.0 - 16.0 g/dL    Hematocrit 30.3 (L) 36.0 - 46.0 %    MCV 95 80 - 100 fL    MCH 29.5 26.0 - 34.0 pg    MCHC 31.0 (L) 32.0 - 36.0 g/dL    RDW 14.6 (H) 11.5 - 14.5 %    Platelets 125 (L) 150 - 450 x10*3/uL   Protime-INR   Result Value Ref Range    Protime 25.1 (H) 9.3 - 12.7 seconds    INR 2.6 (H) 0.9 - 1.2     Transthoracic Echo (TTE) Limited    Result Date: 9/27/2024            [place] [id number]             Phone [phone] TRANSTHORACIC ECHOCARDIOGRAM REPORT Patient Name:     PATRICIO Bose Physician:  [id number] Paul Hamm DO Study Date:       9/27/2024            Ordering Provider:  [id number] JAZZ MCHUGH MRN/PID:          [id number]             Fellow: Accession#:       [id number]         Nurse: Date of           1940 / 84 years Sonographer:        Jazz Umanzor RDCS Birth/Age: Gender:           F                    Additional Staff: Height:           160.02 cm            Admit Date: Weight:           92.08 kg             Admission Status:   Inpatient - Routine BSA / BMI:        1.95 m2 / 35.96      Department          [facility]                   kg/m2                Location: Blood Pressure: 110 /48 mmHg Study Type:    TRANSTHORACIC ECHO (TTE) LIMITED Diagnosis/ICD: Chronic diastolic (congestive) heart failure (CHF)-I50.32 Indication:    Congestive Heart Failure CPT Codes:     Echo Limited-72932; Doppler Full-64586; Color Doppler-79756 Patient History: Pertinent History: CHF, AFIB, chest pain, HLD, HTN. Study Detail: The following Echo studies were performed: 2D, M-Mode, Doppler and               color flow.  PHYSICIAN INTERPRETATION: Left Ventricle: Left ventricular ejection fraction is normal, by visual estimate at 65-70%. There are no  regional wall motion abnormalities. The left ventricular cavity size is normal. Spectral Doppler shows an impaired relaxation pattern of left ventricular diastolic filling. Left Atrium: The left atrium is moderately dilated. Right Ventricle: The right ventricle is normal in size. There is normal right ventricular global systolic function. A device is visualized in the right ventricle. Micra pacemaker implanted in RV. Right Atrium: The right atrium is mild to moderately dilated. Aortic Valve: The aortic valve appears abnormal. There is moderate aortic valve cusp calcification. There is mild aortic valve thickening. There is no evidence of aortic valve regurgitation. Mitral Valve: The mitral valve is abnormal. There is severe mitral annular calcification. There is mild to moderate mitral valve regurgitation. Tricuspid Valve: The tricuspid valve is structurally normal. There is mild tricuspid regurgitation. The Doppler estimated RVSP is mildly elevated right ventricular systolic pressure at 37.4 mmHg. Pulmonic Valve: The pulmonic valve is structurally normal. There is no indication of pulmonic valve regurgitation. Pericardium: No pericardial effusion noted. Aorta: The aortic root is normal.  CONCLUSIONS:  1. Left ventricular ejection fraction is normal, by visual estimate at 65-70%.  2. Spectral Doppler shows an impaired relaxation pattern of left ventricular diastolic filling.  3. There is normal right ventricular global systolic function.  4. Micra pacemaker implanted in RV.  5. The left atrium is moderately dilated.  6. The right atrium is mild to moderately dilated.  7. There is severe mitral annular calcification.  8. Mild to moderate mitral valve regurgitation.  9. Mildly elevated right ventricular systolic pressure. 10. There is moderate aortic valve cusp calcification. QUANTITATIVE DATA SUMMARY:  LA VOLUME:                    Normal Ranges: LA Vol A4C:        104.9 ml   (22+/-6mL/m2) LA Vol A2C:        130.0 ml  LA Vol BP:         118.0 ml LA Vol Index A4C:  53.9ml/m2 LA Vol Index A2C:  66.8 ml/m2 LA Vol Index BP:   60.6 ml/m2 LA Area A4C:       30.4 cm2 LA Area A2C:       34.2 cm2 LA Major Axis A4C: 7.5 cm LA Major Axis A2C: 7.6 cm LA Volume Index:   56.4 ml/m2 LA Vol A4C:        98.4 ml LA Vol A2C:        124.0 ml LA Vol Index BSA:  57.1 ml/m2  RA VOLUME BY A/L METHOD:            Normal Ranges: RA Vol A4C:              68.6 ml    (8.3-19.5ml) RA Vol Index A4C:        35.2 ml/m2 RA Area A4C:             24.2 cm2 RA Major Axis A4C:       7.3 cm  LV SYSTOLIC FUNCTION BY 2D PLANIMETRY (MOD):                      Normal Ranges: EF-A4C View:    57 % (>=55%) EF-A2C View:    60 % EF-Biplane:     59 % EF-Visual:      68 % LV EF Reported: 68 %  LV DIASTOLIC FUNCTION:           Normal Ranges: MV Peak E:             1.29 m/s  (0.7-1.2 m/s) MV Peak A:             0.36 m/s  (0.42-0.7 m/s) E/A Ratio:             3.63      (1.0-2.2) MV e'                  0.071 m/s (>8.0) MV lateral e'          0.07 m/s MV medial e'           0.07 m/s E/e' Ratio:            18.25     (<8.0)  MITRAL VALVE:          Normal Ranges: MV DT:        190 msec (150-240msec)  AORTIC VALVE:          Normal Ranges: LVOT Diameter: 2.00 cm (1.8-2.4cm)  RIGHT VENTRICLE: RV Basal 4.03 cm RV Mid   3.31 cm RV Major 7.8 cm TAPSE:   21.2 mm RV s'    0.12 m/s  TRICUSPID VALVE/RVSP:          Normal Ranges: Peak TR Velocity:     2.71 m/s RV Syst Pressure:     37 mmHg  (< 30mmHg) IVC Diam:             2.50 cm  [id number] Paul Hamm DO Electronically signed on 9/27/2024 at 2:21:08 PM  ** Final **          Assessment/Plan   Assessment & Plan  Acute hypoxic respiratory failure (Multi)    Type 2 diabetes mellitus with obesity (Multi)    Diastolic heart failure    Hypertension    Longstanding persistent atrial fibrillation (Multi)    Pneumonia of both lower lobes    #1 toxic respiratory failure     2.  COPD and sleep apnea with chronic 3 L nasal cannula at home     3.  Obstructive  sleep apnea noncompliant with her sleep mask.     4.  Acute and chronic diastolic heart failure, left ventricular ejection fraction 55 to 60%.     5.  Pneumonia, bilateral     6.  Longstanding persistent atrial fibrillation, good rate control     9/29: Agree with the intravenous furosemide but once she appears euvolemic I would convert her back to her oral furosemide 80 mg daily.  No other cardiac interventions needed at this time    9/30: As above.  Patient has no complaints at this time.  Appears to be diuresing well.  24-hour net output according to chart negative 700 mL.  Patient tells me her legs look much better than when she first came in.  Will keep her on IV Lasix today with a tentative plan to transition her to oral Lasix tomorrow.  Blood pressures are adequate most recent reading 102/53.  Lab work revealing sodium 141, potassium 3.9, creatinine increased to 1.35 today.  Reasonable bump given amount of diuretics.  Again we will transition her to oral diuretics tomorrow.  She had a CT of her chest done yesterday this has not been read yet.  She continues on 2 L of oxygen saturations at 95%.  She is in a rate controlled atrial fibrillation with rates in the 70s to 80s.  Continue Coumadin.  Echo completed this admission revealing a normal EF 65 to 70%, impaired relaxation of left ventricle, normal right ventricle systolic function moderately dilated left and right atrium, severe mitral annular calcification, mild to moderate mitral valve regurgitation, mildly elevated right ventricular systolic pressure moderate aortic valve cusp calcification.  Will continue to follow with you.       I spent 20 minutes in the professional and overall care of this patient.      Melyssa Santoyo PA-C

## 2024-09-30 NOTE — PROGRESS NOTES
Occupational Therapy    Occupational Therapy Treatment    Name: Raúl Jordan  MRN: [id number]  Department: [address]  Room: [address] [place] [address]  Date: 09/30/24  Time Calculation  Start Time: 1018  Stop Time: 1059  Time Calculation (min): 41 min    Assessment:  Evaluation/Treatment Tolerance: Patient tolerated treatment well  Medical Staff Made Aware: Yes  End of Session Communication: PCT/NA/CTA  End of Session Patient Position: Bed, 2 rail up, Alarm on  Plan:  Treatment Interventions: ADL retraining, Functional transfer training, UE strengthening/ROM, Endurance training, Patient/family training, Equipment evaluation/education, Neuromuscular reeducation, Compensatory technique education  OT Frequency: 3 times per week  OT Discharge Recommendations: Low intensity level of continued care  Equipment Recommended upon Discharge: Wheeled walker  OT Recommended Transfer Status: Assist of 1  OT - OK to Discharge: Yes    Subjective   Previous Visit Info:  OT Last Visit  OT Received On: 09/30/24  General:  General  Prior to Session Communication: Bedside nurse  Patient Position Received: Up in chair, Alarm on  Preferred Learning Style: visual, verbal, written  General Comment: pleasant and agreeable to therapy  Precautions:  Medical Precautions: Fall precautions    Vital Signs (Past 2hrs)        Date/Time Vitals Session Patient Position Pulse Resp SpO2 BP MAP (mmHg)    09/30/24 0912 --  --  73  16  95 %  102/53  73     09/30/24 1018 --  --  --  --  94 %  --  --                        Pain Assessment:  Pain Assessment  Pain Assessment: 0-10  0-10 (Numeric) Pain Score: 0 - No pain (did report history of knee pain when up walking too much)     Objective   Cognition:  Overall Cognitive Status: Within Functional Limits  Activities of Daily Living: Grooming  Grooming Comments: already completed    LE Dressing  LE Dressing: Yes  LE Dressing Adaptive Equipment: Reacher, Sock aide  Sock Level of Assistance: Setup, Minimal verbal cues  LE Dressing  Where Assessed: Chair  LE Dressing Comments: todoff/don socks, does have reacher at home, educated pt in benefit of sock aide, provided with community resources written for purchase. f    Toileting  Toileting Level of Assistance: Close supervision  Where Assessed: Toilet  Toileting Comments: able to perform kerri care with use of grab bar       Bed Mobility/Transfers: Bed Mobility 1  Bed Mobility 1: Sitting to supine  Level of Assistance 1: Modified independent  Bed Mobility Comments 1: use  of grab bars    Transfer 1  Transfer From 1: Chair with arms to  Transfer to 1: Bed  Technique 1: Stand pivot  Transfer Device 1: Walker  Transfer Level of Assistance 1: Close supervision    Toilet Transfers  Toilet Transfer From: Chair  Toilet Transfer Type: To and from  Toilet Transfer to: Standard toilet  Toilet Transfer Technique: Ambulating  Toilet Transfers: Supervision  Toilet Transfers Comments: use of FWW safely  Therapy/Activity: Therapeutic Exercise  Therapeutic Exercise Performed: Yes  Therapeutic Exercise Activity 1: Written HEP provided/pt performed 1 set of 10 reps for tricep punches, shoulder flexion, bicep flexion, supination /pronation and abduction.    Outcome Measures:  [facility] Daily Activity  Putting on and taking off regular lower body clothing: A little  Bathing (including washing, rinsing, drying): A little  Putting on and taking off regular upper body clothing: A little  Toileting, which includes using toilet, bedpan or urinal: A little  Taking care of personal grooming such as brushing teeth: A little  Eating Meals: None  Daily Activity - Total Score: 19        Education Documentation  Handouts, taught by DENIZ Arriaza at 9/30/2024 10:56 AM.  Learner: Patient  Readiness: Acceptance  Method: Explanation, Demonstration, Handout  Response: Verbalizes Understanding, Demonstrated Understanding    Body Mechanics, taught by DENIZ Arriaza at 9/30/2024 10:56 AM.  Learner: Patient  Readiness:  Acceptance  Method: Explanation, Demonstration, Handout  Response: Verbalizes Understanding, Demonstrated Understanding    Precautions, taught by DENIZ Arriaza at 9/30/2024 10:56 AM.  Learner: Patient  Readiness: Acceptance  Method: Explanation, Demonstration, Handout  Response: Verbalizes Understanding, Demonstrated Understanding    Home Exercise Program, taught by DENIZ Arriaza at 9/30/2024 10:56 AM.  Learner: Patient  Readiness: Acceptance  Method: Explanation, Demonstration, Handout  Response: Verbalizes Understanding, Demonstrated Understanding    ADL Training, taught by DENIZ Arriaza at 9/30/2024 10:56 AM.  Learner: Patient  Readiness: Acceptance  Method: Explanation, Demonstration, Handout  Response: Verbalizes Understanding, Demonstrated Understanding    Education Comments  No comments found.      Goals:  Encounter Problems       Encounter Problems (Active)       OT Goals       ADL's (Progressing)       Start:  09/24/24    Expected End:  10/08/24       Pt will complete ADL tasks w/ Mod I w/ AE/ compensatory tech for safety and increased independence in self care tasks.         Functional transfers (Progressing)       Start:  09/24/24    Expected End:  10/08/24       Pt will demon bed, chair and toilet transfers w/ Mod I w/ LRD, elev seat heights using B arm supports for safety and increased independence in daily tasks.         Functional endurance  (Progressing)       Start:  09/24/24    Expected End:  10/08/24       Pt will tolerate 20 minutes of functional activity to improve functional endurance for daily tasks and functional mobility.            OT Goals       BUE strengthening (Progressing)       Start:  09/24/24    Expected End:  10/08/24       Pt will demon BUE exercises to improve strength to 4/5 for functional transfers.

## 2024-09-30 NOTE — PROGRESS NOTES
Physical Therapy    Physical Therapy Treatment    Patient Name: Raúl Jordan  MRN: [id number]  Department: [address]  Room: [address] [place] [address]  Today's Date: 9/30/2024  Time Calculation  Start Time: 0920  Stop Time: 0950  Time Calculation (min): 30 min         Assessment/Plan   PT Assessment  Barriers to Discharge: 02 needed?  End of Session Communication: Bedside nurse  Assessment Comment: Increased mobility with 02 on 2 liters desating to 86-88% slight longer recovery today 2 minutes Mild SOB noted 2 sets of 3 pursed lip breathing encouraged  End of Session Patient Position: Up in chair, Alarm on (Needs at reach)     PT Plan  Treatment/Interventions: Bed mobility, Transfer training, Gait training, Endurance training, Strengthening, Therapeutic exercise, Therapeutic activity, Home exercise program  PT Plan: Ongoing PT  PT Frequency: 4 times per week  PT Discharge Recommendations: Low intensity level of continued care  Equipment Recommended upon Discharge: Wheeled walker  PT Recommended Transfer Status: Contact guard, Assistive device  PT - OK to Discharge: Yes      General Visit Information:   PT  Visit  PT Received On: 09/30/24  General  Reason for Referral: impaired mobility  Referred By: Dr Cabello  Past Medical History Relevant to Rehab: CHF, DM, HTN, Rt TKA, OA  Prior to Session Communication: Bedside nurse  Patient Position Received: Up in chair, Alarm on  Preferred Learning Style: visual, verbal  General Comment: Cleared by nurse to see. Patient agreeable to therapy    Subjective   Precautions:  Precautions  Hearing/Visual Limitations: reading glasses, decreased hearing  Medical Precautions: Fall precautions, Oxygen therapy device and L/min (2 liters nasal canula)    Vital Signs (Past 2hrs)        Date/Time Vitals Session Patient Position Pulse Resp SpO2 BP MAP (mmHg)    09/30/24 0912 --  --  73  16  95 %  102/53  73                         Objective   Pain:  Pain Assessment  Pain Assessment: 0-10  0-10 (Numeric) Pain  Score: 2  Pain Type: Chronic pain  Pain Location: Knee  Pain Orientation: Left  Pain Descriptors: Aching  Pain Frequency: Constant/continuous  Cognition:  Cognition  Overall Cognitive Status: Within Functional Limits  Orientation Level: Oriented X4  Coordination:     Postural Control:  Dynamic Standing Balance  Dynamic Standing-Balance Support: Bilateral upper extremity supported  Dynamic Standing-Level of Assistance: Close supervision  Dynamic Standing-Balance: Turning  Dynamic Standing-Comments: No LOB desating 02 86-88% 2 liters nasal canula  Extremity/Trunk Assessments:    Activity Tolerance:  Activity Tolerance  Endurance:  (Mild SOB)  Treatments:  Therapeutic Exercise  Therapeutic Exercise Performed: Yes  Therapeutic Exercise Activity 1: B LE seated ther ex: heel and toe raises, cas hip adduction,resistive hip abduction/adduction,LAQs, hip flexion x 15 Limited ROM Bknees Pain L knee    Ambulation/Gait Training  Ambulation/Gait Training Performed: Yes  Ambulation/Gait Training 1  Surface 1: Level tile  Device 1: Rolling walker  Assistance 1: Close supervision  Quality of Gait 1: Decreased step length  Comments/Distance (ft) 1: 70' x 2 with RW with continous 02 95% with close supervison with cues to stay in frame of RW for upright posture Desating to 86-88% Good 2 minutes 2 sets of pursed lip breathing back to 91-92% Seated rest between walks Mild SOB noted  Transfers  Transfer: Yes  Transfer 1  Transfer From 1: Chair with arms to  Transfer to 1: Stand  Technique 1: Sit to stand  Transfer Device 1: Walker  Transfer Level of Assistance 1: Close supervision  Trials/Comments 1: x 2 trials STS with close supervision with cues for safe hand placement  Transfers 2  Transfer From 2: Stand to  Transfer to 2: Sit, Chair with arms  Technique 2: Stand to sit  Transfer Device 2: Walker  Transfer Level of Assistance 2: Close supervision  Trials/Comments 2: x 2 trials STS with close supervison with cues for safe hand  placement    Outcome Measures:  [facility] Basic Mobility  Turning from your back to your side while in a flat bed without using bedrails: None  Moving from lying on your back to sitting on the side of a flat bed without using bedrails: None  Moving to and from bed to chair (including a wheelchair): A little  Standing up from a chair using your arms (e.g. wheelchair or bedside chair): A little  To walk in hospital room: A little  Climbing 3-5 steps with railing: A lot  Basic Mobility - Total Score: 19    Education Documentation  Home Exercise Program, taught by Jane Prince PTA at 9/30/2024 10:28 AM.  Learner: Patient  Readiness: Acceptance  Method: Explanation  Response: Verbalizes Understanding    Mobility Training, taught by Jane Prince PTA at 9/30/2024 10:28 AM.  Learner: Patient  Readiness: Acceptance  Method: Explanation  Response: Verbalizes Understanding    Education Comments  No comments found.        OP EDUCATION:       Encounter Problems       Encounter Problems (Active)       Mobility       STG - Patient will ambulate 100' w/ LRAD prn and distant supervision  (Progressing)       Start:  09/24/24    Expected End:  10/07/24            STG - Patient will ascend and descend four stairs w/ supervision, 1 rail and cane prn (Progressing)       Start:  09/24/24    Expected End:  10/07/24            Pt will tolerate BLE exercises consistently to promote functional strength, ROM and endurance (Progressing)       Start:  09/24/24    Expected End:  10/07/24               PT Transfers       STG - Patient to transfer to and from sit to supine IND (Progressing)       Start:  09/24/24    Expected End:  10/07/24            STG - Patient will transfer sit to and from stand MOD I (Progressing)       Start:  09/24/24    Expected End:  10/07/24

## 2024-09-30 NOTE — PROGRESS NOTES
"Raúl Jordan is a 84 y.o. female on day 7 of admission seen in follow-up for acute hypoxia, pneumonia    Subjective   On 2 L nasal cannula oxygen; oxygen sats 95%. Endorses dyspnea on exertion only. Denies pain. Afebrile.        Objective     Physical Exam  Vitals and nursing note reviewed.   Constitutional:       Appearance: She is obese.   HENT:      Head: Normocephalic and atraumatic.      Nose: Nose normal.      Mouth/Throat:      Mouth: Mucous membranes are moist.   Eyes:      Extraocular Movements: Extraocular movements intact.      Conjunctiva/sclera: Conjunctivae normal.      Pupils: Pupils are equal, round, and reactive to light.   Cardiovascular:      Rate and Rhythm: Normal rate. Rhythm irregular.      Pulses: Normal pulses.      Heart sounds: Normal heart sounds.   Pulmonary:      Effort: Pulmonary effort is normal.      Breath sounds: Normal breath sounds.   Abdominal:      General: Bowel sounds are normal.      Palpations: Abdomen is soft.   Musculoskeletal:         General: Normal range of motion.      Right lower leg: Edema present.      Left lower leg: Edema present.   Skin:     General: Skin is warm and dry.      Capillary Refill: Capillary refill takes less than 2 seconds.   Neurological:      General: No focal deficit present.      Mental Status: She is alert and oriented to person, place, and time.   Psychiatric:         Mood and Affect: Mood normal.         Behavior: Behavior normal.         Last Recorded Vitals  Blood pressure 102/53, pulse 73, temperature 35.9 °C (96.6 °F), temperature source Temporal, resp. rate 16, height 1.6 m (5' 3\"), weight 92.1 kg (203 lb), SpO2 95%.  Intake/Output last 3 Shifts:  I/O last 3 completed shifts:  In: 1250 (13.6 mL/kg) [P.O.:1250]  Out: 2050 (22.3 mL/kg) [Urine:2050 (0.6 mL/kg/hr)]  Weight: 92.1 kg       Intake/Output Summary (Last 24 hours) at 9/30/2024 1005  Last data filed at 9/30/2024 0917  Gross per 24 hour   Intake 1040 ml   Output 1150 ml   Net -110 " ml      Scheduled medications:  albuterol, 2.5 mg, nebulization, TID  furosemide, 60 mg, intravenous, BID AC  [Held by provider] furosemide, 80 mg, oral, q AM  losartan, 25 mg, oral, Daily  oxygen, , inhalation, Nightly  oxygen, , inhalation, q8h  pantoprazole, 40 mg, oral, Daily before breakfast   Or  pantoprazole, 40 mg, intravenous, Daily before breakfast  pramipexole, 1.5 mg, oral, Daily  sertraline, 100 mg, oral, Daily  simvastatin, 10 mg, oral, Nightly  [Held by provider] traZODone, 200 mg, oral, Nightly  warfarin, 3 mg, oral, Daily     PRN medications: acetaminophen, albuterol, benzocaine-menthol, bisacodyl, bisacodyl, dextromethorphan-guaifenesin, melatonin, ondansetron ODT **OR** ondansetron, polyethylene glycol, prochlorperazine **OR** prochlorperazine **OR** prochlorperazine         Intake/Output Summary (Last 24 hours) at 9/30/2024 1359  Last data filed at 9/30/2024 1200  Gross per 24 hour   Intake 1040 ml   Output 1150 ml   Net -110 ml      Relevant Results  Results for orders placed or performed during the hospital encounter of 09/23/24 (from the past 24 hour(s))   Protime-INR   Result Value Ref Range    Protime 25.7 (H) 9.3 - 12.7 seconds    INR 2.6 (H) 0.9 - 1.2   Basic Metabolic Panel   Result Value Ref Range    Glucose 95 74 - 99 mg/dL    Sodium 141 136 - 145 mmol/L    Potassium 3.9 3.5 - 5.3 mmol/L    Chloride 98 98 - 107 mmol/L    Bicarbonate 36 (H) 21 - 32 mmol/L    Anion Gap 11 10 - 20 mmol/L    Urea Nitrogen 31 (H) 6 - 23 mg/dL    Creatinine 1.35 (H) 0.50 - 1.05 mg/dL    eGFR 38 (L) >60 mL/min/1.73m*2    Calcium 8.4 (L) 8.6 - 10.3 mg/dL   CBC   Result Value Ref Range    WBC 5.4 4.4 - 11.3 x10*3/uL    nRBC 0.0 0.0 - 0.0 /100 WBCs    RBC 3.19 (L) 4.00 - 5.20 x10*6/uL    Hemoglobin 9.4 (L) 12.0 - 16.0 g/dL    Hematocrit 30.3 (L) 36.0 - 46.0 %    MCV 95 80 - 100 fL    MCH 29.5 26.0 - 34.0 pg    MCHC 31.0 (L) 32.0 - 36.0 g/dL    RDW 14.6 (H) 11.5 - 14.5 %    Platelets 125 (L) 150 - 450 x10*3/uL    Protime-INR   Result Value Ref Range    Protime 25.1 (H) 9.3 - 12.7 seconds    INR 2.6 (H) 0.9 - 1.2      CT chest wo IV contrast  Result Date: 9/29/2024    Transthoracic Echo (TTE) Limited  Result Date: 9/27/2024  CONCLUSIONS:  1. Left ventricular ejection fraction is normal, by visual estimate at 65-70%.  2. Spectral Doppler shows an impaired relaxation pattern of left ventricular diastolic filling.  3. There is normal right ventricular global systolic function.  4. Micra pacemaker implanted in RV.  5. The left atrium is moderately dilated.  6. The right atrium is mild to moderately dilated.  7. There is severe mitral annular calcification.  8. Mild to moderate mitral valve regurgitation.  9. Mildly elevated right ventricular systolic pressure. 10. There is moderate aortic valve cusp calcification.     XR chest 2 views  Result Date: 9/26/2024  FINDINGS: The cardiac silhouette is quite prominent suggesting cardiomegaly. The pulmonary vasculature is slightly indistinct suggesting mild pulmonary vascular congestion. Right pleural effusion is suspected on the lateral view. Wireless cardiac pacemaker overlies the right ventricle. No alveolar consolidation is noted.  1. Probable cardiomegaly with pulmonary vascular congestion and right effusion suggesting CHF. 2. Electronic device overlying the heart likely representing a wireless internal pacemaker.             Assessment/Plan   Acute hypoxic respiratory failure  Wean oxygen as sats allow  Home oxygen certification prior to discharge  Continue IBD/ICS  Continuous pulse oximetry  Incentive spirometry/pulmonary hygiene     Pneumonia  Continue IV azithromycin, ceftriaxone  Sputum if able  Follow-up cultures  CT scan chest wo IV contrast pending    Acute on chronic diastolic congestive heart failure/Longstanding persistent atrial fibrillation  IV Lasix-transition to oral tomorrow  Coumadin  Cardiology follow-up     Obstructive sleep apnea/Obesity  CPAP nightly-patient  admits to inconsistent use at home and is refusing to wear hospital unit  Polysomnogram as outpatient     Prophylaxis  Coumadin   Protonix     PT/OT/out of bed    Mady Gottlieb, APRN-CNP

## 2024-09-30 NOTE — PROGRESS NOTES
Raúl Gil is a 84 y.o. female on day 7 of admission presenting with Acute hypoxic respiratory failure (Multi).      Subjective   Patient seen and examined. Resting in bed. States she ambulated in the halls with therapy today. Feels a little better, per therapy notes she still de-sats on the 2L NC with exertion into the high 80's. Afebrile.        Objective     Last Recorded Vitals  /53 (BP Location: Left arm, Patient Position: Lying)   Pulse 73   Temp 35.9 °C (96.6 °F) (Temporal)   Resp 16   Wt 92.1 kg (203 lb)   SpO2 95%   Intake/Output last 3 Shifts:    Intake/Output Summary (Last 24 hours) at 9/30/2024 1045  Last data filed at 9/30/2024 0917  Gross per 24 hour   Intake 1040 ml   Output 1150 ml   Net -110 ml       Admission Weight  Weight: 92.1 kg (203 lb) (09/23/24 1023)    Daily Weight  09/23/24 : 92.1 kg (203 lb)    Image Results  Transthoracic Echo (TTE) [place] [address]              Phone [phone]    TRANSTHORACIC ECHOCARDIOGRAM REPORT    Patient Name:     RAÚL GIL        Reading Physician:  [id number] Paul Hamm DO  Study Date:       9/27/2024            Ordering Provider:  [id number] EDOUARD MCHUGH  MRN/PID:          [id number]             Fellow:  Accession#:       [id number]         Nurse:  Date of           1940 / 84 years Sonographer:        Edouard Umanzor RDCS  Birth/Age:  Gender:           F                    Additional Staff:  Height:           160.02 cm            Admit Date:  Weight:           92.08 kg             Admission Status:   Inpatient - Routine  BSA / BMI:        1.95 m2 / 35.96      Department          [facility]                    kg/m2                Location:  Blood Pressure: 110 /48 mmHg    Study Type:    TRANSTHORACIC ECHO (TTE) LIMITED  Diagnosis/ICD: Chronic diastolic  (congestive) heart failure (CHF)-I50.32  Indication:    Congestive Heart Failure  CPT Codes:     Echo Limited-37598; Doppler Full-26582; Color Doppler-71747    Patient History:  Pertinent History: CHF, AFIB, chest pain, HLD, HTN.    Study Detail: The following Echo studies were performed: 2D, M-Mode, Doppler and                color flow.       PHYSICIAN INTERPRETATION:  Left Ventricle: Left ventricular ejection fraction is normal, by visual estimate at 65-70%. There are no regional wall motion abnormalities. The left ventricular cavity size is normal. Spectral Doppler shows an impaired relaxation pattern of left ventricular diastolic filling.  Left Atrium: The left atrium is moderately dilated.  Right Ventricle: The right ventricle is normal in size. There is normal right ventricular global systolic function. A device is visualized in the right ventricle. Micra pacemaker implanted in RV.  Right Atrium: The right atrium is mild to moderately dilated.  Aortic Valve: The aortic valve appears abnormal. There is moderate aortic valve cusp calcification. There is mild aortic valve thickening. There is no evidence of aortic valve regurgitation.  Mitral Valve: The mitral valve is abnormal. There is severe mitral annular calcification. There is mild to moderate mitral valve regurgitation.  Tricuspid Valve: The tricuspid valve is structurally normal. There is mild tricuspid regurgitation. The Doppler estimated RVSP is mildly elevated right ventricular systolic pressure at 37.4 mmHg.  Pulmonic Valve: The pulmonic valve is structurally normal. There is no indication of pulmonic valve regurgitation.  Pericardium: No pericardial effusion noted.  Aorta: The aortic root is normal.       CONCLUSIONS:   1. Left ventricular ejection fraction is normal, by visual estimate at 65-70%.   2. Spectral Doppler shows an impaired relaxation pattern of left ventricular diastolic filling.   3. There is normal right ventricular global systolic  function.   4. Micra pacemaker implanted in RV.   5. The left atrium is moderately dilated.   6. The right atrium is mild to moderately dilated.   7. There is severe mitral annular calcification.   8. Mild to moderate mitral valve regurgitation.   9. Mildly elevated right ventricular systolic pressure.  10. There is moderate aortic valve cusp calcification.    QUANTITATIVE DATA SUMMARY:     LA VOLUME:                    Normal Ranges:  LA Vol A4C:        104.9 ml   (22+/-6mL/m2)  LA Vol A2C:        130.0 ml  LA Vol BP:         118.0 ml  LA Vol Index A4C:  53.9ml/m2  LA Vol Index A2C:  66.8 ml/m2  LA Vol Index BP:   60.6 ml/m2  LA Area A4C:       30.4 cm2  LA Area A2C:       34.2 cm2  LA Major Axis A4C: 7.5 cm  LA Major Axis A2C: 7.6 cm  LA Volume Index:   56.4 ml/m2  LA Vol A4C:        98.4 ml  LA Vol A2C:        124.0 ml  LA Vol Index BSA:  57.1 ml/m2       RA VOLUME BY A/L METHOD:            Normal Ranges:  RA Vol A4C:              68.6 ml    (8.3-19.5ml)  RA Vol Index A4C:        35.2 ml/m2  RA Area A4C:             24.2 cm2  RA Major Axis A4C:       7.3 cm       LV SYSTOLIC FUNCTION BY 2D PLANIMETRY (MOD):                       Normal Ranges:  EF-A4C View:    57 % (>=55%)  EF-A2C View:    60 %  EF-Biplane:     59 %  EF-Visual:      68 %  LV EF Reported: 68 %       LV DIASTOLIC FUNCTION:           Normal Ranges:  MV Peak E:             1.29 m/s  (0.7-1.2 m/s)  MV Peak A:             0.36 m/s  (0.42-0.7 m/s)  E/A Ratio:             3.63      (1.0-2.2)  MV e'                  0.071 m/s (>8.0)  MV lateral e'          0.07 m/s  MV medial e'           0.07 m/s  E/e' Ratio:            18.25     (<8.0)       MITRAL VALVE:          Normal Ranges:  MV DT:        190 msec (150-240msec)       AORTIC VALVE:          Normal Ranges:  LVOT Diameter: 2.00 cm (1.8-2.4cm)       RIGHT VENTRICLE:  RV Basal 4.03 cm  RV Mid   3.31 cm  RV Major 7.8 cm  TAPSE:   21.2 mm  RV s'    0.12 m/s       TRICUSPID VALVE/RVSP:          Normal  Ranges:  Peak TR Velocity:     2.71 m/s  RV Syst Pressure:     37 mmHg  (< 30mmHg)  IVC Diam:             2.50 cm       [id number] Paul Hamm DO  Electronically signed on 9/27/2024 at 2:21:08 PM       ** Final **      Physical Exam    General: Alert and oriented x3, pleasant.   Cardiac: Irregular rate and rhythm, S1/S2 , no murmur.   Pulmonary: Clear to auscultation on 2L NC.   Abdomen: Soft, round, nontender. BS +x4.   Extremities: Trace edema BLE.  Skin: No rashes or lesions.      Relevant Results    Scheduled medications  albuterol, 2.5 mg, nebulization, TID  furosemide, 60 mg, intravenous, BID AC  [Held by provider] furosemide, 80 mg, oral, q AM  losartan, 25 mg, oral, Daily  oxygen, , inhalation, Nightly  oxygen, , inhalation, q8h  pantoprazole, 40 mg, oral, Daily before breakfast   Or  pantoprazole, 40 mg, intravenous, Daily before breakfast  pramipexole, 1.5 mg, oral, Daily  sertraline, 100 mg, oral, Daily  simvastatin, 10 mg, oral, Nightly  [Held by provider] traZODone, 200 mg, oral, Nightly  warfarin, 3 mg, oral, Daily      Continuous medications     PRN medications  PRN medications: acetaminophen, albuterol, benzocaine-menthol, bisacodyl, bisacodyl, dextromethorphan-guaifenesin, melatonin, ondansetron ODT **OR** ondansetron, polyethylene glycol, prochlorperazine **OR** prochlorperazine **OR** prochlorperazine     Results for orders placed or performed during the hospital encounter of 09/23/24 (from the past 24 hour(s))   Protime-INR   Result Value Ref Range    Protime 25.7 (H) 9.3 - 12.7 seconds    INR 2.6 (H) 0.9 - 1.2   Basic Metabolic Panel   Result Value Ref Range    Glucose 95 74 - 99 mg/dL    Sodium 141 136 - 145 mmol/L    Potassium 3.9 3.5 - 5.3 mmol/L    Chloride 98 98 - 107 mmol/L    Bicarbonate 36 (H) 21 - 32 mmol/L    Anion Gap 11 10 - 20 mmol/L    Urea Nitrogen 31 (H) 6 - 23 mg/dL    Creatinine 1.35 (H) 0.50 - 1.05 mg/dL    eGFR 38 (L) >60 mL/min/1.73m*2    Calcium 8.4 (L) 8.6 - 10.3 mg/dL   CBC    Result Value Ref Range    WBC 5.4 4.4 - 11.3 x10*3/uL    nRBC 0.0 0.0 - 0.0 /100 WBCs    RBC 3.19 (L) 4.00 - 5.20 x10*6/uL    Hemoglobin 9.4 (L) 12.0 - 16.0 g/dL    Hematocrit 30.3 (L) 36.0 - 46.0 %    MCV 95 80 - 100 fL    MCH 29.5 26.0 - 34.0 pg    MCHC 31.0 (L) 32.0 - 36.0 g/dL    RDW 14.6 (H) 11.5 - 14.5 %    Platelets 125 (L) 150 - 450 x10*3/uL   Protime-INR   Result Value Ref Range    Protime 25.1 (H) 9.3 - 12.7 seconds    INR 2.6 (H) 0.9 - 1.2             Assessment/Plan      Acute Hypoxic Respiratory Failure  -Currently on 2L NC, still de-sats with exertion.   -Likely secondary to combination CHF and pneumonia. Seems to be more fluid issue.   -Wean O2 as sats allow, baseline room air.   -Pulmonary follows.   -CT chest 9/29 pending read.   -Continue IBDs.     Acute on Chronic Diastolic CHF  -Cardiology follows.   -Continue IV diuresis today. Plans likely to transition to PO tomorrow. Has negative fluid balance.   -Echo shows EF 65%.   -Continue ARB.     Pneumonia  -Initial CXR showed diffuse interstitial infiltrates bilaterally.   -Treated with azithromycin and ceftriaxone. Completed course.   -Pulmonary follows.   -Strep and legionella negative.   -Awaiting CT chest results.     DM 2  -On no medications at home.   -Blood sugars have been stable.     HTN  -Continue losartan.   -BP currently stable, monitor.     Atrial Fibrillation  -On warfarin, INR therapeutic.     Insomnia  -Trazodone was on hold due to QT prolongation, given small dose last night with melatonin.     DVT Risk  -On warfarin, continue.     Plan  Pulmonary and Cardiology follows.   Completed abx therapy.   Continue IV diuretics, plan likely for PO tomorrow, monitor renal function.   Wean O2 as sats allow, remains on 2L NC. Baseline room air.   PT/OT, mobilize. Plan to return home at [place].         Rhonda Lopez, APRN-CNP

## 2024-09-30 NOTE — PROGRESS NOTES
09/30/24 1409   Discharge Planning   Expected Discharge Disposition Home   Does the patient need discharge transport arranged? No     Home at discharge.  Likely, will need home oxygen.

## 2024-09-30 NOTE — CARE PLAN
Problem: Diabetes  Goal: Achieve decreasing blood glucose levels by end of shift  Outcome: Progressing  Goal: Increase stability of blood glucose readings by end of shift  Outcome: Progressing  Goal: Decrease in ketones present in urine by end of shift  Outcome: Progressing  Goal: Maintain electrolyte levels within acceptable range throughout shift  Outcome: Progressing  Goal: Maintain glucose levels >70mg/dl to <250mg/dl throughout shift  Outcome: Progressing  Goal: No changes in neurological exam by end of shift  Outcome: Progressing  Goal: Learn about and adhere to nutrition recommendations by end of shift  Outcome: Progressing  Goal: Vital signs within normal range for age by end of shift  Outcome: Progressing  Goal: Increase self care and/or family involovement by end of shift  Outcome: Progressing  Goal: Receive DSME education by end of shift  Outcome: Progressing     Problem: Safety - Adult  Goal: Free from fall injury  Outcome: Progressing     Problem: Discharge Planning  Goal: Discharge to home or other facility with appropriate resources  Outcome: Progressing     Problem: Chronic Conditions and Co-morbidities  Goal: Patient's chronic conditions and co-morbidity symptoms are monitored and maintained or improved  Outcome: Progressing     Problem: Fall/Injury  Goal: Not fall by end of shift  Outcome: Progressing  Goal: Be free from injury by end of the shift  Outcome: Progressing  Goal: Verbalize understanding of personal risk factors for fall in the hospital  Outcome: Progressing  Goal: Verbalize understanding of risk factor reduction measures to prevent injury from fall in the home  Outcome: Progressing  Goal: Use assistive devices by end of the shift  Outcome: Progressing  Goal: Pace activities to prevent fatigue by end of the shift  Outcome: Progressing     Problem: Heart Failure  Goal: Improved gas exchange this shift  Outcome: Progressing  Goal: Improved urinary output this shift  Outcome:  Progressing  Goal: Reduction in peripheral edema within 24 hours  Outcome: Progressing  Goal: Report improvement of dyspnea/breathlessness this shift  Outcome: Progressing  Goal: Weight from fluid excess reduced over 2-3 days, then stabilize  Outcome: Progressing  Goal: Increase self care and/or family involvement in 24 hours  Outcome: Progressing     Problem: Respiratory  Goal: No signs of respiratory distress (eg. Use of accessory muscles. Peds grunting)  Outcome: Progressing     Problem: Deep Vein Thrombosis  Goal: I will remain free from complications of deep vein thrombosis and maintain current level of mobility  Outcome: Progressing   The patient's goals for the shift include improved breathing    The clinical goals for the shift include Respiratory treatments, comfort, safety

## 2024-10-01 ENCOUNTER — PATIENT OUTREACH (OUTPATIENT)
Dept: CASE MANAGEMENT | Facility: HOSPITAL | Age: 84
End: 2024-10-01
Payer: MEDICARE

## 2024-10-01 VITALS
HEART RATE: 90 BPM | OXYGEN SATURATION: 97 % | HEIGHT: 63 IN | TEMPERATURE: 98.2 F | BODY MASS INDEX: 35.97 KG/M2 | WEIGHT: 203 LBS | RESPIRATION RATE: 18 BRPM | SYSTOLIC BLOOD PRESSURE: 128 MMHG | DIASTOLIC BLOOD PRESSURE: 83 MMHG

## 2024-10-01 LAB
ANION GAP SERPL CALCULATED.3IONS-SCNC: 13 MMOL/L (ref 10–20)
BUN SERPL-MCNC: 35 MG/DL (ref 6–23)
CALCIUM SERPL-MCNC: 8.9 MG/DL (ref 8.6–10.3)
CHLORIDE SERPL-SCNC: 99 MMOL/L (ref 98–107)
CO2 SERPL-SCNC: 34 MMOL/L (ref 21–32)
CREAT SERPL-MCNC: 1.32 MG/DL (ref 0.5–1.05)
EGFRCR SERPLBLD CKD-EPI 2021: 39 ML/MIN/1.73M*2
ERYTHROCYTE [DISTWIDTH] IN BLOOD BY AUTOMATED COUNT: 14.3 % (ref 11.5–14.5)
GLUCOSE SERPL-MCNC: 113 MG/DL (ref 74–99)
HCT VFR BLD AUTO: 31.2 % (ref 36–46)
HGB BLD-MCNC: 9.8 G/DL (ref 12–16)
INR PPP: 2.5 (ref 0.9–1.2)
MCH RBC QN AUTO: 30.1 PG (ref 26–34)
MCHC RBC AUTO-ENTMCNC: 31.4 G/DL (ref 32–36)
MCV RBC AUTO: 96 FL (ref 80–100)
NRBC BLD-RTO: 0 /100 WBCS (ref 0–0)
PLATELET # BLD AUTO: 121 X10*3/UL (ref 150–450)
POTASSIUM SERPL-SCNC: 3.9 MMOL/L (ref 3.5–5.3)
PROTHROMBIN TIME: 24.4 SECONDS (ref 9.3–12.7)
RBC # BLD AUTO: 3.26 X10*6/UL (ref 4–5.2)
SODIUM SERPL-SCNC: 142 MMOL/L (ref 136–145)
WBC # BLD AUTO: 5.9 X10*3/UL (ref 4.4–11.3)

## 2024-10-01 PROCEDURE — 99239 HOSP IP/OBS DSCHRG MGMT >30: CPT | Performed by: NURSE PRACTITIONER

## 2024-10-01 PROCEDURE — 36415 COLL VENOUS BLD VENIPUNCTURE: CPT | Performed by: HOSPITALIST

## 2024-10-01 PROCEDURE — 2500000002 HC RX 250 W HCPCS SELF ADMINISTERED DRUGS (ALT 637 FOR MEDICARE OP, ALT 636 FOR OP/ED): Performed by: INTERNAL MEDICINE

## 2024-10-01 PROCEDURE — 85027 COMPLETE CBC AUTOMATED: CPT | Performed by: HOSPITALIST

## 2024-10-01 PROCEDURE — 94660 CPAP INITIATION&MGMT: CPT

## 2024-10-01 PROCEDURE — 94640 AIRWAY INHALATION TREATMENT: CPT

## 2024-10-01 PROCEDURE — 9420000001 HC RT PATIENT EDUCATION 5 MIN

## 2024-10-01 PROCEDURE — 2500000002 HC RX 250 W HCPCS SELF ADMINISTERED DRUGS (ALT 637 FOR MEDICARE OP, ALT 636 FOR OP/ED): Performed by: NURSE PRACTITIONER

## 2024-10-01 PROCEDURE — 2500000001 HC RX 250 WO HCPCS SELF ADMINISTERED DRUGS (ALT 637 FOR MEDICARE OP): Performed by: NURSE PRACTITIONER

## 2024-10-01 PROCEDURE — 2500000005 HC RX 250 GENERAL PHARMACY W/O HCPCS: Performed by: HOSPITALIST

## 2024-10-01 PROCEDURE — 99232 SBSQ HOSP IP/OBS MODERATE 35: CPT | Performed by: NURSE PRACTITIONER

## 2024-10-01 PROCEDURE — 99232 SBSQ HOSP IP/OBS MODERATE 35: CPT

## 2024-10-01 PROCEDURE — 97535 SELF CARE MNGMENT TRAINING: CPT | Mod: GO

## 2024-10-01 PROCEDURE — 80048 BASIC METABOLIC PNL TOTAL CA: CPT | Performed by: HOSPITALIST

## 2024-10-01 PROCEDURE — 2500000001 HC RX 250 WO HCPCS SELF ADMINISTERED DRUGS (ALT 637 FOR MEDICARE OP)

## 2024-10-01 PROCEDURE — 2500000004 HC RX 250 GENERAL PHARMACY W/ HCPCS (ALT 636 FOR OP/ED): Performed by: HOSPITALIST

## 2024-10-01 PROCEDURE — 97116 GAIT TRAINING THERAPY: CPT | Mod: GP,CQ

## 2024-10-01 PROCEDURE — 94762 N-INVAS EAR/PLS OXIMTRY CONT: CPT

## 2024-10-01 PROCEDURE — 97110 THERAPEUTIC EXERCISES: CPT | Mod: GP,CQ

## 2024-10-01 PROCEDURE — 85610 PROTHROMBIN TIME: CPT | Performed by: HOSPITALIST

## 2024-10-01 PROCEDURE — 97110 THERAPEUTIC EXERCISES: CPT | Mod: GO

## 2024-10-01 ASSESSMENT — PAIN SCALES - GENERAL
PAINLEVEL_OUTOF10: 0 - NO PAIN
PAINLEVEL_OUTOF10: 2
PAINLEVEL_OUTOF10: 0 - NO PAIN

## 2024-10-01 ASSESSMENT — COGNITIVE AND FUNCTIONAL STATUS - GENERAL
HELP NEEDED FOR BATHING: A LITTLE
DRESSING REGULAR LOWER BODY CLOTHING: A LITTLE
DAILY ACTIVITIY SCORE: 24
MOBILITY SCORE: 23
MOBILITY SCORE: 19
PERSONAL GROOMING: A LITTLE
TOILETING: A LITTLE
STANDING UP FROM CHAIR USING ARMS: A LITTLE
CLIMB 3 TO 5 STEPS WITH RAILING: A LOT
WALKING IN HOSPITAL ROOM: A LITTLE
MOVING TO AND FROM BED TO CHAIR: A LITTLE
DRESSING REGULAR UPPER BODY CLOTHING: A LITTLE
WALKING IN HOSPITAL ROOM: A LITTLE
DAILY ACTIVITIY SCORE: 19

## 2024-10-01 ASSESSMENT — PAIN - FUNCTIONAL ASSESSMENT
PAIN_FUNCTIONAL_ASSESSMENT: 0-10
PAIN_FUNCTIONAL_ASSESSMENT: 0-10

## 2024-10-01 ASSESSMENT — ACTIVITIES OF DAILY LIVING (ADL): HOME_MANAGEMENT_TIME_ENTRY: 13

## 2024-10-01 ASSESSMENT — PAIN DESCRIPTION - DESCRIPTORS: DESCRIPTORS: ACHING

## 2024-10-01 ASSESSMENT — PAIN SCALES - WONG BAKER: WONGBAKER_NUMERICALRESPONSE: HURTS LITTLE BIT

## 2024-10-01 NOTE — PROGRESS NOTES
Occupational Therapy    Occupational Therapy Treatment    Name: Raúl Jordan  MRN: [id number]  Department: [address]  Room: [address] [place] [address]  Date: 10/01/24  Time Calculation  Start Time: 1354  Stop Time: 1417  Time Calculation (min): 23 min    Assessment:  OT Assessment: Continues to progress towards OT POC  Prognosis: Good  Barriers to Discharge: None  Evaluation/Treatment Tolerance: Patient tolerated treatment well  Medical Staff Made Aware: Yes  End of Session Communication: Bedside nurse  End of Session Patient Position: Bed, 3 rail up, Alarm on  Plan:  Treatment Interventions: ADL retraining, Functional transfer training, UE strengthening/ROM, Endurance training, Patient/family training, Equipment evaluation/education, Neuromuscular reeducation, Compensatory technique education  OT Frequency: 3 times per week  OT Discharge Recommendations: Low intensity level of continued care  Equipment Recommended upon Discharge: Wheeled walker  OT Recommended Transfer Status: Assist of 1  OT - OK to Discharge: Yes    Subjective   Previous Visit Info:  OT Last Visit  OT Received On: 10/01/24  General:  General  Reason for Referral: Decreased ADLS,  SOB  Referred By: Dr Cabello  Past Medical History Relevant to Rehab: CHF, DM, HTN, Rt TKA, OA  Family/Caregiver Present: No  Prior to Session Communication: Bedside nurse  Patient Position Received: Bed, 3 rail up, Alarm on  Preferred Learning Style: visual, verbal  General Comment: Cleared by nsg, pt met in supine, agreeable to OT session  Precautions:  Hearing/Visual Limitations: reading glasses, decreased hearing  Medical Precautions: Fall precautions, Oxygen therapy device and L/min  Precautions Comment: on 2L O2 via NC    Vital Signs (Past 2hrs)        Date/Time Vitals Session Patient Position Pulse Resp SpO2 BP MAP (mmHg)    10/01/24 1315 During OT  --  125  --  93 %  --  --                   Vital Signs Comment: HR increases to 125bpm during functional mobility on room air.  difficult to obtain accurate wave form for spO2; appears to fluctuate b/w 88-90% on RA, improves to mid 90s with 2L reapplied    Pain Assessment:  Pain Assessment  Pain Assessment: 0-10  0-10 (Numeric) Pain Score: 0 - No pain     Objective   Cognition:  Overall Cognitive Status: Within Functional Limits  Orientation Level: Oriented X4    Activities of Daily Living:    Grooming  Grooming Level of Assistance: Close supervision  Grooming Where Assessed: Standing sinkside  Grooming Comments: cues for walker mgmt and placement while completing standing sinkside hand hygiene    Toileting  Toileting Level of Assistance: Minimum assistance  Where Assessed: Toilet  Toileting Comments: min A for gown mgmt in stance, pt manages hygiene with lateral weight shifting while seated    Bed Mobility/Transfers:   Bed Mobility  Bed Mobility: Yes  Bed Mobility 1  Bed Mobility 1: Supine to sitting  Level of Assistance 1: Distant supervision  Bed Mobility Comments 1: HOB elevated  Bed Mobility 2  Bed Mobility  2: Sitting to supine  Level of Assistance 2: Close supervision  Bed Mobility Comments 2: increased time/effort    Transfers  Transfer: Yes  Transfer 1  Technique 1: Stand to sit, Sit to stand  Transfer Device 1: Walker  Transfer Level of Assistance 1: Close supervision  Trials/Comments 1: STS to/from EOB and toilet with supervision for safety and stability. cued on hand placement, walker mgmt, body positioning    Functional Mobility:  Functional Mobility  Functional Mobility Performed: Yes  Functional Mobility 1  Surface 1: Level tile  Device 1: Rolling walker  Assistance 1: Close supervision  Comments 1: pt completes > household distance mobility with FWW and 2-3 standing rest breaks, supervision for safety and stability.  RPE 5/10    Therapy/Activity: Therapeutic Exercise  Therapeutic Exercise Performed: Yes  Therapeutic Exercise Activity 1: UE ther ex performed with pt in supine today, 1 set x 10-15 reps with emphasis on shoulder  flexion, bicep flexion, horizontal adduction. Rest breaks provided as needed.  Cued intermittently for joint alignment and pacing    Outcome Measures:  [facility] Daily Activity  Putting on and taking off regular lower body clothing: A little  Bathing (including washing, rinsing, drying): A little  Putting on and taking off regular upper body clothing: A little  Toileting, which includes using toilet, bedpan or urinal: A little  Taking care of personal grooming such as brushing teeth: A little  Eating Meals: None  Daily Activity - Total Score: 19        Education Documentation  No documentation found.  Education Comments  No comments found.      Goals:  Encounter Problems       Encounter Problems (Active)       OT Goals       ADL's (Progressing)       Start:  09/24/24    Expected End:  10/08/24       Pt will complete ADL tasks w/ Mod I w/ AE/ compensatory tech for safety and increased independence in self care tasks.         Functional transfers (Progressing)       Start:  09/24/24    Expected End:  10/08/24       Pt will demon bed, chair and toilet transfers w/ Mod I w/ LRD, elev seat heights using B arm supports for safety and increased independence in daily tasks.         Functional endurance  (Progressing)       Start:  09/24/24    Expected End:  10/08/24       Pt will tolerate 20 minutes of functional activity to improve functional endurance for daily tasks and functional mobility.            OT Goals       BUE strengthening (Progressing)       Start:  09/24/24    Expected End:  10/08/24       Pt will demon BUE exercises to improve strength to 4/5 for functional transfers.

## 2024-10-01 NOTE — PROGRESS NOTES
"Raúl Jordan is a 84 y.o. female on day 8 of admission seen in follow-up for acute hypoxia, pneumonia    Subjective   On 2 L nasal cannula oxygen; oxygen sats 95%. Endorses dyspnea on exertion only. Denies pain. Afebrile.        Objective     Physical Exam  Vitals and nursing note reviewed.   Constitutional:       Appearance: She is obese.   HENT:      Head: Normocephalic and atraumatic.      Nose: Nose normal.      Mouth/Throat:      Mouth: Mucous membranes are moist.   Eyes:      Extraocular Movements: Extraocular movements intact.      Conjunctiva/sclera: Conjunctivae normal.      Pupils: Pupils are equal, round, and reactive to light.   Cardiovascular:      Rate and Rhythm: Normal rate. Rhythm irregular.      Pulses: Normal pulses.      Heart sounds: Normal heart sounds.   Pulmonary:      Effort: Pulmonary effort is normal.      Breath sounds: Normal breath sounds.   Abdominal:      General: Bowel sounds are normal.      Palpations: Abdomen is soft.   Musculoskeletal:         General: Normal range of motion.      Right lower leg: Edema present.      Left lower leg: Edema present.   Skin:     General: Skin is warm and dry.      Capillary Refill: Capillary refill takes less than 2 seconds.   Neurological:      General: No focal deficit present.      Mental Status: She is alert and oriented to person, place, and time.   Psychiatric:         Mood and Affect: Mood normal.         Behavior: Behavior normal.         Last Recorded Vitals  Blood pressure 114/81, pulse 74, temperature 36.9 °C (98.4 °F), temperature source Temporal, resp. rate 18, height 1.6 m (5' 3\"), weight 92.1 kg (203 lb), SpO2 96%.  Intake/Output last 3 Shifts:  I/O last 3 completed shifts:  In: 760 (8.3 mL/kg) [P.O.:760]  Out: 2075 (22.5 mL/kg) [Urine:2075 (0.6 mL/kg/hr)]  Weight: 92.1 kg       Intake/Output Summary (Last 24 hours) at 10/1/2024 1219  Last data filed at 10/1/2024 0845  Gross per 24 hour   Intake 570 ml   Output 1700 ml   Net -1130 ml "      Scheduled medications:  albuterol, 2.5 mg, nebulization, TID  furosemide, 80 mg, oral, q AM  losartan, 25 mg, oral, Daily  oxygen, , inhalation, Nightly  oxygen, , inhalation, q8h  pantoprazole, 40 mg, oral, Daily before breakfast   Or  pantoprazole, 40 mg, intravenous, Daily before breakfast  pramipexole, 1.5 mg, oral, Daily  sertraline, 100 mg, oral, Daily  simvastatin, 10 mg, oral, Nightly  [Held by provider] traZODone, 200 mg, oral, Nightly  warfarin, 3 mg, oral, Daily     PRN medications: acetaminophen, albuterol, benzocaine-menthol, bisacodyl, bisacodyl, dextromethorphan-guaifenesin, melatonin, ondansetron ODT **OR** ondansetron, polyethylene glycol, prochlorperazine **OR** prochlorperazine **OR** prochlorperazine         Intake/Output Summary (Last 24 hours) at 10/1/2024 1219  Last data filed at 10/1/2024 0845  Gross per 24 hour   Intake 570 ml   Output 1700 ml   Net -1130 ml      Relevant Results  Results for orders placed or performed during the hospital encounter of 09/23/24 (from the past 24 hour(s))   Basic Metabolic Panel   Result Value Ref Range    Glucose 113 (H) 74 - 99 mg/dL    Sodium 142 136 - 145 mmol/L    Potassium 3.9 3.5 - 5.3 mmol/L    Chloride 99 98 - 107 mmol/L    Bicarbonate 34 (H) 21 - 32 mmol/L    Anion Gap 13 10 - 20 mmol/L    Urea Nitrogen 35 (H) 6 - 23 mg/dL    Creatinine 1.32 (H) 0.50 - 1.05 mg/dL    eGFR 39 (L) >60 mL/min/1.73m*2    Calcium 8.9 8.6 - 10.3 mg/dL   CBC   Result Value Ref Range    WBC 5.9 4.4 - 11.3 x10*3/uL    nRBC 0.0 0.0 - 0.0 /100 WBCs    RBC 3.26 (L) 4.00 - 5.20 x10*6/uL    Hemoglobin 9.8 (L) 12.0 - 16.0 g/dL    Hematocrit 31.2 (L) 36.0 - 46.0 %    MCV 96 80 - 100 fL    MCH 30.1 26.0 - 34.0 pg    MCHC 31.4 (L) 32.0 - 36.0 g/dL    RDW 14.3 11.5 - 14.5 %    Platelets 121 (L) 150 - 450 x10*3/uL   Protime-INR   Result Value Ref Range    Protime 24.4 (H) 9.3 - 12.7 seconds    INR 2.5 (H) 0.9 - 1.2      CT chest wo IV contrast  Result Date: 9/29/2024  FINDINGS:  Within the limits of this unenhanced study no hilar or mediastinal  lymphadenopathy is identified.  There is a stable appearance of multiple normal-sized mediastinal lymph nodes. Largest lymph node demonstrates short axis diameter of 1 cm. Four-chamber cardiomegaly is present. Right ventricular wireless pacemaker device is present. There is a small right pleural effusion. Bibasilar subsegmental atelectasis is present, more marked on the right. Motion artifact limits pulmonary parenchymal assessment somewhat. No alveolar consolidation is identified within limits of this exam. Chest Wall: Postoperative and postradiation changes of the right breast are incidentally noted. Skeletal System: No fractures or destructive lesions are identified. Upper Abdomen: There is a slightly lobulated appearance to the hepatic contour raising the possibility of underlying cirrhosis. IMPRESSION: 1. Small right pleural effusion.  2. Bibasilar atelectasis more marked on the right. 3. Cardiomegaly with intraventricular wireless pacemaker. 4. Findings are quite similar compared to the previous examination with the right effusion slightly increased in the bibasilar atelectasis slightly more prominent.    Transthoracic Echo (TTE) Limited  Result Date: 9/27/2024  CONCLUSIONS:  1. Left ventricular ejection fraction is normal, by visual estimate at 65-70%.  2. Spectral Doppler shows an impaired relaxation pattern of left ventricular diastolic filling.  3. There is normal right ventricular global systolic function.  4. Micra pacemaker implanted in RV.  5. The left atrium is moderately dilated.  6. The right atrium is mild to moderately dilated.  7. There is severe mitral annular calcification.  8. Mild to moderate mitral valve regurgitation.  9. Mildly elevated right ventricular systolic pressure. 10. There is moderate aortic valve cusp calcification.     XR chest 2 views  Result Date: 9/26/2024  FINDINGS: The cardiac silhouette is quite prominent  suggesting cardiomegaly. The pulmonary vasculature is slightly indistinct suggesting mild pulmonary vascular congestion. Right pleural effusion is suspected on the lateral view. Wireless cardiac pacemaker overlies the right ventricle. No alveolar consolidation is noted.  1. Probable cardiomegaly with pulmonary vascular congestion and right effusion suggesting CHF. 2. Electronic device overlying the heart likely representing a wireless internal pacemaker.             Assessment/Plan   Acute hypoxic respiratory failure  Wean oxygen as sats allow  Home oxygen certification prior to discharge  Continue IBD/ICS  Continuous pulse oximetry  Incentive spirometry/pulmonary hygiene     Pneumonia  Antibiotics completed    Acute on chronic diastolic congestive heart failure/Longstanding persistent atrial fibrillation  Oral Lasix  Coumadin  Cardiology follow-up     Obstructive sleep apnea/Obesity  CPAP nightly-patient admits to inconsistent use at home and is refusing to wear hospital unit  Polysomnogram as outpatient     Prophylaxis  Coumadin   Protonix     PT/OT/out of bed  Stable for discharge from a pulmonary standpoint after home oxygen certification  Patient will follow-up with Dr. Casas in 2 weeks with a chest x-ray    THERESA Berry-CNP

## 2024-10-01 NOTE — CONSULTS
Heart Failure Nurse Navigator    The role of the HF nurse navigator is to (1) characterize risk profiles of patients with heart failure transitioning from [id number]-mg-vowveskup after hospitalization, (2) recommend interventions to improve care and reduce risks of worse post-hospitalization outcomes. Potential recommendations may touch base on patient/family education, additional consults that may bring value, and appointment scheduling.    Assessment    Met with Raúl Jordan at the bedside. Patient reports that she is aware of her CHF dx. She does her best to follow low Na diet, takes medications as directed. Stopped performing daily weights years ago.  Feels that she drinks > 2 liters of fluids daily. Unaware of HF flare up symptoms to report. Follows up regularly with her PCP & Cardiologist. HF Navigator scheduled cardiology follow up appointment. Dr Lozano was not available until late this month. Appointment scheduled with Dr Hart Oct. 16 th @ 3:15, [facility].     Patients Cardiologist(s): Dr Lozano    Patients Primary Care Provider: Dr Beverly Ricketts    1. Medical Domain  What is the patient's most recent LVEF? 9/27/24   pEF 65-70%  HFrEF (LVEF <= 40%) Quadruple therapy recommended  HFmrEF (LVEF 41-49%) Quadruple therapy recommended  HFpEF (LVEF >= 50%) Minimum recommendations: SGLT2i and MRA  Is the patient on GDMT for their condition? Outpatient medications  ARNI/ACEI/ARB: Yes  BB: Yes  MRA: Yes  SGLT2i: Yes  Could this patient have advanced heart failure (Stage D heart failure)?: No   If yes, the potential markers of advanced heart failure include: No    REFERENCE: Potential markers of advanced HF   Inotrope (dobutamine or milrinone) used during this admission?   LVEF<=25%?   >=2 hospitalizations for ADHF in the last year?   Severe symptoms of HF (fatigue, dyspnea, confusion, edema) despite medical therapy?   Downtitration of GDMT as compared to home medications?   Discontinuation of GDMT because of  "hypotension or renal intolerance?   Recurrent arrhythmias (AF, VT with ICD shocks)?   Cardiac cachexia (i.e., unintentional weight loss due to HF)?   High-risk biomarker profile (e.g., hyponatremia [Na<135], very elevated BNP, worsening kidney function)   Escalating doses of diuretics or persistent edema despite escalation     2. Mind and Emotion  Does this patient have possible cognitive impairment?: No (The Mini-Cog score 5/5)  Ask the patient to memorize these 3 words: banana, sunrise, chair  Ask the patient to draw a clock with hands pointing at \"20 minutes after 8\"  Ask the patient to recall the 3 words  Score: Add number of words recalled + clock drawing (0 points for any errors, 2 points if correct)  Interpretation: A score of 0-2 suggests cognitive impairment is present, a score of 3-5 suggests cognitive impairment is absent  Does this patient have major depression?: No   Over the last 2 weeks: Little interest or pleasure in doing things? (Not at all +0, Several days +1, More than half the days +2, Nearly every day +3)  Over the last 2 weeks: Feeling down, depressed or hopeless? (Not at all +0, Several days +1, More than half the days +2, Nearly every day +3)  Score: Add points  Interpretation: A score of 3 or more suggests that major depression is likely.     3. Physical Function  Could this patient be frail?: No   Defined by presence of all of these: slowness, weakness, shrinking, inactivity, exhaustion  Is this patient at risk for falling?: No   Defined by having experienced a fall in the last 12 months.    4. Social Determinants of Health  Transportation deficits?: No   Lack of insurance?: No   Poor financial resources?: No   Living conditions (homelessness, unstable home)?: No   Poor family/social support?: No   Poor personal care?: No   Food insecurity?: No   Lack of HCPOA?: No    Summary of Assessment  The following linked problems were found: see below.   CHF reinforcements reviewed          Current " Medications:  Beta blocker:  none @ this time. Discharge med list not available  ACE inhibitor/ARB/ARNI: Losartan 25 mg daily  MRA: None  SGLT2i:  None  Diuretic: Furosemide 80 mg daily    Device Pacemaker       Heart Failure Education   - Living With Heart Failure book provided.  - CHF signs and symptoms, heart failure zones and when to call cardiologist.   - Controlling Heart Failure at Home: medication adherence, following up with cardiologist after hospital discharge, staying healthy and active, limiting sodium and fluid intake as directed by cardiologist.  - Daily Weight Education. Verbalized understanding of CHF education.        (HF nurse navigator name and contact info)

## 2024-10-01 NOTE — PROGRESS NOTES
"Raúl Jordan is a 84 y.o. female on day 8 of admission presenting with Acute hypoxic respiratory failure (Multi).    Subjective   Patient sitting upright in chair.  Alert and oriented x 3.  Is still with dyspnea on exertion.  Ambulatory pulse ox reveals desaturation to 85%  with ambulation on 2 L.       Objective     Physical Exam  Constitutional:       Appearance: Normal appearance.   Cardiovascular:      Rate and Rhythm: Rhythm irregular.      Pulses: Normal pulses.      Heart sounds: Normal heart sounds. No murmur heard.     No gallop.      Comments: Irregularly irregular  Pulmonary:      Effort: Pulmonary effort is normal.      Breath sounds: Normal breath sounds.   Musculoskeletal:      Right lower leg: Edema present.      Left lower leg: Edema present.      Comments: Trace bilateral lower extremity edema.   Skin:     General: Skin is warm and dry.   Neurological:      Mental Status: She is alert and oriented to person, place, and time.         Last Recorded Vitals  Blood pressure (!) 115/45, pulse 79, temperature 36.8 °C (98.2 °F), temperature source Temporal, resp. rate 18, height 1.6 m (5' 3\"), weight 92.1 kg (203 lb), SpO2 93%.  Intake/Output last 3 Shifts:  I/O last 3 completed shifts:  In: 760 (8.3 mL/kg) [P.O.:760]  Out: 2075 (22.5 mL/kg) [Urine:2075 (0.6 mL/kg/hr)]  Weight: 92.1 kg     Relevant Results       Results for orders placed or performed during the hospital encounter of 09/23/24 (from the past 24 hour(s))   Basic Metabolic Panel   Result Value Ref Range    Glucose 113 (H) 74 - 99 mg/dL    Sodium 142 136 - 145 mmol/L    Potassium 3.9 3.5 - 5.3 mmol/L    Chloride 99 98 - 107 mmol/L    Bicarbonate 34 (H) 21 - 32 mmol/L    Anion Gap 13 10 - 20 mmol/L    Urea Nitrogen 35 (H) 6 - 23 mg/dL    Creatinine 1.32 (H) 0.50 - 1.05 mg/dL    eGFR 39 (L) >60 mL/min/1.73m*2    Calcium 8.9 8.6 - 10.3 mg/dL   CBC   Result Value Ref Range    WBC 5.9 4.4 - 11.3 x10*3/uL    nRBC 0.0 0.0 - 0.0 /100 WBCs    RBC 3.26 " (L) 4.00 - 5.20 x10*6/uL    Hemoglobin 9.8 (L) 12.0 - 16.0 g/dL    Hematocrit 31.2 (L) 36.0 - 46.0 %    MCV 96 80 - 100 fL    MCH 30.1 26.0 - 34.0 pg    MCHC 31.4 (L) 32.0 - 36.0 g/dL    RDW 14.3 11.5 - 14.5 %    Platelets 121 (L) 150 - 450 x10*3/uL   Protime-INR   Result Value Ref Range    Protime 24.4 (H) 9.3 - 12.7 seconds    INR 2.5 (H) 0.9 - 1.2                            Assessment/Plan   Assessment & Plan  Acute hypoxic respiratory failure (Multi)    Type 2 diabetes mellitus with obesity (Multi)    Diastolic heart failure    Hypertension    Longstanding persistent atrial fibrillation (Multi)    Pneumonia of both lower lobes    #1 toxic respiratory failure     2.  COPD and sleep apnea with chronic 3 L nasal cannula at home     3.  Obstructive sleep apnea noncompliant with her sleep mask.     4.  Acute and chronic diastolic heart failure, left ventricular ejection fraction 55 to 60%.     5.  Pneumonia, bilateral     6.  Longstanding persistent atrial fibrillation, good rate control     9/29: Agree with the intravenous furosemide but once she appears euvolemic I would convert her back to her oral furosemide 80 mg daily.  No other cardiac interventions needed at this time     9/30: As above.  Patient has no complaints at this time.  Appears to be diuresing well.  24-hour net output according to chart negative 700 mL.  Patient tells me her legs look much better than when she first came in.  Will keep her on IV Lasix today with a tentative plan to transition her to oral Lasix tomorrow.  Blood pressures are adequate most recent reading 102/53.  Lab work revealing sodium 141, potassium 3.9, creatinine increased to 1.35 today.  Reasonable bump given amount of diuretics.  Again we will transition her to oral diuretics tomorrow.  She had a CT of her chest done yesterday this has not been read yet.  She continues on 2 L of oxygen saturations at 95%.  She is in a rate controlled atrial fibrillation with rates in the 70s  to 80s.  Continue Coumadin.  Echo completed this admission revealing a normal EF 65 to 70%, impaired relaxation of left ventricle, normal right ventricle systolic function moderately dilated left and right atrium, severe mitral annular calcification, mild to moderate mitral valve regurgitation, mildly elevated right ventricular systolic pressure moderate aortic valve cusp calcification.  Will continue to follow with you.    10/1: As above.  Patient with dyspnea on exertion.  Ambulatory pulse ox reveals desaturation to the 85-88% range.  She will more than likely require oxygen at discharge.  She received 60 mg IV Lasix this morning.  I have arranged for her to get her home dose of oral Lasix this afternoon/evening.  She remains on 2 L with saturations at 95% at rest.  Blood pressures are stable most recent reading 115/45. She remains in atrial fibrillation without significant ectopy.  She remains on Coumadin 3 mg daily INR is stable at 2.5.  Patient tells me she has an appointment with her cardiologist Dr. Lozano in several weeks. She was advised to keep this appointment.       I spent 20 minutes in the professional and overall care of this patient.      Melyssa Santoyo PA-C

## 2024-10-01 NOTE — CARE PLAN
Problem: Diabetes  Goal: Achieve decreasing blood glucose levels by end of shift  Outcome: Progressing  Goal: Increase stability of blood glucose readings by end of shift  Outcome: Progressing  Goal: Decrease in ketones present in urine by end of shift  Outcome: Progressing  Goal: Maintain electrolyte levels within acceptable range throughout shift  Outcome: Progressing  Goal: Maintain glucose levels >70mg/dl to <250mg/dl throughout shift  Outcome: Progressing  Goal: No changes in neurological exam by end of shift  Outcome: Progressing  Goal: Learn about and adhere to nutrition recommendations by end of shift  Outcome: Progressing  Goal: Vital signs within normal range for age by end of shift  Outcome: Progressing  Goal: Increase self care and/or family involovement by end of shift  Outcome: Progressing  Goal: Receive DSME education by end of shift  Outcome: Progressing     Problem: Safety - Adult  Goal: Free from fall injury  Outcome: Progressing     Problem: Discharge Planning  Goal: Discharge to home or other facility with appropriate resources  Outcome: Progressing     Problem: Chronic Conditions and Co-morbidities  Goal: Patient's chronic conditions and co-morbidity symptoms are monitored and maintained or improved  Outcome: Progressing     Problem: Fall/Injury  Goal: Not fall by end of shift  Outcome: Progressing  Goal: Be free from injury by end of the shift  Outcome: Progressing  Goal: Verbalize understanding of personal risk factors for fall in the hospital  Outcome: Progressing  Goal: Verbalize understanding of risk factor reduction measures to prevent injury from fall in the home  Outcome: Progressing  Goal: Use assistive devices by end of the shift  Outcome: Progressing  Goal: Pace activities to prevent fatigue by end of the shift  Outcome: Progressing     Problem: Heart Failure  Goal: Improved gas exchange this shift  Outcome: Progressing  Goal: Improved urinary output this shift  Outcome:  Progressing  Goal: Reduction in peripheral edema within 24 hours  Outcome: Progressing  Goal: Report improvement of dyspnea/breathlessness this shift  Outcome: Progressing  Goal: Weight from fluid excess reduced over 2-3 days, then stabilize  Outcome: Progressing  Goal: Increase self care and/or family involvement in 24 hours  Outcome: Progressing     Problem: Respiratory  Goal: No signs of respiratory distress (eg. Use of accessory muscles. Peds grunting)  Outcome: Progressing     Problem: Deep Vein Thrombosis  Goal: I will remain free from complications of deep vein thrombosis and maintain current level of mobility  Outcome: Progressing   The patient's goals for the shift include improved breathing    The clinical goals for the shift include monitort vitals, decrease in SOB, pt safety and comfort

## 2024-10-01 NOTE — DISCHARGE SUMMARY
Discharge Diagnosis  Acute hypoxic respiratory failure (Multi), acute on chronic diastolic CHF, pneumonia.    Issues Requiring Follow-Up  As above    Discharge Meds     Medication List      START taking these medications     oxygen gas therapy; Commonly known as: O2; Inhale 1 each once daily at   bedtime. Room air at rest, 2L NC with exertion.     CONTINUE taking these medications     acetaminophen 325 mg capsule; Commonly known as: Tylenol   * albuterol 90 mcg/actuation inhaler   * albuterol 108 (90 Base) MCG/ACT inhaler   empagliflozin 10 mg; Commonly known as: Jardiance   furosemide 40 mg tablet; Commonly known as: Lasix   Jantoven 3 mg tablet; Generic drug: warfarin; Take as directed. If you   are unsure how to take this medication, talk to your nurse or doctor.;   Original instructions: Take 1 tablet (3 mg) by mouth once daily in the   evening.   losartan 25 mg tablet; Commonly known as: Cozaar   metoprolol succinate XL 50 mg 24 hr tablet; Commonly known as: Toprol-XL   ondansetron ODT 4 mg disintegrating tablet; Commonly known as:   Zofran-ODT   pramipexole 1.5 mg tablet; Commonly known as: Mirapex; Take 1 tablet   (1.5 mg) by mouth once daily.   sertraline 100 mg tablet; Commonly known as: Zoloft; TAKE ONE TABLET BY   MOUTH EVERY DAY   simvastatin 10 mg tablet; Commonly known as: Zocor; Take 1 tablet (10   mg) by mouth once daily at bedtime.   traZODone 100 mg tablet; Commonly known as: Desyrel; Take 2 tablets (200   mg) by mouth once daily at bedtime.  * This list has 2 medication(s) that are the same as other medications   prescribed for you. Read the directions carefully, and ask your doctor or   other care provider to review them with you.     STOP taking these medications     metFORMIN 500 mg tablet; Commonly known as: Glucophage   spironolactone 25 mg tablet; Commonly known as: Aldactone       Test Results Pending At Discharge  Pending Labs       Order Current Status    Extra Urine Gray Tube Collected  (09/23/24 1058)    Urinalysis with Reflex Culture and Microscopic In process            Hospital Course   Admitted for further management for acute hypoxic respiratory failure. Initially thought to be from pneumonia, she was treated with IV abx and was followed by pulmonology. She completed 5 days of IV abx therapy. Repeat imaging then started to look more like fluid overload. Cardiology was consulted to see and she was diuresed with IV lasix with improvement in her breathing. She has been transitioned back to her home dose lasix. She was still requiring low flow NC oxygen. RT performed home O2 certification which was reviewed and she will require 2L NC with exertion to maintain her sats >89%, no need for oxygen at rest. Cardio has cleared her for DC home on her 80mg daily lasix and she will follow up with them in 2 weeks with labs. Pulmonary cleared her for DC, gave referral to see them outpatient and obtain CXR in a few weeks. Labs and VS are stable. She will DC home today with home oxygen.     Case and plan was discussed with patient and her daughter at the bedside, they are agreeable.   Case and plan was also discussed with my collaborating physician.     Time spent 35 minutes.     Pertinent Physical Exam At Time of Discharge  Physical Exam    General: Alert and oriented x3, pleasant.   Cardiac: Irregular rate and rhythm, S1/S2 , no murmur.   Pulmonary: Clear to auscultation on 2L NC.   Abdomen: Soft, round, nontender. BS +x4.   Extremities: No edema.  Skin: No rashes or lesions.    Outpatient Follow-Up  Future Appointments   Date Time Provider Department [place]   10/8/2024 11:00 AM Beverly Ricketts MD [id number] [facility]   10/16/2024  3:15 PM Kit Hart MD [id number] [facility]   2/24/2025 10:30 AM Misbah Lozano DO [id number] [facility]     Follow up with cardiology in 2 weeks with labs.   Follow up with pulmonary with CXR.     THERESA Hicks-CNP

## 2024-10-01 NOTE — PROGRESS NOTES
Raúl Jordan is a 84 y.o. female on day 8 of admission presenting with Acute hypoxic respiratory failure (Multi).      Subjective   Patient seen and examined. Sitting up in the chair, daughter at the bedside. States she feels good, denies any SOB at this time. Does getting some SOB with exertion. Afebrile.        Objective     Last Recorded Vitals  BP (!) 115/45 (BP Location: Left arm, Patient Position: Lying)   Pulse 79   Temp 36.8 °C (98.2 °F) (Temporal)   Resp 18   Wt 92.1 kg (203 lb)   SpO2 95%   Intake/Output last 3 Shifts:    Intake/Output Summary (Last 24 hours) at 10/1/2024 1010  Last data filed at 10/1/2024 0845  Gross per 24 hour   Intake 570 ml   Output 2100 ml   Net -1530 ml       Admission Weight  Weight: 92.1 kg (203 lb) (09/23/24 1023)    Daily Weight  09/23/24 : 92.1 kg (203 lb)    Image Results  CT chest wo IV contrast  Narrative: Interpreted By:  Daniel Ny,   STUDY:  CT CHEST WO IV CONTRAST; 9/29/2024 1:05 pm      INDICATION:  Signs/Symptoms:respiratory failure.      COMPARISON:  05/06/2023      ACCESSION NUMBER(S):  [id number]      ORDERING CLINICIAN:  MARY KATE BOLANOS      TECHNIQUE:  The study was performed without intravenous contrast material as  requested. A helical acquisition data was obtained with multiplanar  reconstructions.      One or more of the following dose reduction techniques were used:  Automated exposure control Adjustment of the mA and/or kV according  to patient size, and/or use of iterative reconstruction technique.      FINDINGS:  Within the limits of this unenhanced study no hilar or mediastinal  lymphadenopathy is identified.  There is a stable appearance of  multiple normal-sized mediastinal lymph nodes. Largest lymph node  demonstrates short axis diameter of 1 cm. Four-chamber cardiomegaly  is present. Right ventricular wireless pacemaker device is present.  There is a small right pleural effusion. Bibasilar subsegmental  atelectasis is present, more marked  on the right. Motion artifact  limits pulmonary parenchymal assessment somewhat. No alveolar  consolidation is identified within limits of this exam.      Chest Wall: Postoperative and postradiation changes of the right  breast are incidentally noted.      Skeletal System: No fractures or destructive lesions are identified.      Upper Abdomen: There is a slightly lobulated appearance to the  hepatic contour raising the possibility of underlying cirrhosis.      Impression: 1. Small right pleural effusion.  2. Bibasilar atelectasis more marked on the right.  3. Cardiomegaly with intraventricular wireless pacemaker.  4. Findings are quite similar compared to the previous examination  with the right effusion slightly increased in the bibasilar  atelectasis slightly more prominent.      MACRO:  none      Signed by: Daniel Ny 9/30/2024 4:22 PM  Dictation workstation:   [id number]      Physical Exam    General: Alert and oriented x3, pleasant.   Cardiac: Irregular rate and rhythm, S1/S2 , no murmur.   Pulmonary: Clear to auscultation on 2L NC.   Abdomen: Soft, round, nontender. BS +x4.   Extremities: No edema.  Skin: No rashes or lesions.     Relevant Results    Scheduled medications  albuterol, 2.5 mg, nebulization, TID  furosemide, 80 mg, oral, q AM  losartan, 25 mg, oral, Daily  oxygen, , inhalation, Nightly  oxygen, , inhalation, q8h  pantoprazole, 40 mg, oral, Daily before breakfast   Or  pantoprazole, 40 mg, intravenous, Daily before breakfast  pramipexole, 1.5 mg, oral, Daily  sertraline, 100 mg, oral, Daily  simvastatin, 10 mg, oral, Nightly  [Held by provider] traZODone, 200 mg, oral, Nightly  warfarin, 3 mg, oral, Daily      Continuous medications     PRN medications  PRN medications: acetaminophen, albuterol, benzocaine-menthol, bisacodyl, bisacodyl, dextromethorphan-guaifenesin, melatonin, ondansetron ODT **OR** ondansetron, polyethylene glycol, prochlorperazine **OR** prochlorperazine **OR**  prochlorperazine     ,  Results for orders placed or performed during the hospital encounter of 09/23/24 (from the past 24 hour(s))   Basic Metabolic Panel   Result Value Ref Range    Glucose 113 (H) 74 - 99 mg/dL    Sodium 142 136 - 145 mmol/L    Potassium 3.9 3.5 - 5.3 mmol/L    Chloride 99 98 - 107 mmol/L    Bicarbonate 34 (H) 21 - 32 mmol/L    Anion Gap 13 10 - 20 mmol/L    Urea Nitrogen 35 (H) 6 - 23 mg/dL    Creatinine 1.32 (H) 0.50 - 1.05 mg/dL    eGFR 39 (L) >60 mL/min/1.73m*2    Calcium 8.9 8.6 - 10.3 mg/dL   CBC   Result Value Ref Range    WBC 5.9 4.4 - 11.3 x10*3/uL    nRBC 0.0 0.0 - 0.0 /100 WBCs    RBC 3.26 (L) 4.00 - 5.20 x10*6/uL    Hemoglobin 9.8 (L) 12.0 - 16.0 g/dL    Hematocrit 31.2 (L) 36.0 - 46.0 %    MCV 96 80 - 100 fL    MCH 30.1 26.0 - 34.0 pg    MCHC 31.4 (L) 32.0 - 36.0 g/dL    RDW 14.3 11.5 - 14.5 %    Platelets 121 (L) 150 - 450 x10*3/uL   Protime-INR   Result Value Ref Range    Protime 24.4 (H) 9.3 - 12.7 seconds    INR 2.5 (H) 0.9 - 1.2             Assessment/Plan      Acute Hypoxic Respiratory Failure  -Currently on 2L NC, still de-sats with exertion.   -Likely secondary to combination CHF and pneumonia. Seems to be more fluid issue. Pneumonia already treated.   -Wean O2 as sats allow, baseline room air.   -Pulmonary follows.   -CT chest shows small effusion and atelectasis, no obvious infiltrates or consolidations.    -Continue IBDs.      Acute on Chronic Diastolic CHF  -Cardiology follows.   -IV lasix given this AM, will receive her normal 80 mg PO dose this afternoon.   -Echo shows EF 65%.   -Continue ARB.      Pneumonia  -Initial CXR showed diffuse interstitial infiltrates bilaterally.   -Treated with azithromycin and ceftriaxone. Completed course.   -Pulmonary follows.   -Strep and legionella negative.   -CT shows no signs of residual infiltrates.      DM 2  -On no medications at home.   -Blood sugars have been stable.      HTN  -Continue losartan.   -BP currently stable, monitor.       Atrial Fibrillation  -On warfarin, INR therapeutic.      Insomnia  -Trazodone was on hold due to QT prolongation, given small dose last night with melatonin.      DVT Risk  -On warfarin, continue.      Plan  Pulmonary and Cardiology follows.   Completed abx therapy.   Transition to PO lasix this afternoon.   Wean O2 as sats allow, remains on 2L NC. Baseline room air. Will obtain home oxygen certification prior to DC, anticipate she will need home O2.   PT/OT, mobilize. Plan to return home at DC. Home later today vs the AM, pending pulm input.         Rhonda Lopez, APRN-CNP

## 2024-10-01 NOTE — PROGRESS NOTES
10/01/24 1513   Discharge Planning   Expected Discharge Disposition Home   Does the patient need discharge transport arranged? No     Home with no care coordination needs.  Possibly home oxygen per respiratory.

## 2024-10-02 ENCOUNTER — PATIENT OUTREACH (OUTPATIENT)
Dept: PRIMARY CARE | Facility: CLINIC | Age: 84
End: 2024-10-02
Payer: MEDICARE

## 2024-10-02 ENCOUNTER — TELEPHONE (OUTPATIENT)
Dept: INPATIENT UNIT | Facility: HOSPITAL | Age: 84
End: 2024-10-02
Payer: MEDICARE

## 2024-10-02 ENCOUNTER — DOCUMENTATION (OUTPATIENT)
Dept: PRIMARY CARE | Facility: CLINIC | Age: 84
End: 2024-10-02
Payer: MEDICARE

## 2024-10-02 NOTE — PROGRESS NOTES
Discharge Facility: [facility]  Discharge Diagnosis: Acute hypoxic respiratory failure (Multi), acute on chronic diastolic CHF, pneumonia.   Admission Date: 09/23/2024  Discharge Date: 10/01/2024    PCP Appointment Date: 10/08/2024  Specialist Appointment Date: Cardio 10/16/2024, 02/24/2025  Hospital Encounter and Summary Linked: Yes    See discharge assessment below for further details    Medications  Medications reviewed with patient/caregiver?: Yes (10/2/2024  9:40 AM)  Is the patient having any side effects they believe may be caused by any medication additions or changes?: No (10/2/2024  9:40 AM)  Does the patient have all medications ordered at discharge?: Not applicable (10/2/2024  9:40 AM)  Prescription Comments: No new scripts given at discharge (10/2/2024  9:40 AM)  Is the patient taking all medications as directed (includes completed medication regime)?: Yes (10/2/2024  9:40 AM)  Medication Comments: patient denies any issues affording medication (10/2/2024  9:40 AM)    Appointments  Care Management Interventions: Verified appointment date/time/provider (PCP 10/08/2024) (10/2/2024  9:40 AM)  Has the patient kept scheduled appointments due by today?: Yes (10/2/2024  9:40 AM)    Self Management  What is the home health agency?: N/A (10/2/2024  9:40 AM)  What Durable Medical Equipment (DME) was ordered?: Oxygen (10/2/2024  9:40 AM)  Has all Durable Medical Equipment (DME) been delivered?: Yes (10/2/2024  9:40 AM)    Patient Teaching  Does the patient have access to their discharge instructions?: Yes (10/2/2024  9:40 AM)  Care Management Interventions: Reviewed instructions with patient (10/2/2024  9:40 AM)  What is the patient's perception of their health status since discharge?: Improving (10/2/2024  9:40 AM)  Is the patient/caregiver able to teach back the hierarchy of who to call/visit for symptoms/problems? PCP, Specialist, Home Health nurse, Urgent Care, ED, 911: Yes (10/2/2024  9:40  AM)  Patient/Caregiver Education Comments: CM spoke to patient via phone. She states that she is doing well at home. She has the prescribed oxygen at the home. PCP follow up appointment is scheduled for 10/08/2024. She has no questions at this time and was thankful for this call. (10/2/2024  9:40 AM)

## 2024-10-03 ENCOUNTER — PATIENT OUTREACH (OUTPATIENT)
Dept: CASE MANAGEMENT | Facility: HOSPITAL | Age: 84
End: 2024-10-03
Payer: MEDICARE

## 2024-10-03 DIAGNOSIS — R06.2 WHEEZING: Primary | ICD-10-CM

## 2024-10-03 RX ORDER — ALBUTEROL SULFATE 90 UG/1
2 INHALANT RESPIRATORY (INHALATION) EVERY 6 HOURS PRN
Qty: 18 G | Refills: 1 | Status: SHIPPED | OUTPATIENT
Start: 2024-10-03

## 2024-10-03 NOTE — PROGRESS NOTES
Heart Failure Nurse Navigator Transition of Care Phone Call    The role of the HF nurse navigator is to (1) characterize risk profiles of patients with heart failure transitioning from [id number] after hospitalization, (2) recommend interventions to improve care and reduce risks of worse post-hospitalization outcomes.     Assessment  Call attempted to patient .     HF Symptoms  Chest pain? No  Shortness of breath? with exertion  Orthopnea? No  Paroxysmal nocturnal dyspnea? No  Edema? No  Weight gain >2lbs in 3 days or 5lbs in 1 week? No    Medications  Is the patient prescribed the following medications?  ACEi/ARB/ARNi? Yes  BB? Yes  MRA? Yes,error-NOT on  SGLT2i? YES  Diuretic? Yes  Is the patient adherent to prescribed medications? YES    Management    Is the patient obtaining daily weights? NO -  If yes, current weight? Forgot to perform  Is the patient following diet limitations (2-3g Na, fluid restriction)? YES  Does the patient have a  cardiology follow-up scheduled? YES  If yes, appointment details? Dr Hart 10/16/24  If no, what is the reason? scheduled  Does the patient require HF education reinforcement? Yes  If yes, what was discussed? Discussed importance of performing daily weights. Reviewed HF flare up symptoms to report.    Recommendations/Comments:  Spoke with Raúl reports she is doing well. She has follow up with her PCP next Tuesday. I will continue to follow.                (HF nurse navigator contact information)

## 2024-10-08 ENCOUNTER — OFFICE VISIT (OUTPATIENT)
Dept: PRIMARY CARE | Facility: CLINIC | Age: 84
End: 2024-10-08
Payer: MEDICARE

## 2024-10-08 ENCOUNTER — HOSPITAL ENCOUNTER (OUTPATIENT)
Dept: RADIOLOGY | Facility: HOSPITAL | Age: 84
Discharge: HOME | End: 2024-10-08
Payer: MEDICARE

## 2024-10-08 VITALS
SYSTOLIC BLOOD PRESSURE: 106 MMHG | WEIGHT: 219 LBS | BODY MASS INDEX: 38.79 KG/M2 | OXYGEN SATURATION: 95 % | DIASTOLIC BLOOD PRESSURE: 56 MMHG | HEART RATE: 65 BPM | TEMPERATURE: 96.8 F

## 2024-10-08 DIAGNOSIS — I50.32 CHRONIC HEART FAILURE WITH PRESERVED EJECTION FRACTION: ICD-10-CM

## 2024-10-08 DIAGNOSIS — Z09 HOSPITAL DISCHARGE FOLLOW-UP: Primary | ICD-10-CM

## 2024-10-08 DIAGNOSIS — I50.9 CHRONIC CONGESTIVE HEART FAILURE, UNSPECIFIED HEART FAILURE TYPE: ICD-10-CM

## 2024-10-08 DIAGNOSIS — E11.9 TYPE 2 DIABETES MELLITUS WITHOUT COMPLICATION, WITHOUT LONG-TERM CURRENT USE OF INSULIN (MULTI): ICD-10-CM

## 2024-10-08 DIAGNOSIS — R05.3 CHRONIC COUGH: ICD-10-CM

## 2024-10-08 DIAGNOSIS — I48.11 LONGSTANDING PERSISTENT ATRIAL FIBRILLATION (MULTI): ICD-10-CM

## 2024-10-08 DIAGNOSIS — Z23 ENCOUNTER FOR IMMUNIZATION: ICD-10-CM

## 2024-10-08 DIAGNOSIS — J18.9 PNEUMONIA OF BOTH LOWER LOBES DUE TO INFECTIOUS ORGANISM: ICD-10-CM

## 2024-10-08 PROCEDURE — 99495 TRANSJ CARE MGMT MOD F2F 14D: CPT | Performed by: INTERNAL MEDICINE

## 2024-10-08 PROCEDURE — 3078F DIAST BP <80 MM HG: CPT | Performed by: INTERNAL MEDICINE

## 2024-10-08 PROCEDURE — 71046 X-RAY EXAM CHEST 2 VIEWS: CPT | Performed by: RADIOLOGY

## 2024-10-08 PROCEDURE — 1159F MED LIST DOCD IN RCRD: CPT | Performed by: INTERNAL MEDICINE

## 2024-10-08 PROCEDURE — 1125F AMNT PAIN NOTED PAIN PRSNT: CPT | Performed by: INTERNAL MEDICINE

## 2024-10-08 PROCEDURE — 90662 IIV NO PRSV INCREASED AG IM: CPT | Performed by: INTERNAL MEDICINE

## 2024-10-08 PROCEDURE — 71046 X-RAY EXAM CHEST 2 VIEWS: CPT

## 2024-10-08 PROCEDURE — 1111F DSCHRG MED/CURRENT MED MERGE: CPT | Performed by: INTERNAL MEDICINE

## 2024-10-08 PROCEDURE — 3074F SYST BP LT 130 MM HG: CPT | Performed by: INTERNAL MEDICINE

## 2024-10-08 PROCEDURE — 1157F ADVNC CARE PLAN IN RCRD: CPT | Performed by: INTERNAL MEDICINE

## 2024-10-08 RX ORDER — GUAIFENESIN 600 MG/1
600 TABLET, EXTENDED RELEASE ORAL 2 TIMES DAILY
Qty: 60 TABLET | Refills: 2 | Status: SHIPPED | OUTPATIENT
Start: 2024-10-08 | End: 2025-10-08

## 2024-10-08 ASSESSMENT — PATIENT HEALTH QUESTIONNAIRE - PHQ9
2. FEELING DOWN, DEPRESSED OR HOPELESS: NOT AT ALL
SUM OF ALL RESPONSES TO PHQ9 QUESTIONS 1 AND 2: 0
1. LITTLE INTEREST OR PLEASURE IN DOING THINGS: NOT AT ALL

## 2024-10-08 ASSESSMENT — ENCOUNTER SYMPTOMS
DEPRESSION: 0
OCCASIONAL FEELINGS OF UNSTEADINESS: 0
LOSS OF SENSATION IN FEET: 0

## 2024-10-08 ASSESSMENT — PAIN SCALES - GENERAL: PAINLEVEL: 3

## 2024-10-08 NOTE — PROGRESS NOTES
"   Patient: Raúl Jordan  : 1940  PCP: Beverly Ricketts MD  MRN: [id number]  Program: Disease Specific Discharge Navigator  Status: Enrolled  Effective Dates: 10/1/2024 - present  Responsible Staff: Rena Garcia RN  Social Determinants to be Addressed: Physical Activity, Social Connections    Transitional Care Management  Status: Enrolled  Effective Dates: 10/2/2024 - present  Responsible Staff: Ana Maria Valencia  Social Determinants to be Addressed: No information to display         Raúl Jordan is a 84 y.o. female presenting today for follow-up after being discharged from the hospital 7 days ago. The main problem requiring admission was acute hypoxic respiratory failure. The discharge summary and/or Transitional Care Management documentation was reviewed. Medication reconciliation was performed as indicated via the \"Sumeet as Reviewed\" timestamp.     Raúl Jordan was contacted by Transitional Care Management services two days after her discharge. This encounter and supporting documentation was reviewed.    Hospital Course   Admitted for further management for acute hypoxic respiratory failure. Initially thought to be from pneumonia, she was treated with IV abx and was followed by pulmonology. She completed 5 days of IV abx therapy. Repeat imaging then started to look more like fluid overload. Cardiology was consulted to see and she was diuresed with IV lasix with improvement in her breathing. She has been transitioned back to her home dose lasix. She was still requiring low flow NC oxygen. RT performed home O2 certification which was reviewed and she will require 2L NC with exertion to maintain her sats >89%, no need for oxygen at rest. Cardio has cleared her for DC home on her 80 mg daily lasix and she will follow up with them in 2 weeks with labs. Pulmonary cleared her for DC, gave referral to see them outpatient and obtain CXR in a few weeks. Labs and VS are stable. She will DC home today with home " oxygen.     Follow up with cardiology in 2 weeks with labs.   Follow up with pulmonary with CXR.       Visit 10/8/24  She is accompanied by her daughter  Metformin was stopped in January 2024  Spironolactone was stopped?? During hospitalization   Started on oxygen through NC, which she is using continuously  She was prescribed Jardiance, was stopped due to cost of the drug  Complaining of worsening cough since hospital discharge  Denied any chest pain, worsening shortness of breath, pedal edema, fever, chills  Has appointment with cardiology in next few weeks   next appointment pulmonology is in December    Review of Systems   Constitutional:  Negative for chills, fatigue and unexpected weight change.   HENT:  Negative for postnasal drip, sinus pressure and trouble swallowing.    Respiratory:  Positive for cough. Negative for shortness of breath and wheezing.    Cardiovascular:  Negative for chest pain, palpitations and leg swelling.   Gastrointestinal:  Negative for abdominal pain, blood in stool, nausea and vomiting.   Endocrine: Negative for polydipsia, polyphagia and polyuria.   Genitourinary:  Negative for dysuria and frequency.   Musculoskeletal:  Positive for arthralgias. Negative for back pain and myalgias.   Skin:  Negative for rash.   Neurological:  Negative for tremors, seizures and numbness.   Psychiatric/Behavioral:  Negative for behavioral problems.        /56 (BP Location: Left arm, Patient Position: Sitting, BP Cuff Size: Adult)   Pulse 65   Temp 36 °C (96.8 °F) (Temporal)   Wt 99.3 kg (219 lb)   SpO2 95%   BMI 38.79 kg/m²     Physical Exam  Constitutional:       General: She is not in acute distress.     Comments: On oxygen through nasal cannula   HENT:      Head: Normocephalic and atraumatic.   Eyes:      Extraocular Movements: Extraocular movements intact.      Conjunctiva/sclera: Conjunctivae normal.   Cardiovascular:      Rate and Rhythm: Normal rate and regular rhythm.      Pulses:  Normal pulses.      Heart sounds: No murmur heard.  Pulmonary:      Effort: Pulmonary effort is normal.      Breath sounds: Normal breath sounds. No wheezing.      Comments: Bilateral scattered rhonchi  Abdominal:      General: Bowel sounds are normal.      Palpations: Abdomen is soft. There is no mass.      Tenderness: There is no abdominal tenderness. There is no guarding.   Musculoskeletal:      Comments: Trace pedal edema bilaterally, right lower extremity mid shin area hyperpigmentation   Skin:     General: Skin is warm and dry.      Findings: No lesion.   Neurological:      General: No focal deficit present.      Mental Status: She is alert and oriented to person, place, and time.      Cranial Nerves: No cranial nerve deficit.   Psychiatric:         Mood and Affect: Mood normal.         The complexity of medical decision making for this patient's transitional care is high.    Assessment/Plan       Raúl was seen today for follow-up.  Diagnoses and all orders for this visit:  Hospital discharge follow-up (Primary)  Encounter for immunization  Chronic congestive heart failure, unspecified heart failure type  -     CBC and Auto Differential; Future  -     Basic Metabolic Panel; Future  Chronic cough  -     guaiFENesin (Mucinex) 600 mg 12 hr tablet; Take 1 tablet (600 mg) by mouth 2 times a day. Do not crush, chew, or split.  Longstanding persistent atrial fibrillation (Multi)  Chronic heart failure with preserved ejection fraction  Type 2 diabetes mellitus without complication, without long-term current use of insulin (Multi)  Other orders  -     Flu vaccine, trivalent, preservative free, HIGH-DOSE, age 65y+ (Fluzone)    Advised to get a chest x-ray today  Started on Mucinex for cough  Advised to use inhaler every 6-8 hours as needed  Reviewed hospital records    Lab Results   Component Value Date    WBC 5.9 10/01/2024    HGB 9.8 (L) 10/01/2024    HCT 31.2 (L) 10/01/2024    MCV 96 10/01/2024     (L)  10/01/2024     Lab Results   Component Value Date    GLUCOSE 113 (H) 10/01/2024    CALCIUM 8.9 10/01/2024     10/01/2024    K 3.9 10/01/2024    CO2 34 (H) 10/01/2024    CL 99 10/01/2024    BUN 35 (H) 10/01/2024    CREATININE 1.32 (H) 10/01/2024       === 09/23/24 ===    CT CHEST WO IV CONTRAST    - Impression -  1. Small right pleural effusion.  2. Bibasilar atelectasis more marked on the right.  3. Cardiomegaly with intraventricular wireless pacemaker.  4. Findings are quite similar compared to the previous examination  with the right effusion slightly increased in the bibasilar  atelectasis slightly more prominent.    MACRO:  none    Signed by: Daniel yN 9/30/2024 4:22 PM  Dictation workstation:   [id number]      Current Outpatient Medications   Medication Instructions    acetaminophen (TYLENOL) 325 mg, oral, Every 6 hours PRN    albuterol 90 mcg/actuation inhaler 2 puffs, inhalation, Every 6 hours PRN    furosemide (LASIX) 80 mg, oral, Every morning,  AS DIRECTED<BR>    guaiFENesin (MUCINEX) 600 mg, oral, 2 times daily, Do not crush, chew, or split.    Jantoven 3 mg, oral, Every evening    losartan (COZAAR) 25 mg, oral, Daily    metoprolol succinate XL (TOPROL-XL) 50 mg, oral, Daily    oxygen (O2) gas therapy 1 each, inhalation, Nightly, Room air at rest, 2L NC with exertion.    pramipexole (MIRAPEX) 1.5 mg, oral, Daily    sertraline (ZOLOFT) 100 mg, oral, Daily    simvastatin (ZOCOR) 10 mg, oral, Nightly    traZODone (DESYREL) 200 mg, oral, Nightly

## 2024-10-09 ENCOUNTER — PATIENT OUTREACH (OUTPATIENT)
Dept: CASE MANAGEMENT | Facility: HOSPITAL | Age: 84
End: 2024-10-09
Payer: MEDICARE

## 2024-10-09 ASSESSMENT — ENCOUNTER SYMPTOMS
MYALGIAS: 0
VOMITING: 0
POLYDIPSIA: 0
UNEXPECTED WEIGHT CHANGE: 0
ABDOMINAL PAIN: 0
PALPITATIONS: 0
TROUBLE SWALLOWING: 0
POLYPHAGIA: 0
SHORTNESS OF BREATH: 0
SEIZURES: 0
CHILLS: 0
FATIGUE: 0
FREQUENCY: 0
BACK PAIN: 0
NUMBNESS: 0
NAUSEA: 0
SINUS PRESSURE: 0
COUGH: 1
BLOOD IN STOOL: 0
ARTHRALGIAS: 1
TREMORS: 0
WHEEZING: 0
DYSURIA: 0

## 2024-10-09 NOTE — PROGRESS NOTES
Follow up phone call made by [place] Clinical Nurse Navigator. No answer & unable to leave message. I will continue to follow.

## 2024-10-10 ENCOUNTER — PATIENT OUTREACH (OUTPATIENT)
Dept: PRIMARY CARE | Facility: CLINIC | Age: 84
End: 2024-10-10
Payer: MEDICARE

## 2024-10-10 DIAGNOSIS — I50.33 ACUTE ON CHRONIC DIASTOLIC HEART FAILURE: ICD-10-CM

## 2024-10-10 DIAGNOSIS — E66.9 TYPE 2 DIABETES MELLITUS WITH OBESITY (MULTI): Primary | ICD-10-CM

## 2024-10-10 DIAGNOSIS — E11.69 TYPE 2 DIABETES MELLITUS WITH OBESITY (MULTI): Primary | ICD-10-CM

## 2024-10-10 NOTE — PROGRESS NOTES
Unable to reach patient for call back after recent hospitalization.   LVM with call back number for patient to call if needed

## 2024-10-15 NOTE — PROGRESS NOTES
Subjective      Chief Complaint   Patient presents with    Hospital Follow-up          She was hospitalized last month with complaints of malaise shortness of breath intermittent cough.  She was originally being treated for pneumonia she was not getting better cardiology saw her there is noted that she was increasing short of breath with edema chest x-ray was consistent with heart failure versus fibrosis or pneumonia.  She does have obstructive sleep apnea and echocardiogram showed ejection fraction 55 to 60% left and right atrium's were both dilated.  There is some severe mitral annular calcification and a Micra pacemaker was noted in the right ventricle.  She does have a history of longstanding atrial fibrillation.  She improved and was continued on her Lasix.  She does have HFpEF.  Has been on the O2 since home from the hosp.  She has trouble sleeping and has to use the O2.  She had a cxr last week and does show some edema yet.  Has not felt the heart is racing.  The left leg will swell mildly  The wt is up 5 lbs from  last week           Review of Systems   Constitutional: Negative. Negative for chills and fever.   HENT: Negative.     Eyes: Negative.    Respiratory:  Positive for cough and shortness of breath.    Endocrine: Negative.    Skin: Negative.    Musculoskeletal: Negative.    Gastrointestinal: Negative.    Genitourinary: Negative.    Neurological: Negative.    All other systems reviewed and are negative.       History reviewed. No pertinent surgical history.     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    Cor pulmonale (Multi) 09/01/2023    Diabetes mellitus (Multi) 09/01/2023    Type 2 diabetes mellitus with  obesity (Multi) 09/01/2023    Diverticulosis 09/01/2023    Dyspnea 09/01/2023    Orthopnea 09/01/2023    Diastolic heart failure 09/01/2023    Grade III diastolic dysfunction 09/01/2023    Heart failure with preserved ejection fraction 09/01/2023    Hyperlipidemia 09/01/2023    Hypertension 09/01/2023    Hypokalemia 09/01/2023    Irritable bowel syndrome with diarrhea 09/01/2023    Kidney stone 09/01/2023    Longstanding persistent atrial fibrillation (Multi) 09/01/2023    Microcytic anemia 09/01/2023    Lower gastrointestinal hemorrhage 09/01/2023    Multiple gastric polyps 09/01/2023    Nausea & vomiting 09/01/2023    Obesity 09/01/2023    LAZARA (obstructive sleep apnea) 09/01/2023    Osteoarthritis 09/01/2023    Chronic insomnia 09/01/2023    Chronic pain disorder 09/01/2023    Insomnia 09/01/2023    Psychophysiologic insomnia 09/01/2023    Rectal hemorrhage 09/01/2023    Restless leg syndrome 09/01/2023    S/P placement of cardiac pacemaker 09/01/2023    Umbilical hernia 09/01/2023    Unilateral primary osteoarthritis, right knee 09/01/2023    Vitamin D deficiency 09/01/2023    Contact with and (suspected) exposure to covid-19 01/12/2023    Cough, unspecified 10/17/2022    Hepatic cirrhosis (Multi) 09/23/2024    Indigestion 09/23/2024    Primary localized osteoarthrosis, hand 08/22/2006    Anemia due to acute blood loss 09/23/2024    Iron deficiency anemia due to chronic blood loss 09/23/2024    Acute hypoxic respiratory failure (Multi) 09/23/2024    Pneumonia of both lower lobes 09/23/2024     Resolved Ambulatory Problems     Diagnosis Date Noted    Abnormal laboratory test result 09/01/2023    Anemia due to acute blood loss 09/01/2023    Angina pectoris 09/01/2023    Flank pain 09/01/2023    Atrial fibrillation (Multi) 09/01/2023    Benign essential hypertension 09/01/2023    Cardiac device in situ 09/01/2023    Chill 09/01/2023    Hypoxemia 09/01/2023    Hypoxia 09/01/2023    Melena 09/01/2023    Other chronic  "pain 09/01/2023    Pneumonia 09/01/2023    Pyuria 09/01/2023     Past Medical History:   Diagnosis Date    CHF (congestive heart failure)         Visit Vitals  /68 (BP Location: Left arm)   Pulse 52   Wt 102 kg (224 lb)   SpO2 (!) 89% Comment: 2% L 02   BMI 39.68 kg/m²   OB Status Postmenopausal   Smoking Status Never   BSA 2.13 m²        Objective     Constitutional:       Appearance: Healthy appearance.   Neck:      Vascular: No JVR.   Pulmonary:      Effort: Pulmonary effort is normal.      Breath sounds: Normal breath sounds.   Cardiovascular:      PMI at left midclavicular line. Normal rate. Irregularly irregular rhythm. Normal S1. Normal S2.       Murmurs: There is no murmur.      No gallop.  No click. No rub.   Pulses:     Intact distal pulses.   Edema:     Peripheral edema present.     Ankle: bilateral trace edema of the ankle.     Feet: bilateral trace edema of the feet.  Abdominal:      Palpations: Abdomen is soft.   Musculoskeletal: Normal range of motion. Skin:     General: Skin is warm and dry.   Neurological:      General: No focal deficit present.            Lab Review:   32 2 weeks for symptoms of 3 weeks of weakness and worsening what is her okay for those running there is only see movement over Jihan okay so I think I cannot do that that is a licensed no yeah lighter no no MRI RI you take care      Lab Results   Component Value Date    CHOL 151 08/30/2023    CHOL 137 12/12/2022    CHOL 153 03/08/2019     Lab Results   Component Value Date    HDL 58 08/30/2023    HDL 54 12/12/2022    HDL 60 03/08/2019     Lab Results   Component Value Date    LDLCALC 74 08/30/2023    LDLCALC 69 12/12/2022    LDLCALC 76 03/08/2019     Lab Results   Component Value Date    TRIG 93 08/30/2023    TRIG 71 12/12/2022    TRIG 84 03/08/2019     No components found for: \"CHOLHDL\"     Assessment/Plan     Heart failure with preserved ejection fraction (Multi)  She was in the hosp with pneumonia and with CHF  She is " gaining wt and the O2 sat is down. jWill increase the lasix to BID for 3 days.  Than back to once a day.  She follows with  Dr Lozano and will have her see him in 2 weeks    Longstanding persistent atrial fibrillation (Multi)  The afib is stable

## 2024-10-16 ENCOUNTER — OFFICE VISIT (OUTPATIENT)
Dept: CARDIOLOGY | Facility: CLINIC | Age: 84
End: 2024-10-16
Payer: MEDICARE

## 2024-10-16 VITALS
DIASTOLIC BLOOD PRESSURE: 68 MMHG | HEART RATE: 52 BPM | OXYGEN SATURATION: 89 % | WEIGHT: 224 LBS | SYSTOLIC BLOOD PRESSURE: 134 MMHG | BODY MASS INDEX: 39.68 KG/M2

## 2024-10-16 DIAGNOSIS — I48.11 LONGSTANDING PERSISTENT ATRIAL FIBRILLATION (MULTI): Primary | ICD-10-CM

## 2024-10-16 DIAGNOSIS — I10 PRIMARY HYPERTENSION: ICD-10-CM

## 2024-10-16 DIAGNOSIS — I50.32 CHRONIC HEART FAILURE WITH PRESERVED EJECTION FRACTION: ICD-10-CM

## 2024-10-16 DIAGNOSIS — I50.30 HEART FAILURE WITH PRESERVED EJECTION FRACTION, UNSPECIFIED HF CHRONICITY: ICD-10-CM

## 2024-10-16 DIAGNOSIS — I50.30 HEART FAILURE WITH PRESERVED EJECTION FRACTION, UNSPECIFIED HF CHRONICITY: Primary | ICD-10-CM

## 2024-10-16 PROCEDURE — 99214 OFFICE O/P EST MOD 30 MIN: CPT | Performed by: INTERNAL MEDICINE

## 2024-10-16 RX ORDER — FUROSEMIDE 40 MG/1
80 TABLET ORAL EVERY MORNING
Qty: 90 TABLET | Refills: 1 | Status: SHIPPED | OUTPATIENT
Start: 2024-10-16 | End: 2024-10-16 | Stop reason: SDUPTHER

## 2024-10-16 RX ORDER — FUROSEMIDE 80 MG/1
80 TABLET ORAL EVERY MORNING
Qty: 90 TABLET | Refills: 3 | Status: SHIPPED | OUTPATIENT
Start: 2024-10-16 | End: 2025-10-16

## 2024-10-16 ASSESSMENT — ENCOUNTER SYMPTOMS
SHORTNESS OF BREATH: 1
DEPRESSION: 0
CONSTITUTIONAL NEGATIVE: 1
ENDOCRINE NEGATIVE: 1
MUSCULOSKELETAL NEGATIVE: 1
EYES NEGATIVE: 1
COUGH: 1
OCCASIONAL FEELINGS OF UNSTEADINESS: 1
GASTROINTESTINAL NEGATIVE: 1
NEUROLOGICAL NEGATIVE: 1
FEVER: 0
CHILLS: 0
LOSS OF SENSATION IN FEET: 1

## 2024-10-16 ASSESSMENT — PAIN SCALES - GENERAL: PAINLEVEL_OUTOF10: 0-NO PAIN

## 2024-10-16 NOTE — ASSESSMENT & PLAN NOTE
She was in the hosp with pneumonia and with CHF  She is gaining wt and the O2 sat is down. jWill increase the lasix to BID for 3 days.  Than back to once a day.  She follows with  Dr Lozano and will have her see him in 2 weeks

## 2024-10-17 ENCOUNTER — PATIENT OUTREACH (OUTPATIENT)
Dept: CASE MANAGEMENT | Facility: HOSPITAL | Age: 84
End: 2024-10-17

## 2024-10-17 NOTE — PROGRESS NOTES
Heart Failure Nurse Navigator Transition of Care Phone Call    The role of the HF nurse navigator is to (1) characterize risk profiles of patients with heart failure transitioning from [id number] after hospitalization, (2) recommend interventions to improve care and reduce risks of worse post-hospitalization outcomes.     Assessment  Call attempted to patient .     HF Symptoms  Chest pain? No  Shortness of breath? none  Orthopnea? No  Paroxysmal nocturnal dyspnea? No  Edema? Yes - see below  Weight gain >2lbs in 3 days or 5lbs in 1 week? Yes - Seen via her Cardiologist yesterday, increased diuretic.    Medications  Is the patient prescribed the following medications?  ACEi/ARB/ARNi? Yes  BB? Yes  MRA? No  SGLT2i? Yes  Diuretic? Yes  Is the patient adherent to prescribed medications? YES    Management    Is the patient obtaining daily weights? YES  If yes, current weight? Today's weight not available  Is the patient following diet limitations (2-3g Na, fluid restriction)? YES  Does the patient have a  cardiology follow-up scheduled? YES  If yes, appointment details? Completed yesterday with Dr Hart  If no, what is the reason? See above  Does the patient require HF education reinforcement? Yes  If yes, what was discussed? Reinforced importance of performing daily weights & reviewed HF flare up symptoms to report.    Recommendations/Comments:  Spoke with Raúl reports she is feeling better, but noticed swelling in LLE. She notified Dr Hart. I will continue to follow.                 (HF nurse navigator contact information)

## 2024-10-21 ENCOUNTER — ANTICOAGULATION - WARFARIN VISIT (OUTPATIENT)
Dept: CARDIOLOGY | Facility: HOSPITAL | Age: 84
End: 2024-10-21
Payer: MEDICARE

## 2024-10-21 DIAGNOSIS — I48.11 LONGSTANDING PERSISTENT ATRIAL FIBRILLATION (MULTI): ICD-10-CM

## 2024-10-22 DIAGNOSIS — I10 PRIMARY HYPERTENSION: Primary | ICD-10-CM

## 2024-10-22 DIAGNOSIS — G25.81 RESTLESS LEGS: ICD-10-CM

## 2024-10-23 ENCOUNTER — PATIENT OUTREACH (OUTPATIENT)
Dept: CASE MANAGEMENT | Facility: HOSPITAL | Age: 84
End: 2024-10-23
Payer: MEDICARE

## 2024-10-23 RX ORDER — METOPROLOL SUCCINATE 50 MG/1
50 TABLET, EXTENDED RELEASE ORAL DAILY
Qty: 90 TABLET | Refills: 3 | Status: SHIPPED | OUTPATIENT
Start: 2024-10-23 | End: 2025-10-23

## 2024-10-23 RX ORDER — PRAMIPEXOLE DIHYDROCHLORIDE 1.5 MG/1
1.5 TABLET ORAL DAILY
Qty: 90 TABLET | Refills: 1 | Status: SHIPPED | OUTPATIENT
Start: 2024-10-23

## 2024-10-23 NOTE — PROGRESS NOTES
Heart Failure Nurse Navigator Transition of Care Phone Call    The role of the HF nurse navigator is to (1) characterize risk profiles of patients with heart failure transitioning from [id number] after hospitalization, (2) recommend interventions to improve care and reduce risks of worse post-hospitalization outcomes.     Assessment  Call attempted to patient .     HF Symptoms  Chest pain? No  Shortness of breath? none  Orthopnea? No  Paroxysmal nocturnal dyspnea? No  Edema? No  Weight gain >2lbs in 3 days or 5lbs in 1 week? No    Medications  Is the patient prescribed the following medications?  ACEi/ARB/ARNi? Yes  BB? Yes  MRA? Yes-error not on MRA  SGLT2i? Yes  Diuretic? Yes  Is the patient adherent to prescribed medications? YES    Management    Is the patient obtaining daily weights? YES  If yes, current weight? 216 lbs  Is the patient following diet limitations (2-3g Na, fluid restriction)? YES  Does the patient have a  cardiology follow-up scheduled? YES  If yes, appointment details? Completed 10/16/24 with Dr Hart. Next appointment 11/7/24 with Dr Lozano  If no, what is the reason? See above  Does the patient require HF education reinforcement? No  If yes, what was discussed? Reviewed HF flare up symptoms to report.    Recommendations/Comments:  Spoke with Raúl reports she is doing well manageing her CHF. Activity level is @ baseline. Raúl asked about getting portable oxygen tank. Instructed her to start with calling [facility]. I will continue to follow.                (HF nurse navigator contact information)

## 2024-10-25 ENCOUNTER — TELEPHONE (OUTPATIENT)
Dept: PRIMARY CARE | Facility: CLINIC | Age: 84
End: 2024-10-25
Payer: MEDICARE

## 2024-10-30 ENCOUNTER — HOSPITAL ENCOUNTER (OUTPATIENT)
Dept: CARDIOLOGY | Facility: CLINIC | Age: 84
Discharge: HOME | End: 2024-10-30
Payer: MEDICARE

## 2024-10-30 DIAGNOSIS — I44.2 COMPLETE HEART BLOCK: ICD-10-CM

## 2024-10-30 DIAGNOSIS — Z95.0 PACEMAKER: ICD-10-CM

## 2024-10-30 PROCEDURE — 93296 REM INTERROG EVL PM/IDS: CPT

## 2024-10-30 PROCEDURE — 93294 REM INTERROG EVL PM/LDLS PM: CPT | Performed by: INTERNAL MEDICINE

## 2024-10-31 ENCOUNTER — PATIENT OUTREACH (OUTPATIENT)
Dept: CASE MANAGEMENT | Facility: HOSPITAL | Age: 84
End: 2024-10-31
Payer: MEDICARE

## 2024-11-04 ENCOUNTER — PATIENT OUTREACH (OUTPATIENT)
Dept: PRIMARY CARE | Facility: CLINIC | Age: 84
End: 2024-11-04
Payer: MEDICARE

## 2024-11-06 ENCOUNTER — APPOINTMENT (OUTPATIENT)
Dept: CARDIOLOGY | Facility: HOSPITAL | Age: 84
End: 2024-11-06
Payer: MEDICARE

## 2024-11-06 NOTE — ASSESSMENT & PLAN NOTE
I placed order for lipid panel but do not see results. Dr. Ricketts will be checking labs in the next couple weeks.  Will add a fasting lipid panel.  Review on return.

## 2024-11-07 ENCOUNTER — OFFICE VISIT (OUTPATIENT)
Dept: CARDIOLOGY | Facility: CLINIC | Age: 84
End: 2024-11-07
Payer: MEDICARE

## 2024-11-07 ENCOUNTER — ANTICOAGULATION - WARFARIN VISIT (OUTPATIENT)
Dept: CARDIOLOGY | Facility: HOSPITAL | Age: 84
End: 2024-11-07
Payer: MEDICARE

## 2024-11-07 VITALS
BODY MASS INDEX: 39.15 KG/M2 | HEART RATE: 76 BPM | SYSTOLIC BLOOD PRESSURE: 126 MMHG | WEIGHT: 221 LBS | DIASTOLIC BLOOD PRESSURE: 70 MMHG

## 2024-11-07 DIAGNOSIS — E78.2 MIXED HYPERLIPIDEMIA: ICD-10-CM

## 2024-11-07 DIAGNOSIS — I10 PRIMARY HYPERTENSION: ICD-10-CM

## 2024-11-07 DIAGNOSIS — Z95.0 S/P PLACEMENT OF CARDIAC PACEMAKER: Primary | ICD-10-CM

## 2024-11-07 DIAGNOSIS — I48.11 LONGSTANDING PERSISTENT ATRIAL FIBRILLATION (MULTI): ICD-10-CM

## 2024-11-07 LAB
POC INR: 3.1
POC PROTHROMBIN TIME: NORMAL

## 2024-11-07 PROCEDURE — 85610 PROTHROMBIN TIME: CPT | Mod: QW

## 2024-11-07 PROCEDURE — 3078F DIAST BP <80 MM HG: CPT | Performed by: INTERNAL MEDICINE

## 2024-11-07 PROCEDURE — 99211 OFF/OP EST MAY X REQ PHY/QHP: CPT | Performed by: INTERNAL MEDICINE

## 2024-11-07 PROCEDURE — 99214 OFFICE O/P EST MOD 30 MIN: CPT | Performed by: INTERNAL MEDICINE

## 2024-11-07 PROCEDURE — 3074F SYST BP LT 130 MM HG: CPT | Performed by: INTERNAL MEDICINE

## 2024-11-07 PROCEDURE — 1126F AMNT PAIN NOTED NONE PRSNT: CPT | Performed by: INTERNAL MEDICINE

## 2024-11-07 PROCEDURE — 1036F TOBACCO NON-USER: CPT | Performed by: INTERNAL MEDICINE

## 2024-11-07 PROCEDURE — 1157F ADVNC CARE PLAN IN RCRD: CPT | Performed by: INTERNAL MEDICINE

## 2024-11-07 PROCEDURE — 1159F MED LIST DOCD IN RCRD: CPT | Performed by: INTERNAL MEDICINE

## 2024-11-07 ASSESSMENT — PAIN SCALES - GENERAL: PAINLEVEL_OUTOF10: 0-NO PAIN

## 2024-11-07 ASSESSMENT — ENCOUNTER SYMPTOMS
PALPITATIONS: 1
NUMBNESS: 0
HEMATURIA: 0
DYSPNEA ON EXERTION: 0
DEPRESSION: 0
COUGH: 0
SHORTNESS OF BREATH: 1
PARESTHESIAS: 0
BLURRED VISION: 0
LOSS OF SENSATION IN FEET: 0
DYSURIA: 0
OCCASIONAL FEELINGS OF UNSTEADINESS: 1
ABDOMINAL PAIN: 0

## 2024-11-07 NOTE — PROGRESS NOTES
Subjective   Raúl Jordan is a 84 y.o. female.    Chief Complaint:  Follow-up    HPI  Recent hospitalization with respiratory failure.  Now on home oxygen therapy.  Some mild fatigue and shortness of breath but gradually improving.  Scheduled to see a pulmonologist in the next couple weeks.    Review of Systems   Constitutional: Positive for malaise/fatigue.   HENT:  Negative for congestion.    Eyes:  Negative for blurred vision.   Cardiovascular:  Positive for palpitations. Negative for chest pain and dyspnea on exertion.   Respiratory:  Positive for shortness of breath. Negative for cough.    Musculoskeletal:  Negative for joint pain.   Gastrointestinal:  Negative for abdominal pain.   Genitourinary:  Negative for dysuria and hematuria.   Neurological:  Negative for numbness and paresthesias.       Objective   Constitutional:       Appearance: Not in distress.   Eyes:      Conjunctiva/sclera: Conjunctivae normal.   Neck:      Vascular: JVD normal.   Pulmonary:      Breath sounds: Normal breath sounds. No wheezing. No rhonchi. No rales.   Cardiovascular:      Normal rate. Irregularly irregular rhythm.      Murmurs: There is no murmur.      No gallop.  No click. No rub.   Abdominal:      Palpations: Abdomen is soft.   Neurological:      General: No focal deficit present.      Mental Status: Alert.         Lab Review:   Anticoagulation - Warfarin Visit on 11/07/2024   Component Date Value    POC INR 11/07/2024 3.10    No results displayed because visit has over 200 results.      Admission on 09/17/2024, Discharged on 09/17/2024   Component Date Value    WBC 09/17/2024 6.2     nRBC 09/17/2024 0.0     RBC 09/17/2024 3.70 (L)     Hemoglobin 09/17/2024 11.0 (L)     Hematocrit 09/17/2024 34.5 (L)     MCV 09/17/2024 93     MCH 09/17/2024 29.7     MCHC 09/17/2024 31.9 (L)     RDW 09/17/2024 14.8 (H)     Platelets 09/17/2024 137 (L)     Neutrophils % 09/17/2024 80.2     Immature Granulocytes %,* 09/17/2024 0.6      Lymphocytes % 09/17/2024 7.9     Monocytes % 09/17/2024 9.2     Eosinophils % 09/17/2024 1.3     Basophils % 09/17/2024 0.8     Neutrophils Absolute 09/17/2024 4.95     Immature Granulocytes Ab* 09/17/2024 0.04     Lymphocytes Absolute 09/17/2024 0.49 (L)     Monocytes Absolute 09/17/2024 0.57     Eosinophils Absolute 09/17/2024 0.08     Basophils Absolute 09/17/2024 0.05     Glucose 09/17/2024 105 (H)     Sodium 09/17/2024 141     Potassium 09/17/2024 3.1 (L)     Chloride 09/17/2024 100     Bicarbonate 09/17/2024 30     Anion Gap 09/17/2024 14     Urea Nitrogen 09/17/2024 32 (H)     Creatinine 09/17/2024 1.30 (H)     eGFR 09/17/2024 40 (L)     Calcium 09/17/2024 9.4    Anticoagulation - Warfarin Visit on 09/11/2024   Component Date Value    POC INR 09/11/2024 4.30        Assessment/Plan   The primary encounter diagnosis was S/P placement of cardiac pacemaker. Diagnoses of Longstanding persistent atrial fibrillation (Multi), Primary hypertension, and Mixed hyperlipidemia were also pertinent to this visit.    S/P placement of cardiac pacemaker  Dr. Sheth follows pacemaker    Hyperlipidemia  I placed order for lipid panel but do not see results. Dr. Ricketts will be checking labs in the next couple weeks.  Will add a fasting lipid panel.  Review on return.    Hypertension  Blood pressure currently well-controlled on the low-dose metoprolol and losartan.  No change necessary at this time.  Continue to follow on a regular basis.    Longstanding persistent atrial fibrillation (Multi)  Heart rate well-controlled with metoprolol succinate.  She has INRs checked at the anticoagulation clinic and levels have been relatively stable.

## 2024-11-07 NOTE — PROGRESS NOTES
Patient identification verified with 2 identifiers.    Location: [facility] - [facility]st Floor [facility] [address] [id number]    Referring Physician: DR Lozano  Enrollment/ Re-enrollment date: 2025   INR Goal: 2.0-3.0  INR monitoring is per [facility] protocol.  Anticoagulation Medication: warfarin  Indication: Atrial Fibrillation/Atrial Flutter    Subjective   Bleeding signs/symptoms: No    Bruising: No   Major bleeding event: No  Thrombosis signs/symptoms: No  Thromboembolic event: No  Missed doses: No  Extra doses: No  Medication changes: No  Dietary changes: No  Change in health: No  Change in activity: No  Alcohol: No  Other concerns: No    Upcoming Procedures:  Does the Patient Have any upcoming procedures that require interruption in anticoagulation therapy? no  Does the patient require bridging? no      Anticoagulation Summary  As of 11/7/2024      INR goal:  2.0-3.0   TTR:  67.4% (11.8 mo)   INR used for dosing:  3.10 (11/7/2024)   Weekly warfarin total:  19.5 mg               Assessment/Plan   Therapeutic     1. New dose:  dosing changed to reflect what she was taking since discahrge     2. Next INR: 1 month      Education provided to patient during the visit:  Patient instructed to call in interim with questions, concerns and changes.

## 2024-11-07 NOTE — ASSESSMENT & PLAN NOTE
Blood pressure currently well-controlled on the low-dose metoprolol and losartan.  No change necessary at this time.  Continue to follow on a regular basis.

## 2024-11-07 NOTE — ASSESSMENT & PLAN NOTE
Heart rate well-controlled with metoprolol succinate.  She has INRs checked at the anticoagulation clinic and levels have been relatively stable.

## 2024-12-04 DIAGNOSIS — I48.11 LONGSTANDING PERSISTENT ATRIAL FIBRILLATION (MULTI): ICD-10-CM

## 2024-12-04 RX ORDER — WARFARIN SODIUM 3 MG/1
3 TABLET ORAL EVERY EVENING
Qty: 90 TABLET | Refills: 1 | Status: ON HOLD | OUTPATIENT
Start: 2024-12-04 | End: 2025-06-02

## 2024-12-05 ENCOUNTER — APPOINTMENT (OUTPATIENT)
Dept: CARDIOLOGY | Facility: HOSPITAL | Age: 84
End: 2024-12-05
Payer: MEDICARE

## 2024-12-05 ENCOUNTER — ANTICOAGULATION - WARFARIN VISIT (OUTPATIENT)
Dept: CARDIOLOGY | Facility: HOSPITAL | Age: 84
End: 2024-12-05
Payer: MEDICARE

## 2024-12-05 DIAGNOSIS — I48.11 LONGSTANDING PERSISTENT ATRIAL FIBRILLATION (MULTI): ICD-10-CM

## 2024-12-08 ENCOUNTER — APPOINTMENT (OUTPATIENT)
Dept: CARDIOLOGY | Facility: HOSPITAL | Age: 84
DRG: 811 | End: 2024-12-08
Payer: MEDICARE

## 2024-12-08 ENCOUNTER — APPOINTMENT (OUTPATIENT)
Dept: RADIOLOGY | Facility: HOSPITAL | Age: 84
DRG: 811 | End: 2024-12-08
Payer: MEDICARE

## 2024-12-08 ENCOUNTER — HOSPITAL ENCOUNTER (INPATIENT)
Facility: HOSPITAL | Age: 84
End: 2024-12-08
Attending: INTERNAL MEDICINE | Admitting: INTERNAL MEDICINE
Payer: MEDICARE

## 2024-12-08 VITALS
HEART RATE: 75 BPM | WEIGHT: 210 LBS | BODY MASS INDEX: 37.21 KG/M2 | OXYGEN SATURATION: 95 % | SYSTOLIC BLOOD PRESSURE: 123 MMHG | DIASTOLIC BLOOD PRESSURE: 60 MMHG | RESPIRATION RATE: 20 BRPM | HEIGHT: 63 IN | TEMPERATURE: 97.9 F

## 2024-12-08 DIAGNOSIS — K92.1 MELENA: ICD-10-CM

## 2024-12-08 DIAGNOSIS — D50.9 MICROCYTIC ANEMIA: ICD-10-CM

## 2024-12-08 DIAGNOSIS — I50.9 CONGESTIVE HEART FAILURE, UNSPECIFIED HF CHRONICITY, UNSPECIFIED HEART FAILURE TYPE: ICD-10-CM

## 2024-12-08 DIAGNOSIS — K92.2 GASTROINTESTINAL HEMORRHAGE, UNSPECIFIED GASTROINTESTINAL HEMORRHAGE TYPE: ICD-10-CM

## 2024-12-08 DIAGNOSIS — R06.00 DYSPNEA, UNSPECIFIED TYPE: ICD-10-CM

## 2024-12-08 DIAGNOSIS — I48.92 ATRIAL FIBRILLATION AND FLUTTER: ICD-10-CM

## 2024-12-08 DIAGNOSIS — R60.0 LOWER LEG EDEMA: ICD-10-CM

## 2024-12-08 DIAGNOSIS — D64.9 ANEMIA, UNSPECIFIED TYPE: ICD-10-CM

## 2024-12-08 DIAGNOSIS — G25.81 RESTLESS LEG SYNDROME: ICD-10-CM

## 2024-12-08 DIAGNOSIS — D62 ANEMIA DUE TO ACUTE BLOOD LOSS: Primary | ICD-10-CM

## 2024-12-08 DIAGNOSIS — D64.9 ANEMIA, UNSPECIFIED TYPE: Primary | ICD-10-CM

## 2024-12-08 DIAGNOSIS — I48.91 ATRIAL FIBRILLATION AND FLUTTER: ICD-10-CM

## 2024-12-08 LAB
ALBUMIN SERPL BCP-MCNC: 4.1 G/DL (ref 3.4–5)
ALP SERPL-CCNC: 82 U/L (ref 33–136)
ALT SERPL W P-5'-P-CCNC: 14 U/L (ref 7–45)
ANION GAP SERPL CALCULATED.3IONS-SCNC: 12 MMOL/L (ref 10–20)
APPEARANCE UR: CLEAR
AST SERPL W P-5'-P-CCNC: 20 U/L (ref 9–39)
BASOPHILS # BLD AUTO: 0.05 X10*3/UL (ref 0–0.1)
BASOPHILS NFR BLD AUTO: 0.7 %
BILIRUB SERPL-MCNC: 1.2 MG/DL (ref 0–1.2)
BILIRUB UR STRIP.AUTO-MCNC: NEGATIVE MG/DL
BNP SERPL-MCNC: 461 PG/ML (ref 0–99)
BUN SERPL-MCNC: 35 MG/DL (ref 6–23)
CALCIUM SERPL-MCNC: 9.4 MG/DL (ref 8.6–10.3)
CARDIAC TROPONIN I PNL SERPL HS: 21 NG/L (ref 0–13)
CARDIAC TROPONIN I PNL SERPL HS: 25 NG/L (ref 0–13)
CHLORIDE SERPL-SCNC: 98 MMOL/L (ref 98–107)
CO2 SERPL-SCNC: 33 MMOL/L (ref 21–32)
COLOR UR: NORMAL
CREAT SERPL-MCNC: 1.62 MG/DL (ref 0.5–1.05)
EGFRCR SERPLBLD CKD-EPI 2021: 31 ML/MIN/1.73M*2
EOSINOPHIL # BLD AUTO: 0.04 X10*3/UL (ref 0–0.4)
EOSINOPHIL NFR BLD AUTO: 0.5 %
ERYTHROCYTE [DISTWIDTH] IN BLOOD BY AUTOMATED COUNT: 14.7 % (ref 11.5–14.5)
GLUCOSE BLD MANUAL STRIP-MCNC: 129 MG/DL (ref 74–99)
GLUCOSE BLD MANUAL STRIP-MCNC: 97 MG/DL (ref 74–99)
GLUCOSE SERPL-MCNC: 120 MG/DL (ref 74–99)
GLUCOSE UR STRIP.AUTO-MCNC: NORMAL MG/DL
HCT VFR BLD AUTO: 24.3 % (ref 36–46)
HEMOCCULT SP1 STL QL: POSITIVE
HGB BLD-MCNC: 7.3 G/DL (ref 12–16)
IMM GRANULOCYTES # BLD AUTO: 0.05 X10*3/UL (ref 0–0.5)
IMM GRANULOCYTES NFR BLD AUTO: 0.7 % (ref 0–0.9)
INR PPP: 1.7 (ref 0.9–1.2)
IRON SATN MFR SERPL: 5 % (ref 25–45)
IRON SERPL-MCNC: 25 UG/DL (ref 35–150)
KETONES UR STRIP.AUTO-MCNC: NEGATIVE MG/DL
LEUKOCYTE ESTERASE UR QL STRIP.AUTO: NEGATIVE
LYMPHOCYTES # BLD AUTO: 0.52 X10*3/UL (ref 0.8–3)
LYMPHOCYTES NFR BLD AUTO: 7.1 %
MCH RBC QN AUTO: 27.4 PG (ref 26–34)
MCHC RBC AUTO-ENTMCNC: 30 G/DL (ref 32–36)
MCV RBC AUTO: 91 FL (ref 80–100)
MONOCYTES # BLD AUTO: 0.79 X10*3/UL (ref 0.05–0.8)
MONOCYTES NFR BLD AUTO: 10.7 %
NEUTROPHILS # BLD AUTO: 5.91 X10*3/UL (ref 1.6–5.5)
NEUTROPHILS NFR BLD AUTO: 80.3 %
NITRITE UR QL STRIP.AUTO: NEGATIVE
NRBC BLD-RTO: 0 /100 WBCS (ref 0–0)
PH UR STRIP.AUTO: 6.5 [PH]
PLATELET # BLD AUTO: 181 X10*3/UL (ref 150–450)
POTASSIUM SERPL-SCNC: 3.4 MMOL/L (ref 3.5–5.3)
PROT SERPL-MCNC: 6.3 G/DL (ref 6.4–8.2)
PROT UR STRIP.AUTO-MCNC: NEGATIVE MG/DL
PROTHROMBIN TIME: 17.4 SECONDS (ref 9.3–12.7)
RBC # BLD AUTO: 2.66 X10*6/UL (ref 4–5.2)
RBC # UR STRIP.AUTO: NEGATIVE /UL
SODIUM SERPL-SCNC: 140 MMOL/L (ref 136–145)
SP GR UR STRIP.AUTO: 1.01
TIBC SERPL-MCNC: 467 UG/DL (ref 240–445)
UIBC SERPL-MCNC: 442 UG/DL (ref 110–370)
UROBILINOGEN UR STRIP.AUTO-MCNC: NORMAL MG/DL
WBC # BLD AUTO: 7.4 X10*3/UL (ref 4.4–11.3)

## 2024-12-08 PROCEDURE — 2500000001 HC RX 250 WO HCPCS SELF ADMINISTERED DRUGS (ALT 637 FOR MEDICARE OP): Performed by: INTERNAL MEDICINE

## 2024-12-08 PROCEDURE — 93010 ELECTROCARDIOGRAM REPORT: CPT | Performed by: INTERNAL MEDICINE

## 2024-12-08 PROCEDURE — 84484 ASSAY OF TROPONIN QUANT: CPT | Performed by: PHYSICIAN ASSISTANT

## 2024-12-08 PROCEDURE — 82947 ASSAY GLUCOSE BLOOD QUANT: CPT

## 2024-12-08 PROCEDURE — 81003 URINALYSIS AUTO W/O SCOPE: CPT | Performed by: PHYSICIAN ASSISTANT

## 2024-12-08 PROCEDURE — 83550 IRON BINDING TEST: CPT | Performed by: INTERNAL MEDICINE

## 2024-12-08 PROCEDURE — 2500000002 HC RX 250 W HCPCS SELF ADMINISTERED DRUGS (ALT 637 FOR MEDICARE OP, ALT 636 FOR OP/ED): Performed by: INTERNAL MEDICINE

## 2024-12-08 PROCEDURE — 2500000004 HC RX 250 GENERAL PHARMACY W/ HCPCS (ALT 636 FOR OP/ED): Performed by: INTERNAL MEDICINE

## 2024-12-08 PROCEDURE — 96374 THER/PROPH/DIAG INJ IV PUSH: CPT

## 2024-12-08 PROCEDURE — 71046 X-RAY EXAM CHEST 2 VIEWS: CPT

## 2024-12-08 PROCEDURE — 99223 1ST HOSP IP/OBS HIGH 75: CPT | Performed by: INTERNAL MEDICINE

## 2024-12-08 PROCEDURE — 85610 PROTHROMBIN TIME: CPT | Performed by: PHYSICIAN ASSISTANT

## 2024-12-08 PROCEDURE — 2500000004 HC RX 250 GENERAL PHARMACY W/ HCPCS (ALT 636 FOR OP/ED): Performed by: PHYSICIAN ASSISTANT

## 2024-12-08 PROCEDURE — 80053 COMPREHEN METABOLIC PANEL: CPT | Performed by: PHYSICIAN ASSISTANT

## 2024-12-08 PROCEDURE — 82270 OCCULT BLOOD FECES: CPT | Performed by: PHYSICIAN ASSISTANT

## 2024-12-08 PROCEDURE — 71046 X-RAY EXAM CHEST 2 VIEWS: CPT | Mod: FOREIGN READ | Performed by: RADIOLOGY

## 2024-12-08 PROCEDURE — 83036 HEMOGLOBIN GLYCOSYLATED A1C: CPT | Mod: TRILAB | Performed by: INTERNAL MEDICINE

## 2024-12-08 PROCEDURE — 93005 ELECTROCARDIOGRAM TRACING: CPT

## 2024-12-08 PROCEDURE — 36415 COLL VENOUS BLD VENIPUNCTURE: CPT | Performed by: PHYSICIAN ASSISTANT

## 2024-12-08 PROCEDURE — 2500000002 HC RX 250 W HCPCS SELF ADMINISTERED DRUGS (ALT 637 FOR MEDICARE OP, ALT 636 FOR OP/ED): Performed by: PHYSICIAN ASSISTANT

## 2024-12-08 PROCEDURE — 1100000001 HC PRIVATE ROOM DAILY

## 2024-12-08 PROCEDURE — 99285 EMERGENCY DEPT VISIT HI MDM: CPT | Mod: 25

## 2024-12-08 PROCEDURE — 83540 ASSAY OF IRON: CPT | Performed by: INTERNAL MEDICINE

## 2024-12-08 PROCEDURE — 85025 COMPLETE CBC W/AUTO DIFF WBC: CPT | Performed by: PHYSICIAN ASSISTANT

## 2024-12-08 PROCEDURE — 83880 ASSAY OF NATRIURETIC PEPTIDE: CPT | Performed by: PHYSICIAN ASSISTANT

## 2024-12-08 RX ORDER — LOSARTAN POTASSIUM 25 MG/1
25 TABLET ORAL DAILY
Status: DISPENSED | OUTPATIENT
Start: 2024-12-08

## 2024-12-08 RX ORDER — PRAMIPEXOLE DIHYDROCHLORIDE 1.5 MG/1
1.5 TABLET ORAL DAILY
Status: DISPENSED | OUTPATIENT
Start: 2024-12-08

## 2024-12-08 RX ORDER — ALBUTEROL SULFATE 0.83 MG/ML
3 SOLUTION RESPIRATORY (INHALATION) EVERY 6 HOURS PRN
Status: ACTIVE | OUTPATIENT
Start: 2024-12-08

## 2024-12-08 RX ORDER — PANTOPRAZOLE SODIUM 40 MG/10ML
40 INJECTION, POWDER, LYOPHILIZED, FOR SOLUTION INTRAVENOUS 2 TIMES DAILY
Status: DISPENSED | OUTPATIENT
Start: 2024-12-08

## 2024-12-08 RX ORDER — PANTOPRAZOLE SODIUM 40 MG/10ML
80 INJECTION, POWDER, LYOPHILIZED, FOR SOLUTION INTRAVENOUS ONCE
Status: COMPLETED | OUTPATIENT
Start: 2024-12-08 | End: 2024-12-08

## 2024-12-08 RX ORDER — POLYETHYLENE GLYCOL 3350 17 G/17G
17 POWDER, FOR SOLUTION ORAL DAILY PRN
Status: DISPENSED | OUTPATIENT
Start: 2024-12-08

## 2024-12-08 RX ORDER — ROPINIROLE 2 MG/1
2 TABLET, FILM COATED ORAL NIGHTLY
Status: DISPENSED | OUTPATIENT
Start: 2024-12-08

## 2024-12-08 RX ORDER — INSULIN LISPRO 100 [IU]/ML
0-10 INJECTION, SOLUTION INTRAVENOUS; SUBCUTANEOUS
Status: DISPENSED | OUTPATIENT
Start: 2024-12-09

## 2024-12-08 RX ORDER — FUROSEMIDE 10 MG/ML
60 INJECTION INTRAMUSCULAR; INTRAVENOUS ONCE
Status: COMPLETED | OUTPATIENT
Start: 2024-12-08 | End: 2024-12-08

## 2024-12-08 RX ORDER — TRAZODONE HYDROCHLORIDE 100 MG/1
200 TABLET ORAL NIGHTLY
Status: DISPENSED | OUTPATIENT
Start: 2024-12-08

## 2024-12-08 RX ORDER — SERTRALINE HYDROCHLORIDE 100 MG/1
100 TABLET, FILM COATED ORAL DAILY
Status: DISPENSED | OUTPATIENT
Start: 2024-12-08

## 2024-12-08 RX ORDER — LABETALOL HYDROCHLORIDE 5 MG/ML
20 INJECTION, SOLUTION INTRAVENOUS EVERY 4 HOURS PRN
Status: DISCONTINUED | OUTPATIENT
Start: 2024-12-08 | End: 2024-12-08

## 2024-12-08 RX ORDER — SIMVASTATIN 10 MG/1
10 TABLET, FILM COATED ORAL NIGHTLY
Status: DISPENSED | OUTPATIENT
Start: 2024-12-08

## 2024-12-08 RX ORDER — ACETAMINOPHEN 325 MG/1
650 TABLET ORAL EVERY 6 HOURS PRN
Status: ACTIVE | OUTPATIENT
Start: 2024-12-08

## 2024-12-08 RX ORDER — LABETALOL HYDROCHLORIDE 5 MG/ML
20 INJECTION, SOLUTION INTRAVENOUS EVERY 4 HOURS PRN
Status: ACTIVE | OUTPATIENT
Start: 2024-12-08

## 2024-12-08 RX ORDER — FUROSEMIDE 10 MG/ML
40 INJECTION INTRAMUSCULAR; INTRAVENOUS EVERY 12 HOURS
Status: DISPENSED | OUTPATIENT
Start: 2024-12-08

## 2024-12-08 RX ORDER — ROPINIROLE HYDROCHLORIDE 2 MG/1
2 TABLET, FILM COATED, EXTENDED RELEASE ORAL NIGHTLY
Status: DISCONTINUED | OUTPATIENT
Start: 2024-12-08 | End: 2024-12-08

## 2024-12-08 RX ORDER — METOPROLOL SUCCINATE 50 MG/1
50 TABLET, EXTENDED RELEASE ORAL DAILY
Status: DISPENSED | OUTPATIENT
Start: 2024-12-08

## 2024-12-08 RX ORDER — HYDROXYZINE PAMOATE 25 MG/1
25 CAPSULE ORAL ONCE
Status: COMPLETED | OUTPATIENT
Start: 2024-12-08 | End: 2024-12-08

## 2024-12-08 RX ADMIN — TRAZODONE HYDROCHLORIDE 200 MG: 100 TABLET ORAL at 20:44

## 2024-12-08 RX ADMIN — SERTRALINE 100 MG: 100 TABLET, FILM COATED ORAL at 23:50

## 2024-12-08 RX ADMIN — HYDROXYZINE PAMOATE 25 MG: 25 CAPSULE ORAL at 15:25

## 2024-12-08 RX ADMIN — POLYETHYLENE GLYCOL 3350 17 G: 17 POWDER, FOR SOLUTION ORAL at 20:43

## 2024-12-08 RX ADMIN — PANTOPRAZOLE SODIUM 40 MG: 40 INJECTION, POWDER, FOR SOLUTION INTRAVENOUS at 23:53

## 2024-12-08 RX ADMIN — FUROSEMIDE 40 MG: 10 INJECTION, SOLUTION INTRAMUSCULAR; INTRAVENOUS at 23:48

## 2024-12-08 RX ADMIN — METOPROLOL SUCCINATE 50 MG: 50 TABLET, EXTENDED RELEASE ORAL at 23:50

## 2024-12-08 RX ADMIN — FUROSEMIDE 60 MG: 10 INJECTION, SOLUTION INTRAMUSCULAR; INTRAVENOUS at 14:25

## 2024-12-08 RX ADMIN — PANTOPRAZOLE SODIUM 80 MG: 40 INJECTION, POWDER, FOR SOLUTION INTRAVENOUS at 15:25

## 2024-12-08 RX ADMIN — LOSARTAN POTASSIUM 25 MG: 25 TABLET, FILM COATED ORAL at 23:51

## 2024-12-08 RX ADMIN — SIMVASTATIN 10 MG: 10 TABLET, FILM COATED ORAL at 20:46

## 2024-12-08 SDOH — SOCIAL STABILITY: SOCIAL INSECURITY: DO YOU FEEL ANYONE HAS EXPLOITED OR TAKEN ADVANTAGE OF YOU FINANCIALLY OR OF YOUR PERSONAL PROPERTY?: NO

## 2024-12-08 SDOH — SOCIAL STABILITY: SOCIAL INSECURITY: DOES ANYONE TRY TO KEEP YOU FROM HAVING/CONTACTING OTHER FRIENDS OR DOING THINGS OUTSIDE YOUR HOME?: NO

## 2024-12-08 SDOH — HEALTH STABILITY: MENTAL HEALTH: SUICIDE ASSESSMENT: ADULT (C-SSRS)

## 2024-12-08 SDOH — SOCIAL STABILITY: SOCIAL INSECURITY
WITHIN THE LAST YEAR, HAVE YOU BEEN RAPED OR FORCED TO HAVE ANY KIND OF SEXUAL ACTIVITY BY YOUR PARTNER OR EX-PARTNER?: NO

## 2024-12-08 SDOH — HEALTH STABILITY: MENTAL HEALTH: HAVE YOU ACTUALLY HAD ANY THOUGHTS OF KILLING YOURSELF?: NO

## 2024-12-08 SDOH — ECONOMIC STABILITY: FOOD INSECURITY: WITHIN THE PAST 12 MONTHS, THE FOOD YOU BOUGHT JUST DIDN'T LAST AND YOU DIDN'T HAVE MONEY TO GET MORE.: NEVER TRUE

## 2024-12-08 SDOH — SOCIAL STABILITY: SOCIAL INSECURITY: DO YOU FEEL UNSAFE GOING BACK TO THE PLACE WHERE YOU ARE LIVING?: NO

## 2024-12-08 SDOH — ECONOMIC STABILITY: HOUSING INSECURITY: AT ANY TIME IN THE PAST 12 MONTHS, WERE YOU HOMELESS OR LIVING IN A SHELTER (INCLUDING NOW)?: NO

## 2024-12-08 SDOH — SOCIAL STABILITY: SOCIAL INSECURITY: HAS ANYONE EVER THREATENED TO HURT YOUR FAMILY OR YOUR PETS?: NO

## 2024-12-08 SDOH — SOCIAL STABILITY: SOCIAL INSECURITY: WITHIN THE LAST YEAR, HAVE YOU BEEN AFRAID OF YOUR PARTNER OR EX-PARTNER?: NO

## 2024-12-08 SDOH — SOCIAL STABILITY: SOCIAL INSECURITY: ARE YOU OR HAVE YOU BEEN THREATENED OR ABUSED PHYSICALLY, EMOTIONALLY, OR SEXUALLY BY ANYONE?: NO

## 2024-12-08 SDOH — ECONOMIC STABILITY: HOUSING INSECURITY: IN THE LAST 12 MONTHS, WAS THERE A TIME WHEN YOU WERE NOT ABLE TO PAY THE MORTGAGE OR RENT ON TIME?: NO

## 2024-12-08 SDOH — HEALTH STABILITY: MENTAL HEALTH: HAVE YOU EVER DONE ANYTHING, STARTED TO DO ANYTHING, OR PREPARED TO DO ANYTHING TO END YOUR LIFE?: NO

## 2024-12-08 SDOH — SOCIAL STABILITY: SOCIAL INSECURITY: HAVE YOU HAD THOUGHTS OF HARMING ANYONE ELSE?: NO

## 2024-12-08 SDOH — SOCIAL STABILITY: SOCIAL INSECURITY: WITHIN THE LAST YEAR, HAVE YOU BEEN HUMILIATED OR EMOTIONALLY ABUSED IN OTHER WAYS BY YOUR PARTNER OR EX-PARTNER?: NO

## 2024-12-08 SDOH — ECONOMIC STABILITY: TRANSPORTATION INSECURITY: IN THE PAST 12 MONTHS, HAS LACK OF TRANSPORTATION KEPT YOU FROM MEDICAL APPOINTMENTS OR FROM GETTING MEDICATIONS?: NO

## 2024-12-08 SDOH — ECONOMIC STABILITY: FOOD INSECURITY: WITHIN THE PAST 12 MONTHS, YOU WORRIED THAT YOUR FOOD WOULD RUN OUT BEFORE YOU GOT THE MONEY TO BUY MORE.: NEVER TRUE

## 2024-12-08 SDOH — SOCIAL STABILITY: SOCIAL INSECURITY: ARE THERE ANY APPARENT SIGNS OF INJURIES/BEHAVIORS THAT COULD BE RELATED TO ABUSE/NEGLECT?: NO

## 2024-12-08 SDOH — ECONOMIC STABILITY: INCOME INSECURITY: IN THE PAST 12 MONTHS HAS THE ELECTRIC, GAS, OIL, OR WATER COMPANY THREATENED TO SHUT OFF SERVICES IN YOUR HOME?: NO

## 2024-12-08 SDOH — SOCIAL STABILITY: SOCIAL INSECURITY: WERE YOU ABLE TO COMPLETE ALL THE BEHAVIORAL HEALTH SCREENINGS?: YES

## 2024-12-08 SDOH — HEALTH STABILITY: MENTAL HEALTH: HAVE YOU WISHED YOU WERE DEAD OR WISHED YOU COULD GO TO SLEEP AND NOT WAKE UP?: NO

## 2024-12-08 SDOH — ECONOMIC STABILITY: HOUSING INSECURITY: IN THE PAST 12 MONTHS, HOW MANY TIMES HAVE YOU MOVED WHERE YOU WERE LIVING?: 0

## 2024-12-08 SDOH — HEALTH STABILITY: MENTAL HEALTH: BEHAVIORAL HEALTH(WDL): WITHIN DEFINED LIMITS

## 2024-12-08 SDOH — SOCIAL STABILITY: SOCIAL INSECURITY: ABUSE: ADULT

## 2024-12-08 SDOH — SOCIAL STABILITY: SOCIAL INSECURITY: HAVE YOU HAD ANY THOUGHTS OF HARMING ANYONE ELSE?: NO

## 2024-12-08 SDOH — ECONOMIC STABILITY: FOOD INSECURITY: HOW HARD IS IT FOR YOU TO PAY FOR THE VERY BASICS LIKE FOOD, HOUSING, MEDICAL CARE, AND HEATING?: NOT HARD AT ALL

## 2024-12-08 ASSESSMENT — COLUMBIA-SUICIDE SEVERITY RATING SCALE - C-SSRS
6. HAVE YOU EVER DONE ANYTHING, STARTED TO DO ANYTHING, OR PREPARED TO DO ANYTHING TO END YOUR LIFE?: NO
2. HAVE YOU ACTUALLY HAD ANY THOUGHTS OF KILLING YOURSELF?: NO
2. HAVE YOU ACTUALLY HAD ANY THOUGHTS OF KILLING YOURSELF?: NO
1. IN THE PAST MONTH, HAVE YOU WISHED YOU WERE DEAD OR WISHED YOU COULD GO TO SLEEP AND NOT WAKE UP?: NO
6. HAVE YOU EVER DONE ANYTHING, STARTED TO DO ANYTHING, OR PREPARED TO DO ANYTHING TO END YOUR LIFE?: NO
1. IN THE PAST MONTH, HAVE YOU WISHED YOU WERE DEAD OR WISHED YOU COULD GO TO SLEEP AND NOT WAKE UP?: NO

## 2024-12-08 ASSESSMENT — ACTIVITIES OF DAILY LIVING (ADL)
TOILETING: INDEPENDENT
HEARING - RIGHT EAR: FUNCTIONAL
DRESSING YOURSELF: NEEDS ASSISTANCE
GROOMING: INDEPENDENT
FEEDING YOURSELF: INDEPENDENT
PATIENT'S MEMORY ADEQUATE TO SAFELY COMPLETE DAILY ACTIVITIES?: YES
ADEQUATE_TO_COMPLETE_ADL: YES
HEARING - LEFT EAR: FUNCTIONAL
BATHING: NEEDS ASSISTANCE
LACK_OF_TRANSPORTATION: NO
JUDGMENT_ADEQUATE_SAFELY_COMPLETE_DAILY_ACTIVITIES: YES
ASSISTIVE_DEVICE: WALKER;CANE
WALKS IN HOME: NEEDS ASSISTANCE

## 2024-12-08 ASSESSMENT — PAIN SCALES - GENERAL
PAINLEVEL_OUTOF10: 0 - NO PAIN
PAINLEVEL_OUTOF10: 0 - NO PAIN

## 2024-12-08 ASSESSMENT — COGNITIVE AND FUNCTIONAL STATUS - GENERAL
MOVING TO AND FROM BED TO CHAIR: A LITTLE
CLIMB 3 TO 5 STEPS WITH RAILING: A LOT
DRESSING REGULAR UPPER BODY CLOTHING: A LITTLE
DRESSING REGULAR UPPER BODY CLOTHING: A LITTLE
STANDING UP FROM CHAIR USING ARMS: A LITTLE
DAILY ACTIVITIY SCORE: 20
TURNING FROM BACK TO SIDE WHILE IN FLAT BAD: A LITTLE
TOILETING: A LITTLE
MOVING FROM LYING ON BACK TO SITTING ON SIDE OF FLAT BED WITH BEDRAILS: A LITTLE
DRESSING REGULAR LOWER BODY CLOTHING: A LITTLE
DAILY ACTIVITIY SCORE: 20
WALKING IN HOSPITAL ROOM: A LITTLE
TURNING FROM BACK TO SIDE WHILE IN FLAT BAD: A LITTLE
PATIENT BASELINE BEDBOUND: NO
MOBILITY SCORE: 17
HELP NEEDED FOR BATHING: A LITTLE
DRESSING REGULAR LOWER BODY CLOTHING: A LITTLE
MOVING TO AND FROM BED TO CHAIR: A LITTLE
CLIMB 3 TO 5 STEPS WITH RAILING: A LOT
TOILETING: A LITTLE
STANDING UP FROM CHAIR USING ARMS: A LITTLE
MOBILITY SCORE: 17
MOVING FROM LYING ON BACK TO SITTING ON SIDE OF FLAT BED WITH BEDRAILS: A LITTLE
HELP NEEDED FOR BATHING: A LITTLE
WALKING IN HOSPITAL ROOM: A LITTLE

## 2024-12-08 ASSESSMENT — PAIN - FUNCTIONAL ASSESSMENT
PAIN_FUNCTIONAL_ASSESSMENT: 0-10

## 2024-12-08 ASSESSMENT — PATIENT HEALTH QUESTIONNAIRE - PHQ9
1. LITTLE INTEREST OR PLEASURE IN DOING THINGS: NOT AT ALL
2. FEELING DOWN, DEPRESSED OR HOPELESS: NOT AT ALL
SUM OF ALL RESPONSES TO PHQ9 QUESTIONS 1 & 2: 0

## 2024-12-08 ASSESSMENT — ENCOUNTER SYMPTOMS
HEMATOLOGIC/LYMPHATIC NEGATIVE: 1
SINUS PRESSURE: 1
NERVOUS/ANXIOUS: 1
PALPITATIONS: 1
ALLERGIC/IMMUNOLOGIC NEGATIVE: 1
COUGH: 1
ARTHRALGIAS: 1
EYES NEGATIVE: 1
NEUROLOGICAL NEGATIVE: 1
CHILLS: 1
SHORTNESS OF BREATH: 1
ROS GI COMMENTS: DARK STOOL
FATIGUE: 1
ABDOMINAL PAIN: 1

## 2024-12-08 ASSESSMENT — LIFESTYLE VARIABLES
AUDIT-C TOTAL SCORE: 0
HOW OFTEN DO YOU HAVE 6 OR MORE DRINKS ON ONE OCCASION: NEVER
AUDIT-C TOTAL SCORE: 0
HOW OFTEN DO YOU HAVE A DRINK CONTAINING ALCOHOL: NEVER
HOW MANY STANDARD DRINKS CONTAINING ALCOHOL DO YOU HAVE ON A TYPICAL DAY: PATIENT DOES NOT DRINK
SKIP TO QUESTIONS 9-10: 1

## 2024-12-08 NOTE — ED PROVIDER NOTES
HPI   Chief Complaint   Patient presents with    Anxiety     Pt states has chf and cannot sleep at night due to not being able to lay down, is having some axiety also         84-year-old female presented emergency department with a chief complaint of shortness of breath, unable to lie flat.  Feels short of breath with minimal movement.  Denies lightheadedness dizziness.  Denies chest pain or pressure.  Nontoxic-appearing.  No other complaint.              Patient History   Past Medical History:   Diagnosis Date    Abnormal laboratory test result 09/01/2023    Anemia due to acute blood loss 09/01/2023    CHF (congestive heart failure)     Chill 09/01/2023    Diabetes mellitus (Multi)     Flank pain 09/01/2023    Hypertension     Hypoxemia 09/01/2023    Hypoxia 09/01/2023    Melena 09/01/2023    Other chronic pain 09/01/2023    Pneumonia 09/01/2023    Pyuria 09/01/2023     History reviewed. No pertinent surgical history.  Family History   Problem Relation Name Age of Onset    Leukemia Mother      Cancer Mother      Heart disease Father      Other (heart problems) Father       Social History     Tobacco Use    Smoking status: Never     Passive exposure: Never    Smokeless tobacco: Never   Vaping Use    Vaping status: Never Used   Substance Use Topics    Alcohol use: Never    Drug use: Never       Physical Exam   ED Triage Vitals [12/08/24 1156]   Temperature Heart Rate Respirations BP   36.5 °C (97.7 °F) 60 16 121/50      Pulse Ox Temp Source Heart Rate Source Patient Position   96 % Temporal Monitor Sitting      BP Location FiO2 (%)     Right arm --       Physical Exam  Vitals and nursing note reviewed.   Constitutional:       Appearance: Normal appearance.   HENT:      Head: Normocephalic.      Nose: Nose normal.   Cardiovascular:      Rate and Rhythm: Normal rate.   Pulmonary:      Effort: Pulmonary effort is normal.   Abdominal:      General: Abdomen is flat.   Musculoskeletal:         General: Normal range of  motion.   Skin:     General: Skin is warm.   Neurological:      General: No focal deficit present.      Mental Status: She is alert.   Psychiatric:         Mood and Affect: Mood normal.           ED Course & MDM   ED Course as of 12/17/24 2104   Sun Dec 08, 2024   1231 EKG personally turbid by me performed at 1208  Atrial fibrillation with a ventricular rate 69 occasional PVC no acute ischemic changes [EF]      ED Course User Index  [EF] Soniya Lopez, DO         Diagnoses as of 12/17/24 2104   Anemia, unspecified type   Gastrointestinal hemorrhage, unspecified gastrointestinal hemorrhage type   Congestive heart failure, unspecified HF chronicity, unspecified heart failure type   Dyspnea, unspecified type                 No data recorded     Harris Coma Scale Score: 15 (12/08/24 1201 : Kaylee Flynn RN)                           Medical Decision Making    I have seen and evaluated this patient.  Physician available for consultation.  Vital signs have been reviewed.  All laboratory and diagnostic imaging is reviewed by myself and interpreted by myself unless otherwise stated.  Additionally imaging is interpreted by radiologist.    Labs Reviewed  OCCULT BLOOD X1, STOOL - Abnormal     Occult Blood, Stool X1        Positive (*)            CBC WITH AUTO DIFFERENTIAL - Abnormal     WBC                           7.4                    nRBC                          0.0                    RBC                           2.66 (*)               Hemoglobin                    7.3 (*)                Hematocrit                    24.3 (*)               MCV                           91                     MCH                           27.4                   MCHC                          30.0 (*)               RDW                           14.7 (*)               Platelets                     181                    Neutrophils %                 80.3                   Immature Granulocytes %, Automated   0.7                     Lymphocytes %                 7.1                    Monocytes %                   10.7                   Eosinophils %                 0.5                    Basophils %                   0.7                    Neutrophils Absolute          5.91 (*)               Immature Granulocytes Absolute, Au*   0.05                   Lymphocytes Absolute          0.52 (*)               Monocytes Absolute            0.79                   Eosinophils Absolute          0.04                   Basophils Absolute            0.05                COMPREHENSIVE METABOLIC PANEL - Abnormal     Glucose                       120 (*)                Sodium                        140                    Potassium                     3.4 (*)                Chloride                      98                     Bicarbonate                   33 (*)                 Anion Gap                     12                     Urea Nitrogen                 35 (*)                 Creatinine                    1.62 (*)               eGFR                          31 (*)                 Calcium                       9.4                    Albumin                       4.1                    Alkaline Phosphatase          82                     Total Protein                 6.3 (*)                AST                           20                     Bilirubin, Total              1.2                    ALT                           14                  B-TYPE NATRIURETIC PEPTIDE - Abnormal     BNP                           461 (*)                  Narrative:    <100 pg/mL - Heart failure unlikely                100-299 pg/mL - Intermediate probability of acute heart                                failure exacerbation. Correlate with clinical                                context and patient history.                  >=300 pg/mL - Heart Failure likely. Correlate with clinical                                context and patient history.                                 BNP testing is performed using different testing methodology at [facility] than at other [facility]. Direct result comparisons should only be made within the same method.                   SERIAL TROPONIN-INITIAL - Abnormal     Troponin I, High Sensitivity   25 (*)                   Narrative: Less than 99th percentile of normal range cutoff-                Female and children under 18 years old <14 ng/L; Male <21 ng/L: Negative                Repeat testing should be performed if clinically indicated.                                 Female and children under 18 years old 14-50 ng/L; Male 21-50 ng/L:                Consistent with possible cardiac damage and possible increased clinical                 risk. Serial measurements may help to assess extent of myocardial damage.                                 >50 ng/L: Consistent with cardiac damage, increased clinical risk and                myocardial infarction. Serial measurements may help assess extent of                 myocardial damage.                                  NOTE: Children less than 1 year old may have higher baseline troponin                 levels and results should be interpreted in conjunction with the overall                 clinical context.                                 NOTE: Troponin I testing is performed using a different                 testing methodology at [facility] than at other                 [facility]. Direct result comparisons should only                 be made within the same method.  SERIAL TROPONIN, 1 HOUR - Abnormal     Troponin I, High Sensitivity   21 (*)                   Narrative: Less than 99th percentile of normal range cutoff-                Female and children under 18 years old <14 ng/L; Male <21 ng/L: Negative                Repeat testing should be performed if clinically indicated.                                 Female and children under 18 years old 14-50 ng/L; Male  21-50 ng/L:                Consistent with possible cardiac damage and possible increased clinical                 risk. Serial measurements may help to assess extent of myocardial damage.                                 >50 ng/L: Consistent with cardiac damage, increased clinical risk and                myocardial infarction. Serial measurements may help assess extent of                 myocardial damage.                                  NOTE: Children less than 1 year old may have higher baseline troponin                 levels and results should be interpreted in conjunction with the overall                 clinical context.                                 NOTE: Troponin I testing is performed using a different                 testing methodology at [facility] than at other                 [facility]. Direct result comparisons should only                 be made within the same method.  PROTIME-INR - Abnormal     Protime                       17.4 (*)               INR                           1.7 (*)                  Narrative: INR Therapeutic Range: 2.0-3.5  IRON AND TIBC - Abnormal     Iron                          25 (*)                 UIBC                          442 (*)                TIBC                          467 (*)                % Saturation                  5 (*)               CBC - Abnormal     WBC                           6.6                    nRBC                          0.0                    RBC                           2.44 (*)               Hemoglobin                    6.7 (*)                Hematocrit                    22.4 (*)               MCV                           92                     MCH                           27.5                   MCHC                          29.9 (*)               RDW                           15.0 (*)               Platelets                     153                 RENAL FUNCTION PANEL - Abnormal     Glucose                        83                     Sodium                        142                    Potassium                     3.0 (*)                Chloride                      99                     Bicarbonate                   34 (*)                 Anion Gap                     12                     Urea Nitrogen                 32 (*)                 Creatinine                    1.54 (*)               eGFR                          33 (*)                 Calcium                       8.5 (*)                Phosphorus                    4.1                    Albumin                       3.5                 RENAL FUNCTION PANEL - Abnormal     Glucose                       110 (*)                Sodium                        142                    Potassium                     3.2 (*)                Chloride                      100                    Bicarbonate                   35 (*)                 Anion Gap                     10                     Urea Nitrogen                 39 (*)                 Creatinine                    1.85 (*)               eGFR                          26 (*)                 Calcium                       8.7                    Phosphorus                    4.5                    Albumin                       3.7                 CBC - Abnormal     WBC                           6.5                    nRBC                          0.0                    RBC                           3.00 (*)               Hemoglobin                    8.4 (*)                Hematocrit                    27.4 (*)               MCV                           91                     MCH                           28.0                   MCHC                          30.7 (*)               RDW                           15.5 (*)               Platelets                     139 (*)             CBC - Abnormal     WBC                           6.7                    nRBC                          0.0                    RBC                            3.02 (*)               Hemoglobin                    8.3 (*)                Hematocrit                    27.5 (*)               MCV                           91                     MCH                           27.5                   MCHC                          30.2 (*)               RDW                           15.3 (*)               Platelets                     140 (*)             RENAL FUNCTION PANEL - Abnormal     Glucose                       92                     Sodium                        144                    Potassium                     3.1 (*)                Chloride                      99                     Bicarbonate                   35 (*)                 Anion Gap                     13                     Urea Nitrogen                 33 (*)                 Creatinine                    1.59 (*)               eGFR                          32 (*)                 Calcium                       8.6                    Phosphorus                    3.9                    Albumin                       3.6                 RENAL FUNCTION PANEL - Abnormal     Glucose                       89                     Sodium                        141                    Potassium                     3.2 (*)                Chloride                      100                    Bicarbonate                   34 (*)                 Anion Gap                     10                     Urea Nitrogen                 22                     Creatinine                    1.24 (*)               eGFR                          42 (*)                 Calcium                       8.6                    Phosphorus                    2.5                    Albumin                       3.6                 CBC WITH AUTO DIFFERENTIAL - Abnormal     WBC                           6.5                    nRBC                          0.0                    RBC                           3.03 (*)                Hemoglobin                    8.3 (*)                Hematocrit                    27.7 (*)               MCV                           91                     MCH                           27.4                   MCHC                          30.0 (*)               RDW                           15.2 (*)               Platelets                     138 (*)                Neutrophils %                 78.2                   Immature Granulocytes %, Automated   0.5                    Lymphocytes %                 8.4                    Monocytes %                   9.5                    Eosinophils %                 2.9                    Basophils %                   0.5                    Neutrophils Absolute          5.10                   Immature Granulocytes Absolute, Au*   0.03                   Lymphocytes Absolute          0.55 (*)               Monocytes Absolute            0.62                   Eosinophils Absolute          0.19                   Basophils Absolute            0.03                POCT GLUCOSE - Abnormal     POCT Glucose                  129 (*)             POCT GLUCOSE - Abnormal     POCT Glucose                  128 (*)             POCT GLUCOSE - Abnormal     POCT Glucose                  123 (*)             POCT GLUCOSE - Abnormal     POCT Glucose                  133 (*)             POCT GLUCOSE - Abnormal     POCT Glucose                  107 (*)             POCT GLUCOSE - Abnormal     POCT Glucose                  132 (*)             POCT GLUCOSE - Abnormal     POCT Glucose                  119 (*)             POCT GLUCOSE - Abnormal     POCT Glucose                  122 (*)             POCT GLUCOSE - Abnormal     POCT Glucose                  105 (*)             URINALYSIS WITH REFLEX CULTURE AND MICROSCOPIC - Normal     Color, Urine                                         Appearance, Urine             Clear                  Specific Gravity, Urine       1.007                  pH,  Urine                     6.5                    Protein, Urine                NEGATIVE                Glucose, Urine                Normal                 Blood, Urine                  NEGATIVE                Ketones, Urine                NEGATIVE                Bilirubin, Urine              NEGATIVE                Urobilinogen, Urine           Normal                 Nitrite, Urine                NEGATIVE                Leukocyte Esterase, Urine     NEGATIVE             MAGNESIUM - Normal     Magnesium                     2.06                MAGNESIUM - Normal     Magnesium                     2.13                MAGNESIUM - Normal     Magnesium                     2.07                POCT GLUCOSE - Normal     POCT Glucose                  97                  POCT GLUCOSE - Normal     POCT Glucose                  93                  POCT GLUCOSE - Normal     POCT Glucose                  97                  POCT GLUCOSE - Normal     POCT Glucose                  94                  POCT GLUCOSE - Normal     POCT Glucose                  97                  URINALYSIS WITH REFLEX CULTURE AND MICROSCOPIC       Narrative: The following orders were created for panel order Urinalysis with Reflex Culture and Microscopic.                Procedure                               Abnormality         Status                                   ---------                               -----------         ------                                   Urinalysis with Reflex C...[[id number]]  Normal              Final result                             Extra Urine Gray Tube[[id number]]                            Final result                                             Please view results for these tests on the individual orders.  TROPONIN SERIES- (INITIAL, 1 HR)       Narrative: The following orders were created for panel order Troponin Series, (0, 1 HR).                Procedure                               Abnormality         Status                                    ---------                               -----------         ------                                   Troponin I, High Sensiti...[[id number]]  Abnormal            Final result                             Troponin, High Sensitivi...[[id number]]  Abnormal            Final result                                             Please view results for these tests on the individual orders.  EXTRA URINE GRAY TUBE     Extra Tube                                        HEMOGLOBIN A1C     Hemoglobin A1C                5.5                    Estimated Average Glucose     111                      Narrative: Diagnosis of Diabetes-Adults                Non-Diabetic: < or = 5.6%                Increased risk for developing diabetes: 5.7-6.4%                Diagnostic of diabetes: > or = 6.5%                                  TYPE AND SCREEN     ABO TYPE                      O                      Rh TYPE                       POS                    ANTIBODY SCREEN               NEG                 VERAB/VERIFY ABORH     ABO TYPE                      O                      Rh TYPE                       POS                 PREPARE RBC     PRODUCT CODE                  [id number]                Unit Number                                          Unit ABO                      O                      Unit RH                       NEG                    XM INTEP                      COMP                   Dispense Status               TR                     Blood Expiration Date                                PRODUCT BLOOD TYPE            9500                   UNIT VOLUME                   350                    PRODUCT CODE                  [id number]                Unit Number                                          Unit ABO                      O                      Unit RH                       POS                    XM INTEP                      COMP                   Dispense Status               TR                      Blood Expiration Date                                PRODUCT BLOOD TYPE            5100                   UNIT VOLUME                   350                 SURGICAL PATHOLOGY EXAM     Case Report                                          FINAL DIAGNOSIS                                                                                           Clinical History                                     Gross Description                                 SURGICAL PATHOLOGY EXAM  Lower extremity venous duplex left   Final Result    No evidence for deep venous thrombosis within the limits of this    examination. Left popliteal fossa cyst.          MACRO:    none          Signed by: Daniel Ny 12/9/2024 8:47 AM    Dictation workstation:   [id number]     XR chest 2 views   Final Result    Congestive heart failure.    Signed by Ashish Viera MD     Medications  furosemide (Lasix) injection 60 mg (60 mg intravenous Given 12/8/24 1425)  pantoprazole (ProtoNix) injection 80 mg (80 mg intravenous Given 12/8/24 1525)  hydrOXYzine pamoate (Vistaril) capsule 25 mg (25 mg oral Given 12/8/24 1525)   iron polysaccharides (Nu-Iron,Niferex) capsule 150 mg (150 mg oral Given 12/12/24 1115)  polyethylene glycol-electrolytes (Nulytely) solution 4,000 mL (4,000 mL oral Given 12/11/24 1711)  Discharge Medication List as of 12/12/2024  1:39 PM    START taking these medications    apixaban (Eliquis) 5 mg tablet  Take 1 tablet (5 mg) by mouth 2 times a day. Do not start before December 20, 2024., Starting Fri 12/20/2024, Until Sun 1/19/2025, Normal    ferrous gluconate (Fergon) 324 (38 Fe) mg tablet  Take 1 tablet (38 mg of iron) by mouth once daily with breakfast., Starting Thu 12/12/2024, Until Sat 1/11/2025, Normal    rOPINIRole (Requip) 2 mg tablet  Take 1 tablet (2 mg) by mouth once daily at bedtime., Starting Thu 12/12/2024, Until Sat 1/11/2025, Normal                  Procedure  Procedures     Mahendra Koch PA-C  12/17/24 8309

## 2024-12-08 NOTE — NURSING NOTE
Patient arrived to unit from ED. Oriented to room and surroundings, call light within reach. Family at bedside.

## 2024-12-08 NOTE — H&P
Chief Complaint   Patient presents with   • Anxiety     Pt states has chf and cannot sleep at night due to not being able to lay down, is having some axiety also       HPI    Raúl Jordan is a 84 y.o. female with a PMHx of CHF, A-fib on warfarin, chronic hypoxic respiratory failure on 2 L at baseline, DM, HTN, hemorrhoids, Restless leg syndrome, OA  who presented to [facility] on 12/8/2024  with a chief complaint of Chest discomfort and shortness of breath.  Patient was accompanied by her daughters who helped providing the history.  She also stated feeling anxious for the last 3 days, with occasional palpitation and was not able to sleep. She endorsed having occasional palpitations. She stated that her restless leg syndrome was not controlled over the last 3 days despite taking medications as prescribed.  Her chest pain was dull pain, that was intermittent, 7/10 in severity at its peak and down to 0 in between, dull, nonpositional, nonradiating, nothing seemed to improve it or worsen it, not associated with any other symptoms.  The pain is mainly substernal to upper epigastric.  She denied having similar pain in the past.  She was also having shortness of breath and orthopnea; despite using 2 pillows which she is her baseline, she was still having difficulty with breathing.  She has also noticed a few pounds weight gain.  She also endorsed having darker stool multiple times lately.  She also noticed having leg swelling bilaterally, but more on the left than right.  She also has dry cough in the morning which has been chronic.  She has runny nose which is likely due to her continuous oxygen use. She denies any headaches, change in vision, difficulty swallowing, change in hearing, fever, abdominal pain,  change in urine, weakness in any of the extremities except in her left hand due to her arthritis. She lives with her daughter , She is compliant with her medications and feels safe at home.     ROS:  10 point review of systems negative with the exception of above.    ED Course:    ED Triage Vitals [12/08/24 1156]   Temp Heart Rate Resp BP   36.5 °C (97.7 °F) 60 16 121/50      SpO2 Temp Source Heart Rate Source Patient Position   96 % Temporal Monitor Sitting      BP Location FiO2 (%)     Right arm --         Labs:  Abnormal Labs Reviewed   OCCULT BLOOD X1, STOOL - Abnormal; Notable for the following components:       Result Value    Occult Blood, Stool X1 Positive (*)     All other components within normal limits   CBC WITH AUTO DIFFERENTIAL - Abnormal; Notable for the following components:    RBC 2.66 (*)     Hemoglobin 7.3 (*)     Hematocrit 24.3 (*)     MCHC 30.0 (*)     RDW 14.7 (*)     Neutrophils Absolute 5.91 (*)     Lymphocytes Absolute 0.52 (*)     All other components within normal limits   COMPREHENSIVE METABOLIC PANEL - Abnormal; Notable for the following components:    Glucose 120 (*)     Potassium 3.4 (*)     Bicarbonate 33 (*)     Urea Nitrogen 35 (*)     Creatinine 1.62 (*)     eGFR 31 (*)     Total Protein 6.3 (*)     All other components within normal limits   B-TYPE NATRIURETIC PEPTIDE - Abnormal; Notable for the following components:     (*)     All other components within normal limits    Narrative:        <100 pg/mL - Heart failure unlikely  100-299 pg/mL - Intermediate probability of acute heart                  failure exacerbation. Correlate with clinical                  context and patient history.    >=300 pg/mL - Heart Failure likely. Correlate with clinical                  context and patient history.    BNP testing is performed using different testing methodology at [facility] than at other [facility] hospitals. Direct result comparisons should only be made within the same method.      SERIAL TROPONIN-INITIAL - Abnormal; Notable for the following components:    Troponin I, High Sensitivity 25 (*)     All other components within normal limits    Narrative:     Less  than 99th percentile of normal range cutoff-  Female and children under 18 years old <14 ng/L; Male <21 ng/L: Negative  Repeat testing should be performed if clinically indicated.     Female and children under 18 years old 14-50 ng/L; Male 21-50 ng/L:  Consistent with possible cardiac damage and possible increased clinical   risk. Serial measurements may help to assess extent of myocardial damage.     >50 ng/L: Consistent with cardiac damage, increased clinical risk and  myocardial infarction. Serial measurements may help assess extent of   myocardial damage.      NOTE: Children less than 1 year old may have higher baseline troponin   levels and results should be interpreted in conjunction with the overall   clinical context.     NOTE: Troponin I testing is performed using a different   testing methodology at [facility] than at other   [facility]. Direct result comparisons should only   be made within the same method.   SERIAL TROPONIN, 1 HOUR - Abnormal; Notable for the following components:    Troponin I, High Sensitivity 21 (*)     All other components within normal limits    Narrative:     Less than 99th percentile of normal range cutoff-  Female and children under 18 years old <14 ng/L; Male <21 ng/L: Negative  Repeat testing should be performed if clinically indicated.     Female and children under 18 years old 14-50 ng/L; Male 21-50 ng/L:  Consistent with possible cardiac damage and possible increased clinical   risk. Serial measurements may help to assess extent of myocardial damage.     >50 ng/L: Consistent with cardiac damage, increased clinical risk and  myocardial infarction. Serial measurements may help assess extent of   myocardial damage.      NOTE: Children less than 1 year old may have higher baseline troponin   levels and results should be interpreted in conjunction with the overall   clinical context.     NOTE: Troponin I testing is performed using a different   testing  methodology at [facility] than at other   [facility]. Direct result comparisons should only   be made within the same method.   PROTIME-INR - Abnormal; Notable for the following components:    Protime 17.4 (*)     INR 1.7 (*)     All other components within normal limits    Narrative:     INR Therapeutic Range: 2.0-3.5        No orders to display       XR chest 2 views   Final Result   Congestive heart failure.   Signed by Ashish Viera MD      Lower extremity venous duplex left    (Results Pending)     XR chest 2 views   Final Result   Congestive heart failure.   Signed by Ashish Viera MD              Intervention: In ED, patient received   Medications   polyethylene glycol (Glycolax, Miralax) packet 17 g (has no administration in time range)   furosemide (Lasix) injection 60 mg (60 mg intravenous Given 12/8/24 1425)   pantoprazole (ProtoNix) injection 80 mg (80 mg intravenous Given 12/8/24 1525)   hydrOXYzine pamoate (Vistaril) capsule 25 mg (25 mg oral Given 12/8/24 1525)      Patient was then transferred to the floor for further management      Meds:   Current Discharge Medication List          Follows up with Dr. Beverly Ricketts MD      Past Medical History     Past Medical History:   Diagnosis Date   • Abnormal laboratory test result 09/01/2023   • Anemia due to acute blood loss 09/01/2023   • CHF (congestive heart failure)    • Chill 09/01/2023   • Diabetes mellitus (Multi)    • Flank pain 09/01/2023   • Hypertension    • Hypoxemia 09/01/2023   • Hypoxia 09/01/2023   • Melena 09/01/2023   • Other chronic pain 09/01/2023   • Pneumonia 09/01/2023   • Pyuria 09/01/2023      Surgical History   History reviewed. No pertinent surgical history.  Family History     Family History   Problem Relation Name Age of Onset   • Leukemia Mother     • Cancer Mother     • Heart disease Father     • Other (heart problems) Father       Social History     Tobacco Use: Low Risk  (12/8/2024)    Patient  "History    • Smoking Tobacco Use: Never    • Smokeless Tobacco Use: Never    • Passive Exposure: Never      Social History     Substance and Sexual Activity   Alcohol Use Never      Allergies     Allergies   Allergen Reactions   • Penicillin V Itching   • Sulfamethoxazole-Trimethoprim Shortness of breath and Other     Abdominal pain   • Levofloxacin Swelling   • Oxycodone-Acetaminophen Unknown   • Penicillin Unknown     paralysis      Meds    Scheduled medications     Continuous medications     PRN medications  PRN medications: polyethylene glycol   Objective     Vitals  Visit Vitals  /58 (BP Location: Left arm, Patient Position: Lying)   Pulse 69   Temp 36.6 °C (97.9 °F) (Temporal)   Resp 22   Ht 1.6 m (5' 3\")   Wt 95.3 kg (210 lb)   SpO2 96%   BMI 37.20 kg/m²   OB Status Postmenopausal   Smoking Status Never   BSA 2.06 m²        Review of Systems   Constitutional:  Positive for chills (Patient stated she always have chills) and fatigue.   HENT:  Positive for congestion, nosebleeds (minimal with NC) and sinus pressure.    Eyes: Negative.    Respiratory:  Positive for cough (Chronic dry cough) and shortness of breath.    Cardiovascular:  Positive for chest pain, palpitations and leg swelling.   Gastrointestinal:  Positive for abdominal pain (Possible lower substernal to upper epigastric).        Dark stool   Genitourinary: Negative.    Musculoskeletal:  Positive for arthralgias.   Skin: Negative.    Allergic/Immunologic: Negative.    Neurological: Negative.    Hematological: Negative.    Psychiatric/Behavioral:  The patient is nervous/anxious.      Temp:  [36.5 °C (97.7 °F)-36.6 °C (97.9 °F)] 36.6 °C (97.9 °F)  Heart Rate:  [60-75] 69  Resp:  [15-24] 22  BP: (112-135)/(50-90) 121/58  No intake/output data recorded.  No intake/output data recorded.  Physical Exam  Constitutional:       General: She is in acute distress.      Appearance: She is obese.   HENT:      Head: Normocephalic and atraumatic.      Nose: " "Nose normal.      Mouth/Throat:      Mouth: Mucous membranes are moist.      Pharynx: Oropharynx is clear.   Eyes:      Extraocular Movements: Extraocular movements intact.      Conjunctiva/sclera: Conjunctivae normal.      Pupils: Pupils are equal, round, and reactive to light.   Cardiovascular:      Rate and Rhythm: Normal rate. Rhythm irregular.      Pulses: Normal pulses.      Heart sounds: Normal heart sounds.   Pulmonary:      Effort: Pulmonary effort is normal.      Breath sounds: Rhonchi present.   Abdominal:      General: Abdomen is flat. Bowel sounds are normal.      Palpations: Abdomen is soft.   Musculoskeletal:         General: Normal range of motion.      Cervical back: Normal range of motion and neck supple.   Skin:     General: Skin is warm and dry.   Neurological:      General: No focal deficit present.      Mental Status: She is alert and oriented to person, place, and time. Mental status is at baseline.   Psychiatric:         Mood and Affect: Mood normal.         Behavior: Behavior normal.         Thought Content: Thought content normal.         Judgment: Judgment normal.       Principal Problem:    Anemia, unspecified type          I/Os  No intake or output data in the 24 hours ending 12/08/24 1716      Labs:   Results from last 72 hours   Lab Units 12/08/24  1237   SODIUM mmol/L 140   POTASSIUM mmol/L 3.4*   CHLORIDE mmol/L 98   CO2 mmol/L 33*   BUN mg/dL 35*   CREATININE mg/dL 1.62*   GLUCOSE mg/dL 120*   CALCIUM mg/dL 9.4   ANION GAP mmol/L 12   EGFR mL/min/1.73m*2 31*      Results from last 72 hours   Lab Units 12/08/24  1237   WBC AUTO x10*3/uL 7.4   HEMOGLOBIN g/dL 7.3*   HEMATOCRIT % 24.3*   PLATELETS AUTO x10*3/uL 181   NEUTROS PCT AUTO % 80.3   LYMPHS PCT AUTO % 7.1   MONOS PCT AUTO % 10.7   EOS PCT AUTO % 0.5      Lab Results   Component Value Date    CALCIUM 9.4 12/08/2024    PHOS 3.3 02/05/2020      No results found for: \"CRP\"   [unfilled]     Micro/ID:   No results found for the " "last 90 days.                   No lab exists for component: \"AGALPCRNB\"   .ID  Lab Results   Component Value Date    URINECULTURE No significant growth 09/23/2024    BLOODCULT No growth at 4 days -  FINAL REPORT 09/23/2024    BLOODCULT No growth at 4 days -  FINAL REPORT 09/23/2024     Images    XR chest 2 views  Narrative: STUDY:  Chest Radiographs;  12/8/2024 1:45PM  INDICATION:  Shortness of breath.  COMPARISON:  10/8/2024 XR Chest, 9/26/2024 XR Chest.  ACCESSION NUMBER(S):  [id number]  ORDERING CLINICIAN:  MANNIE WELCH  TECHNIQUE:  Frontal and lateral chest.   FINDINGS:  There is vascular congestion.  Heart size is enlarged.  No  pneumothorax is noted.  A loop recorder is visualized.  Impression: Congestive heart failure.  Signed by Ashish Viera MD    Assessment and Plan    Raúl Jordan is a 84 y.o. female with PMH of CHF, A-fib on warfarin, chronic hypoxic respiratory failure on 2 L at baseline, DM, HTN, hemorrhoids, Restless leg syndrome, OA  who presented to [facility] on 12/8/2024  with a chief complaint of Chest discomfort and shortness of breath.      Acute Medical Issues   # Acute on chronic hypoxic respiratory failure:  #CHF exacerbation (HFpEF)  -Patient is on 2 L at baseline, currently on 3 L with orthopnea.  -BNP in the ED;461, CXR; Congestive heart failure   -Daily weights  -Strict I/O  -1500cc fluid restriction  -Cardiac diet (low Na)  -Lasix 40 mg IV BID   -Last TTE: 9/27, normal EF with impaired relaxation.    #Anemia   #Concern for upper GI bleed:  - Last HB 9.8 two months ago, today 7.3, patient had dark stool multiple times lately.  - Protonix 80mg IV once then 40mg IV BID  - clear liquid diet and then NPO after midnight  - Fluid resuscitation, as needed  - Monitor Hgb, transfuse if < 7.0, consent obtained   -GI consulted; appreciate recs.      #Chest pain (Resolved):  - EKG with no ischemic changes.  - Troponin; 25-->21  - Less likely of cardiac origin, pain subsided as " well, will continue to monitor.      #Worsening Restless leg syndrome:  - Patient stated her restless leg syndrome was recently bothering her and was not controlled by pramipexole.  - Patient was in distress during the interview.  - Will continue pramipexole 1.5 mg daily, will add Requip 2 mg nightly, will check iron stores and replete as needed.  - Consult neurology; appreciate recs.        #JC on CKD:  -On admission, serum creat 1.62, baseline 1.2-1.35.  -Likely cardiorenal in the setting of CHF exacerbation.  -Continue to monitor, avoid nephrotoxic meds.      #Rule out DVT:  - Left leg circumference bigger than the right.  - Duplex US ordered.    Chronic Medical Issues   #HTN/AFIB:  -Rate controlled, continue metoprolol succinate 50 mg daily  -Hold  losartan 25 mg (for JC) and warfarin for increased GI bleed risk.  -Labetalol PRN with parameters.    # Depression/anxiety/insomnia:  -Continue sertraline 100 mg daily and trazodone 200 mg nightly.    # HLD:  -Continue simvastatin 10 mg daily.    #DM:  -Patient stated she has DM but there was nothing on med recs for diabetes.  -Sliding scale and diabetic diet  -Last HbA1c 6 from 6 months ago, we will recheck HbA1c.      F: PO intake & IVF PRN  E: Replete as needed  N: Clear liquid cardiac, consistent carb  GI ppx: Protonix 40 mg IV twice daily   DVT ppx: SCD, no pharmacological due to GI bleed.  Antibiotics: None  Oxygenation: 3 L NC    Code Status: DNR and No Intubation, confirmed with the patient and her daughters at bedside.  Emergency Contact: Extended Emergency Contact Information  Primary Emergency Contact: Jazz Woods  Home Phone: [phone]  Relation: None  Secondary Emergency Contact: MitratrentonRoma  Home Phone: [phone]  Relation: None     Disposition: 84 y.o.female admitted for acute hypoxic respiratory failure secondary to CHF exacerbation, acute anemia with concern for GI bleed, anticipate LOS > 2 midnights.         Deb LEE  Max  Internal Medicine, Hospitalist   [facility]  [place]

## 2024-12-08 NOTE — CARE PLAN
Problem: Diabetes  Goal: Achieve decreasing blood glucose levels by end of shift  Outcome: Progressing  Goal: Increase stability of blood glucose readings by end of shift  Outcome: Progressing  Goal: Decrease in ketones present in urine by end of shift  Outcome: Progressing  Goal: Maintain electrolyte levels within acceptable range throughout shift  Outcome: Progressing  Goal: Maintain glucose levels >70mg/dl to <250mg/dl throughout shift  Outcome: Progressing  Goal: No changes in neurological exam by end of shift  Outcome: Progressing  Goal: Learn about and adhere to nutrition recommendations by end of shift  Outcome: Progressing  Goal: Vital signs within normal range for age by end of shift  Outcome: Progressing  Goal: Increase self care and/or family involovement by end of shift  Outcome: Progressing  Goal: Receive DSME education by end of shift  Outcome: Progressing     Problem: Pain - Adult  Goal: Verbalizes/displays adequate comfort level or baseline comfort level  Outcome: Progressing     Problem: Safety - Adult  Goal: Free from fall injury  Outcome: Progressing     Problem: Discharge Planning  Goal: Discharge to home or other facility with appropriate resources  Outcome: Progressing     Problem: Chronic Conditions and Co-morbidities  Goal: Patient's chronic conditions and co-morbidity symptoms are monitored and maintained or improved  Outcome: Progressing     Problem: Fall/Injury  Goal: Not fall by end of shift  Outcome: Progressing  Goal: Be free from injury by end of the shift  Outcome: Progressing  Goal: Verbalize understanding of personal risk factors for fall in the hospital  Outcome: Progressing  Goal: Verbalize understanding of risk factor reduction measures to prevent injury from fall in the home  Outcome: Progressing  Goal: Use assistive devices by end of the shift  Outcome: Progressing  Goal: Pace activities to prevent fatigue by end of the shift  Outcome: Progressing     Problem:  Respiratory  Goal: Clear secretions with interventions this shift  Outcome: Progressing  Goal: Minimize anxiety/maximize coping throughout shift  Outcome: Progressing  Goal: Minimal/no exertional discomfort or dyspnea this shift  Outcome: Progressing  Goal: No signs of respiratory distress (eg. Use of accessory muscles. Peds grunting)  Outcome: Progressing  Goal: Patent airway maintained this shift  Outcome: Progressing  Goal: Tolerate mechanical ventilation evidenced by VS/agitation level this shift  Outcome: Progressing  Goal: Tolerate pulmonary toileting this shift  Outcome: Progressing  Goal: Verbalize decreased shortness of breath this shift  Outcome: Progressing  Goal: Wean oxygen to maintain O2 saturation per order/standard this shift  Outcome: Progressing  Goal: Increase self care and/or family involvement in next 24 hours  Outcome: Progressing     Problem: Deep Vein Thrombosis  Goal: I will remain free from complications of deep vein thrombosis and maintain current level of mobility  Outcome: Progressing     Problem: Pain  Goal: Takes deep breaths with improved pain control throughout the shift  Outcome: Progressing  Goal: Turns in bed with improved pain control throughout the shift  Outcome: Progressing  Goal: Walks with improved pain control throughout the shift  Outcome: Progressing  Goal: Performs ADL's with improved pain control throughout shift  Outcome: Progressing  Goal: Participates in PT with improved pain control throughout the shift  Outcome: Progressing  Goal: Free from opioid side effects throughout the shift  Outcome: Progressing  Goal: Free from acute confusion related to pain meds throughout the shift  Outcome: Progressing   The patient's goals for the shift include      The clinical goals for the shift include      Over the shift, the patient did not make progress toward the following goals. Barriers to progression include shortness of breath. Recommendations to address these barriers  include monitor respiratory status.

## 2024-12-09 ENCOUNTER — APPOINTMENT (OUTPATIENT)
Dept: RADIOLOGY | Facility: HOSPITAL | Age: 84
DRG: 811 | End: 2024-12-09
Payer: MEDICARE

## 2024-12-09 LAB
ABO GROUP (TYPE) IN BLOOD: NORMAL
ABO GROUP (TYPE) IN BLOOD: NORMAL
ALBUMIN SERPL BCP-MCNC: 3.5 G/DL (ref 3.4–5)
ANION GAP SERPL CALCULATED.3IONS-SCNC: 12 MMOL/L (ref 10–20)
ANTIBODY SCREEN: NORMAL
ATRIAL RATE: 55 BPM
BLOOD EXPIRATION DATE: NORMAL
BLOOD EXPIRATION DATE: NORMAL
BUN SERPL-MCNC: 32 MG/DL (ref 6–23)
CALCIUM SERPL-MCNC: 8.5 MG/DL (ref 8.6–10.3)
CHLORIDE SERPL-SCNC: 99 MMOL/L (ref 98–107)
CO2 SERPL-SCNC: 34 MMOL/L (ref 21–32)
CREAT SERPL-MCNC: 1.54 MG/DL (ref 0.5–1.05)
DISPENSE STATUS: NORMAL
DISPENSE STATUS: NORMAL
EGFRCR SERPLBLD CKD-EPI 2021: 33 ML/MIN/1.73M*2
ERYTHROCYTE [DISTWIDTH] IN BLOOD BY AUTOMATED COUNT: 15 % (ref 11.5–14.5)
EST. AVERAGE GLUCOSE BLD GHB EST-MCNC: 111 MG/DL
GLUCOSE BLD MANUAL STRIP-MCNC: 123 MG/DL (ref 74–99)
GLUCOSE BLD MANUAL STRIP-MCNC: 128 MG/DL (ref 74–99)
GLUCOSE BLD MANUAL STRIP-MCNC: 133 MG/DL (ref 74–99)
GLUCOSE BLD MANUAL STRIP-MCNC: 93 MG/DL (ref 74–99)
GLUCOSE SERPL-MCNC: 83 MG/DL (ref 74–99)
HBA1C MFR BLD: 5.5 %
HCT VFR BLD AUTO: 22.4 % (ref 36–46)
HGB BLD-MCNC: 6.7 G/DL (ref 12–16)
HOLD SPECIMEN: NORMAL
MAGNESIUM SERPL-MCNC: 2.06 MG/DL (ref 1.6–2.4)
MCH RBC QN AUTO: 27.5 PG (ref 26–34)
MCHC RBC AUTO-ENTMCNC: 29.9 G/DL (ref 32–36)
MCV RBC AUTO: 92 FL (ref 80–100)
NRBC BLD-RTO: 0 /100 WBCS (ref 0–0)
PHOSPHATE SERPL-MCNC: 4.1 MG/DL (ref 2.5–4.9)
PLATELET # BLD AUTO: 153 X10*3/UL (ref 150–450)
POTASSIUM SERPL-SCNC: 3 MMOL/L (ref 3.5–5.3)
PRODUCT BLOOD TYPE: 5100
PRODUCT BLOOD TYPE: 9500
PRODUCT CODE: NORMAL
PRODUCT CODE: NORMAL
Q ONSET: 223 MS
QRS COUNT: 12 BEATS
QRS DURATION: 72 MS
QT INTERVAL: 456 MS
QTC CALCULATION(BAZETT): 488 MS
QTC FREDERICIA: 478 MS
R AXIS: 79 DEGREES
RBC # BLD AUTO: 2.44 X10*6/UL (ref 4–5.2)
RH FACTOR (ANTIGEN D): NORMAL
RH FACTOR (ANTIGEN D): NORMAL
SODIUM SERPL-SCNC: 142 MMOL/L (ref 136–145)
T AXIS: 68 DEGREES
T OFFSET: 451 MS
UNIT ABO: NORMAL
UNIT ABO: NORMAL
UNIT NUMBER: NORMAL
UNIT NUMBER: NORMAL
UNIT RH: NORMAL
UNIT RH: NORMAL
UNIT VOLUME: 350
UNIT VOLUME: 350
VENTRICULAR RATE: 69 BPM
WBC # BLD AUTO: 6.6 X10*3/UL (ref 4.4–11.3)
XM INTEP: NORMAL
XM INTEP: NORMAL

## 2024-12-09 PROCEDURE — 97165 OT EVAL LOW COMPLEX 30 MIN: CPT | Mod: GO

## 2024-12-09 PROCEDURE — 36430 TRANSFUSION BLD/BLD COMPNT: CPT

## 2024-12-09 PROCEDURE — 36415 COLL VENOUS BLD VENIPUNCTURE: CPT | Performed by: INTERNAL MEDICINE

## 2024-12-09 PROCEDURE — 2500000002 HC RX 250 W HCPCS SELF ADMINISTERED DRUGS (ALT 637 FOR MEDICARE OP, ALT 636 FOR OP/ED): Performed by: INTERNAL MEDICINE

## 2024-12-09 PROCEDURE — 80069 RENAL FUNCTION PANEL: CPT | Performed by: INTERNAL MEDICINE

## 2024-12-09 PROCEDURE — 93971 EXTREMITY STUDY: CPT

## 2024-12-09 PROCEDURE — G0427 INPT/ED TELECONSULT70: HCPCS | Performed by: STUDENT IN AN ORGANIZED HEALTH CARE EDUCATION/TRAINING PROGRAM

## 2024-12-09 PROCEDURE — 86850 RBC ANTIBODY SCREEN: CPT | Performed by: INTERNAL MEDICINE

## 2024-12-09 PROCEDURE — 93971 EXTREMITY STUDY: CPT | Performed by: RADIOLOGY

## 2024-12-09 PROCEDURE — 2500000005 HC RX 250 GENERAL PHARMACY W/O HCPCS: Performed by: INTERNAL MEDICINE

## 2024-12-09 PROCEDURE — 82947 ASSAY GLUCOSE BLOOD QUANT: CPT

## 2024-12-09 PROCEDURE — P9040 RBC LEUKOREDUCED IRRADIATED: HCPCS

## 2024-12-09 PROCEDURE — P9016 RBC LEUKOCYTES REDUCED: HCPCS

## 2024-12-09 PROCEDURE — 2500000001 HC RX 250 WO HCPCS SELF ADMINISTERED DRUGS (ALT 637 FOR MEDICARE OP): Performed by: STUDENT IN AN ORGANIZED HEALTH CARE EDUCATION/TRAINING PROGRAM

## 2024-12-09 PROCEDURE — 86920 COMPATIBILITY TEST SPIN: CPT

## 2024-12-09 PROCEDURE — 85027 COMPLETE CBC AUTOMATED: CPT | Performed by: INTERNAL MEDICINE

## 2024-12-09 PROCEDURE — 2500000004 HC RX 250 GENERAL PHARMACY W/ HCPCS (ALT 636 FOR OP/ED): Performed by: INTERNAL MEDICINE

## 2024-12-09 PROCEDURE — 83735 ASSAY OF MAGNESIUM: CPT | Performed by: INTERNAL MEDICINE

## 2024-12-09 PROCEDURE — 1100000001 HC PRIVATE ROOM DAILY

## 2024-12-09 PROCEDURE — 97161 PT EVAL LOW COMPLEX 20 MIN: CPT | Mod: GP

## 2024-12-09 PROCEDURE — 99233 SBSQ HOSP IP/OBS HIGH 50: CPT | Performed by: INTERNAL MEDICINE

## 2024-12-09 PROCEDURE — 2500000001 HC RX 250 WO HCPCS SELF ADMINISTERED DRUGS (ALT 637 FOR MEDICARE OP): Performed by: INTERNAL MEDICINE

## 2024-12-09 RX ORDER — LIDOCAINE 560 MG/1
1 PATCH PERCUTANEOUS; TOPICAL; TRANSDERMAL DAILY
Status: DISCONTINUED | OUTPATIENT
Start: 2024-12-09 | End: 2024-12-12 | Stop reason: HOSPADM

## 2024-12-09 RX ORDER — ROPINIROLE 1 MG/1
1 TABLET, FILM COATED ORAL NIGHTLY
Status: DISCONTINUED | OUTPATIENT
Start: 2024-12-09 | End: 2024-12-11

## 2024-12-09 RX ORDER — ROPINIROLE 1 MG/1
1 TABLET, FILM COATED ORAL ONCE AS NEEDED
Status: DISCONTINUED | OUTPATIENT
Start: 2024-12-09 | End: 2024-12-12 | Stop reason: HOSPADM

## 2024-12-09 RX ADMIN — Medication 2 L/MIN: at 01:45

## 2024-12-09 RX ADMIN — ROPINIROLE HYDROCHLORIDE 1 MG: 1 TABLET, FILM COATED ORAL at 20:37

## 2024-12-09 RX ADMIN — PRAMIPEXOLE DIHYDROCHLORIDE 1.5 MG: 1.5 TABLET ORAL at 00:12

## 2024-12-09 RX ADMIN — Medication 3 L/MIN: at 20:07

## 2024-12-09 RX ADMIN — LIDOCAINE 1 PATCH: 4 PATCH TOPICAL at 16:00

## 2024-12-09 RX ADMIN — PANTOPRAZOLE SODIUM 40 MG: 40 INJECTION, POWDER, FOR SOLUTION INTRAVENOUS at 09:44

## 2024-12-09 RX ADMIN — ROPINIROLE HYDROCHLORIDE 2 MG: 2 TABLET, FILM COATED ORAL at 00:11

## 2024-12-09 RX ADMIN — SIMVASTATIN 10 MG: 10 TABLET, FILM COATED ORAL at 20:37

## 2024-12-09 RX ADMIN — PRAMIPEXOLE DIHYDROCHLORIDE 1.5 MG: 1.5 TABLET ORAL at 09:44

## 2024-12-09 RX ADMIN — TRAZODONE HYDROCHLORIDE 200 MG: 100 TABLET ORAL at 20:37

## 2024-12-09 RX ADMIN — SERTRALINE 100 MG: 100 TABLET, FILM COATED ORAL at 09:44

## 2024-12-09 RX ADMIN — FUROSEMIDE 40 MG: 10 INJECTION, SOLUTION INTRAMUSCULAR; INTRAVENOUS at 20:36

## 2024-12-09 RX ADMIN — FUROSEMIDE 40 MG: 10 INJECTION, SOLUTION INTRAMUSCULAR; INTRAVENOUS at 09:44

## 2024-12-09 RX ADMIN — PANTOPRAZOLE SODIUM 40 MG: 40 INJECTION, POWDER, FOR SOLUTION INTRAVENOUS at 20:36

## 2024-12-09 ASSESSMENT — ENCOUNTER SYMPTOMS
WEAKNESS: 1
BLOOD IN STOOL: 1
DIARRHEA: 0
FATIGUE: 1
SHORTNESS OF BREATH: 1
ABDOMINAL PAIN: 0
NAUSEA: 0
VOMITING: 0

## 2024-12-09 ASSESSMENT — COGNITIVE AND FUNCTIONAL STATUS - GENERAL
EATING MEALS: A LITTLE
MOVING TO AND FROM BED TO CHAIR: A LITTLE
MOBILITY SCORE: 18
STANDING UP FROM CHAIR USING ARMS: A LITTLE
MOVING FROM LYING ON BACK TO SITTING ON SIDE OF FLAT BED WITH BEDRAILS: A LITTLE
WALKING IN HOSPITAL ROOM: A LITTLE
HELP NEEDED FOR BATHING: A LITTLE
CLIMB 3 TO 5 STEPS WITH RAILING: A LITTLE
DAILY ACTIVITIY SCORE: 18
TOILETING: A LITTLE
DRESSING REGULAR LOWER BODY CLOTHING: A LITTLE
TURNING FROM BACK TO SIDE WHILE IN FLAT BAD: A LITTLE
PERSONAL GROOMING: A LITTLE
DRESSING REGULAR UPPER BODY CLOTHING: A LITTLE

## 2024-12-09 ASSESSMENT — PAIN - FUNCTIONAL ASSESSMENT
PAIN_FUNCTIONAL_ASSESSMENT: 0-10

## 2024-12-09 ASSESSMENT — ACTIVITIES OF DAILY LIVING (ADL)
BATHING_ASSISTANCE: STAND BY
ADL_ASSISTANCE: INDEPENDENT
ADL_ASSISTANCE: INDEPENDENT

## 2024-12-09 ASSESSMENT — PAIN SCALES - GENERAL
PAINLEVEL_OUTOF10: 0 - NO PAIN

## 2024-12-09 NOTE — PROGRESS NOTES
Raúl Jordan is a 84 y.o. female on day 1 of admission presenting with Anemia, unspecified type.      Subjective   Denies any active bleeding.  Complains of shortness of breath with exertion.  Overall, feels better after blood transfusion.  Complains of chronic back pain.       Objective     Last Recorded Vitals  BP (!) 94/48   Pulse 75   Temp 37 °C (98.6 °F) (Temporal)   Resp 16   Wt 95.3 kg (210 lb)   SpO2 95%   Intake/Output last 3 Shifts:    Intake/Output Summary (Last 24 hours) at 12/9/2024 1434  Last data filed at 12/9/2024 1300  Gross per 24 hour   Intake 850 ml   Output 1900 ml   Net -1050 ml       Admission Weight  Weight: 95.3 kg (210 lb) (12/08/24 1156)    Daily Weight  12/08/24 : 95.3 kg (210 lb)    Image Results      Physical Exam  Pt is some respiratory distress, on 2 L of oxygen.  Cooperative with exam.    A, Ox3.  Face is symmetrical.  Skin - no lesions.  Lungs: clear to auscultations B/L.  Diminished bilaterally.  No wheezes, rales, rhonchi.  Heart: regular S1S2.  Abdomen: soft, NT, ND. BS positive.  Extr.: no edema, cords, cyanosis.  Moves all extr.   Relevant Results               Assessment/Plan        Results for orders placed or performed during the hospital encounter of 12/08/24 (from the past 24 hours)   Urinalysis with Reflex Culture and Microscopic   Result Value Ref Range    Color, Urine Light-Yellow Light-Yellow, Yellow, Dark-Yellow    Appearance, Urine Clear Clear    Specific Gravity, Urine 1.007 1.005 - 1.035    pH, Urine 6.5 5.0, 5.5, 6.0, 6.5, 7.0, 7.5, 8.0    Protein, Urine NEGATIVE NEGATIVE, 10 (TRACE), 20 (TRACE) mg/dL    Glucose, Urine Normal Normal mg/dL    Blood, Urine NEGATIVE NEGATIVE    Ketones, Urine NEGATIVE NEGATIVE mg/dL    Bilirubin, Urine NEGATIVE NEGATIVE    Urobilinogen, Urine Normal Normal mg/dL    Nitrite, Urine NEGATIVE NEGATIVE    Leukocyte Esterase, Urine NEGATIVE NEGATIVE   Extra Urine Gray Tube   Result Value Ref Range    Extra Tube Hold for add-ons.     POCT GLUCOSE   Result Value Ref Range    POCT Glucose 97 74 - 99 mg/dL   Hemoglobin A1c   Result Value Ref Range    Hemoglobin A1C 5.5 See comment %    Estimated Average Glucose 111 Not Established mg/dL   POCT GLUCOSE   Result Value Ref Range    POCT Glucose 129 (H) 74 - 99 mg/dL   CBC   Result Value Ref Range    WBC 6.6 4.4 - 11.3 x10*3/uL    nRBC 0.0 0.0 - 0.0 /100 WBCs    RBC 2.44 (L) 4.00 - 5.20 x10*6/uL    Hemoglobin 6.7 (L) 12.0 - 16.0 g/dL    Hematocrit 22.4 (L) 36.0 - 46.0 %    MCV 92 80 - 100 fL    MCH 27.5 26.0 - 34.0 pg    MCHC 29.9 (L) 32.0 - 36.0 g/dL    RDW 15.0 (H) 11.5 - 14.5 %    Platelets 153 150 - 450 x10*3/uL   Renal Function Panel   Result Value Ref Range    Glucose 83 74 - 99 mg/dL    Sodium 142 136 - 145 mmol/L    Potassium 3.0 (L) 3.5 - 5.3 mmol/L    Chloride 99 98 - 107 mmol/L    Bicarbonate 34 (H) 21 - 32 mmol/L    Anion Gap 12 10 - 20 mmol/L    Urea Nitrogen 32 (H) 6 - 23 mg/dL    Creatinine 1.54 (H) 0.50 - 1.05 mg/dL    eGFR 33 (L) >60 mL/min/1.73m*2    Calcium 8.5 (L) 8.6 - 10.3 mg/dL    Phosphorus 4.1 2.5 - 4.9 mg/dL    Albumin 3.5 3.4 - 5.0 g/dL   Magnesium   Result Value Ref Range    Magnesium 2.06 1.60 - 2.40 mg/dL   Prepare RBC: 2 Units   Result Value Ref Range    PRODUCT CODE B7187L23     Unit Number [id number]     Unit ABO O     Unit RH NEG     XM INTEP COMP     Dispense Status XM     Blood Expiration Date 12/12/2024 11:59:00 PM EST     PRODUCT BLOOD TYPE 9500     UNIT VOLUME 350     PRODUCT CODE X8624Y85     Unit Number [id number]     Unit ABO O     Unit RH POS     XM INTEP COMP     Dispense Status IS     Blood Expiration Date 12/12/2024 11:59:00 PM EST     PRODUCT BLOOD TYPE 5100     UNIT VOLUME 350    Type and screen   Result Value Ref Range    ABO TYPE O     Rh TYPE POS     ANTIBODY SCREEN NEG    POCT GLUCOSE   Result Value Ref Range    POCT Glucose 93 74 - 99 mg/dL   VERIFY ABO/Rh Group Test   Result Value Ref Range    ABO TYPE O     Rh TYPE POS    POCT GLUCOSE    Result Value Ref Range    POCT Glucose 128 (H) 74 - 99 mg/dL     *Note: Due to a large number of results and/or encounters for the requested time period, some results have not been displayed. A complete set of results can be found in Results Review.               Assessment & Plan  Anemia, unspecified type    # Acute on chronic hypoxic respiratory failure:  #CHF exacerbation (HFpEF)  -Patient is on 2 L at baseline, currently on 3 L with orthopnea.  -BNP in the ED;461, CXR; Congestive heart failure   -Daily weights  -Strict I/O  -1500cc fluid restriction  -Cardiac diet (low Na)  -Lasix 40 mg IV BID   -Last TTE: 9/27, normal EF with impaired relaxation.     #Anemia   #Concern for upper GI bleed:  - Last HB 9.8 two months ago, today 7.3, patient had dark stool multiple times lately.  - Protonix 80mg IV once then 40mg IV BID  - clear liquid diet and then NPO after midnight  - Fluid resuscitation, as needed  - Monitor Hgb, transfuse if < 7.0, consent obtained   -GI consulted; appreciate recs.        #Chest pain (Resolved):  - EKG with no ischemic changes.  - Troponin; 25-->21  - Less likely of cardiac origin, pain subsided as well, will continue to monitor.        #Worsening Restless leg syndrome:  - Patient stated her restless leg syndrome was recently bothering her and was not controlled by pramipexole.  - Patient was in distress during the interview.  - Will continue pramipexole 1.5 mg daily, will add Requip 2 mg nightly, will check iron stores and replete as needed.  - Consult neurology; appreciate recs.          #JC on CKD:  -On admission, serum creat 1.62, baseline 1.2-1.35.  -Likely cardiorenal in the setting of CHF exacerbation.  -Continue to monitor, avoid nephrotoxic meds.        #Rule out DVT:  - Left leg circumference bigger than the right.  - Duplex US ordered.     Chronic Medical Issues   #HTN/AFIB:  -Rate controlled, continue metoprolol succinate 50 mg daily  -Hold  losartan 25 mg (for JC) and warfarin  for increased GI bleed risk.  -Labetalol PRN with parameters.     # Depression/anxiety/insomnia:  -Continue sertraline 100 mg daily and trazodone 200 mg nightly.     # HLD:  -Continue simvastatin 10 mg daily.     #DM:  -Patient stated she has DM but there was nothing on med recs for diabetes.  -Sliding scale and diabetic diet  -Last HbA1c 6 from 6 months ago, we will recheck HbA1c.        F: PO intake & IVF PRN  E: Replete as needed  N: Clear liquid cardiac, consistent carb  GI ppx: Protonix 40 mg IV twice daily   DVT ppx: SCD, no pharmacological due to GI bleed.  Antibiotics: None  Oxygenation: 3 L NC     Code Status: DNR and No Intubation, confirmed with the patient and her daughters at bedside.     12/09/24:  Severe anemia, likely due to GI blood loss.  Status posttransfusion today.  Monitor and transfuse as needed.  CHF, acute on chronic diastolic due to severe anemia.  IV Lasix as needed.  Gastrointestinal bleeding, likely upper.  Continue PPI.  Plan for EGD tomorrow.  Hypertension.  Continue medications  Acute on chronic renal failure, improving.  Monitor.  Acute respiratory failure with hypoxemia, likely due to acute CHF.       Oralia Mercado MD

## 2024-12-09 NOTE — CARE PLAN
The patient's goals for the shift include  feel better, therapy, tests, comfort and safety, rest.     The clinical goals for the shift include monitor labs and VS, remain NPO, encourage activity, maintain safety, transfuse 2 units RBCs, promote rest, run tests, discharge planning.    No barriers toward meeting these goals. Plan of care ongoing, no additional needs at this time.       Problem: Diabetes  Goal: Achieve decreasing blood glucose levels by end of shift  Outcome: Progressing  Goal: Increase stability of blood glucose readings by end of shift  Outcome: Progressing  Goal: Decrease in ketones present in urine by end of shift  Outcome: Progressing  Goal: Maintain electrolyte levels within acceptable range throughout shift  Outcome: Progressing  Goal: Maintain glucose levels >70mg/dl to <250mg/dl throughout shift  Outcome: Progressing  Goal: No changes in neurological exam by end of shift  Outcome: Progressing  Goal: Learn about and adhere to nutrition recommendations by end of shift  Outcome: Progressing  Goal: Vital signs within normal range for age by end of shift  Outcome: Progressing  Goal: Increase self care and/or family involovement by end of shift  Outcome: Progressing  Goal: Receive DSME education by end of shift  Outcome: Progressing     Problem: Pain - Adult  Goal: Verbalizes/displays adequate comfort level or baseline comfort level  Outcome: Progressing     Problem: Safety - Adult  Goal: Free from fall injury  Outcome: Progressing     Problem: Discharge Planning  Goal: Discharge to home or other facility with appropriate resources  Outcome: Progressing     Problem: Chronic Conditions and Co-morbidities  Goal: Patient's chronic conditions and co-morbidity symptoms are monitored and maintained or improved  Outcome: Progressing     Problem: Fall/Injury  Goal: Not fall by end of shift  Outcome: Progressing  Goal: Be free from injury by end of the shift  Outcome: Progressing  Goal: Verbalize  understanding of personal risk factors for fall in the hospital  Outcome: Progressing  Goal: Verbalize understanding of risk factor reduction measures to prevent injury from fall in the home  Outcome: Progressing  Goal: Use assistive devices by end of the shift  Outcome: Progressing  Goal: Pace activities to prevent fatigue by end of the shift  Outcome: Progressing     Problem: Respiratory  Goal: Clear secretions with interventions this shift  Outcome: Progressing  Goal: Minimize anxiety/maximize coping throughout shift  Outcome: Progressing  Goal: Minimal/no exertional discomfort or dyspnea this shift  Outcome: Progressing  Goal: No signs of respiratory distress (eg. Use of accessory muscles. Peds grunting)  Outcome: Progressing  Goal: Patent airway maintained this shift  Outcome: Progressing  Goal: Tolerate mechanical ventilation evidenced by VS/agitation level this shift  Outcome: Progressing  Goal: Tolerate pulmonary toileting this shift  Outcome: Progressing  Goal: Verbalize decreased shortness of breath this shift  Outcome: Progressing  Goal: Wean oxygen to maintain O2 saturation per order/standard this shift  Outcome: Progressing  Goal: Increase self care and/or family involvement in next 24 hours  Outcome: Progressing     Problem: Deep Vein Thrombosis  Goal: I will remain free from complications of deep vein thrombosis and maintain current level of mobility  Outcome: Progressing     Problem: Pain  Goal: Takes deep breaths with improved pain control throughout the shift  Outcome: Progressing  Goal: Turns in bed with improved pain control throughout the shift  Outcome: Progressing  Goal: Walks with improved pain control throughout the shift  Outcome: Progressing  Goal: Performs ADL's with improved pain control throughout shift  Outcome: Progressing  Goal: Participates in PT with improved pain control throughout the shift  Outcome: Progressing  Goal: Free from opioid side effects throughout the shift  Outcome:  Progressing  Goal: Free from acute confusion related to pain meds throughout the shift  Outcome: Progressing

## 2024-12-09 NOTE — CONSULTS
HPI: Tele neuro consult at [facility]   84 y.o. female with hx of CHF, Afib on warfarin, chronic hypoxia 2L O2 at baseline, DM, HTN, RLS, OA presenting to [facility] for chest discomfort and SOB. Teleneuro service consutled for worsening RLS.    At home pt takes pramipexole 1.5mg.  Pt states she has been on this for a long time and has been doing well. In the past few weeks however she felt symptoms worsening and now is totally uncontrolled. She does not think there was any provoking event to acutely worsen the symptoms.     Overnight pt was stated on Requip 2mg, denies any side effects, no confusion / hallucination.     Denies other parkinsonian symptoms. Has knee pains / arthritis but no other gait issues.     Cr 1.62 -- 1.54, past in 1.2-1.3s            Patient Active Problem List    Diagnosis Date Noted    Anemia, unspecified type 12/08/2024    Hepatic cirrhosis (Multi) 09/23/2024    Indigestion 09/23/2024    Anemia due to acute blood loss 09/23/2024    Iron deficiency anemia due to chronic blood loss 09/23/2024    Acute hypoxic respiratory failure (Multi) 09/23/2024    Pneumonia of both lower lobes 09/23/2024    Abnormal CT of the chest 09/01/2023    Abnormal liver function 09/01/2023    Chronic congestive heart failure 09/01/2023    Other chest pain 09/01/2023    Anxiety 09/01/2023    Asthenia 09/01/2023    AV node arrhythmia 09/01/2023    Complete atrioventricular block (Multi) 09/01/2023    Cor pulmonale (Multi) 09/01/2023    Diabetes mellitus (Multi) 09/01/2023    Type 2 diabetes mellitus with obesity (Multi) 09/01/2023    Diverticulosis 09/01/2023    Dyspnea 09/01/2023    Orthopnea 09/01/2023    Diastolic heart failure 09/01/2023    Grade III diastolic dysfunction 09/01/2023    Heart failure with preserved ejection fraction 09/01/2023    Hyperlipidemia 09/01/2023    Hypertension 09/01/2023    Hypokalemia 09/01/2023    Irritable bowel syndrome with diarrhea 09/01/2023    Kidney stone 09/01/2023    Longstanding  persistent atrial fibrillation (Multi) 09/01/2023    Microcytic anemia 09/01/2023    Lower gastrointestinal hemorrhage 09/01/2023    Multiple gastric polyps 09/01/2023    Nausea & vomiting 09/01/2023    Obesity 09/01/2023    LAZARA (obstructive sleep apnea) 09/01/2023    Osteoarthritis 09/01/2023    Chronic insomnia 09/01/2023    Chronic pain disorder 09/01/2023    Insomnia 09/01/2023    Psychophysiologic insomnia 09/01/2023    Rectal hemorrhage 09/01/2023    Restless leg syndrome 09/01/2023    S/P placement of cardiac pacemaker 09/01/2023    Umbilical hernia 09/01/2023    Unilateral primary osteoarthritis, right knee 09/01/2023    Vitamin D deficiency 09/01/2023    Contact with and (suspected) exposure to covid-19 01/12/2023    Cough, unspecified 10/17/2022    Primary localized osteoarthrosis, hand 08/22/2006     Current Facility-Administered Medications   Medication Dose Route Frequency Provider Last Rate Last Admin    acetaminophen (Tylenol) tablet 650 mg  650 mg oral q6h PRN Deb Santana, DO        albuterol 2.5 mg /3 mL (0.083 %) nebulizer solution 3 mL  3 mL nebulization q6h PRN Deb Cortezed, DO        furosemide (Lasix) injection 40 mg  40 mg intravenous q12h Deb Santana, DO   40 mg at 12/09/24 0944    insulin lispro injection 0-10 Units  0-10 Units subcutaneous TID AC Deb Santana, DO        labetaloL (Normodyne,Trandate) injection 20 mg  20 mg intravenous q4h PRN Deb Santana, DO        losartan (Cozaar) tablet 25 mg  25 mg oral Daily Deb Cortezed, DO   25 mg at 12/08/24 2351    metoprolol succinate XL (Toprol-XL) 24 hr tablet 50 mg  50 mg oral Daily Deb Cortezed, DO   50 mg at 12/08/24 2350    oxygen (O2) therapy   inhalation Nightly Deb Santana, DO   2 L/min at 12/09/24 0145    pantoprazole (ProtoNix) injection 40 mg  40 mg intravenous BID Deb Cortzeed, DO   40 mg at 12/09/24 0944    polyethylene glycol (Glycolax, Miralax) packet 17 g  17 g oral Daily PRN  Mohamed A Max, DO   17 g at 12/08/24 2043    pramipexole (Mirapex) tablet 1.5 mg  1.5 mg oral Daily Gilamed ROSA Cortezed, DO   1.5 mg at 12/09/24 0944    rOPINIRole (Requip) tablet 2 mg  2 mg oral Nightly Mohamed A Max, DO   2 mg at 12/09/24 0011    sertraline (Zoloft) tablet 100 mg  100 mg oral Daily Mohamed A Max, DO   100 mg at 12/09/24 0944    simvastatin (Zocor) tablet 10 mg  10 mg oral Nightly Mohamed A Max, DO   10 mg at 12/08/24 2046    traZODone (Desyrel) tablet 200 mg  200 mg oral Nightly Mohamed A Max, DO   200 mg at 12/08/24 2044     Allergies: Penicillin v, Sulfamethoxazole-trimethoprim, Levofloxacin, Oxycodone-acetaminophen, and Penicillin  Past Medical History:   Diagnosis Date    Abnormal laboratory test result 09/01/2023    Anemia due to acute blood loss 09/01/2023    CHF (congestive heart failure)     Chill 09/01/2023    Diabetes mellitus (Multi)     Flank pain 09/01/2023    Hypertension     Hypoxemia 09/01/2023    Hypoxia 09/01/2023    Melena 09/01/2023    Other chronic pain 09/01/2023    Pneumonia 09/01/2023    Pyuria 09/01/2023     History reviewed. No pertinent surgical history.  Family History   Problem Relation Name Age of Onset    Leukemia Mother      Cancer Mother      Heart disease Father      Other (heart problems) Father       Social History     Tobacco Use    Smoking status: Never     Passive exposure: Never    Smokeless tobacco: Never   Substance Use Topics    Alcohol use: Never       Exam:  Vitals:    12/09/24 0840   BP: (!) 105/45   Pulse: 67   Resp: 20   Temp: 36.9 °C (98.4 °F)   SpO2: 96%     Virtual, limited exam performed  Patient is laying in bed, in NAD  Language without dysarthria or aphasia  Gaze midline, EOM appears full range horizontally and vertically   Face is symmetric on eyeclosure, eyebrow raise and smile  Tongue midline   No drift in both arms and no drift in both legs  Sensation intact to light touch       Imaging Results:  MRI: No MRI head results  found for the past 14 days  CT Head: No CT head results found for the past 14 days  Echo: No echocardiogram results found for the past 14 days    ASSESSMENT:  Raúl Jordan is a 84 y.o. female who presented to the hospital with chest discomfort and uncontrolled restless legs syndrome.     Diagnosis:   Restless legs syndrome  Comorbidity - anxiety      RECOMMENDATIONS:   - she did tolerate 2mg dose but this is far beyond typical starting dose of ropinirole for RLS, Will try cutting it in half to 1mg to see if it would be enough. If sx unresponsive to this dose can give the other 1mg for total of 2mg. Monitor for delirium, hallucinations as inpatient giving dopaminergic medication. Patient agreeable for this plan   - continue home pramipexole 1.5mg     Abrahma Cabrera MD   Neurology and Vascular Neurology       Consult completed by: TELEPHONE and VIDEO interactive communication was used to provide this telehealth service. Time includes consultation with ED provider and extensive review of data- history, medical records, lab/radiology/medical test results, neuroimaging studies:  70 minutes was spent in INITIAL telehealth consultation

## 2024-12-09 NOTE — CARE PLAN
The patient's goals for the shift include      The clinical goals for the shift include monitor labs and vs, maintain safety

## 2024-12-09 NOTE — CONSULTS
Consults    Reason For Consult  Anemia    History Of Present Illness  Raúl Jordan is a 84 y.o. female presenting with chest discomfort and shortness of breath. Found evidence normocytic anemia with hgb 6.7. She reports black stools over the past few days. On Coumadin. INR 1.7. BUN/Cr both elevated. Denies NV    Colonoscopy 21 diverticulosis   EGD 21 antral erythema and gastric polyp      Past Medical History  She has a past medical history of Abnormal laboratory test result (09/01/2023), Anemia due to acute blood loss (09/01/2023), CHF (congestive heart failure), Chill (09/01/2023), Diabetes mellitus (Multi), Flank pain (09/01/2023), Hypertension, Hypoxemia (09/01/2023), Hypoxia (09/01/2023), Melena (09/01/2023), Other chronic pain (09/01/2023), Pneumonia (09/01/2023), and Pyuria (09/01/2023).    Surgical History  She has no past surgical history on file.     Social History  She reports that she has never smoked. She has never been exposed to tobacco smoke. She has never used smokeless tobacco. She reports that she does not drink alcohol and does not use drugs.    Family History  Family History   Problem Relation Name Age of Onset    Leukemia Mother      Cancer Mother      Heart disease Father      Other (heart problems) Father          Allergies  Penicillin v, Sulfamethoxazole-trimethoprim, Levofloxacin, Oxycodone-acetaminophen, and Penicillin    Review of Systems   Constitutional:  Positive for fatigue.   Respiratory:  Positive for shortness of breath.    Gastrointestinal:  Positive for blood in stool. Negative for abdominal pain, diarrhea, nausea and vomiting.   Neurological:  Positive for weakness.        Physical Exam  Constitutional:       Appearance: Normal appearance.   HENT:      Head: Normocephalic and atraumatic.      Mouth/Throat:      Mouth: Mucous membranes are moist.   Pulmonary:      Effort: Pulmonary effort is normal.   Abdominal:      General: There is no distension.      Palpations: Abdomen is  "soft.      Tenderness: There is no abdominal tenderness. There is no guarding.   Musculoskeletal:         General: Normal range of motion.      Cervical back: Normal range of motion.   Skin:     General: Skin is warm and dry.      Coloration: Skin is pale.   Neurological:      General: No focal deficit present.      Mental Status: She is alert. Mental status is at baseline.   Psychiatric:         Mood and Affect: Mood normal.          Last Recorded Vitals  Blood pressure (!) 105/45, pulse 67, temperature 36.9 °C (98.4 °F), temperature source Temporal, resp. rate 20, height 1.6 m (5' 3\"), weight 95.3 kg (210 lb), SpO2 96%.    Relevant Results  Results for orders placed or performed during the hospital encounter of 12/08/24 (from the past 24 hours)   ECG 12 lead   Result Value Ref Range    Ventricular Rate 69 BPM    Atrial Rate 55 BPM    QRS Duration 72 ms    QT Interval 456 ms    QTC Calculation(Bazett) 488 ms    R Axis 79 degrees    T Axis 68 degrees    QRS Count 12 beats    Q Onset 223 ms    T Offset 451 ms    QTC Fredericia 478 ms   CBC and Auto Differential   Result Value Ref Range    WBC 7.4 4.4 - 11.3 x10*3/uL    nRBC 0.0 0.0 - 0.0 /100 WBCs    RBC 2.66 (L) 4.00 - 5.20 x10*6/uL    Hemoglobin 7.3 (L) 12.0 - 16.0 g/dL    Hematocrit 24.3 (L) 36.0 - 46.0 %    MCV 91 80 - 100 fL    MCH 27.4 26.0 - 34.0 pg    MCHC 30.0 (L) 32.0 - 36.0 g/dL    RDW 14.7 (H) 11.5 - 14.5 %    Platelets 181 150 - 450 x10*3/uL    Neutrophils % 80.3 40.0 - 80.0 %    Immature Granulocytes %, Automated 0.7 0.0 - 0.9 %    Lymphocytes % 7.1 13.0 - 44.0 %    Monocytes % 10.7 2.0 - 10.0 %    Eosinophils % 0.5 0.0 - 6.0 %    Basophils % 0.7 0.0 - 2.0 %    Neutrophils Absolute 5.91 (H) 1.60 - 5.50 x10*3/uL    Immature Granulocytes Absolute, Automated 0.05 0.00 - 0.50 x10*3/uL    Lymphocytes Absolute 0.52 (L) 0.80 - 3.00 x10*3/uL    Monocytes Absolute 0.79 0.05 - 0.80 x10*3/uL    Eosinophils Absolute 0.04 0.00 - 0.40 x10*3/uL    Basophils Absolute " 0.05 0.00 - 0.10 x10*3/uL   Comprehensive metabolic panel   Result Value Ref Range    Glucose 120 (H) 74 - 99 mg/dL    Sodium 140 136 - 145 mmol/L    Potassium 3.4 (L) 3.5 - 5.3 mmol/L    Chloride 98 98 - 107 mmol/L    Bicarbonate 33 (H) 21 - 32 mmol/L    Anion Gap 12 10 - 20 mmol/L    Urea Nitrogen 35 (H) 6 - 23 mg/dL    Creatinine 1.62 (H) 0.50 - 1.05 mg/dL    eGFR 31 (L) >60 mL/min/1.73m*2    Calcium 9.4 8.6 - 10.3 mg/dL    Albumin 4.1 3.4 - 5.0 g/dL    Alkaline Phosphatase 82 33 - 136 U/L    Total Protein 6.3 (L) 6.4 - 8.2 g/dL    AST 20 9 - 39 U/L    Bilirubin, Total 1.2 0.0 - 1.2 mg/dL    ALT 14 7 - 45 U/L   B-type natriuretic peptide   Result Value Ref Range     (H) 0 - 99 pg/mL   Troponin I, High Sensitivity, Initial   Result Value Ref Range    Troponin I, High Sensitivity 25 (H) 0 - 13 ng/L   Troponin, High Sensitivity, 1 Hour   Result Value Ref Range    Troponin I, High Sensitivity 21 (H) 0 - 13 ng/L   Protime-INR   Result Value Ref Range    Protime 17.4 (H) 9.3 - 12.7 seconds    INR 1.7 (H) 0.9 - 1.2   Iron and TIBC   Result Value Ref Range    Iron 25 (L) 35 - 150 ug/dL    UIBC 442 (H) 110 - 370 ug/dL    TIBC 467 (H) 240 - 445 ug/dL    % Saturation 5 (L) 25 - 45 %   Occult Blood, Stool    Specimen: Stool   Result Value Ref Range    Occult Blood, Stool X1 Positive (A) Negative   Urinalysis with Reflex Culture and Microscopic   Result Value Ref Range    Color, Urine Light-Yellow Light-Yellow, Yellow, Dark-Yellow    Appearance, Urine Clear Clear    Specific Gravity, Urine 1.007 1.005 - 1.035    pH, Urine 6.5 5.0, 5.5, 6.0, 6.5, 7.0, 7.5, 8.0    Protein, Urine NEGATIVE NEGATIVE, 10 (TRACE), 20 (TRACE) mg/dL    Glucose, Urine Normal Normal mg/dL    Blood, Urine NEGATIVE NEGATIVE    Ketones, Urine NEGATIVE NEGATIVE mg/dL    Bilirubin, Urine NEGATIVE NEGATIVE    Urobilinogen, Urine Normal Normal mg/dL    Nitrite, Urine NEGATIVE NEGATIVE    Leukocyte Esterase, Urine NEGATIVE NEGATIVE   Extra Urine Richards  Tube   Result Value Ref Range    Extra Tube Hold for add-ons.    POCT GLUCOSE   Result Value Ref Range    POCT Glucose 97 74 - 99 mg/dL   POCT GLUCOSE   Result Value Ref Range    POCT Glucose 129 (H) 74 - 99 mg/dL   CBC   Result Value Ref Range    WBC 6.6 4.4 - 11.3 x10*3/uL    nRBC 0.0 0.0 - 0.0 /100 WBCs    RBC 2.44 (L) 4.00 - 5.20 x10*6/uL    Hemoglobin 6.7 (L) 12.0 - 16.0 g/dL    Hematocrit 22.4 (L) 36.0 - 46.0 %    MCV 92 80 - 100 fL    MCH 27.5 26.0 - 34.0 pg    MCHC 29.9 (L) 32.0 - 36.0 g/dL    RDW 15.0 (H) 11.5 - 14.5 %    Platelets 153 150 - 450 x10*3/uL   Renal Function Panel   Result Value Ref Range    Glucose 83 74 - 99 mg/dL    Sodium 142 136 - 145 mmol/L    Potassium 3.0 (L) 3.5 - 5.3 mmol/L    Chloride 99 98 - 107 mmol/L    Bicarbonate 34 (H) 21 - 32 mmol/L    Anion Gap 12 10 - 20 mmol/L    Urea Nitrogen 32 (H) 6 - 23 mg/dL    Creatinine 1.54 (H) 0.50 - 1.05 mg/dL    eGFR 33 (L) >60 mL/min/1.73m*2    Calcium 8.5 (L) 8.6 - 10.3 mg/dL    Phosphorus 4.1 2.5 - 4.9 mg/dL    Albumin 3.5 3.4 - 5.0 g/dL   Magnesium   Result Value Ref Range    Magnesium 2.06 1.60 - 2.40 mg/dL   Prepare RBC: 2 Units   Result Value Ref Range    PRODUCT CODE C5177G13     Unit Number [id number]     Unit ABO O     Unit RH NEG     XM INTEP COMP     Dispense Status XM     Blood Expiration Date 12/12/2024 11:59:00 PM EST     PRODUCT BLOOD TYPE 9500     UNIT VOLUME 350     PRODUCT CODE A1951M17     Unit Number [id number]     Unit ABO O     Unit RH POS     XM INTEP COMP     Dispense Status XM     Blood Expiration Date 12/12/2024 11:59:00 PM EST     PRODUCT BLOOD TYPE 5100     UNIT VOLUME 350    Type and screen   Result Value Ref Range    ABO TYPE O     Rh TYPE POS     ANTIBODY SCREEN NEG    POCT GLUCOSE   Result Value Ref Range    POCT Glucose 93 74 - 99 mg/dL   VERIFY ABO/Rh Group Test   Result Value Ref Range    ABO TYPE O     Rh TYPE POS      *Note: Due to a large number of results and/or encounters for the requested time  period, some results have not been displayed. A complete set of results can be found in Results Review.     Lower extremity venous duplex left    Result Date: 12/9/2024  Interpreted By:  Daniel Ny, STUDY: [facility] LOWER EXTREMITY VENOUS DUPLEX LEFT; 12/9/2024 7:59 am   INDICATION: Signs/Symptoms:Left lower leg swelling >right. Swelling.   COMPARISON: None   ACCESSION NUMBER(S): [id number]   ORDERING CLINICIAN: YON OLIVER   TECHNIQUE: Black and white as well as color-flow duplex images were obtained along with Doppler spectral analysis of the deep venous system of the lower extremity from the knee to the groin. Attempts were made to image the proximal posterior tibial and peroneal veins of the proximal calf as well. Venous compression was performed.   FINDINGS: There is normal compressibility and flow within the visualized vessels of the deep venous system of the lower extremity.  The contralateral common femoral vein is patent.   There is a 1.4 x 1.9 x 4.1 cm popliteal fossa cyst on the left.       No evidence for deep venous thrombosis within the limits of this examination. Left popliteal fossa cyst.   MACRO: none   Signed by: Daniel Ny 12/9/2024 8:47 AM Dictation workstation:   [id number]    ECG 12 lead    Result Date: 12/9/2024  1 Poor data quality, interpretation may be adversely affected Atrial fibrillation with occasional ventricular-paced complexes and with premature ventricular or aberrantly conducted complexes Low voltage QRS Nonspecific ST and T wave abnormality Abnormal ECG When compared with ECG of 28-SEP-2024 15:17, Electronic ventricular pacemaker has replaced Atrial fibrillation    XR chest 2 views    Result Date: 12/8/2024  STUDY: Chest Radiographs;  12/8/2024 1:45PM INDICATION: Shortness of breath. COMPARISON: 10/8/2024 XR Chest, 9/26/2024 XR Chest. ACCESSION NUMBER(S): [id number] ORDERING CLINICIAN: MANNIE WELCH TECHNIQUE:  Frontal and lateral chest. FINDINGS: There is vascular  congestion.  Heart size is enlarged.  No pneumothorax is noted.  A loop recorder is visualized.    Congestive heart failure. Signed by Ashish Viera MD    XR chest 2 views    Result Date: 11/25/2024  * * *Final Report* * * DATE OF EXAM: Nov 25 2024  3:33PM   MTX   [id number]  -  XR CHEST 2V FRONTAL/LAT  / ACCESSION #  [id number] PROCEDURE REASON: multiple diagnoses      * * * * Physician Interpretation * * * *  EXAMINATION:  CHEST RADIOGRAPH (2 VIEW FRONTAL & LATERAL) CLINICAL HISTORY: Acute respiratory failure with hypoxia (HCC) Congestive cardiomyopathy (HCC) MQ:  [id number] EXAM DATE/TIME:  11/25/2024 3:33 PM COMPARISON:  None RESULT: Lines, tubes, and devices:  Leadless pacemaker is present. Lungs and pleura:  There is pulmonary vascular and interstitial prominence throughout both lungs felt to be from vascular congestion with interstitial edema.  Small right pleural effusion with right basilar atelectasis.  There may be trace left pleural effusion as well with mild left basilar atelectasis. Cardiomediastinal silhouette:  Enlarged cardiomediastinal silhouette.   Mitral annular calcification.  There is aortic atherosclerotic calcification. Bones and soft tissues:  Degenerative change of the thoracic spine.    IMPRESSION: See result : [id number]   Transcribe Date/Time: Nov 25 2024  4:50P Dictated by : ROSSANA MERIDA MD This examination was interpreted and the report reviewed and electronically signed by: ROSSANA MERIDA MD on Nov 25 2024  4:55PM  EST        Assessment/Plan     Anemia, Melena, Anticoagulation   Black stools over the past few days with hgb decline to 6.7   -EGD tomorrow    -NPO after midnight    -PPI BID    -She is on Coumadin. INR 1.7. Continue to hold anticoagulation pending procedure. Further recs to follow    -Transfuse goal hgb 7     I spent 30 minutes in the professional and overall care of this patient.

## 2024-12-09 NOTE — PROGRESS NOTES
Occupational Therapy    Evaluation    Patient Name: Raúl Jordan  MRN: [id number]  Department: [address]  Room: [address] [place] [address]  Today's Date: 12/9/2024  Time Calculation  Start Time: 1430  Stop Time: 1452  Time Calculation (min): 22 min    Assessment  IP OT Assessment  OT Assessment: Patient is an 84 year old female admitted with anemia, acute on chronic hypoxic respiratory failure, CHF exacerbation. Patient is presenting below baseline level with a decline in strength, balance, activity tolerance, and function, resulting in an increased need for assist with ADL/IADL tasks. Recommend skilled OT to address the above deficits and increase patient's safety and independence with daily tasks.  Prognosis: Good  Barriers to Discharge: Other (Comment) (decreased activity tolerance)  Evaluation/Treatment Tolerance: Patient tolerated treatment well  End of Session Communication: Bedside nurse  End of Session Patient Position: Bed, 3 rail up, Alarm on  Plan:  Treatment Interventions: ADL retraining, Functional transfer training, UE strengthening/ROM, Endurance training, Patient/family training, Equipment evaluation/education, Compensatory technique education  OT Frequency: 3 times per week  OT Discharge Recommendations: Low intensity level of continued care  Equipment Recommended upon Discharge: Straight cane  OT Recommended Transfer Status: Assist of 1  OT - OK to Discharge: Yes    Subjective   Current Problem:  1. Gastrointestinal hemorrhage, unspecified gastrointestinal hemorrhage type  Esophagogastroduodenoscopy (EGD)      2. Anemia, unspecified type        3. Congestive heart failure, unspecified HF chronicity, unspecified heart failure type        4. Dyspnea, unspecified type        5. Lower leg edema  Lower extremity venous duplex left    Lower extremity venous duplex left        General:  General  Reason for Referral: decline in ADLs; anemia  Referred By: Dr. Mercado  Past Medical History Relevant to Rehab: chronic CHF,  asthenia, anxiety, HTN, diastolic heart failure, DM type 2, LAZARA  Family/Caregiver Present: No  Co-Treatment: PT  Co-Treatment Reason: safety with OOB mobility  Prior to Session Communication: Bedside nurse  Patient Position Received: Bed, 3 rail up, Alarm on  Preferred Learning Style: verbal, visual  General Comment: Patient is an 84 year old female who presented to the ED with c/o chest discomfort and SOB. Patient with plans for an EGD. Patient found to be anemic. Receiving blood transfusion. She is admitted with anemia, acute on chronic hypoxic respiratory failure, CHF exacerbation. Patient is cleared by nursing for therapy. Patient in bed upon arrival and agreeable to participate. +IV, tele  Precautions:  Hearing/Visual Limitations: reading glasses  Medical Precautions: Fall precautions, Oxygen therapy device and L/min (3L O2 via NC)    Vital Signs Comment: HR 78 post functional mobility    Pain:  Pain Assessment  Pain Assessment: 0-10  0-10 (Numeric) Pain Score: 0 - No pain    Objective   Cognition:  Overall Cognitive Status: Within Functional Limits  Cognition Comments: pleasant and cooperative. able to follow commands throughout  Insight: Within function limits  Impulsive: Within functional limits  Processing Speed: Within funtional limits     Home Living:  Type of Home: House  Lives With: Adult children (Daughter and SOHAIL)  Home Adaptive Equipment: Cane, Walker rolling or standard (rollator)  Home Layout: Multi-level, Able to live on main level with bedroom/bathroom  Home Access: Stairs to enter with rails  Entrance Stairs-Rails: Left  Entrance Stairs-Number of Steps: 3  Bathroom Shower/Tub: Walk-in shower  Bathroom Toilet: Standard  Bathroom Equipment: Grab bars in shower, Shower chair with back  Home Living Comments: patient denies fall hx   Prior Function:  Level of [place]: Independent with ADLs and functional transfers, Independent with homemaking with ambulation  Receives Help From: Family  ADL  Assistance: Independent  Homemaking Assistance: Independent  Ambulatory Assistance: Independent (uses cane when outside of home vs no AD in home)  Prior Function Comments: patient drives. reports she is independent with ADLs/IADLs at baseline  IADL History:  Homemaking Responsibilities: Yes  ADL:  Eating Assistance: Stand by  Eating Deficit: Setup (per clinical judgement)  Grooming Assistance: Stand by  Grooming Deficit: Supervision/safety (standing sinkside to wash hands)  Bathing Assistance: Stand by  Bathing Deficit: Steadying, Supervision/safety, Use of adaptive equipment (per clinical judgement)  UE Dressing Assistance: Minimal  UE Dressing Deficit: Setup, Pull around back, Pull down in back  LE Dressing Assistance: Minimal  LE Dressing Deficit: Steadying, Supervision/safety, Use of adaptive equipment (per clinical judgement)  Toileting Assistance with Device: Minimal  Toileting Deficit: Clothing management up, Clothing management down, Supervison/safety  Activity Tolerance:  Endurance: Decreased tolerance for upright activites  Activity Tolerance Comments: fatigues easily  Bed Mobility/Transfers: Bed Mobility  Bed Mobility: Yes  Bed Mobility 1  Bed Mobility 1: Supine to sitting  Level of Assistance 1: Close supervision  Bed Mobility Comments 1: head of bed elevated  Bed Mobility 2  Bed Mobility  2: Sitting to supine  Level of Assistance 2: Close supervision  Bed Mobility Comments 2: head of bed elevated    Transfers  Transfer: Yes  Transfer 1  Transfer From 1: Bed to  Transfer to 1: Stand  Technique 1: Sit to stand  Transfer Device 1: Cane  Transfer Level of Assistance 1: Contact guard  Trials/Comments 1: x2 trials  Transfers 2  Transfer From 2: Toilet to  Transfer to 2: Stand  Technique 2: Sit to stand  Transfer Device 2: Cane  Transfer Level of Assistance 2: Contact guard  Trials/Comments 2: with use of grab bar    Functional Mobility:  Functional Mobility  Functional Mobility Performed: Yes  Functional  Mobility 1  Surface 1: Level tile  Device 1: Single point cane  Assistance 1: Contact guard  Comments 1: to and from the bathroom  Sitting Balance:  Static Sitting Balance  Static Sitting-Balance Support: Feet supported, No upper extremity supported  Static Sitting-Level of Assistance: Close supervision  Standing Balance:  Dynamic Standing Balance  Dynamic Standing-Balance Support:  (unilateral support)  Dynamic Standing-Level of Assistance: Close supervision, Contact guard  Dynamic Standing-Comments: washing her hands sinkside    IADL's:   Homemaking Responsibilities: Yes  Vision: Vision - Basic Assessment  Current Vision: Wears glasses only for reading  Sensation:  Sensation Comment: patient denies numbness/tingling  Strength:  Strength Comments: B UE 3+/5 grossly  Coordination:  Movements are Fluid and Coordinated: Yes   Hand Function:  Hand Function  Gross Grasp: Functional  Coordination: Functional  Extremities: RUE   RUE : Within Functional Limits and LUE   LUE: Within Functional Limits    Outcome Measures: [facility] Daily Activity  Putting on and taking off regular lower body clothing: A little  Bathing (including washing, rinsing, drying): A little  Putting on and taking off regular upper body clothing: A little  Toileting, which includes using toilet, bedpan or urinal: A little  Taking care of personal grooming such as brushing teeth: A little  Eating Meals: A little  Daily Activity - Total Score: 18    Education Documentation  Body Mechanics, taught by Leda Deleon OT at 12/9/2024  3:28 PM.  Learner: Patient  Readiness: Acceptance  Method: Explanation, Demonstration  Response: Verbalizes Understanding, Needs Reinforcement    Precautions, taught by Leda Deleon OT at 12/9/2024  3:28 PM.  Learner: Patient  Readiness: Acceptance  Method: Explanation, Demonstration  Response: Verbalizes Understanding, Needs Reinforcement    ADL Training, taught by Leda Deleon OT at 12/9/2024  3:28 PM.  Learner:  Patient  Readiness: Acceptance  Method: Explanation, Demonstration  Response: Verbalizes Understanding, Needs Reinforcement    Education Comments  No comments found.      Goals:   Encounter Problems       Encounter Problems (Active)       OT Goals       ADLs (Progressing)       Start:  12/09/24    Expected End:  01/06/25       Patient will complete ADL tasks, with modified independence, using AE need in order to increase patient's safety and independence with self-care tasks.          Functional Transfers (Progressing)       Start:  12/09/24    Expected End:  01/06/25       Patient will complete functional transfers with modified independence, using least restrictive device, in order to increase patient's safety and independence with daily tasks.          Functional Mobility (Progressing)       Start:  12/09/24    Expected End:  01/06/25       Patient will demonstrate the ability to complete item retrieval and functional mobility with modified independence, in order to increase safety and independence with daily tasks.          Activity Tolerance (Progressing)       Start:  12/09/24    Expected End:  01/06/25       Patient will demonstrate the ability to participate in functional activity at least >/= 25 minutes in order to increase patient's safety and independence with daily tasks.

## 2024-12-10 ENCOUNTER — ANESTHESIA EVENT (OUTPATIENT)
Dept: GASTROENTEROLOGY | Facility: HOSPITAL | Age: 84
End: 2024-12-10
Payer: MEDICARE

## 2024-12-10 ENCOUNTER — ANESTHESIA (OUTPATIENT)
Dept: GASTROENTEROLOGY | Facility: HOSPITAL | Age: 84
End: 2024-12-10
Payer: MEDICARE

## 2024-12-10 ENCOUNTER — APPOINTMENT (OUTPATIENT)
Dept: GASTROENTEROLOGY | Facility: HOSPITAL | Age: 84
DRG: 811 | End: 2024-12-10
Payer: MEDICARE

## 2024-12-10 LAB
ALBUMIN SERPL BCP-MCNC: 3.7 G/DL (ref 3.4–5)
ANION GAP SERPL CALCULATED.3IONS-SCNC: 10 MMOL/L (ref 10–20)
BUN SERPL-MCNC: 39 MG/DL (ref 6–23)
CALCIUM SERPL-MCNC: 8.7 MG/DL (ref 8.6–10.3)
CHLORIDE SERPL-SCNC: 100 MMOL/L (ref 98–107)
CO2 SERPL-SCNC: 35 MMOL/L (ref 21–32)
CREAT SERPL-MCNC: 1.85 MG/DL (ref 0.5–1.05)
EGFRCR SERPLBLD CKD-EPI 2021: 26 ML/MIN/1.73M*2
ERYTHROCYTE [DISTWIDTH] IN BLOOD BY AUTOMATED COUNT: 15.5 % (ref 11.5–14.5)
GLUCOSE BLD MANUAL STRIP-MCNC: 107 MG/DL (ref 74–99)
GLUCOSE BLD MANUAL STRIP-MCNC: 132 MG/DL (ref 74–99)
GLUCOSE BLD MANUAL STRIP-MCNC: 94 MG/DL (ref 74–99)
GLUCOSE BLD MANUAL STRIP-MCNC: 97 MG/DL (ref 74–99)
GLUCOSE SERPL-MCNC: 110 MG/DL (ref 74–99)
HCT VFR BLD AUTO: 27.4 % (ref 36–46)
HGB BLD-MCNC: 8.4 G/DL (ref 12–16)
MAGNESIUM SERPL-MCNC: 2.13 MG/DL (ref 1.6–2.4)
MCH RBC QN AUTO: 28 PG (ref 26–34)
MCHC RBC AUTO-ENTMCNC: 30.7 G/DL (ref 32–36)
MCV RBC AUTO: 91 FL (ref 80–100)
NRBC BLD-RTO: 0 /100 WBCS (ref 0–0)
PHOSPHATE SERPL-MCNC: 4.5 MG/DL (ref 2.5–4.9)
PLATELET # BLD AUTO: 139 X10*3/UL (ref 150–450)
POTASSIUM SERPL-SCNC: 3.2 MMOL/L (ref 3.5–5.3)
RBC # BLD AUTO: 3 X10*6/UL (ref 4–5.2)
SODIUM SERPL-SCNC: 142 MMOL/L (ref 136–145)
WBC # BLD AUTO: 6.5 X10*3/UL (ref 4.4–11.3)

## 2024-12-10 PROCEDURE — 0DB78ZX EXCISION OF STOMACH, PYLORUS, VIA NATURAL OR ARTIFICIAL OPENING ENDOSCOPIC, DIAGNOSTIC: ICD-10-PCS | Performed by: INTERNAL MEDICINE

## 2024-12-10 PROCEDURE — 2500000004 HC RX 250 GENERAL PHARMACY W/ HCPCS (ALT 636 FOR OP/ED): Performed by: NURSE ANESTHETIST, CERTIFIED REGISTERED

## 2024-12-10 PROCEDURE — 7100000002 HC RECOVERY ROOM TIME - EACH INCREMENTAL 1 MINUTE

## 2024-12-10 PROCEDURE — 97116 GAIT TRAINING THERAPY: CPT | Mod: GP,CQ

## 2024-12-10 PROCEDURE — 2500000001 HC RX 250 WO HCPCS SELF ADMINISTERED DRUGS (ALT 637 FOR MEDICARE OP): Performed by: INTERNAL MEDICINE

## 2024-12-10 PROCEDURE — 43239 EGD BIOPSY SINGLE/MULTIPLE: CPT

## 2024-12-10 PROCEDURE — 82947 ASSAY GLUCOSE BLOOD QUANT: CPT

## 2024-12-10 PROCEDURE — 2500000005 HC RX 250 GENERAL PHARMACY W/O HCPCS: Performed by: INTERNAL MEDICINE

## 2024-12-10 PROCEDURE — 36415 COLL VENOUS BLD VENIPUNCTURE: CPT | Performed by: INTERNAL MEDICINE

## 2024-12-10 PROCEDURE — 83735 ASSAY OF MAGNESIUM: CPT | Performed by: INTERNAL MEDICINE

## 2024-12-10 PROCEDURE — 3700000002 HC GENERAL ANESTHESIA TIME - EACH INCREMENTAL 1 MINUTE

## 2024-12-10 PROCEDURE — 2500000002 HC RX 250 W HCPCS SELF ADMINISTERED DRUGS (ALT 637 FOR MEDICARE OP, ALT 636 FOR OP/ED): Performed by: INTERNAL MEDICINE

## 2024-12-10 PROCEDURE — 99233 SBSQ HOSP IP/OBS HIGH 50: CPT | Performed by: INTERNAL MEDICINE

## 2024-12-10 PROCEDURE — 1100000001 HC PRIVATE ROOM DAILY

## 2024-12-10 PROCEDURE — 88305 TISSUE EXAM BY PATHOLOGIST: CPT | Mod: TC | Performed by: INTERNAL MEDICINE

## 2024-12-10 PROCEDURE — 97110 THERAPEUTIC EXERCISES: CPT | Mod: GO,CO

## 2024-12-10 PROCEDURE — 7100000001 HC RECOVERY ROOM TIME - INITIAL BASE CHARGE

## 2024-12-10 PROCEDURE — 2500000004 HC RX 250 GENERAL PHARMACY W/ HCPCS (ALT 636 FOR OP/ED): Performed by: INTERNAL MEDICINE

## 2024-12-10 PROCEDURE — 85027 COMPLETE CBC AUTOMATED: CPT | Performed by: INTERNAL MEDICINE

## 2024-12-10 PROCEDURE — 0DB48ZX EXCISION OF ESOPHAGOGASTRIC JUNCTION, VIA NATURAL OR ARTIFICIAL OPENING ENDOSCOPIC, DIAGNOSTIC: ICD-10-PCS | Performed by: INTERNAL MEDICINE

## 2024-12-10 PROCEDURE — 3700000001 HC GENERAL ANESTHESIA TIME - INITIAL BASE CHARGE

## 2024-12-10 PROCEDURE — 2500000001 HC RX 250 WO HCPCS SELF ADMINISTERED DRUGS (ALT 637 FOR MEDICARE OP): Performed by: STUDENT IN AN ORGANIZED HEALTH CARE EDUCATION/TRAINING PROGRAM

## 2024-12-10 PROCEDURE — 0DB68ZX EXCISION OF STOMACH, VIA NATURAL OR ARTIFICIAL OPENING ENDOSCOPIC, DIAGNOSTIC: ICD-10-PCS | Performed by: INTERNAL MEDICINE

## 2024-12-10 PROCEDURE — 80069 RENAL FUNCTION PANEL: CPT | Performed by: INTERNAL MEDICINE

## 2024-12-10 PROCEDURE — 97110 THERAPEUTIC EXERCISES: CPT | Mod: GP,CQ

## 2024-12-10 RX ORDER — LIDOCAINE HYDROCHLORIDE 10 MG/ML
0.1 INJECTION, SOLUTION INFILTRATION; PERINEURAL ONCE
Status: DISCONTINUED | OUTPATIENT
Start: 2024-12-10 | End: 2024-12-12 | Stop reason: HOSPADM

## 2024-12-10 RX ORDER — SODIUM CHLORIDE, SODIUM LACTATE, POTASSIUM CHLORIDE, CALCIUM CHLORIDE 600; 310; 30; 20 MG/100ML; MG/100ML; MG/100ML; MG/100ML
100 INJECTION, SOLUTION INTRAVENOUS CONTINUOUS
Status: DISCONTINUED | OUTPATIENT
Start: 2024-12-10 | End: 2024-12-11

## 2024-12-10 RX ORDER — PROPOFOL 10 MG/ML
INJECTION, EMULSION INTRAVENOUS AS NEEDED
Status: DISCONTINUED | OUTPATIENT
Start: 2024-12-10 | End: 2024-12-10

## 2024-12-10 RX ORDER — POLYETHYLENE GLYCOL 3350, SODIUM CHLORIDE, SODIUM BICARBONATE, POTASSIUM CHLORIDE 420; 11.2; 5.72; 1.48 G/4L; G/4L; G/4L; G/4L
4000 POWDER, FOR SOLUTION ORAL ONCE
Status: COMPLETED | OUTPATIENT
Start: 2024-12-10 | End: 2024-12-11

## 2024-12-10 RX ORDER — LIDOCAINE HYDROCHLORIDE 10 MG/ML
INJECTION, SOLUTION INFILTRATION; PERINEURAL AS NEEDED
Status: DISCONTINUED | OUTPATIENT
Start: 2024-12-10 | End: 2024-12-10

## 2024-12-10 RX ORDER — HYDROMORPHONE HYDROCHLORIDE 0.2 MG/ML
0.2 INJECTION INTRAMUSCULAR; INTRAVENOUS; SUBCUTANEOUS EVERY 5 MIN PRN
Status: DISCONTINUED | OUTPATIENT
Start: 2024-12-10 | End: 2024-12-12 | Stop reason: HOSPADM

## 2024-12-10 RX ORDER — IRON POLYSACCHARIDE COMPLEX 150 MG
150 CAPSULE ORAL DAILY
Status: COMPLETED | OUTPATIENT
Start: 2024-12-10 | End: 2024-12-12

## 2024-12-10 RX ORDER — MIDAZOLAM HYDROCHLORIDE 1 MG/ML
1 INJECTION, SOLUTION INTRAMUSCULAR; INTRAVENOUS ONCE AS NEEDED
Status: DISCONTINUED | OUTPATIENT
Start: 2024-12-10 | End: 2024-12-12 | Stop reason: HOSPADM

## 2024-12-10 RX ORDER — ALBUTEROL SULFATE 0.83 MG/ML
2.5 SOLUTION RESPIRATORY (INHALATION) ONCE
Status: DISCONTINUED | OUTPATIENT
Start: 2024-12-10 | End: 2024-12-12 | Stop reason: HOSPADM

## 2024-12-10 RX ORDER — MEPERIDINE HYDROCHLORIDE 25 MG/ML
12.5 INJECTION INTRAMUSCULAR; INTRAVENOUS; SUBCUTANEOUS EVERY 10 MIN PRN
Status: DISCONTINUED | OUTPATIENT
Start: 2024-12-10 | End: 2024-12-12 | Stop reason: HOSPADM

## 2024-12-10 RX ORDER — ONDANSETRON HYDROCHLORIDE 2 MG/ML
4 INJECTION, SOLUTION INTRAVENOUS ONCE AS NEEDED
Status: DISCONTINUED | OUTPATIENT
Start: 2024-12-10 | End: 2024-12-12 | Stop reason: HOSPADM

## 2024-12-10 RX ORDER — SODIUM CHLORIDE, SODIUM LACTATE, POTASSIUM CHLORIDE, CALCIUM CHLORIDE 600; 310; 30; 20 MG/100ML; MG/100ML; MG/100ML; MG/100ML
INJECTION, SOLUTION INTRAVENOUS CONTINUOUS PRN
Status: DISCONTINUED | OUTPATIENT
Start: 2024-12-10 | End: 2024-12-10

## 2024-12-10 RX ORDER — FENTANYL CITRATE 50 UG/ML
50 INJECTION, SOLUTION INTRAMUSCULAR; INTRAVENOUS EVERY 5 MIN PRN
Status: DISCONTINUED | OUTPATIENT
Start: 2024-12-10 | End: 2024-12-12 | Stop reason: HOSPADM

## 2024-12-10 RX ADMIN — PANTOPRAZOLE SODIUM 40 MG: 40 INJECTION, POWDER, FOR SOLUTION INTRAVENOUS at 20:01

## 2024-12-10 RX ADMIN — Medication 150 MG: at 16:05

## 2024-12-10 RX ADMIN — ROPINIROLE HYDROCHLORIDE 1 MG: 1 TABLET, FILM COATED ORAL at 20:05

## 2024-12-10 RX ADMIN — TRAZODONE HYDROCHLORIDE 200 MG: 100 TABLET ORAL at 20:05

## 2024-12-10 RX ADMIN — LIDOCAINE 1 PATCH: 4 PATCH TOPICAL at 16:05

## 2024-12-10 RX ADMIN — PRAMIPEXOLE DIHYDROCHLORIDE 1.5 MG: 1.5 TABLET ORAL at 13:38

## 2024-12-10 RX ADMIN — PANTOPRAZOLE SODIUM 40 MG: 40 INJECTION, POWDER, FOR SOLUTION INTRAVENOUS at 09:08

## 2024-12-10 RX ADMIN — Medication 3 L/MIN: at 21:25

## 2024-12-10 RX ADMIN — FUROSEMIDE 40 MG: 10 INJECTION, SOLUTION INTRAMUSCULAR; INTRAVENOUS at 20:01

## 2024-12-10 RX ADMIN — FUROSEMIDE 40 MG: 10 INJECTION, SOLUTION INTRAMUSCULAR; INTRAVENOUS at 09:08

## 2024-12-10 RX ADMIN — SIMVASTATIN 10 MG: 10 TABLET, FILM COATED ORAL at 20:06

## 2024-12-10 ASSESSMENT — COGNITIVE AND FUNCTIONAL STATUS - GENERAL
MOBILITY SCORE: 18
STANDING UP FROM CHAIR USING ARMS: A LITTLE
TURNING FROM BACK TO SIDE WHILE IN FLAT BAD: A LITTLE
CLIMB 3 TO 5 STEPS WITH RAILING: A LOT
DRESSING REGULAR UPPER BODY CLOTHING: A LITTLE
MOVING TO AND FROM BED TO CHAIR: A LITTLE
MOVING FROM LYING ON BACK TO SITTING ON SIDE OF FLAT BED WITH BEDRAILS: A LITTLE
MOBILITY SCORE: 17
TOILETING: A LITTLE
DRESSING REGULAR LOWER BODY CLOTHING: A LITTLE
STANDING UP FROM CHAIR USING ARMS: A LITTLE
DAILY ACTIVITIY SCORE: 20
MOBILITY SCORE: 17
MOVING FROM LYING ON BACK TO SITTING ON SIDE OF FLAT BED WITH BEDRAILS: A LITTLE
HELP NEEDED FOR BATHING: A LITTLE
WALKING IN HOSPITAL ROOM: A LITTLE
HELP NEEDED FOR BATHING: A LITTLE
MOVING FROM LYING ON BACK TO SITTING ON SIDE OF FLAT BED WITH BEDRAILS: A LITTLE
DRESSING REGULAR LOWER BODY CLOTHING: A LITTLE
DAILY ACTIVITIY SCORE: 20
CLIMB 3 TO 5 STEPS WITH RAILING: A LITTLE
WALKING IN HOSPITAL ROOM: A LITTLE
DRESSING REGULAR LOWER BODY CLOTHING: A LITTLE
MOVING TO AND FROM BED TO CHAIR: A LITTLE
CLIMB 3 TO 5 STEPS WITH RAILING: A LOT
DAILY ACTIVITIY SCORE: 20
STANDING UP FROM CHAIR USING ARMS: A LITTLE
DRESSING REGULAR UPPER BODY CLOTHING: A LITTLE
STANDING UP FROM CHAIR USING ARMS: A LITTLE
WALKING IN HOSPITAL ROOM: A LITTLE
TOILETING: A LITTLE
MOVING TO AND FROM BED TO CHAIR: A LITTLE
TURNING FROM BACK TO SIDE WHILE IN FLAT BAD: A LITTLE
TURNING FROM BACK TO SIDE WHILE IN FLAT BAD: A LITTLE
TOILETING: A LITTLE
TURNING FROM BACK TO SIDE WHILE IN FLAT BAD: A LITTLE
CLIMB 3 TO 5 STEPS WITH RAILING: A LOT
HELP NEEDED FOR BATHING: A LITTLE
WALKING IN HOSPITAL ROOM: A LITTLE
MOVING TO AND FROM BED TO CHAIR: A LITTLE
DRESSING REGULAR UPPER BODY CLOTHING: A LITTLE
MOVING FROM LYING ON BACK TO SITTING ON SIDE OF FLAT BED WITH BEDRAILS: A LITTLE
MOBILITY SCORE: 17
TOILETING: A LITTLE
DRESSING REGULAR UPPER BODY CLOTHING: A LITTLE
HELP NEEDED FOR BATHING: A LITTLE
DAILY ACTIVITIY SCORE: 20
DRESSING REGULAR LOWER BODY CLOTHING: A LITTLE

## 2024-12-10 ASSESSMENT — PAIN SCALES - GENERAL
PAINLEVEL_OUTOF10: 0 - NO PAIN

## 2024-12-10 ASSESSMENT — PAIN - FUNCTIONAL ASSESSMENT
PAIN_FUNCTIONAL_ASSESSMENT: 0-10

## 2024-12-10 NOTE — CARE PLAN
Problem: Diabetes  Goal: Achieve decreasing blood glucose levels by end of shift  Outcome: Progressing  Goal: Increase stability of blood glucose readings by end of shift  Outcome: Progressing  Goal: Decrease in ketones present in urine by end of shift  Outcome: Progressing  Goal: Maintain electrolyte levels within acceptable range throughout shift  Outcome: Progressing  Goal: Maintain glucose levels >70mg/dl to <250mg/dl throughout shift  Outcome: Progressing  Goal: No changes in neurological exam by end of shift  Outcome: Progressing  Goal: Learn about and adhere to nutrition recommendations by end of shift  Outcome: Progressing  Goal: Vital signs within normal range for age by end of shift  Outcome: Progressing  Goal: Increase self care and/or family involovement by end of shift  Outcome: Progressing  Goal: Receive DSME education by end of shift  Outcome: Progressing     Problem: Pain - Adult  Goal: Verbalizes/displays adequate comfort level or baseline comfort level  Outcome: Progressing     Problem: Safety - Adult  Goal: Free from fall injury  Outcome: Progressing     Problem: Discharge Planning  Goal: Discharge to home or other facility with appropriate resources  Outcome: Progressing     Problem: Chronic Conditions and Co-morbidities  Goal: Patient's chronic conditions and co-morbidity symptoms are monitored and maintained or improved  Outcome: Progressing     Problem: Fall/Injury  Goal: Not fall by end of shift  Outcome: Progressing  Goal: Be free from injury by end of the shift  Outcome: Progressing  Goal: Verbalize understanding of personal risk factors for fall in the hospital  Outcome: Progressing  Goal: Verbalize understanding of risk factor reduction measures to prevent injury from fall in the home  Outcome: Progressing  Goal: Use assistive devices by end of the shift  Outcome: Progressing  Goal: Pace activities to prevent fatigue by end of the shift  Outcome: Progressing     Problem:  Respiratory  Goal: Clear secretions with interventions this shift  Outcome: Progressing  Goal: Minimize anxiety/maximize coping throughout shift  Outcome: Progressing  Goal: Minimal/no exertional discomfort or dyspnea this shift  Outcome: Progressing  Goal: No signs of respiratory distress (eg. Use of accessory muscles. Peds grunting)  Outcome: Progressing  Goal: Patent airway maintained this shift  Outcome: Progressing  Goal: Tolerate mechanical ventilation evidenced by VS/agitation level this shift  Outcome: Progressing  Goal: Tolerate pulmonary toileting this shift  Outcome: Progressing  Goal: Verbalize decreased shortness of breath this shift  Outcome: Progressing  Goal: Wean oxygen to maintain O2 saturation per order/standard this shift  Outcome: Progressing  Goal: Increase self care and/or family involvement in next 24 hours  Outcome: Progressing     Problem: Deep Vein Thrombosis  Goal: I will remain free from complications of deep vein thrombosis and maintain current level of mobility  Outcome: Progressing     Problem: Pain  Goal: Takes deep breaths with improved pain control throughout the shift  Outcome: Progressing  Goal: Turns in bed with improved pain control throughout the shift  Outcome: Progressing  Goal: Walks with improved pain control throughout the shift  Outcome: Progressing  Goal: Performs ADL's with improved pain control throughout shift  Outcome: Progressing  Goal: Participates in PT with improved pain control throughout the shift  Outcome: Progressing  Goal: Free from opioid side effects throughout the shift  Outcome: Progressing  Goal: Free from acute confusion related to pain meds throughout the shift  Outcome: Progressing   The patient's goals for the shift include      The clinical goals for the shift include monitor labs    Over the shift, the patient did not make progress toward the following goals. Barriers to progression include pain, anemia. Recommendations to address these barriers  include medicate, monitor labs and vs.

## 2024-12-10 NOTE — SIGNIFICANT EVENT
Pt was off the floor for EGD at the time of teleneuro rounds.  No change in recs. Continue ropinirole 1mg if sx is managed at this dose, if uncontrolled can increase back to 2mg at bedtime.    Abraham Cabrera MD   Neurology and Vascular Neurology

## 2024-12-10 NOTE — PROGRESS NOTES
Raúl Jordan is a 84 y.o. female on day 2 of admission presenting with Anemia, unspecified type.      Subjective   Denies any active bleeding.    Complains of chronic back pain.   He had EGD today.  [place] has had radiology department visit daily    Objective     Last Recorded Vitals  /56 (BP Location: Left arm, Patient Position: Lying)   Pulse 74   Temp 36.2 °C (97.2 °F) (Temporal)   Resp 18   Wt 95.3 kg (210 lb)   SpO2 94%   Intake/Output last 3 Shifts:    Intake/Output Summary (Last 24 hours) at 12/10/2024 1425  Last data filed at 12/10/2024 1352  Gross per 24 hour   Intake 1090 ml   Output 1000 ml   Net 90 ml       Admission Weight  Weight: 95.3 kg (210 lb) (12/08/24 1156)    Daily Weight  12/08/24 : 95.3 kg (210 lb)    Image Results      Physical Exam  Pt is some respiratory distress, on 2 L of oxygen.  Cooperative with exam.    A, Ox3.  Face is symmetrical.  Skin - no lesions.  Lungs: clear to auscultations B/L.  Diminished bilaterally.  No wheezes, rales, rhonchi.  Heart: regular S1S2.  Abdomen: soft, NT, ND. BS positive.  Extr.: no edema, cords, cyanosis.  Moves all extr.   Relevant Results               Assessment/Plan        Results for orders placed or performed during the hospital encounter of 12/08/24 (from the past 24 hours)   POCT GLUCOSE   Result Value Ref Range    POCT Glucose 123 (H) 74 - 99 mg/dL   POCT GLUCOSE   Result Value Ref Range    POCT Glucose 133 (H) 74 - 99 mg/dL   Renal Function Panel   Result Value Ref Range    Glucose 110 (H) 74 - 99 mg/dL    Sodium 142 136 - 145 mmol/L    Potassium 3.2 (L) 3.5 - 5.3 mmol/L    Chloride 100 98 - 107 mmol/L    Bicarbonate 35 (H) 21 - 32 mmol/L    Anion Gap 10 10 - 20 mmol/L    Urea Nitrogen 39 (H) 6 - 23 mg/dL    Creatinine 1.85 (H) 0.50 - 1.05 mg/dL    eGFR 26 (L) >60 mL/min/1.73m*2    Calcium 8.7 8.6 - 10.3 mg/dL    Phosphorus 4.5 2.5 - 4.9 mg/dL    Albumin 3.7 3.4 - 5.0 g/dL   Magnesium   Result Value Ref Range    Magnesium 2.13 1.60 -  2.40 mg/dL   CBC   Result Value Ref Range    WBC 6.5 4.4 - 11.3 x10*3/uL    nRBC 0.0 0.0 - 0.0 /100 WBCs    RBC 3.00 (L) 4.00 - 5.20 x10*6/uL    Hemoglobin 8.4 (L) 12.0 - 16.0 g/dL    Hematocrit 27.4 (L) 36.0 - 46.0 %    MCV 91 80 - 100 fL    MCH 28.0 26.0 - 34.0 pg    MCHC 30.7 (L) 32.0 - 36.0 g/dL    RDW 15.5 (H) 11.5 - 14.5 %    Platelets 139 (L) 150 - 450 x10*3/uL   POCT GLUCOSE   Result Value Ref Range    POCT Glucose 107 (H) 74 - 99 mg/dL   POCT GLUCOSE   Result Value Ref Range    POCT Glucose 97 74 - 99 mg/dL     *Note: Due to a large number of results and/or encounters for the requested time period, some results have not been displayed. A complete set of results can be found in Results Review.               Assessment & Plan  Anemia, unspecified type    # Acute on chronic hypoxic respiratory failure:  #CHF exacerbation (HFpEF)  -Patient is on 2 L at baseline, currently on 3 L with orthopnea.  -BNP in the ED;461, CXR; Congestive heart failure   -Daily weights  -Strict I/O  -1500cc fluid restriction  -Cardiac diet (low Na)  -Lasix 40 mg IV BID   -Last TTE: 9/27, normal EF with impaired relaxation.     #Anemia   #Concern for upper GI bleed:  - Last HB 9.8 two months ago, today 7.3, patient had dark stool multiple times lately.  - Protonix 80mg IV once then 40mg IV BID  - clear liquid diet and then NPO after midnight  - Fluid resuscitation, as needed  - Monitor Hgb, transfuse if < 7.0, consent obtained   -GI consulted; appreciate recs.        #Chest pain (Resolved):  - EKG with no ischemic changes.  - Troponin; 25-->21  - Less likely of cardiac origin, pain subsided as well, will continue to monitor.        #Worsening Restless leg syndrome:  - Patient stated her restless leg syndrome was recently bothering her and was not controlled by pramipexole.  - Patient was in distress during the interview.  - Will continue pramipexole 1.5 mg daily, will add Requip 2 mg nightly, will check iron stores and replete as  needed.  - Consult neurology; appreciate recs.          #JC on CKD:  -On admission, serum creat 1.62, baseline 1.2-1.35.  -Likely cardiorenal in the setting of CHF exacerbation.  -Continue to monitor, avoid nephrotoxic meds.        #Rule out DVT:  - Left leg circumference bigger than the right.  - Duplex US ordered.     Chronic Medical Issues   #HTN/AFIB:  -Rate controlled, continue metoprolol succinate 50 mg daily  -Hold  losartan 25 mg (for JC) and warfarin for increased GI bleed risk.  -Labetalol PRN with parameters.     # Depression/anxiety/insomnia:  -Continue sertraline 100 mg daily and trazodone 200 mg nightly.     # HLD:  -Continue simvastatin 10 mg daily.     #DM:  -Patient stated she has DM but there was nothing on med recs for diabetes.  -Sliding scale and diabetic diet  -Last HbA1c 6 from 6 months ago, we will recheck HbA1c.        F: PO intake & IVF PRN  E: Replete as needed  N: Clear liquid cardiac, consistent carb  GI ppx: Protonix 40 mg IV twice daily   DVT ppx: SCD, no pharmacological due to GI bleed.  Antibiotics: None  Oxygenation: 3 L NC     Code Status: DNR and No Intubation, confirmed with the patient and her daughters at bedside.     12/09/24:  Severe anemia, likely due to GI blood loss.  Status posttransfusion today.  Monitor and transfuse as needed.  CHF, acute on chronic diastolic due to severe anemia.  IV Lasix as needed.  Gastrointestinal bleeding, likely upper.  Continue PPI.  Plan for EGD tomorrow.  Hypertension.  Continue medications  Acute on chronic renal failure, improving.  Monitor.  Acute respiratory failure with hypoxemia, likely due to acute CHF.    12/10/24:  No evidence of active bleeding.  Hemoglobin is stable after transfusion.  EGD did not reveal an obvious source of bleeding.  Gastroenterologist will discuss with patient possible colonoscopy.  Will start IV iron.  Renal function is worsening.  Hold losartan.  Continue nephrologist.  CHF, continue Lasix, she is doing  better.       Oralia Mercado MD

## 2024-12-10 NOTE — CONSULTS
"Nutrition Assessement Note    Nutrition Assessment    Reason for Assessment: Dietitian discretion (CHF)    Reason for Hospital Admission:  Raúl Jordan is a 84 y.o. female who is admitted for anemia. Spoke with pt at bedside. Pt reported good PO intake and appetite. Pt reported weight gain of 5# due to fluid retention. Pt is NPO for EGD for possible GI bleed. Will monitor for diet advancement. Discussed low sodium diet and provided handout.     Malnutrition Screening Tool (MST)  Have you recently lost weight without trying?: No  Weight Loss Score: 0  Have you been eating poorly because of a decreased appetite?: No  Malnutrition Score: 0  Nutrition Screen  Stage 3 or 4 Pressure Injury or Multiple Non-Healing Wounds: No  Home Tube Feeding or Total Parenteral Nutrition (TPN): No  Dietitian Consult Needed: No    Past Medical History:   Diagnosis Date    Abnormal laboratory test result 09/01/2023    Anemia due to acute blood loss 09/01/2023    CHF (congestive heart failure)     Chill 09/01/2023    Diabetes mellitus (Multi)     Flank pain 09/01/2023    Hypertension     Hypoxemia 09/01/2023    Hypoxia 09/01/2023    Melena 09/01/2023    Other chronic pain 09/01/2023    Pneumonia 09/01/2023    Pyuria 09/01/2023      History reviewed. No pertinent surgical history.    Nutrition History:  Food and Nutrient History: Pt reported good appetite. Pt is NPO  Energy Intake: Poor < 50 %     Food Allergies/Intolerances:  None  GI Symptoms: None  Oral Problems: None    Anthropometrics:  Ht: 160 cm (5' 3\"), Wt: 95.3 kg (210 lb), BMI: 37.21  IBW/kg (Dietitian Calculated): 52.27 kg  Percent of IBW: 182.61 %  Adjusted Body Weight (kg): 63.18 kg    Weight Change:  Daily Weight  12/08/24 : 95.3 kg (210 lb)  11/07/24 : 100 kg (221 lb)  10/16/24 : 102 kg (224 lb)  10/08/24 : 99.3 kg (219 lb)  09/23/24 : 92.1 kg (203 lb)  09/17/24 : 99.8 kg (220 lb)  08/12/24 : 103 kg (228 lb)  07/15/24 : 101 kg (223 lb 1.7 oz)  06/05/24 : 100 kg (221 " lb)  05/01/24 : 99.8 kg (220 lb)     Weight History / % Weight Change: Pt reported 5# weight gain             Nutrition Focused Physical Exam Findings:             Edema  Edema: +1 trace (nonpitting)  Edema Location: +1 LLE, non-pitting RLE         Nutrition Significant Labs:  Lab Results   Component Value Date    WBC 6.5 12/10/2024    HGB 8.4 (L) 12/10/2024    HCT 27.4 (L) 12/10/2024     (L) 12/10/2024    CHOL 151 08/30/2023    TRIG 93 08/30/2023    HDL 58 08/30/2023    ALT 14 12/08/2024    AST 20 12/08/2024     12/10/2024    K 3.2 (L) 12/10/2024     12/10/2024    CREATININE 1.85 (H) 12/10/2024    BUN 39 (H) 12/10/2024    CO2 35 (H) 12/10/2024    TSH 1.66 11/08/2022    INR 1.7 (H) 12/08/2024    HGBA1C 5.5 12/08/2024    ALBUR 13 11/08/2022     Nutrition Specific Medications:  furosemide, 40 mg, intravenous, q12h  insulin lispro, 0-10 Units, subcutaneous, TID AC  lidocaine, 1 patch, transdermal, Daily  losartan, 25 mg, oral, Daily  metoprolol succinate XL, 50 mg, oral, Daily  oxygen, , inhalation, Nightly  pantoprazole, 40 mg, intravenous, BID  pramipexole, 1.5 mg, oral, Daily  rOPINIRole, 1 mg, oral, Nightly  sertraline, 100 mg, oral, Daily  simvastatin, 10 mg, oral, Nightly  traZODone, 200 mg, oral, Nightly        Dietary Orders (From admission, onward)       Start     Ordered    12/10/24 0001  NPO Diet; Effective midnight  Diet effective midnight         12/09/24 0859    12/08/24 1607  May Participate in Room Service With Assistance  ( ROOM SERVICE MAY PARTICIPATE WITH ASSISTANCE)  Once        Question:  .  Answer:  Yes    12/08/24 1606                   Estimated Needs:   Estimated Energy Needs  Total Energy Estimated Needs (kCal): 1578 kCal  Total Estimated Energy Need per Day (kCal/kg): 25 kCal/kg  Method for Estimating Needs: ABW    Estimated Protein Needs  Total Protein Estimated Needs (g):  (63-95)  Total Protein Estimated Needs (g/kg):  (1-1.5)  Method for Estimating Needs:  ABW    Estimated Fluid Needs  Total Fluid Estimated Needs (mL): 1500 mL  Method for Estimating Needs: fluid restriction        Nutrition Diagnosis   Nutrition Diagnosis:       Nutrition Diagnosis  Patient has Nutrition Diagnosis: Yes  Diagnosis Status (1): New  Nutrition Diagnosis 1: Inadequate energy intake  Related to (1): decreased ability to consume sufficient energy  As Evidenced by (1): NPO  Additional Nutrition Diagnosis: Diagnosis 2  Diagnosis Status (2): New  Nutrition Diagnosis 2: Food and nutrition related knowledge deficit  Related to (2): needs review of diet education  As Evidenced by (2): conditions associated with diagnosis       Nutrition Interventions/Recommendations   Nutrition Interventions and Recommendations:    Nutrition Prescription:  Individualized Nutrition Prescription Provided for : 1578 kcals, 63-95 g protein via diet    Nutrition Interventions:   Food and/or Nutrient Delivery Interventions  Interventions: Meals and snacks  Meals and Snacks: Fat-modified diet, Mineral-modified diet  Goal: recommend cardiac diet once able    Education Documentation  No documentation found.           Nutrition Monitoring and Evaluation   Monitoring/Evaluation:   Food/Nutrient Related History Monitoring  Monitoring and Evaluation Plan: Energy intake  Energy Intake: Estimated energy intake  Criteria: monitor for diet advancement    Body Composition/Growth/Weight History  Monitoring and Evaluation Plan: Weight  Weight: Measured weight  Criteria: pt to maintain edema/ fluid-free weight         Time Spent/Follow-up:   Follow Up  Time Spent (min): 30 minutes  Last Date of Nutrition Visit: 12/10/24  Nutrition Follow-Up Needed?: 7-10 days  Follow up Comment: 12/17/24

## 2024-12-10 NOTE — PROGRESS NOTES
12/10/24 1228   Discharge Planning   Living Arrangements Children   Support Systems Children   Assistance Needed walker outside of home   Type of Residence Private residence   Number of Stairs to Enter Residence 3   Number of Stairs Within Residence 0  (all needs first floor)   Do you have animals or pets at home? Yes   Type of Animals or Pets 1 dog 1 cat   Who is requesting discharge planning? Patient   Home or Post Acute Services In home services   Type of Home Care Services Home PT   Expected Discharge Disposition Home H   Does the patient need discharge transport arranged? No   Patient Choice   Provider Choice list and CMS website (https://medicare.gov/care-compare#search) for post-acute Quality and Resource Measure Data were provided and reviewed with: Patient   Patient / Family choosing to utilize agency / facility established prior to hospitalization No     Home with HHC -PT at discharge.  EGD today.    [facility]

## 2024-12-10 NOTE — ANESTHESIA PREPROCEDURE EVALUATION
Patient: Raúl Jordan    Procedure Information       Anesthesia Start Date/Time: 12/10/24 1154    Scheduled providers: Alejo Meyer DO; MIKE Arrieta    Procedure: EGD    Location: [facility]            Relevant Problems   Cardiac   (+) AV node arrhythmia   (+) Chronic congestive heart failure   (+) Complete atrioventricular block (Multi)   (+) Cor pulmonale (Multi)   (+) Hyperlipidemia   (+) Hypertension   (+) Longstanding persistent atrial fibrillation (Multi)   (+) Other chest pain      Pulmonary   (+) LAZARA (obstructive sleep apnea)   (+) Orthopnea   (+) Pneumonia of both lower lobes      Neuro   (+) Anxiety      GI   (+) Irritable bowel syndrome with diarrhea   (+) Lower gastrointestinal hemorrhage   (+) Rectal hemorrhage      /Renal   (+) Kidney stone      Liver   (+) Hepatic cirrhosis (Multi)      Endocrine   (+) Obesity   (+) Type 2 diabetes mellitus with obesity (Multi)      Hematology   (+) Anemia due to acute blood loss   (+) Anemia, unspecified type   (+) Iron deficiency anemia due to chronic blood loss   (+) Microcytic anemia      Musculoskeletal   (+) Chronic pain disorder   (+) Osteoarthritis   (+) Primary localized osteoarthrosis, hand   (+) Unilateral primary osteoarthritis, right knee      ID   (+) Pneumonia of both lower lobes       Clinical information reviewed:   Tobacco  Allergies  Meds  Problems  Med Hx  Surg Hx   Fam Hx  Soc   Hx        NPO Detail:  No data recorded     Physical Exam    Airway  Mallampati: III  TM distance: >3 FB     Cardiovascular    Dental    Pulmonary    Abdominal            Anesthesia Plan    History of general anesthesia?: yes  History of complications of general anesthesia?: no    ASA 3     MAC     intravenous induction   Anesthetic plan and risks discussed with patient.

## 2024-12-10 NOTE — PROGRESS NOTES
Physical Therapy    Physical Therapy Treatment    Patient Name: Raúl Jordan  MRN: [id number]  Department: [address]  Room: [address] [place] [address]  Today's Date: 12/10/2024  Time Calculation  Start Time: 0803  Stop Time: 0834  Time Calculation (min): 31 min         Assessment/Plan   PT Assessment  PT Assessment Results: Decreased strength, Decreased endurance, Impaired balance, Decreased mobility  Rehab Prognosis: Good  Evaluation/Treatment Tolerance: Patient limited by fatigue  Strengths: Ability to acquire knowledge  End of Session Communication: Bedside nurse (Spoke to nurse regarding desating 02 with gait 96% desating to 88-89% requiring good 2 minutes to reciover while on 3 liters nasal canula)  Assessment Comment: Patient presents with dx of anemia and presents with weakness B LE, impaired balance, impaired functional activity tolerance who requires min A x 1 overall for safe mobility. Patient has suffered functional decline from independent baseline and requires acute PT to address deficits. Recommend low intensity rehab follow up.  End of Session Patient Position: Up in chair, Alarm on (Needs at reach)     PT Plan  Treatment/Interventions: Bed mobility, Transfer training, Gait training, Strengthening, Endurance training, Therapeutic exercise, Therapeutic activity  PT Plan: Ongoing PT  PT Frequency: 4 times per week  PT Discharge Recommendations: Low intensity level of continued care  Equipment Recommended upon Discharge: Straight cane  PT Recommended Transfer Status: Assist x1, Assistive device  PT - OK to Discharge: Yes      General Visit Information:   PT  Visit  PT Received On: 12/10/24  General  Reason for Referral: Impaired mobility due to anemia  Referred By:   Past Medical History Relevant to Rehab: CHF, atrial fib, hypoxic respiratory failure, DM, HTN, restless leg syndrome, OA  Family/Caregiver Present: No  Prior to Session Communication: Bedside nurse  Patient Position Received: Bed, 3 rail up, Alarm  on  Preferred Learning Style: verbal, visual  General Comment: Cleared by nurse to see. Patient agreeable to therapy    Subjective   Precautions:  Precautions  Hearing/Visual Limitations: glasses for reading  Medical Precautions: Fall precautions    Vital Signs (Past 2hrs)        Date/Time Vitals Session Patient Position Pulse Resp SpO2 BP MAP (mmHg)    12/10/24 0803 --  --  --  --  90 %  --  --     12/10/24 0844 --  --  73  15  94 %  117/53  74                   Vital Signs Comment: 90% 3 liters once of of bed. Gait 87% Pursed lip breathing Improved to 97%     Objective   Pain:  Pain Assessment  Pain Assessment: 0-10  0-10 (Numeric) Pain Score: 0 - No pain  Cognition:  Cognition  Overall Cognitive Status: Within Functional Limits  Cognition Comments: Pleasant and cooperative  Coordination:     Postural Control:  Postural Control  Posture Comment: forward head, rounded shoulders  Static Sitting Balance  Static Sitting-Balance Support: Bilateral upper extremity supported  Static Sitting-Level of Assistance: Close supervision  Dynamic Sitting Balance  Dynamic Sitting-Balance Support: Bilateral upper extremity supported  Dynamic Sitting-Level of Assistance: Close supervision  Static Standing Balance  Static Standing-Balance Support: Right upper extremity supported  Static Standing-Level of Assistance: Minimum assistance  Static Standing-Comment/Number of Minutes: with her cane  Dynamic Standing Balance  Dynamic Standing-Balance Support: Right upper extremity supported  Dynamic Standing-Level of Assistance: Minimum assistance  Dynamic Standing-Balance: Turning  Dynamic Standing-Comments: With cane with Min A  Extremity/Trunk Assessments:    Activity Tolerance:  Activity Tolerance  Endurance: Decreased tolerance for upright activites  Activity Tolerance Comments: fatiques easily Desating 02 with gait  Treatments:       Bed Mobility  Bed Mobility: Yes  Bed Mobility 1  Bed Mobility 1: Supine to sitting  Level of Assistance  1: Close supervision  Bed Mobility Comments 1: HOB elevated Cus for safe hand placement    Ambulation/Gait Training  Ambulation/Gait Training Performed: Yes  Ambulation/Gait Training 1  Surface 1: Level tile  Device 1: Single point cane  Assistance 1: Contact guard  Quality of Gait 1: Inconsistent stride length, Decreased step length, Diminished heel strike  Comments/Distance (ft) 1: 15' x 2 into/ out bathroom with contact guard assist to steady with WBOS Seated rest between walks due to Sp02 desating to 87-88% on 3 liters continous 02 Pursed lip breathing perform. Back nto 97%  Transfers  Transfer: Yes  Transfer 1  Transfer From 1: Bed to  Transfer to 1: Stand  Technique 1: Sit to stand  Transfer Device 1: Cane  Transfer Level of Assistance 1: Contact guard  Trials/Comments 1: STS from bed with cane with contactb guard assist cues for safe hand placement  Transfers 2  Transfer From 2: Stand to  Transfer to 2: Sit, Toilet  Technique 2: Stand to sit, Sit to stand  Transfer Device 2: Cane  Transfer Level of Assistance 2: Contact guard  Trials/Comments 2: STS to/from toilet with cotnaqct guard assist with cues for safe hand placement  Transfers 3  Transfer From 3: Stand to  Transfer to 3: Sit, Chair with arms  Technique 3: Stand to sit  Transfer Device 3: Cane  Transfer Level of Assistance 3: Contact guard  Trials/Comments 3: STS to bedside chair with contact guard assist with cues for safe hand placement.Sp02 89% instructed pursed llip breathing Good 1-2 minutes to recover back to 97%    Outcome Measures:  [facility] Basic Mobility  Turning from your back to your side while in a flat bed without using bedrails: A little  Moving from lying on your back to sitting on the side of a flat bed without using bedrails: A little  Moving to and from bed to chair (including a wheelchair): A little  Standing up from a chair using your arms (e.g. wheelchair or bedside chair): A little  To walk in hospital room: A little  Climbing 3-5  steps with railing: A little  Basic Mobility - Total Score: 18    Education Documentation  Precautions, taught by Jane Prince PTA at 12/10/2024  9:46 AM.  Learner: Patient  Readiness: Acceptance  Method: Explanation  Response: Verbalizes Understanding    Body Mechanics, taught by Jane Prince PTA at 12/10/2024  9:46 AM.  Learner: Patient  Readiness: Acceptance  Method: Explanation  Response: Verbalizes Understanding    Home Exercise Program, taught by Jane Prince PTA at 12/10/2024  9:46 AM.  Learner: Patient  Readiness: Acceptance  Method: Explanation  Response: Verbalizes Understanding    Mobility Training, taught by Jane Prince PTA at 12/10/2024  9:46 AM.  Learner: Patient  Readiness: Acceptance  Method: Explanation  Response: Verbalizes Understanding    Education Comments  No comments found.        OP EDUCATION:       Encounter Problems       Encounter Problems (Active)       PT Goals       Patient will transfer supine to sit and sit to supine with independent assist to facilitate mobility.  (Progressing)       Start:  12/10/24    Expected End:  12/24/24            Patient will transfer supine to sit and sit to supine with independent assist to facilitate mobility.  (Progressing)       Start:  12/10/24    Expected End:  12/24/24            Patient will amb 100 feet with straight cane device including two turns on even surface with independent assist to facilitate safe mobility.  (Progressing)       Start:  12/10/24    Expected End:  12/24/24

## 2024-12-10 NOTE — ANESTHESIA POSTPROCEDURE EVALUATION
Patient: Raúl Jordan    Procedure Summary       Date: 12/10/24 Room / Location: [facility]    Anesthesia Start: 1154 Anesthesia Stop: 1215    Procedure: EGD Diagnosis: Gastrointestinal hemorrhage, unspecified gastrointestinal hemorrhage type    Scheduled Providers: Alejo Meyer DO; THERESA Arrieta-CRNA Responsible Provider: Alejo Meyer DO    Anesthesia Type: MAC ASA Status: 3            Anesthesia Type: MAC    Vitals Value Taken Time   /66 12/10/24 1255   Temp 36.2 °C (97.2 °F) 12/10/24 1219   Pulse 77 12/10/24 1257   Resp 19 12/10/24 1257   SpO2 89 % 12/10/24 1258   Vitals shown include unfiled device data.    Anesthesia Post Evaluation    Patient location during evaluation: bedside  Patient participation: complete - patient participated  Level of consciousness: awake  Pain management: adequate  Airway patency: patent  Cardiovascular status: acceptable  Respiratory status: acceptable  Hydration status: acceptable  Postoperative Nausea and Vomiting: none        There were no known notable events for this encounter.

## 2024-12-10 NOTE — PROGRESS NOTES
S/P EGD  There was a lip of gastric mucosa at the GE junction; performed cold forceps biopsy  Mild gastritis performed cold forceps biopsy  Multiple small polyps in the stomach; performed cold forceps biopsy          Await pathology results  Theoretically the gastric abnormality could have been a site of a previous erosion or tear, but more likely incidental and therefore no source of melena  Patient has agreed to proceed with colonoscopy. Will have done on Thursday. May advance diet today. Clear liquid diet starting tomorrow along with bowel prep.

## 2024-12-10 NOTE — PROGRESS NOTES
Physical Therapy    Physical Therapy Evaluation    Patient Name: Raúl Jordan  MRN: [id number]  Department: [address]  Room: [address] [place] [address]  Today's Date: 12/09/2024   Time Calculation  Start Time: 1431  Stop time: 1452  Time Calculation (min): 21 min    Assessment/Plan   PT Assessment  PT Assessment Results: Decreased strength, Decreased endurance, Impaired balance, Decreased mobility  Rehab Prognosis: Good  Barriers to Discharge: none  Evaluation/Treatment Tolerance: Patient tolerated treatment well  Medical Staff Made Aware: Yes  Strengths: Ability to acquire knowledge  Barriers to Participation: Comorbidities  End of Session Communication: Bedside nurse  Assessment Comment: Patient presents with dx of anemia and presents with weakness B LE, impaired balance, impaired functional activity tolerance who requires min A x 1 overall for safe mobility. Patient has suffered functional decline from independent baseline and requires acute PT to address deficits. Recommend low intensity rehab follow up.  End of Session Patient Position: Bed, 3 rail up, Alarm on  IP OR SWING BED PT PLAN  Inpatient or Swing Bed: Inpatient  PT Plan  Treatment/Interventions: Bed mobility, Transfer training, Gait training, Strengthening, Endurance training, Therapeutic exercise, Therapeutic activity  PT Plan: Ongoing PT  PT Frequency: 4 times per week  PT Discharge Recommendations: Low intensity level of continued care  Equipment Recommended upon Discharge: Straight cane  PT Recommended Transfer Status: Assist x1, Assistive device  PT - OK to Discharge: Yes    Subjective   General Visit Information:  General  Reason for Referral: Impaired mobility due to anemia  Referred By:   Past Medical History Relevant to Rehab: CHF, atrial fib, hypoxic respiratory failure, DM, HTN, restless leg syndrome, OA  Co-Treatment: OT  Co-Treatment Reason: safety with OOB mobility  Prior to Session Communication: Bedside nurse  Patient Position Received: Bed, 3  rail up, Alarm on  Preferred Learning Style: auditory, verbal  General Comment: 84 yr old female to ED with discomfort and SOB, found to be anemic, received 2 units of blood and is awaiting 1 more unit  Home Living:  Home Living  Type of Home: House  Lives With: Adult children (daughter and SOHAIL)  Home Adaptive Equipment: Cane, Walker rolling or standard (rollator)  Home Layout: Multi-level, Able to live on main level with bedroom/bathroom  Home Access: Stairs to enter with rails  Entrance Stairs-Rails: Left  Entrance Stairs-Number of Steps: 3  Bathroom Shower/Tub: Walk-in shower  Bathroom Toilet: Standard  Bathroom Equipment: Grab bars in shower, Shower chair with back  Home Living Comments: patient denies fall hx  Prior Level of Function:  Prior Function Per Pt/Caregiver Report  Level of Stone: Independent with ADLs and functional transfers, Independent with homemaking with ambulation  Receives Help From: Family  ADL Assistance: Independent  Homemaking Assistance: Independent  Ambulatory Assistance: Independent (uses cane when outside of home vs no AD in sean)  Prior Function Comments: patient drives. reports she is independent with ADLs/IADLs at baseline  Precautions:  Precautions  Hearing/Visual Limitations: glasses for reading  Medical Precautions: Fall precautions     Vital Signs (Past 2hrs)                 Objective   Pain:  Pain Assessment  Pain Assessment: 0-10  0-10 (Numeric) Pain Score: 0 - No pain  Cognition:  Cognition  Overall Cognitive Status: Within Functional Limits  Cognition Comments: pleasant and cooperative. able to follow commands throughout  Insight: Within function limits  Impulsive: Within functional limits  Processing Speed: Within funtional limits    General Assessments:  General Observation  General Observation: patient is pleasant and cooperative, agreeable to PT               Activity Tolerance  Endurance: Decreased tolerance for upright activites  Activity Tolerance Comments:  fatigues easily    Sensation  Sensation Comment: patient denies numbness/tingling    Strength  Strength Comments: BLE 3+/5 grossly  Coordination  Movements are Fluid and Coordinated: Yes    Postural Control  Postural Control: Impaired  Posture Comment: forward head, rounded shoulders    Static Sitting Balance  Static Sitting-Balance Support: Bilateral upper extremity supported, Feet supported  Static Sitting-Level of Assistance: Close supervision  Dynamic Sitting Balance  Dynamic Sitting-Balance Support: Bilateral upper extremity supported, Feet supported  Dynamic Sitting-Level of Assistance: Close supervision    Static Standing Balance  Static Standing-Balance Support: Bilateral upper extremity supported  Static Standing-Level of Assistance: Minimum assistance  Static Standing-Comment/Number of Minutes: with use of RW  Dynamic Standing Balance  Dynamic Standing-Balance Support: Bilateral upper extremity supported  Dynamic Standing-Level of Assistance: Minimum assistance  Dynamic Standing-Balance: Turning  Dynamic Standing-Comments: with use of RW  Functional Assessments:  Bed Mobility 1  Bed Mobility 1: Supine to sitting, Sitting to supine  Level of Assistance 1: Close supervision  Bed Mobility Comments 1: head of bed elevated    Transfer 1  Transfer From 1: Bed to  Transfer to 1: Stand  Technique 1: Sit to stand  Transfer Device 1: Cane  Transfer Level of Assistance 1: Contact guard  Trials/Comments 1: x 2 trials  Transfers 2  Transfer From 2: Toilet to  Transfer to 2: Stand  Technique 2: Stand to sit, Sit to stand  Transfer Device 2: Cane  Transfer Level of Assistance 2: Contact guard  Trials/Comments 2: with use of grab bar    Ambulation/Gait Training  Ambulation/Gait Training Performed: Yes  Ambulation/Gait Training 1  Surface 1: Level tile  Device 1: Single point cane  Assistance 1: Contact guard  Quality of Gait 1: Inconsistent stride length, Decreased step length, Diminished heel strike  Comments/Distance  (ft) 1: wide base of support, decreased B step length, unsteady  Extremity/Trunk Assessments:  RLE   RLE :  (grossly 3+/5 hip, knee, ankle)  Outcome Measures:  [facility] Basic Mobility  Turning from your back to your side while in a flat bed without using bedrails: A little  Moving from lying on your back to sitting on the side of a flat bed without using bedrails: A little  Moving to and from bed to chair (including a wheelchair): A little  Standing up from a chair using your arms (e.g. wheelchair or bedside chair): A little  To walk in hospital room: A little  Climbing 3-5 steps with railing: A little  Basic Mobility - Total Score: 18    Encounter Problems       Encounter Problems (Active)       PT Goals       Patient will transfer supine to sit and sit to supine with independent assist to facilitate mobility.        Start:  12/10/24    Expected End:  12/24/24            Patient will transfer supine to sit and sit to supine with independent assist to facilitate mobility.        Start:  12/10/24    Expected End:  12/24/24            Patient will amb 100 feet with straight cane device including two turns on even surface with independent assist to facilitate safe mobility.        Start:  12/10/24    Expected End:  12/24/24                   Education Documentation  Mobility Training, taught by Cintia Pratt, PT at 12/10/2024  7:48 AM.  Learner: Patient  Readiness: Acceptance  Method: Explanation  Response: Verbalizes Understanding

## 2024-12-10 NOTE — PROGRESS NOTES
Occupational Therapy    OT Treatment    Patient Name: Raúl Jordan  MRN: [id number]  Department: [address]  Room: [address] [place] [address]  Today's Date: 12/10/2024  Time Calculation  Start Time: 1419  Stop Time: 1437  Time Calculation (min): 18 min        Assessment:  OT Assessment: Pt making good progress with mobility and transfers, continue to recommend OT services this acute stay.  Prognosis: Good  Barriers to Discharge: Other (Comment) (decreased activity tolerance)  Evaluation/Treatment Tolerance: Patient tolerated treatment well  End of Session Patient Position: Up in chair, Alarm on (all needs in reach.)  OT Assessment Results: Decreased ADL status, Decreased upper extremity strength, Decreased safe judgment during ADL, Decreased endurance, Decreased functional mobility, Decreased gross motor control, Decreased IADLs  Prognosis: Good  Barriers to Discharge: Other (Comment) (decreased activity tolerance)  Evaluation/Treatment Tolerance: Patient tolerated treatment well  Strengths: Ability to acquire knowledge, Attitude of self  Plan:  Treatment Interventions: Functional transfer training, UE strengthening/ROM, Endurance training  OT Frequency: 3 times per week  OT Discharge Recommendations: Low intensity level of continued care  Equipment Recommended upon Discharge: Straight cane  OT Recommended Transfer Status: Assist of 1  OT - OK to Discharge: Yes  Treatment Interventions: Functional transfer training, UE strengthening/ROM, Endurance training    Subjective   Previous Visit Info:  OT Last Visit  OT Received On: 12/10/24  General:  General  Reason for Referral: Impaired mobility due to anemia  Referred By:   Past Medical History Relevant to Rehab: CHF, atrial fib, hypoxic respiratory failure, DM, HTN, restless leg syndrome, OA  Prior to Session Communication: Bedside nurse  Patient Position Received: Up in chair, Alarm on  General Comment: Agreeable to treatment, just returned from EGD  procedure.  Precautions:  Hearing/Visual Limitations: glasses for reading  Medical Precautions: Fall precautions, Oxygen therapy device and L/min (3L nc)    Vital Signs (Past 2hrs)        Date/Time Vitals Session Patient Position Pulse Resp SpO2 BP MAP (mmHg)    12/10/24 1315 --  --  74  18  94 %  119/56  77                   Pain:  Pain Assessment  Pain Assessment: 0-10  0-10 (Numeric) Pain Score: 0 - No pain    Objective    Activities of Daily Living: Grooming  Grooming Comments: completed earlier    UE Bathing  UE Bathing Comments: completed earlier     Bed Mobility/Transfers: Transfer 1  Transfer From 1: Chair with arms to  Transfer to 1: Chair with arms  Technique 1: To right, To left  Transfer Device 1: Cane (02 3Lnc)  Transfer Level of Assistance 1: Moderate assistance  Trials/Comments 1: additional effort needed sit to stand    Functional Mobility:  Functional Mobility 1  Surface 1: Level tile  Device 1: Single point cane  Functional Mobility Support Devices:  (02 3 Lnc)  Assistance 1: Contact guard  Quality of Functional Mobility 1:  (steady pace, reciprocal steps, fair balance)  Comments 1: short household distance in room.  Therapy/Activity: Therapeutic Exercise  Therapeutic Exercise Activity 1: scap protraction/retraction  Therapeutic Exercise Activity 2: shoulder flexion  Therapeutic Exercise Activity 3: shoulder IR/ER  Therapeutic Exercise Activity 4: 1 set x 15 reps, 1 lb free wt. Pt completes with fair form, cues to slow pace, move through full ROM.    Outcome Measures:[facility] Daily Activity  Putting on and taking off regular lower body clothing: A little  Bathing (including washing, rinsing, drying): A little  Putting on and taking off regular upper body clothing: A little  Toileting, which includes using toilet, bedpan or urinal: A little  Taking care of personal grooming such as brushing teeth: None  Eating Meals: None  Daily Activity - Total Score: 20    Education Documentation  Home Exercise  Program, taught by DENIZ Matta at 12/10/2024  2:59 PM.  Learner: Patient  Readiness: Acceptance  Method: Explanation, Demonstration  Response: Verbalizes Understanding, Demonstrated Understanding    ADL Training, taught by DENIZ Matta at 12/10/2024  2:59 PM.  Learner: Patient  Readiness: Acceptance  Method: Explanation, Demonstration  Response: Verbalizes Understanding, Demonstrated Understanding    Education Comments  No comments found.    IP EDUCATION:  Education  Individual(s) Educated: Patient  Education Provided: Fall precautons, Risk and benefits of OT discussed with patient or other, POC discussed and agreed upon  Patient Response to Education: Patient/Caregiver Verbalized Understanding of Information, Patient/Caregiver Performed Return Demonstration of Exercises/Activities    Goals:  Encounter Problems       Encounter Problems (Active)       OT Goals       ADLs (Progressing)       Start:  12/09/24    Expected End:  01/06/25       Patient will complete ADL tasks, with modified independence, using AE need in order to increase patient's safety and independence with self-care tasks.          Functional Transfers (Progressing)       Start:  12/09/24    Expected End:  01/06/25       Patient will complete functional transfers with modified independence, using least restrictive device, in order to increase patient's safety and independence with daily tasks.          Functional Mobility (Progressing)       Start:  12/09/24    Expected End:  01/06/25       Patient will demonstrate the ability to complete item retrieval and functional mobility with modified independence, in order to increase safety and independence with daily tasks.          Activity Tolerance (Progressing)       Start:  12/09/24    Expected End:  01/06/25       Patient will demonstrate the ability to participate in functional activity at least >/= 25 minutes in order to increase patient's safety and independence with daily tasks.

## 2024-12-11 LAB
ALBUMIN SERPL BCP-MCNC: 3.6 G/DL (ref 3.4–5)
ANION GAP SERPL CALCULATED.3IONS-SCNC: 13 MMOL/L (ref 10–20)
ATRIAL RATE: 127 BPM
BUN SERPL-MCNC: 33 MG/DL (ref 6–23)
CALCIUM SERPL-MCNC: 8.6 MG/DL (ref 8.6–10.3)
CHLORIDE SERPL-SCNC: 99 MMOL/L (ref 98–107)
CO2 SERPL-SCNC: 35 MMOL/L (ref 21–32)
CREAT SERPL-MCNC: 1.59 MG/DL (ref 0.5–1.05)
EGFRCR SERPLBLD CKD-EPI 2021: 32 ML/MIN/1.73M*2
ERYTHROCYTE [DISTWIDTH] IN BLOOD BY AUTOMATED COUNT: 15.3 % (ref 11.5–14.5)
GLUCOSE BLD MANUAL STRIP-MCNC: 119 MG/DL (ref 74–99)
GLUCOSE BLD MANUAL STRIP-MCNC: 122 MG/DL (ref 74–99)
GLUCOSE BLD MANUAL STRIP-MCNC: 97 MG/DL (ref 74–99)
GLUCOSE SERPL-MCNC: 92 MG/DL (ref 74–99)
HCT VFR BLD AUTO: 27.5 % (ref 36–46)
HGB BLD-MCNC: 8.3 G/DL (ref 12–16)
MAGNESIUM SERPL-MCNC: 2.07 MG/DL (ref 1.6–2.4)
MCH RBC QN AUTO: 27.5 PG (ref 26–34)
MCHC RBC AUTO-ENTMCNC: 30.2 G/DL (ref 32–36)
MCV RBC AUTO: 91 FL (ref 80–100)
NRBC BLD-RTO: 0 /100 WBCS (ref 0–0)
P AXIS: 32 DEGREES
P OFFSET: 210 MS
P ONSET: 151 MS
PHOSPHATE SERPL-MCNC: 3.9 MG/DL (ref 2.5–4.9)
PLATELET # BLD AUTO: 140 X10*3/UL (ref 150–450)
POTASSIUM SERPL-SCNC: 3.1 MMOL/L (ref 3.5–5.3)
PR INTERVAL: 142 MS
Q ONSET: 222 MS
QRS COUNT: 21 BEATS
QRS DURATION: 84 MS
QT INTERVAL: 308 MS
QTC CALCULATION(BAZETT): 447 MS
QTC FREDERICIA: 395 MS
R AXIS: 53 DEGREES
RBC # BLD AUTO: 3.02 X10*6/UL (ref 4–5.2)
SODIUM SERPL-SCNC: 144 MMOL/L (ref 136–145)
T AXIS: 23 DEGREES
T OFFSET: 376 MS
VENTRICULAR RATE: 127 BPM
WBC # BLD AUTO: 6.7 X10*3/UL (ref 4.4–11.3)

## 2024-12-11 PROCEDURE — 1100000001 HC PRIVATE ROOM DAILY

## 2024-12-11 PROCEDURE — 85027 COMPLETE CBC AUTOMATED: CPT | Performed by: INTERNAL MEDICINE

## 2024-12-11 PROCEDURE — 83735 ASSAY OF MAGNESIUM: CPT | Performed by: INTERNAL MEDICINE

## 2024-12-11 PROCEDURE — 97530 THERAPEUTIC ACTIVITIES: CPT | Mod: GP,CQ

## 2024-12-11 PROCEDURE — 2500000001 HC RX 250 WO HCPCS SELF ADMINISTERED DRUGS (ALT 637 FOR MEDICARE OP)

## 2024-12-11 PROCEDURE — 2500000005 HC RX 250 GENERAL PHARMACY W/O HCPCS: Performed by: INTERNAL MEDICINE

## 2024-12-11 PROCEDURE — 2500000001 HC RX 250 WO HCPCS SELF ADMINISTERED DRUGS (ALT 637 FOR MEDICARE OP): Performed by: INTERNAL MEDICINE

## 2024-12-11 PROCEDURE — 36415 COLL VENOUS BLD VENIPUNCTURE: CPT | Performed by: INTERNAL MEDICINE

## 2024-12-11 PROCEDURE — 82947 ASSAY GLUCOSE BLOOD QUANT: CPT

## 2024-12-11 PROCEDURE — 2500000001 HC RX 250 WO HCPCS SELF ADMINISTERED DRUGS (ALT 637 FOR MEDICARE OP): Performed by: STUDENT IN AN ORGANIZED HEALTH CARE EDUCATION/TRAINING PROGRAM

## 2024-12-11 PROCEDURE — 80069 RENAL FUNCTION PANEL: CPT | Performed by: INTERNAL MEDICINE

## 2024-12-11 PROCEDURE — 97110 THERAPEUTIC EXERCISES: CPT | Mod: GP,CQ

## 2024-12-11 PROCEDURE — 99233 SBSQ HOSP IP/OBS HIGH 50: CPT | Performed by: INTERNAL MEDICINE

## 2024-12-11 PROCEDURE — 2500000002 HC RX 250 W HCPCS SELF ADMINISTERED DRUGS (ALT 637 FOR MEDICARE OP, ALT 636 FOR OP/ED): Performed by: INTERNAL MEDICINE

## 2024-12-11 PROCEDURE — 2500000004 HC RX 250 GENERAL PHARMACY W/ HCPCS (ALT 636 FOR OP/ED): Performed by: INTERNAL MEDICINE

## 2024-12-11 PROCEDURE — G0406 INPT/TELE FOLLOW UP 15: HCPCS | Performed by: STUDENT IN AN ORGANIZED HEALTH CARE EDUCATION/TRAINING PROGRAM

## 2024-12-11 PROCEDURE — 97116 GAIT TRAINING THERAPY: CPT | Mod: GP,CQ

## 2024-12-11 RX ORDER — FUROSEMIDE 10 MG/ML
40 INJECTION INTRAMUSCULAR; INTRAVENOUS DAILY
Status: DISCONTINUED | OUTPATIENT
Start: 2024-12-12 | End: 2024-12-12 | Stop reason: HOSPADM

## 2024-12-11 RX ORDER — ROPINIROLE 2 MG/1
2 TABLET, FILM COATED ORAL NIGHTLY
Status: DISCONTINUED | OUTPATIENT
Start: 2024-12-11 | End: 2024-12-12 | Stop reason: HOSPADM

## 2024-12-11 RX ORDER — POTASSIUM CHLORIDE 1.5 G/1.58G
20 POWDER, FOR SOLUTION ORAL 2 TIMES DAILY
Status: DISCONTINUED | OUTPATIENT
Start: 2024-12-11 | End: 2024-12-12 | Stop reason: HOSPADM

## 2024-12-11 RX ADMIN — SIMVASTATIN 10 MG: 10 TABLET, FILM COATED ORAL at 20:50

## 2024-12-11 RX ADMIN — Medication 150 MG: at 09:40

## 2024-12-11 RX ADMIN — PANTOPRAZOLE SODIUM 40 MG: 40 INJECTION, POWDER, FOR SOLUTION INTRAVENOUS at 09:40

## 2024-12-11 RX ADMIN — PRAMIPEXOLE DIHYDROCHLORIDE 1.5 MG: 1.5 TABLET ORAL at 09:40

## 2024-12-11 RX ADMIN — Medication 3 L/MIN: at 20:01

## 2024-12-11 RX ADMIN — PANTOPRAZOLE SODIUM 40 MG: 40 INJECTION, POWDER, FOR SOLUTION INTRAVENOUS at 20:51

## 2024-12-11 RX ADMIN — ROPINIROLE HYDROCHLORIDE 2 MG: 2 TABLET, FILM COATED ORAL at 20:50

## 2024-12-11 RX ADMIN — SERTRALINE 100 MG: 100 TABLET, FILM COATED ORAL at 09:40

## 2024-12-11 RX ADMIN — POTASSIUM CHLORIDE 20 MEQ: 1.5 POWDER, FOR SOLUTION ORAL at 20:50

## 2024-12-11 RX ADMIN — METOPROLOL SUCCINATE 50 MG: 50 TABLET, EXTENDED RELEASE ORAL at 09:40

## 2024-12-11 RX ADMIN — POLYETHYLENE GLYCOL 3350, SODIUM SULFATE ANHYDROUS, SODIUM BICARBONATE, SODIUM CHLORIDE, POTASSIUM CHLORIDE 4000 ML: 236; 22.74; 6.74; 5.86; 2.97 POWDER, FOR SOLUTION ORAL at 17:11

## 2024-12-11 RX ADMIN — POTASSIUM CHLORIDE 20 MEQ: 1.5 POWDER, FOR SOLUTION ORAL at 09:40

## 2024-12-11 RX ADMIN — TRAZODONE HYDROCHLORIDE 200 MG: 100 TABLET ORAL at 20:51

## 2024-12-11 RX ADMIN — LIDOCAINE 1 PATCH: 4 PATCH TOPICAL at 15:01

## 2024-12-11 ASSESSMENT — PAIN - FUNCTIONAL ASSESSMENT: PAIN_FUNCTIONAL_ASSESSMENT: 0-10

## 2024-12-11 ASSESSMENT — ENCOUNTER SYMPTOMS
SORE THROAT: 0
SHORTNESS OF BREATH: 1
TREMORS: 0
ADENOPATHY: 0
BACK PAIN: 0
HEADACHES: 0
FEVER: 0
PHOTOPHOBIA: 0
POLYDIPSIA: 0
SINUS PRESSURE: 0
MYALGIAS: 0
APNEA: 0
CHILLS: 0
WOUND: 0
ARTHRALGIAS: 0
HEMATURIA: 0
HALLUCINATIONS: 0
NECK PAIN: 0
SPEECH DIFFICULTY: 0
NUMBNESS: 0
NAUSEA: 0
PALPITATIONS: 0
LIGHT-HEADEDNESS: 0
BLOOD IN STOOL: 1
COUGH: 0
COLOR CHANGE: 0
ABDOMINAL PAIN: 0
VOMITING: 0
POLYPHAGIA: 0
RECTAL PAIN: 0
WEAKNESS: 1
CONFUSION: 0
CONSTIPATION: 0
SEIZURES: 0
JOINT SWELLING: 0
DIARRHEA: 0
DYSURIA: 0
BRUISES/BLEEDS EASILY: 0
DIZZINESS: 0
FREQUENCY: 0
FATIGUE: 1
SLEEP DISTURBANCE: 0
WHEEZING: 0

## 2024-12-11 ASSESSMENT — PAIN SCALES - GENERAL
PAINLEVEL_OUTOF10: 0 - NO PAIN

## 2024-12-11 ASSESSMENT — COGNITIVE AND FUNCTIONAL STATUS - GENERAL
MOVING FROM LYING ON BACK TO SITTING ON SIDE OF FLAT BED WITH BEDRAILS: A LITTLE
CLIMB 3 TO 5 STEPS WITH RAILING: A LOT
DRESSING REGULAR LOWER BODY CLOTHING: A LITTLE
STANDING UP FROM CHAIR USING ARMS: A LITTLE
TURNING FROM BACK TO SIDE WHILE IN FLAT BAD: A LITTLE
MOVING TO AND FROM BED TO CHAIR: A LITTLE
WALKING IN HOSPITAL ROOM: A LITTLE
TOILETING: A LITTLE
DRESSING REGULAR LOWER BODY CLOTHING: A LITTLE
MOBILITY SCORE: 18
DRESSING REGULAR UPPER BODY CLOTHING: A LITTLE
DAILY ACTIVITIY SCORE: 20
CLIMB 3 TO 5 STEPS WITH RAILING: A LOT
STANDING UP FROM CHAIR USING ARMS: A LITTLE
MOBILITY SCORE: 17
MOVING FROM LYING ON BACK TO SITTING ON SIDE OF FLAT BED WITH BEDRAILS: A LITTLE
TURNING FROM BACK TO SIDE WHILE IN FLAT BAD: A LITTLE
WALKING IN HOSPITAL ROOM: A LITTLE
WALKING IN HOSPITAL ROOM: A LITTLE
MOBILITY SCORE: 17
HELP NEEDED FOR BATHING: A LITTLE
TURNING FROM BACK TO SIDE WHILE IN FLAT BAD: A LITTLE
MOVING TO AND FROM BED TO CHAIR: A LITTLE
HELP NEEDED FOR BATHING: A LITTLE
TOILETING: A LITTLE
MOVING FROM LYING ON BACK TO SITTING ON SIDE OF FLAT BED WITH BEDRAILS: A LITTLE
DRESSING REGULAR UPPER BODY CLOTHING: A LITTLE
STANDING UP FROM CHAIR USING ARMS: A LITTLE
CLIMB 3 TO 5 STEPS WITH RAILING: A LITTLE
MOVING TO AND FROM BED TO CHAIR: A LITTLE
DAILY ACTIVITIY SCORE: 20

## 2024-12-11 NOTE — CARE PLAN
Problem: Diabetes  Goal: Achieve decreasing blood glucose levels by end of shift  Outcome: Progressing  Goal: Increase stability of blood glucose readings by end of shift  Outcome: Progressing  Goal: Decrease in ketones present in urine by end of shift  Outcome: Progressing  Goal: Maintain electrolyte levels within acceptable range throughout shift  Outcome: Progressing  Goal: Maintain glucose levels >70mg/dl to <250mg/dl throughout shift  Outcome: Progressing  Goal: No changes in neurological exam by end of shift  Outcome: Progressing  Goal: Learn about and adhere to nutrition recommendations by end of shift  Outcome: Progressing  Goal: Vital signs within normal range for age by end of shift  Outcome: Progressing  Goal: Increase self care and/or family involovement by end of shift  Outcome: Progressing  Goal: Receive DSME education by end of shift  Outcome: Progressing     Problem: Safety - Adult  Goal: Free from fall injury  Outcome: Progressing     Problem: Discharge Planning  Goal: Discharge to home or other facility with appropriate resources  Outcome: Progressing     Problem: Chronic Conditions and Co-morbidities  Goal: Patient's chronic conditions and co-morbidity symptoms are monitored and maintained or improved  Outcome: Progressing     Problem: Fall/Injury  Goal: Not fall by end of shift  Outcome: Progressing  Goal: Be free from injury by end of the shift  Outcome: Progressing  Goal: Verbalize understanding of personal risk factors for fall in the hospital  Outcome: Progressing  Goal: Verbalize understanding of risk factor reduction measures to prevent injury from fall in the home  Outcome: Progressing  Goal: Use assistive devices by end of the shift  Outcome: Progressing  Goal: Pace activities to prevent fatigue by end of the shift  Outcome: Progressing     Problem: Respiratory  Goal: Clear secretions with interventions this shift  Outcome: Progressing  Goal: Minimize anxiety/maximize coping throughout  shift  Outcome: Progressing  Goal: Minimal/no exertional discomfort or dyspnea this shift  Outcome: Progressing  Goal: No signs of respiratory distress (eg. Use of accessory muscles. Peds grunting)  Outcome: Progressing  Goal: Patent airway maintained this shift  Outcome: Progressing  Goal: Tolerate mechanical ventilation evidenced by VS/agitation level this shift  Outcome: Progressing  Goal: Tolerate pulmonary toileting this shift  Outcome: Progressing  Goal: Verbalize decreased shortness of breath this shift  Outcome: Progressing  Goal: Wean oxygen to maintain O2 saturation per order/standard this shift  Outcome: Progressing  Goal: Increase self care and/or family involvement in next 24 hours  Outcome: Progressing     Problem: Deep Vein Thrombosis  Goal: I will remain free from complications of deep vein thrombosis and maintain current level of mobility  Outcome: Progressing     Problem: Pain  Goal: Takes deep breaths with improved pain control throughout the shift  Outcome: Progressing  Goal: Turns in bed with improved pain control throughout the shift  Outcome: Progressing  Goal: Walks with improved pain control throughout the shift  Outcome: Progressing  Goal: Performs ADL's with improved pain control throughout shift  Outcome: Progressing  Goal: Participates in PT with improved pain control throughout the shift  Outcome: Progressing  Goal: Free from opioid side effects throughout the shift  Outcome: Progressing  Goal: Free from acute confusion related to pain meds throughout the shift  Outcome: Progressing     Problem: Nutrition  Goal: Less than 5 days NPO/clear liquids  Outcome: Progressing  Goal: Reduce weight from edema/fluid  Outcome: Progressing

## 2024-12-11 NOTE — PROGRESS NOTES
Spiritual Care Visit  Spiritual Care Request    Reason for Visit:  Routine Visit: Introduction     Request Received From:       Focus of Care:  Visited With: Patient         Refer to :          Spiritual Care Assessment    Spiritual Assessment:                      Care Provided:  Intended Effects: Establish rapport and connectedness, Build relationship of care and support, Demonstrate caring and concern    Sense of Community and or Jain Affiliation:  Cheondoism         Addressed Needs/Concerns and/or Angeli Through:  Jain Encounters  Jain Needs: Prayer, Literature       Outcome:        Advance Directives:         Spiritual Care Annotation    Annotation:  Spiritual care Note:     Patient received a visit from the Spiritual care volunteer while admitted.  This patient was triaged for their emotional and spiritual need consistent with their belief system and supported accordingly.

## 2024-12-11 NOTE — PROGRESS NOTES
Raúl Jordan is a 84 y.o. female on day 3 of admission presenting with Anemia, unspecified type.      Subjective   Denies any active bleeding.  Feels better today, more comfortable.  Trace      Objective     Last Recorded Vitals  /52 (BP Location: Left arm, Patient Position: Sitting)   Pulse 65   Temp 36.7 °C (98.1 °F) (Temporal)   Resp 18   Wt 95.3 kg (210 lb)   SpO2 97%   Intake/Output last 3 Shifts:    Intake/Output Summary (Last 24 hours) at 12/11/2024 1238  Last data filed at 12/11/2024 0900  Gross per 24 hour   Intake 400 ml   Output 800 ml   Net -400 ml       Admission Weight  Weight: 95.3 kg (210 lb) (12/08/24 1156)    Daily Weight  12/08/24 : 95.3 kg (210 lb)    Image Results      Physical Exam  Pt is some respiratory distress, on 2 L of oxygen.  Cooperative with exam.    A, Ox3.  Face is symmetrical.  Skin - no lesions.  Lungs: clear to auscultations B/L.  Diminished bilaterally.  No wheezes, rales, rhonchi.  Heart: regular S1S2.  Abdomen: soft, NT, ND. BS positive.  Extr.: no edema, cords, cyanosis.  Moves all extr.   Relevant Results               Assessment/Plan        Results for orders placed or performed during the hospital encounter of 12/08/24 (from the past 24 hours)   POCT GLUCOSE   Result Value Ref Range    POCT Glucose 97 74 - 99 mg/dL   POCT GLUCOSE   Result Value Ref Range    POCT Glucose 94 74 - 99 mg/dL   POCT GLUCOSE   Result Value Ref Range    POCT Glucose 132 (H) 74 - 99 mg/dL   CBC   Result Value Ref Range    WBC 6.7 4.4 - 11.3 x10*3/uL    nRBC 0.0 0.0 - 0.0 /100 WBCs    RBC 3.02 (L) 4.00 - 5.20 x10*6/uL    Hemoglobin 8.3 (L) 12.0 - 16.0 g/dL    Hematocrit 27.5 (L) 36.0 - 46.0 %    MCV 91 80 - 100 fL    MCH 27.5 26.0 - 34.0 pg    MCHC 30.2 (L) 32.0 - 36.0 g/dL    RDW 15.3 (H) 11.5 - 14.5 %    Platelets 140 (L) 150 - 450 x10*3/uL   Renal Function Panel   Result Value Ref Range    Glucose 92 74 - 99 mg/dL    Sodium 144 136 - 145 mmol/L    Potassium 3.1 (L) 3.5 - 5.3 mmol/L     Chloride 99 98 - 107 mmol/L    Bicarbonate 35 (H) 21 - 32 mmol/L    Anion Gap 13 10 - 20 mmol/L    Urea Nitrogen 33 (H) 6 - 23 mg/dL    Creatinine 1.59 (H) 0.50 - 1.05 mg/dL    eGFR 32 (L) >60 mL/min/1.73m*2    Calcium 8.6 8.6 - 10.3 mg/dL    Phosphorus 3.9 2.5 - 4.9 mg/dL    Albumin 3.6 3.4 - 5.0 g/dL   Magnesium   Result Value Ref Range    Magnesium 2.07 1.60 - 2.40 mg/dL   POCT GLUCOSE   Result Value Ref Range    POCT Glucose 97 74 - 99 mg/dL     *Note: Due to a large number of results and/or encounters for the requested time period, some results have not been displayed. A complete set of results can be found in Results Review.       Results for orders placed or performed during the hospital encounter of 12/08/24 (from the past 24 hours)   POCT GLUCOSE   Result Value Ref Range    POCT Glucose 97 74 - 99 mg/dL   POCT GLUCOSE   Result Value Ref Range    POCT Glucose 94 74 - 99 mg/dL   POCT GLUCOSE   Result Value Ref Range    POCT Glucose 132 (H) 74 - 99 mg/dL   CBC   Result Value Ref Range    WBC 6.7 4.4 - 11.3 x10*3/uL    nRBC 0.0 0.0 - 0.0 /100 WBCs    RBC 3.02 (L) 4.00 - 5.20 x10*6/uL    Hemoglobin 8.3 (L) 12.0 - 16.0 g/dL    Hematocrit 27.5 (L) 36.0 - 46.0 %    MCV 91 80 - 100 fL    MCH 27.5 26.0 - 34.0 pg    MCHC 30.2 (L) 32.0 - 36.0 g/dL    RDW 15.3 (H) 11.5 - 14.5 %    Platelets 140 (L) 150 - 450 x10*3/uL   Renal Function Panel   Result Value Ref Range    Glucose 92 74 - 99 mg/dL    Sodium 144 136 - 145 mmol/L    Potassium 3.1 (L) 3.5 - 5.3 mmol/L    Chloride 99 98 - 107 mmol/L    Bicarbonate 35 (H) 21 - 32 mmol/L    Anion Gap 13 10 - 20 mmol/L    Urea Nitrogen 33 (H) 6 - 23 mg/dL    Creatinine 1.59 (H) 0.50 - 1.05 mg/dL    eGFR 32 (L) >60 mL/min/1.73m*2    Calcium 8.6 8.6 - 10.3 mg/dL    Phosphorus 3.9 2.5 - 4.9 mg/dL    Albumin 3.6 3.4 - 5.0 g/dL   Magnesium   Result Value Ref Range    Magnesium 2.07 1.60 - 2.40 mg/dL   POCT GLUCOSE   Result Value Ref Range    POCT Glucose 97 74 - 99 mg/dL             Assessment & Plan  Anemia, unspecified type    # Acute on chronic hypoxic respiratory failure:  #CHF exacerbation (HFpEF)  -Patient is on 2 L at baseline, currently on 3 L with orthopnea.  -BNP in the ED;461, CXR; Congestive heart failure   -Daily weights  -Strict I/O  -1500cc fluid restriction  -Cardiac diet (low Na)  -Lasix 40 mg IV BID   -Last TTE: 9/27, normal EF with impaired relaxation.     #Anemia   #Concern for upper GI bleed:  - Last HB 9.8 two months ago, today 7.3, patient had dark stool multiple times lately.  - Protonix 80mg IV once then 40mg IV BID  - clear liquid diet and then NPO after midnight  - Fluid resuscitation, as needed  - Monitor Hgb, transfuse if < 7.0, consent obtained   -GI consulted; appreciate recs.        #Chest pain (Resolved):  - EKG with no ischemic changes.  - Troponin; 25-->21  - Less likely of cardiac origin, pain subsided as well, will continue to monitor.        #Worsening Restless leg syndrome:  - Patient stated her restless leg syndrome was recently bothering her and was not controlled by pramipexole.  - Patient was in distress during the interview.  - Will continue pramipexole 1.5 mg daily, will add Requip 2 mg nightly, will check iron stores and replete as needed.  - Consult neurology; appreciate recs.          #JC on CKD:  -On admission, serum creat 1.62, baseline 1.2-1.35.  -Likely cardiorenal in the setting of CHF exacerbation.  -Continue to monitor, avoid nephrotoxic meds.        #Rule out DVT:  - Left leg circumference bigger than the right.  - Duplex US ordered.     Chronic Medical Issues   #HTN/AFIB:  -Rate controlled, continue metoprolol succinate 50 mg daily  -Hold  losartan 25 mg (for JC) and warfarin for increased GI bleed risk.  -Labetalol PRN with parameters.     # Depression/anxiety/insomnia:  -Continue sertraline 100 mg daily and trazodone 200 mg nightly.     # HLD:  -Continue simvastatin 10 mg daily.     #DM:  -Patient stated she has DM but there  was nothing on med recs for diabetes.  -Sliding scale and diabetic diet  -Last HbA1c 6 from 6 months ago, we will recheck HbA1c.        F: PO intake & IVF PRN  E: Replete as needed  N: Clear liquid cardiac, consistent carb  GI ppx: Protonix 40 mg IV twice daily   DVT ppx: SCD, no pharmacological due to GI bleed.  Antibiotics: None  Oxygenation: 3 L NC     Code Status: DNR and No Intubation, confirmed with the patient and her daughters at bedside.     12/09/24:  Severe anemia, likely due to GI blood loss.  Status posttransfusion today.  Monitor and transfuse as needed.  CHF, acute on chronic diastolic due to severe anemia.  IV Lasix as needed.  Gastrointestinal bleeding, likely upper.  Continue PPI.  Plan for EGD tomorrow.  Hypertension.  Continue medications  Acute on chronic renal failure, improving.  Monitor.  Acute respiratory failure with hypoxemia, likely due to acute CHF.    12/10/24:  No evidence of active bleeding.  Hemoglobin is stable after transfusion.  EGD did not reveal an obvious source of bleeding.  Gastroenterologist will discuss with patient possible colonoscopy.  Will start IV iron.  Renal function is worsening.  Hold losartan.  Continue nephrologist.  CHF, continue Lasix, she is doing better.    12/11/24:  No recurrent bleeding.  Hemoglobin is stable after transfusion.  Hypokalemia, replace and monitor.  Acute renal failure, improving.  Seen by nephrologist.  Plan is for colonoscopy tomorrow.  Discussed with patient and her daughter at bedside.  They agreeable with plan.       Oralia Mercado MD

## 2024-12-11 NOTE — PROGRESS NOTES
Subjective:  Patient seen virtually today.   Reports her RLS sx is less controlled on 1mg.       Objective:  Exam:  Vitals:    12/11/24 1124   BP: 113/52   Pulse: 65   Resp: 18   Temp: 36.7 °C (98.1 °F)   SpO2: 97%     Exam performed virtually via video.  Patient is laying in bed, in NAD  Language without dysarthria or aphasia  Gaze midline, EOM appears full range horizontally and vertically   Face is symmetric on eyeclosure, eyebrow raise and smile  Tongue midline   No drift in both arms and no drift in both legs  Sensation intact to light touch         ASSESSMENT:  Raúl Jordan is a 84 y.o. female who presented to the hospital with chest discomfort and uncontrolled restless legs syndrome.      Diagnosis:   Restless legs syndrome  Comorbidity - anxiety      RECOMMENDATIONS:   - pt states 1mg was not enough, increase it back to 2mg   - continue home pramipexole 1.5mg   - otherwise no further recs    Thank you for the consult. Will sign off. Please do not hesitate to reach out with any questions or any new change in patient's neurological status.        Abraham Cabrera MD   Neurology and Vascular Neurology           Consult completed by: TELEPHONE and VIDEO interactive communication was used to provide this telehealth service. Time includes consultation with ED provider and extensive review of data- history, medical records, lab/radiology/medical test results, neuroimaging studies:  15 minutes was spent in FOLLOW-UP telehealth consultation

## 2024-12-11 NOTE — PROGRESS NOTES
"Raúl Jordan is a 84 y.o. female on day 3 of admission presenting with Anemia, unspecified type.    Subjective    Patient denies any black, bloody stool.  She denies any abdominal pain, nausea, vomiting.  He is scheduled for colonoscopy tomorrow    Objective     Physical Exam  HENT:      Head: Normocephalic.      Nose: Nose normal.      Mouth/Throat:      Mouth: Mucous membranes are moist.   Cardiovascular:      Rate and Rhythm: Normal rate.      Pulses: Normal pulses.   Pulmonary:      Effort: Pulmonary effort is normal.   Abdominal:      Palpations: Abdomen is soft.   Musculoskeletal:         General: Normal range of motion.   Skin:     General: Skin is warm.   Neurological:      General: No focal deficit present.      Mental Status: She is alert.         Last Recorded Vitals  Blood pressure 113/52, pulse 65, temperature 36.7 °C (98.1 °F), temperature source Temporal, resp. rate 18, height 1.6 m (5' 3\"), weight 95.3 kg (210 lb), SpO2 97%.  Intake/Output last 3 Shifts:  I/O last 3 completed shifts:  In: 300 (3.1 mL/kg) [I.V.:300 (3.1 mL/kg)]  Out: 1400 (14.7 mL/kg) [Urine:1400 (0.4 mL/kg/hr)]  Weight: 95.3 kg     Relevant Results      Scheduled medications  albuterol, 2.5 mg, nebulization, Once  [START ON 12/12/2024] furosemide, 40 mg, intravenous, Daily  insulin lispro, 0-10 Units, subcutaneous, TID AC  iron polysaccharides, 150 mg, oral, Daily  lidocaine, 0.1 mL, subcutaneous, Once  lidocaine, 1 patch, transdermal, Daily  [Held by provider] losartan, 25 mg, oral, Daily  metoprolol succinate XL, 50 mg, oral, Daily  oxygen, , inhalation, Nightly  pantoprazole, 40 mg, intravenous, BID  polyethylene glycol-electrolytes, 4,000 mL, oral, Once  potassium chloride, 20 mEq, oral, BID  pramipexole, 1.5 mg, oral, Daily  rOPINIRole, 2 mg, oral, Nightly  sertraline, 100 mg, oral, Daily  simvastatin, 10 mg, oral, Nightly  traZODone, 200 mg, oral, Nightly      Continuous medications     PRN medications  PRN medications: " acetaminophen, albuterol, fentaNYL PF, HYDROmorphone, labetaloL, meperidine, midazolam, ondansetron, polyethylene glycol, promethazine, rOPINIRole  Results for orders placed or performed during the hospital encounter of 12/08/24 (from the past 24 hours)   POCT GLUCOSE   Result Value Ref Range    POCT Glucose 94 74 - 99 mg/dL   POCT GLUCOSE   Result Value Ref Range    POCT Glucose 132 (H) 74 - 99 mg/dL   CBC   Result Value Ref Range    WBC 6.7 4.4 - 11.3 x10*3/uL    nRBC 0.0 0.0 - 0.0 /100 WBCs    RBC 3.02 (L) 4.00 - 5.20 x10*6/uL    Hemoglobin 8.3 (L) 12.0 - 16.0 g/dL    Hematocrit 27.5 (L) 36.0 - 46.0 %    MCV 91 80 - 100 fL    MCH 27.5 26.0 - 34.0 pg    MCHC 30.2 (L) 32.0 - 36.0 g/dL    RDW 15.3 (H) 11.5 - 14.5 %    Platelets 140 (L) 150 - 450 x10*3/uL   Renal Function Panel   Result Value Ref Range    Glucose 92 74 - 99 mg/dL    Sodium 144 136 - 145 mmol/L    Potassium 3.1 (L) 3.5 - 5.3 mmol/L    Chloride 99 98 - 107 mmol/L    Bicarbonate 35 (H) 21 - 32 mmol/L    Anion Gap 13 10 - 20 mmol/L    Urea Nitrogen 33 (H) 6 - 23 mg/dL    Creatinine 1.59 (H) 0.50 - 1.05 mg/dL    eGFR 32 (L) >60 mL/min/1.73m*2    Calcium 8.6 8.6 - 10.3 mg/dL    Phosphorus 3.9 2.5 - 4.9 mg/dL    Albumin 3.6 3.4 - 5.0 g/dL   Magnesium   Result Value Ref Range    Magnesium 2.07 1.60 - 2.40 mg/dL   POCT GLUCOSE   Result Value Ref Range    POCT Glucose 97 74 - 99 mg/dL     *Note: Due to a large number of results and/or encounters for the requested time period, some results have not been displayed. A complete set of results can be found in Results Review.                            Assessment/Plan   Assessment & Plan  Anemia, unspecified type    Anemia, Melena, Anticoagulation   Status post EGD yesterday no source of bleeding identified  Patient has had no further episodes of black stools. H&H remains stable at 8.3/ 27.5.  Colonoscopy scheduled for tomorrow.  Clear liquid diet today.  Bowel prep.  N.p.o. after midnight  Further recs to follow  pending scope    Suellen Espinoza, APRN-CNP

## 2024-12-11 NOTE — PROGRESS NOTES
12/11/24 1153   Discharge Planning   Expected Discharge Disposition Home H  ([facility])   Does the patient need discharge transport arranged? No     Will need EXTERNAL referral for home care at discharge.  Accepted by [facility].

## 2024-12-11 NOTE — PROGRESS NOTES
Physical Therapy    Physical Therapy Treatment    Patient Name: Raúl Jordan  MRN: [id number]  Department: [address]  Room: [address] [place] [address]  Today's Date: 12/11/2024  Time Calculation  Start Time: 0815  Stop Time: 0900  Time Calculation (min): 45 min         Assessment/Plan   PT Assessment  PT Assessment Results: Decreased strength, Decreased endurance, Impaired balance, Decreased mobility  Rehab Prognosis: Good  Evaluation/Treatment Tolerance: Patient limited by fatigue  Strengths: Ability to acquire knowledge  End of Session Communication: Bedside nurse (Spoke to nurse regarding desating 02 with gait with 3 liters NC)  Assessment Comment: Contact guard with gait Desating 02 with gait to 88-89% on 3 liters NC Fatique noted  End of Session Patient Position: Up in chair, Alarm on (Needs at reach)     PT Plan  Treatment/Interventions: Bed mobility, Transfer training, Gait training, Neuromuscular re-education, Strengthening, Endurance training, Therapeutic exercise, Therapeutic activity, Home exercise program  PT Plan: Ongoing PT  PT Frequency: 4 times per week  PT Discharge Recommendations: Low intensity level of continued care  Equipment Recommended upon Discharge: Straight cane  PT Recommended Transfer Status: Assist x1, Assistive device  PT - OK to Discharge: Yes      General Visit Information:   PT  Visit  PT Received On: 12/11/24  General  Reason for Referral: Impaired mobility due to anemia  Referred By:   Past Medical History Relevant to Rehab: CHF, atrial fib, hypoxic respiratory failure, DM, HTN, restless leg syndrome, OA  Prior to Session Communication: Bedside nurse  Patient Position Received: Bed, 3 rail up, Alarm on  Preferred Learning Style: verbal, visual  General Comment: Cleared for therapy Patient agreeable    Subjective   Precautions:  Precautions  Hearing/Visual Limitations: glasses for reading  Medical Precautions: Fall precautions, Oxygen therapy device and L/min (3 liters nasal  gwen)    Vital Signs (Past 2hrs)                 Objective   Pain:  Pain Assessment  Pain Assessment: 0-10  0-10 (Numeric) Pain Score: 0 - No pain  Cognition:  Cognition  Overall Cognitive Status: Within Functional Limits  Cognition Comments: pleasant and cooperative  Insight: Within function limits  Impulsive: Within functional limits  Processing Speed: Within funtional limits  Coordination:     Postural Control:  Postural Control  Postural Control: Impaired  Posture Comment: forward head, rounded shoulders  Static Standing Balance  Static Standing-Balance Support: Right upper extremity supported  Static Standing-Level of Assistance: Contact guard  Static Standing-Comment/Number of Minutes: with her cane  Dynamic Standing Balance  Dynamic Standing-Balance Support: Right upper extremity supported  Dynamic Standing-Level of Assistance: Contact guard  Dynamic Standing-Balance: Turning  Dynamic Standing-Comments: Contact gaurd assist  Extremity/Trunk Assessments:    Activity Tolerance:  Activity Tolerance  Endurance: Tolerates 10 - 20 min exercise with multiple rests  Activity Tolerance Comments: fatiques easily Desating 02 88-89% with gait  Treatments:  Therapeutic Exercise  Therapeutic Exercise Performed: Yes  Therapeutic Exercise Activity 1: B LE seated ther ex: heel and toe raises, LAQs, hip flexion, Michele hip adduction,resisitve hip abduction x 15    Therapeutic Activity  Therapeutic Activity Performed: Yes  Therapeutic Activity 1: Gait 15' x 2 into/out of bathroom with contact guard assist with cane Sp02 93% desating to 89% Pursed lip breathing after both walks. Washed face and brushed her teeth    Bed Mobility  Bed Mobility: Yes  Bed Mobility 1  Bed Mobility 1: Supine to sitting  Level of Assistance 1: Close supervision  Bed Mobility Comments 1: HOB elevated Cues for safe hand placement    Ambulation/Gait Training  Ambulation/Gait Training Performed: Yes  Ambulation/Gait Training 1  Surface 1: Level  tile  Device 1: Single point cane  Assistance 1: Contact guard  Quality of Gait 1: Decreased step length, Diminished heel strike  Comments/Distance (ft) 1: Sp02 96% Gait 40' x 1 with SPC with contact guard assist. Sp02 88-89% after increased distance walk on 3 liters NC Pursed lip breathong back to 96%  Transfers  Transfer: Yes  Transfer 1  Transfer From 1: Bed to  Transfer to 1: Stand  Technique 1: Sit to stand  Transfer Device 1: Cane  Transfer Level of Assistance 1: Minimum assistance  Trials/Comments 1: STS from bed with Min A to steady initially with cues for safe hand placement  Transfers 2  Transfer From 2: Stand to  Transfer to 2: Sit, Toilet  Technique 2: Stand to sit, Sit to stand  Transfer Device 2: Cane  Transfer Level of Assistance 2: Contact guard  Trials/Comments 2: STS to/from toilet with contact guard assist with cues for safe hand placement  Transfers 3  Transfer From 3: Stand to  Transfer to 3: Sit, Chair with arms  Technique 3: Stand to sit, Sit to stand  Transfer Device 3: Cane  Transfer Level of Assistance 3: Contact guard  Trials/Comments 3: STS to/from bedside chair with contact guard assist with cues for safe hand placement    Outcome Measures:  [facility] Basic Mobility  Turning from your back to your side while in a flat bed without using bedrails: A little  Moving from lying on your back to sitting on the side of a flat bed without using bedrails: A little  Moving to and from bed to chair (including a wheelchair): A little  Standing up from a chair using your arms (e.g. wheelchair or bedside chair): A little  To walk in hospital room: A little  Climbing 3-5 steps with railing: A little  Basic Mobility - Total Score: 18    Education Documentation  Precautions, taught by Jane Prince PTA at 12/11/2024 10:02 AM.  Learner: Patient  Readiness: Acceptance  Method: Explanation  Response: Verbalizes Understanding    Body Mechanics, taught by Jane Prince PTA at 12/11/2024 10:02  AM.  Learner: Patient  Readiness: Acceptance  Method: Explanation  Response: Verbalizes Understanding    Home Exercise Program, taught by Jane Prince PTA at 12/11/2024 10:02 AM.  Learner: Patient  Readiness: Acceptance  Method: Explanation  Response: Verbalizes Understanding    Mobility Training, taught by Jane Prince PTA at 12/11/2024 10:02 AM.  Learner: Patient  Readiness: Acceptance  Method: Explanation  Response: Verbalizes Understanding    Education Comments  No comments found.        OP EDUCATION:       Encounter Problems       Encounter Problems (Active)       PT Goals       Patient will transfer supine to sit and sit to supine with independent assist to facilitate mobility.  (Progressing)       Start:  12/10/24    Expected End:  12/24/24            Patient will transfer supine to sit and sit to supine with independent assist to facilitate mobility.  (Progressing)       Start:  12/10/24    Expected End:  12/24/24            Patient will amb 100 feet with straight cane device including two turns on even surface with independent assist to facilitate safe mobility.  (Progressing)       Start:  12/10/24    Expected End:  12/24/24

## 2024-12-11 NOTE — PROGRESS NOTES
.Reason For Consult  Acute kidney injury on chronic, history of congestive heart failure    History Of Present Illness  Raúl Jordan is a 84 y.o. female known history of chronic kidney disease stage III, congestive heart failure, chronic atrial fibrillation, chronic respiratory failure on 2 L of oxygen at home by nasal cannula, diabetes mellitus and hypertension patient was initially admitted to hospital because of shortness of breath with worsening oxygenation was not able to sleep also she was complaining of some palpitations patient is an anxious person with history of restless leg syndrome she was started on IV diuretics with furosemide twice a day also the patient was diagnosed with GI bleed she had black stools she underwent upper endoscopy yesterday patient states that she feels okay today she denies any shortness of breath nausea vomiting or diarrhea no abdominal pain.     Review of Systems  Review of Systems   Constitutional:  Positive for fatigue. Negative for chills and fever.   HENT:  Negative for sinus pressure, sore throat and tinnitus.    Eyes:  Negative for photophobia and visual disturbance.   Respiratory:  Positive for shortness of breath. Negative for apnea, cough and wheezing.    Cardiovascular:  Positive for leg swelling. Negative for chest pain and palpitations.   Gastrointestinal:  Positive for blood in stool. Negative for abdominal pain, constipation, diarrhea, nausea, rectal pain and vomiting.   Endocrine: Negative for cold intolerance, heat intolerance, polydipsia, polyphagia and polyuria.   Genitourinary:  Negative for decreased urine volume, dysuria, frequency, hematuria and urgency.   Musculoskeletal:  Negative for arthralgias, back pain, joint swelling, myalgias and neck pain.   Skin:  Negative for color change, pallor, rash and wound.   Neurological:  Positive for weakness. Negative for dizziness, tremors, seizures, syncope, speech difficulty, light-headedness, numbness and headaches.    Hematological:  Negative for adenopathy. Does not bruise/bleed easily.   Psychiatric/Behavioral:  Negative for confusion, hallucinations, sleep disturbance and suicidal ideas.         Past Medical History  She has a past medical history of Abnormal laboratory test result (09/01/2023), Anemia due to acute blood loss (09/01/2023), CHF (congestive heart failure), Chill (09/01/2023), Diabetes mellitus (Multi), Flank pain (09/01/2023), Hypertension, Hypoxemia (09/01/2023), Hypoxia (09/01/2023), Melena (09/01/2023), Other chronic pain (09/01/2023), Pneumonia (09/01/2023), and Pyuria (09/01/2023).    Surgical History  She has no past surgical history on file.     Social History  She reports that she has never smoked. She has never been exposed to tobacco smoke. She has never used smokeless tobacco. She reports that she does not drink alcohol and does not use drugs.    Family History  Family History   Problem Relation Name Age of Onset    Leukemia Mother      Cancer Mother      Heart disease Father      Other (heart problems) Father          Current Facility-Administered Medications:     acetaminophen (Tylenol) tablet 650 mg, 650 mg, oral, q6h PRN, Deb Santana DO    albuterol 2.5 mg /3 mL (0.083 %) nebulizer solution 2.5 mg, 2.5 mg, nebulization, Once, Alejo Meyer DO    albuterol 2.5 mg /3 mL (0.083 %) nebulizer solution 3 mL, 3 mL, nebulization, q6h PRN, Deb Santana DO    fentaNYL PF (Sublimaze) injection 50 mcg, 50 mcg, intravenous, q5 min PRN, Alejo Meyer DO    furosemide (Lasix) injection 40 mg, 40 mg, intravenous, q12h, Deb Santana DO, 40 mg at 12/10/24 2001    HYDROmorphone PF (Dilaudid) injection 0.2 mg, 0.2 mg, intravenous, q5 min PRN, Alejo Meyer,     insulin lispro injection 0-10 Units, 0-10 Units, subcutaneous, TID AC, Deb Santana DO    iron polysaccharides (Nu-Iron,Niferex) capsule 150 mg, 150 mg, oral, Daily, Oralia Mercado MD, 150 mg at 12/10/24 2940    labetaloL  (Normodyne,Trandate) injection 20 mg, 20 mg, intravenous, q4h PRN, Deb Santana DO    lactated Ringer's infusion, 100 mL/hr, intravenous, Continuous, Alejo Meyer DO    lidocaine (Xylocaine) 10 mg/mL (1 %) injection 0.1 mL, 0.1 mL, subcutaneous, Once, Alejo Meyer DO    lidocaine 4 % patch 1 patch, 1 patch, transdermal, Daily, Oralia Mercado MD, 1 patch at 12/10/24 1605    [Held by provider] losartan (Cozaar) tablet 25 mg, 25 mg, oral, Daily, Deb Santana DO, 25 mg at 12/08/24 2351    meperidine PF (Demerol) injection 12.5 mg, 12.5 mg, intravenous, q10 min PRN, Alejo Meyer DO    metoprolol succinate XL (Toprol-XL) 24 hr tablet 50 mg, 50 mg, oral, Daily, Deb Santana DO, 50 mg at 12/08/24 2350    midazolam (Versed) injection 1 mg, 1 mg, intravenous, Once PRN, Alejo Meyer DO    ondansetron (Zofran) injection 4 mg, 4 mg, intravenous, Once PRN, Alejo Meyer DO    oxygen (O2) therapy, , inhalation, Nightly, Deb Santana DO, 3 L/min at 12/10/24 2125    pantoprazole (ProtoNix) injection 40 mg, 40 mg, intravenous, BID, Deb Santana DO, 40 mg at 12/10/24 2001    polyethylene glycol (Glycolax, Miralax) packet 17 g, 17 g, oral, Daily PRN, Deb Santana, DO, 17 g at 12/08/24 2043    polyethylene glycol-electrolytes (Nulytely) solution 4,000 mL, 4,000 mL, oral, Once, THERESA Lux-CNP    potassium chloride (Klor-Con) packet 20 mEq, 20 mEq, oral, BID, Oralia Mercado MD    pramipexole (Mirapex) tablet 1.5 mg, 1.5 mg, oral, Daily, Deb Santana DO, 1.5 mg at 12/10/24 1338    promethazine (Phenergan) 6.25 mg in sodium chloride 0.9% 50 mL IV, 6.25 mg, intravenous, Once PRN, Alejo Meyer DO    rOPINIRole (Requip) tablet 1 mg, 1 mg, oral, Once PRN, Abraham Cabrera MD    rOPINIRole (Requip) tablet 1 mg, 1 mg, oral, Nightly, Abraham Cabrera MD, 1 mg at 12/10/24 2005    sertraline (Zoloft) tablet 100 mg, 100 mg, oral, Daily, Deb Santana DO, 100 mg at 12/09/24 7747     simvastatin (Zocor) tablet 10 mg, 10 mg, oral, Nightly, Deb LEE Max, DO, 10 mg at 12/10/24 2006    traZODone (Desyrel) tablet 200 mg, 200 mg, oral, Nightly, Deb LEE Elsayed, DO, 200 mg at 12/10/24 2005   Allergies  Penicillin v, Sulfamethoxazole-trimethoprim, Levofloxacin, Oxycodone-acetaminophen, and Penicillin         Physical Exam  Physical Exam  Constitutional:       General: She is not in acute distress.     Appearance: She is not toxic-appearing.   HENT:      Head: Normocephalic and atraumatic.   Eyes:      Extraocular Movements: Extraocular movements intact.      Pupils: Pupils are equal, round, and reactive to light.   Neck:      Vascular: No carotid bruit.   Cardiovascular:      Rate and Rhythm: Normal rate and regular rhythm.   Pulmonary:      Effort: No respiratory distress.      Breath sounds: No stridor. No wheezing, rhonchi or rales.   Chest:      Chest wall: No tenderness.   Abdominal:      General: There is no distension.      Palpations: There is no mass.      Tenderness: There is no abdominal tenderness. There is no right CVA tenderness, left CVA tenderness or guarding.      Hernia: No hernia is present.   Musculoskeletal:         General: No swelling or tenderness.      Cervical back: No rigidity.      Right lower leg: No edema (Trace edema).      Left lower leg: No edema (Trace edema).   Lymphadenopathy:      Cervical: No cervical adenopathy.   Skin:     General: Skin is warm and dry.      Coloration: Skin is not jaundiced or pale.      Findings: No bruising or erythema.   Neurological:      General: No focal deficit present.      Mental Status: She is alert and oriented to person, place, and time.   Psychiatric:         Mood and Affect: Mood normal.         Behavior: Behavior normal.              I&O 24HR    Intake/Output Summary (Last 24 hours) at 12/11/2024 0876  Last data filed at 12/11/2024 0334  Gross per 24 hour   Intake 300 ml   Output 800 ml   Net -500 ml       Vitals 24HR  Heart  Rate:  [64-91]   Temp:  [36.2 °C (97.2 °F)-37.2 °C (99 °F)]   Resp:  [15-22]   BP: ()/(37-72)   SpO2:  [89 %-100 %]     Relevant Results        Results for orders placed or performed during the hospital encounter of 12/08/24 (from the past 96 hours)   ECG 12 lead   Result Value Ref Range    Ventricular Rate 69 BPM    Atrial Rate 55 BPM    QRS Duration 72 ms    QT Interval 456 ms    QTC Calculation(Bazett) 488 ms    R Axis 79 degrees    T Axis 68 degrees    QRS Count 12 beats    Q Onset 223 ms    T Offset 451 ms    QTC Fredericia 478 ms   CBC and Auto Differential   Result Value Ref Range    WBC 7.4 4.4 - 11.3 x10*3/uL    nRBC 0.0 0.0 - 0.0 /100 WBCs    RBC 2.66 (L) 4.00 - 5.20 x10*6/uL    Hemoglobin 7.3 (L) 12.0 - 16.0 g/dL    Hematocrit 24.3 (L) 36.0 - 46.0 %    MCV 91 80 - 100 fL    MCH 27.4 26.0 - 34.0 pg    MCHC 30.0 (L) 32.0 - 36.0 g/dL    RDW 14.7 (H) 11.5 - 14.5 %    Platelets 181 150 - 450 x10*3/uL    Neutrophils % 80.3 40.0 - 80.0 %    Immature Granulocytes %, Automated 0.7 0.0 - 0.9 %    Lymphocytes % 7.1 13.0 - 44.0 %    Monocytes % 10.7 2.0 - 10.0 %    Eosinophils % 0.5 0.0 - 6.0 %    Basophils % 0.7 0.0 - 2.0 %    Neutrophils Absolute 5.91 (H) 1.60 - 5.50 x10*3/uL    Immature Granulocytes Absolute, Automated 0.05 0.00 - 0.50 x10*3/uL    Lymphocytes Absolute 0.52 (L) 0.80 - 3.00 x10*3/uL    Monocytes Absolute 0.79 0.05 - 0.80 x10*3/uL    Eosinophils Absolute 0.04 0.00 - 0.40 x10*3/uL    Basophils Absolute 0.05 0.00 - 0.10 x10*3/uL   Comprehensive metabolic panel   Result Value Ref Range    Glucose 120 (H) 74 - 99 mg/dL    Sodium 140 136 - 145 mmol/L    Potassium 3.4 (L) 3.5 - 5.3 mmol/L    Chloride 98 98 - 107 mmol/L    Bicarbonate 33 (H) 21 - 32 mmol/L    Anion Gap 12 10 - 20 mmol/L    Urea Nitrogen 35 (H) 6 - 23 mg/dL    Creatinine 1.62 (H) 0.50 - 1.05 mg/dL    eGFR 31 (L) >60 mL/min/1.73m*2    Calcium 9.4 8.6 - 10.3 mg/dL    Albumin 4.1 3.4 - 5.0 g/dL    Alkaline Phosphatase 82 33 - 136 U/L     Total Protein 6.3 (L) 6.4 - 8.2 g/dL    AST 20 9 - 39 U/L    Bilirubin, Total 1.2 0.0 - 1.2 mg/dL    ALT 14 7 - 45 U/L   B-type natriuretic peptide   Result Value Ref Range     (H) 0 - 99 pg/mL   Troponin I, High Sensitivity, Initial   Result Value Ref Range    Troponin I, High Sensitivity 25 (H) 0 - 13 ng/L   Troponin, High Sensitivity, 1 Hour   Result Value Ref Range    Troponin I, High Sensitivity 21 (H) 0 - 13 ng/L   Protime-INR   Result Value Ref Range    Protime 17.4 (H) 9.3 - 12.7 seconds    INR 1.7 (H) 0.9 - 1.2   Iron and TIBC   Result Value Ref Range    Iron 25 (L) 35 - 150 ug/dL    UIBC 442 (H) 110 - 370 ug/dL    TIBC 467 (H) 240 - 445 ug/dL    % Saturation 5 (L) 25 - 45 %   Occult Blood, Stool    Specimen: Stool   Result Value Ref Range    Occult Blood, Stool X1 Positive (A) Negative   Urinalysis with Reflex Culture and Microscopic   Result Value Ref Range    Color, Urine Light-Yellow Light-Yellow, Yellow, Dark-Yellow    Appearance, Urine Clear Clear    Specific Gravity, Urine 1.007 1.005 - 1.035    pH, Urine 6.5 5.0, 5.5, 6.0, 6.5, 7.0, 7.5, 8.0    Protein, Urine NEGATIVE NEGATIVE, 10 (TRACE), 20 (TRACE) mg/dL    Glucose, Urine Normal Normal mg/dL    Blood, Urine NEGATIVE NEGATIVE    Ketones, Urine NEGATIVE NEGATIVE mg/dL    Bilirubin, Urine NEGATIVE NEGATIVE    Urobilinogen, Urine Normal Normal mg/dL    Nitrite, Urine NEGATIVE NEGATIVE    Leukocyte Esterase, Urine NEGATIVE NEGATIVE   Extra Urine Gray Tube   Result Value Ref Range    Extra Tube Hold for add-ons.    POCT GLUCOSE   Result Value Ref Range    POCT Glucose 97 74 - 99 mg/dL   Hemoglobin A1c   Result Value Ref Range    Hemoglobin A1C 5.5 See comment %    Estimated Average Glucose 111 Not Established mg/dL   POCT GLUCOSE   Result Value Ref Range    POCT Glucose 129 (H) 74 - 99 mg/dL   CBC   Result Value Ref Range    WBC 6.6 4.4 - 11.3 x10*3/uL    nRBC 0.0 0.0 - 0.0 /100 WBCs    RBC 2.44 (L) 4.00 - 5.20 x10*6/uL    Hemoglobin 6.7 (L) 12.0  - 16.0 g/dL    Hematocrit 22.4 (L) 36.0 - 46.0 %    MCV 92 80 - 100 fL    MCH 27.5 26.0 - 34.0 pg    MCHC 29.9 (L) 32.0 - 36.0 g/dL    RDW 15.0 (H) 11.5 - 14.5 %    Platelets 153 150 - 450 x10*3/uL   Renal Function Panel   Result Value Ref Range    Glucose 83 74 - 99 mg/dL    Sodium 142 136 - 145 mmol/L    Potassium 3.0 (L) 3.5 - 5.3 mmol/L    Chloride 99 98 - 107 mmol/L    Bicarbonate 34 (H) 21 - 32 mmol/L    Anion Gap 12 10 - 20 mmol/L    Urea Nitrogen 32 (H) 6 - 23 mg/dL    Creatinine 1.54 (H) 0.50 - 1.05 mg/dL    eGFR 33 (L) >60 mL/min/1.73m*2    Calcium 8.5 (L) 8.6 - 10.3 mg/dL    Phosphorus 4.1 2.5 - 4.9 mg/dL    Albumin 3.5 3.4 - 5.0 g/dL   Magnesium   Result Value Ref Range    Magnesium 2.06 1.60 - 2.40 mg/dL   Prepare RBC: 2 Units   Result Value Ref Range    PRODUCT CODE O3859M16     Unit Number [id number]     Unit ABO O     Unit RH NEG     XM INTEP COMP     Dispense Status TR     Blood Expiration Date 12/12/2024 11:59:00 PM EST     PRODUCT BLOOD TYPE 9500     UNIT VOLUME 350     PRODUCT CODE T7178T98     Unit Number [id number]     Unit ABO O     Unit RH POS     XM INTEP COMP     Dispense Status TR     Blood Expiration Date 12/12/2024 11:59:00 PM EST     PRODUCT BLOOD TYPE 5100     UNIT VOLUME 350    Type and screen   Result Value Ref Range    ABO TYPE O     Rh TYPE POS     ANTIBODY SCREEN NEG    POCT GLUCOSE   Result Value Ref Range    POCT Glucose 93 74 - 99 mg/dL   VERIFY ABO/Rh Group Test   Result Value Ref Range    ABO TYPE O     Rh TYPE POS    POCT GLUCOSE   Result Value Ref Range    POCT Glucose 128 (H) 74 - 99 mg/dL   POCT GLUCOSE   Result Value Ref Range    POCT Glucose 123 (H) 74 - 99 mg/dL   POCT GLUCOSE   Result Value Ref Range    POCT Glucose 133 (H) 74 - 99 mg/dL   Renal Function Panel   Result Value Ref Range    Glucose 110 (H) 74 - 99 mg/dL    Sodium 142 136 - 145 mmol/L    Potassium 3.2 (L) 3.5 - 5.3 mmol/L    Chloride 100 98 - 107 mmol/L    Bicarbonate 35 (H) 21 - 32 mmol/L     Anion Gap 10 10 - 20 mmol/L    Urea Nitrogen 39 (H) 6 - 23 mg/dL    Creatinine 1.85 (H) 0.50 - 1.05 mg/dL    eGFR 26 (L) >60 mL/min/1.73m*2    Calcium 8.7 8.6 - 10.3 mg/dL    Phosphorus 4.5 2.5 - 4.9 mg/dL    Albumin 3.7 3.4 - 5.0 g/dL   Magnesium   Result Value Ref Range    Magnesium 2.13 1.60 - 2.40 mg/dL   CBC   Result Value Ref Range    WBC 6.5 4.4 - 11.3 x10*3/uL    nRBC 0.0 0.0 - 0.0 /100 WBCs    RBC 3.00 (L) 4.00 - 5.20 x10*6/uL    Hemoglobin 8.4 (L) 12.0 - 16.0 g/dL    Hematocrit 27.4 (L) 36.0 - 46.0 %    MCV 91 80 - 100 fL    MCH 28.0 26.0 - 34.0 pg    MCHC 30.7 (L) 32.0 - 36.0 g/dL    RDW 15.5 (H) 11.5 - 14.5 %    Platelets 139 (L) 150 - 450 x10*3/uL   POCT GLUCOSE   Result Value Ref Range    POCT Glucose 107 (H) 74 - 99 mg/dL   POCT GLUCOSE   Result Value Ref Range    POCT Glucose 97 74 - 99 mg/dL   POCT GLUCOSE   Result Value Ref Range    POCT Glucose 94 74 - 99 mg/dL   POCT GLUCOSE   Result Value Ref Range    POCT Glucose 132 (H) 74 - 99 mg/dL   CBC   Result Value Ref Range    WBC 6.7 4.4 - 11.3 x10*3/uL    nRBC 0.0 0.0 - 0.0 /100 WBCs    RBC 3.02 (L) 4.00 - 5.20 x10*6/uL    Hemoglobin 8.3 (L) 12.0 - 16.0 g/dL    Hematocrit 27.5 (L) 36.0 - 46.0 %    MCV 91 80 - 100 fL    MCH 27.5 26.0 - 34.0 pg    MCHC 30.2 (L) 32.0 - 36.0 g/dL    RDW 15.3 (H) 11.5 - 14.5 %    Platelets 140 (L) 150 - 450 x10*3/uL   Renal Function Panel   Result Value Ref Range    Glucose 92 74 - 99 mg/dL    Sodium 144 136 - 145 mmol/L    Potassium 3.1 (L) 3.5 - 5.3 mmol/L    Chloride 99 98 - 107 mmol/L    Bicarbonate 35 (H) 21 - 32 mmol/L    Anion Gap 13 10 - 20 mmol/L    Urea Nitrogen 33 (H) 6 - 23 mg/dL    Creatinine 1.59 (H) 0.50 - 1.05 mg/dL    eGFR 32 (L) >60 mL/min/1.73m*2    Calcium 8.6 8.6 - 10.3 mg/dL    Phosphorus 3.9 2.5 - 4.9 mg/dL    Albumin 3.6 3.4 - 5.0 g/dL   Magnesium   Result Value Ref Range    Magnesium 2.07 1.60 - 2.40 mg/dL   POCT GLUCOSE   Result Value Ref Range    POCT Glucose 97 74 - 99 mg/dL           Assessment/Plan     Esophagogastroduodenoscopy (EGD)    Result Date: 12/10/2024  Table formatting from the original result was not included. Impression There was a lip of gastric mucosa at the GE junction; performed cold forceps biopsy Mild gastritis performed cold forceps biopsy Multiple small polyps in the stomach; performed cold forceps biopsy Findings There was a lip of gastric mucosa at the GE junction; performed cold forceps biopsy Mild gastritis performed cold forceps biopsy Multiple small polyps in the stomach; performed cold forceps biopsy Recommendation Await pathology results Theoretically the gastric abnormality could have been a site of a previous erosion or tear, but more likely incidental and therefore no source of melena Will discuss possible colonoscopy with patient Indication Gastrointestinal hemorrhage, unspecified gastrointestinal hemorrhage type Staff Staff Role Paolo Noland MD Proceduralist Medications See Anesthesia Record. Preprocedure A history and physical has been performed, and patient medication allergies have been reviewed. The patient's tolerance of previous anesthesia has been reviewed. The risks and benefits of the procedure and the sedation options and risks were discussed with the patient. All questions were answered and informed consent obtained. Details of the Procedure The patient underwent monitored anesthesia care, which was administered by an anesthesia professional. The patient's blood pressure, ECG, ETCO2, heart rate, level of consciousness, oxygen and respirations were monitored throughout the procedure. The scope was introduced through the mouth and advanced to the second part of the duodenum. Retroflexion was performed in the cardia. Events Procedure Events Event Event Time ENDO SCOPE IN TIME 12/10/2024 11:59 AM ENDO SCOPE OUT TIME 12/10/2024 12:07 PM Specimens ID Type Source Tests Collected by Time 1 : r/o H. Pylori Tissue STOMACH ANTRUM BIOPSY SURGICAL  PATHOLOGY EXAM Kecia Kraus RN 12/10/2024 1159 2 : polyps Tissue GASTRIC BODY BIOPSY SURGICAL PATHOLOGY EXAM Kecia Kraus RN 12/10/2024 1202 3 : r/o Suarez's esophagus Tissue ESOPHAGOGASTRIC JUNCTION BIOPSY SURGICAL PATHOLOGY EXAM Kecia Kraus RN 12/10/2024 1204 Procedure Location [place] [address] [facility] [id number] [phone] Referring Provider Sophie Kuo, APRN-CNP Procedure Provider Paolo Noland MD      Impression:  Acute kidney injury on top of chronic kidney stage IIIb secondary to prerenal etiology GI bleed with blood loss and diuretics  Congestive heart failure  Acute respiratory failure on top of chronic respiratory failure  GI bleed  Anemia of GI loss  Hypertension  Left leg syndrome    Recommendations:  Agree with holding losartan  Discontinue IV fluids  Reduce Lasix to once a day  Monitor renal function very closely  We will continue with oxygen therapy  GI bleed workup per gastroenterology  Monitor hemoglobin hematocrit    Thank you for your consultation    Leon Odell MD

## 2024-12-11 NOTE — CARE PLAN
The patient's goals for the shift include      The clinical goals for the shift include maintain safety      Problem: Diabetes  Goal: Achieve decreasing blood glucose levels by end of shift  Outcome: Progressing  Goal: Increase stability of blood glucose readings by end of shift  Outcome: Progressing  Goal: Decrease in ketones present in urine by end of shift  Outcome: Progressing  Goal: Maintain electrolyte levels within acceptable range throughout shift  Outcome: Progressing  Goal: Maintain glucose levels >70mg/dl to <250mg/dl throughout shift  Outcome: Progressing  Goal: No changes in neurological exam by end of shift  Outcome: Progressing  Goal: Learn about and adhere to nutrition recommendations by end of shift  Outcome: Progressing  Goal: Vital signs within normal range for age by end of shift  Outcome: Progressing  Goal: Increase self care and/or family involovement by end of shift  Outcome: Progressing  Goal: Receive DSME education by end of shift  Outcome: Progressing     Problem: Safety - Adult  Goal: Free from fall injury  Outcome: Progressing     Problem: Discharge Planning  Goal: Discharge to home or other facility with appropriate resources  Outcome: Progressing     Problem: Chronic Conditions and Co-morbidities  Goal: Patient's chronic conditions and co-morbidity symptoms are monitored and maintained or improved  Outcome: Progressing     Problem: Fall/Injury  Goal: Not fall by end of shift  Outcome: Progressing  Goal: Be free from injury by end of the shift  Outcome: Progressing  Goal: Verbalize understanding of personal risk factors for fall in the hospital  Outcome: Progressing  Goal: Verbalize understanding of risk factor reduction measures to prevent injury from fall in the home  Outcome: Progressing  Goal: Use assistive devices by end of the shift  Outcome: Progressing  Goal: Pace activities to prevent fatigue by end of the shift  Outcome: Progressing     Problem: Respiratory  Goal: Clear  secretions with interventions this shift  Outcome: Progressing  Goal: Minimize anxiety/maximize coping throughout shift  Outcome: Progressing  Goal: Minimal/no exertional discomfort or dyspnea this shift  Outcome: Progressing  Goal: No signs of respiratory distress (eg. Use of accessory muscles. Peds grunting)  Outcome: Progressing  Goal: Patent airway maintained this shift  Outcome: Progressing  Goal: Tolerate mechanical ventilation evidenced by VS/agitation level this shift  Outcome: Progressing  Goal: Tolerate pulmonary toileting this shift  Outcome: Progressing  Goal: Verbalize decreased shortness of breath this shift  Outcome: Progressing  Goal: Wean oxygen to maintain O2 saturation per order/standard this shift  Outcome: Progressing  Goal: Increase self care and/or family involvement in next 24 hours  Outcome: Progressing     Problem: Deep Vein Thrombosis  Goal: I will remain free from complications of deep vein thrombosis and maintain current level of mobility  Outcome: Progressing     Problem: Pain  Goal: Takes deep breaths with improved pain control throughout the shift  Outcome: Progressing  Goal: Turns in bed with improved pain control throughout the shift  Outcome: Progressing  Goal: Walks with improved pain control throughout the shift  Outcome: Progressing  Goal: Performs ADL's with improved pain control throughout shift  Outcome: Progressing  Goal: Participates in PT with improved pain control throughout the shift  Outcome: Progressing  Goal: Free from opioid side effects throughout the shift  Outcome: Progressing  Goal: Free from acute confusion related to pain meds throughout the shift  Outcome: Progressing     Problem: Nutrition  Goal: Less than 5 days NPO/clear liquids  Outcome: Progressing  Goal: Reduce weight from edema/fluid  Outcome: Progressing

## 2024-12-12 ENCOUNTER — PHARMACY VISIT (OUTPATIENT)
Dept: PHARMACY | Facility: CLINIC | Age: 84
End: 2024-12-12
Payer: COMMERCIAL

## 2024-12-12 ENCOUNTER — ANESTHESIA EVENT (OUTPATIENT)
Dept: GASTROENTEROLOGY | Facility: HOSPITAL | Age: 84
End: 2024-12-12
Payer: MEDICARE

## 2024-12-12 ENCOUNTER — APPOINTMENT (OUTPATIENT)
Dept: GASTROENTEROLOGY | Facility: HOSPITAL | Age: 84
DRG: 811 | End: 2024-12-12
Payer: MEDICARE

## 2024-12-12 ENCOUNTER — ANESTHESIA (OUTPATIENT)
Dept: GASTROENTEROLOGY | Facility: HOSPITAL | Age: 84
End: 2024-12-12
Payer: MEDICARE

## 2024-12-12 VITALS
RESPIRATION RATE: 18 BRPM | DIASTOLIC BLOOD PRESSURE: 60 MMHG | HEIGHT: 63 IN | TEMPERATURE: 98.2 F | BODY MASS INDEX: 37.21 KG/M2 | OXYGEN SATURATION: 95 % | HEART RATE: 68 BPM | WEIGHT: 210 LBS | SYSTOLIC BLOOD PRESSURE: 116 MMHG

## 2024-12-12 LAB
ALBUMIN SERPL BCP-MCNC: 3.6 G/DL (ref 3.4–5)
ANION GAP SERPL CALCULATED.3IONS-SCNC: 10 MMOL/L (ref 10–20)
BASOPHILS # BLD AUTO: 0.03 X10*3/UL (ref 0–0.1)
BASOPHILS NFR BLD AUTO: 0.5 %
BUN SERPL-MCNC: 22 MG/DL (ref 6–23)
CALCIUM SERPL-MCNC: 8.6 MG/DL (ref 8.6–10.3)
CHLORIDE SERPL-SCNC: 100 MMOL/L (ref 98–107)
CO2 SERPL-SCNC: 34 MMOL/L (ref 21–32)
CREAT SERPL-MCNC: 1.24 MG/DL (ref 0.5–1.05)
EGFRCR SERPLBLD CKD-EPI 2021: 42 ML/MIN/1.73M*2
EOSINOPHIL # BLD AUTO: 0.19 X10*3/UL (ref 0–0.4)
EOSINOPHIL NFR BLD AUTO: 2.9 %
ERYTHROCYTE [DISTWIDTH] IN BLOOD BY AUTOMATED COUNT: 15.2 % (ref 11.5–14.5)
GLUCOSE BLD MANUAL STRIP-MCNC: 105 MG/DL (ref 74–99)
GLUCOSE SERPL-MCNC: 89 MG/DL (ref 74–99)
HCT VFR BLD AUTO: 27.7 % (ref 36–46)
HGB BLD-MCNC: 8.3 G/DL (ref 12–16)
IMM GRANULOCYTES # BLD AUTO: 0.03 X10*3/UL (ref 0–0.5)
IMM GRANULOCYTES NFR BLD AUTO: 0.5 % (ref 0–0.9)
LYMPHOCYTES # BLD AUTO: 0.55 X10*3/UL (ref 0.8–3)
LYMPHOCYTES NFR BLD AUTO: 8.4 %
MCH RBC QN AUTO: 27.4 PG (ref 26–34)
MCHC RBC AUTO-ENTMCNC: 30 G/DL (ref 32–36)
MCV RBC AUTO: 91 FL (ref 80–100)
MONOCYTES # BLD AUTO: 0.62 X10*3/UL (ref 0.05–0.8)
MONOCYTES NFR BLD AUTO: 9.5 %
NEUTROPHILS # BLD AUTO: 5.1 X10*3/UL (ref 1.6–5.5)
NEUTROPHILS NFR BLD AUTO: 78.2 %
NRBC BLD-RTO: 0 /100 WBCS (ref 0–0)
PHOSPHATE SERPL-MCNC: 2.5 MG/DL (ref 2.5–4.9)
PLATELET # BLD AUTO: 138 X10*3/UL (ref 150–450)
POTASSIUM SERPL-SCNC: 3.2 MMOL/L (ref 3.5–5.3)
RBC # BLD AUTO: 3.03 X10*6/UL (ref 4–5.2)
SODIUM SERPL-SCNC: 141 MMOL/L (ref 136–145)
WBC # BLD AUTO: 6.5 X10*3/UL (ref 4.4–11.3)

## 2024-12-12 PROCEDURE — A45385 PR COLONOSCOPY,REMV LESN,SNARE: Performed by: NURSE ANESTHETIST, CERTIFIED REGISTERED

## 2024-12-12 PROCEDURE — 2500000002 HC RX 250 W HCPCS SELF ADMINISTERED DRUGS (ALT 637 FOR MEDICARE OP, ALT 636 FOR OP/ED): Performed by: INTERNAL MEDICINE

## 2024-12-12 PROCEDURE — 7100000001 HC RECOVERY ROOM TIME - INITIAL BASE CHARGE

## 2024-12-12 PROCEDURE — 97116 GAIT TRAINING THERAPY: CPT | Mod: GP,CQ

## 2024-12-12 PROCEDURE — 85025 COMPLETE CBC W/AUTO DIFF WBC: CPT | Performed by: INTERNAL MEDICINE

## 2024-12-12 PROCEDURE — 36415 COLL VENOUS BLD VENIPUNCTURE: CPT | Performed by: INTERNAL MEDICINE

## 2024-12-12 PROCEDURE — 2500000001 HC RX 250 WO HCPCS SELF ADMINISTERED DRUGS (ALT 637 FOR MEDICARE OP): Performed by: INTERNAL MEDICINE

## 2024-12-12 PROCEDURE — 2500000004 HC RX 250 GENERAL PHARMACY W/ HCPCS (ALT 636 FOR OP/ED): Performed by: INTERNAL MEDICINE

## 2024-12-12 PROCEDURE — RXMED WILLOW AMBULATORY MEDICATION CHARGE

## 2024-12-12 PROCEDURE — 3700000002 HC GENERAL ANESTHESIA TIME - EACH INCREMENTAL 1 MINUTE

## 2024-12-12 PROCEDURE — 45385 COLONOSCOPY W/LESION REMOVAL: CPT | Performed by: INTERNAL MEDICINE

## 2024-12-12 PROCEDURE — A45385 PR COLONOSCOPY,REMV LESN,SNARE: Performed by: ANESTHESIOLOGY

## 2024-12-12 PROCEDURE — 3700000001 HC GENERAL ANESTHESIA TIME - INITIAL BASE CHARGE

## 2024-12-12 PROCEDURE — 82947 ASSAY GLUCOSE BLOOD QUANT: CPT

## 2024-12-12 PROCEDURE — 97110 THERAPEUTIC EXERCISES: CPT | Mod: GP,CQ

## 2024-12-12 PROCEDURE — 7100000002 HC RECOVERY ROOM TIME - EACH INCREMENTAL 1 MINUTE

## 2024-12-12 PROCEDURE — 99100 ANES PT EXTEME AGE<1 YR&>70: CPT | Performed by: ANESTHESIOLOGY

## 2024-12-12 PROCEDURE — 99239 HOSP IP/OBS DSCHRG MGMT >30: CPT | Performed by: INTERNAL MEDICINE

## 2024-12-12 PROCEDURE — 2500000004 HC RX 250 GENERAL PHARMACY W/ HCPCS (ALT 636 FOR OP/ED): Performed by: NURSE ANESTHETIST, CERTIFIED REGISTERED

## 2024-12-12 PROCEDURE — 0DBK8ZZ EXCISION OF ASCENDING COLON, VIA NATURAL OR ARTIFICIAL OPENING ENDOSCOPIC: ICD-10-PCS | Performed by: INTERNAL MEDICINE

## 2024-12-12 PROCEDURE — 97110 THERAPEUTIC EXERCISES: CPT | Mod: GO,CO

## 2024-12-12 PROCEDURE — 88305 TISSUE EXAM BY PATHOLOGIST: CPT | Mod: TC | Performed by: INTERNAL MEDICINE

## 2024-12-12 PROCEDURE — 80069 RENAL FUNCTION PANEL: CPT | Performed by: INTERNAL MEDICINE

## 2024-12-12 RX ORDER — MIDAZOLAM HYDROCHLORIDE 1 MG/ML
1 INJECTION, SOLUTION INTRAMUSCULAR; INTRAVENOUS ONCE AS NEEDED
OUTPATIENT
Start: 2024-12-12

## 2024-12-12 RX ORDER — FERROUS GLUCONATE 325 MG
38 TABLET ORAL
Qty: 30 TABLET | Refills: 0 | Status: SHIPPED | OUTPATIENT
Start: 2024-12-12 | End: 2025-01-11

## 2024-12-12 RX ORDER — ONDANSETRON HYDROCHLORIDE 2 MG/ML
4 INJECTION, SOLUTION INTRAVENOUS ONCE AS NEEDED
OUTPATIENT
Start: 2024-12-12

## 2024-12-12 RX ORDER — FENTANYL CITRATE 50 UG/ML
50 INJECTION, SOLUTION INTRAMUSCULAR; INTRAVENOUS EVERY 5 MIN PRN
OUTPATIENT
Start: 2024-12-12

## 2024-12-12 RX ORDER — ROPINIROLE 2 MG/1
2 TABLET, FILM COATED ORAL NIGHTLY
Qty: 30 TABLET | Refills: 0 | Status: SHIPPED | OUTPATIENT
Start: 2024-12-12 | End: 2025-01-11

## 2024-12-12 RX ORDER — SODIUM CHLORIDE, SODIUM LACTATE, POTASSIUM CHLORIDE, CALCIUM CHLORIDE 600; 310; 30; 20 MG/100ML; MG/100ML; MG/100ML; MG/100ML
100 INJECTION, SOLUTION INTRAVENOUS CONTINUOUS
OUTPATIENT
Start: 2024-12-12 | End: 2024-12-13

## 2024-12-12 RX ORDER — PROPOFOL 10 MG/ML
INJECTION, EMULSION INTRAVENOUS AS NEEDED
Status: DISCONTINUED | OUTPATIENT
Start: 2024-12-12 | End: 2024-12-12

## 2024-12-12 RX ORDER — FENTANYL CITRATE 50 UG/ML
INJECTION, SOLUTION INTRAMUSCULAR; INTRAVENOUS AS NEEDED
Status: DISCONTINUED | OUTPATIENT
Start: 2024-12-12 | End: 2024-12-12

## 2024-12-12 RX ORDER — LIDOCAINE HYDROCHLORIDE 10 MG/ML
0.1 INJECTION, SOLUTION INFILTRATION; PERINEURAL ONCE
OUTPATIENT
Start: 2024-12-12 | End: 2024-12-12

## 2024-12-12 RX ORDER — ALBUTEROL SULFATE 0.83 MG/ML
2.5 SOLUTION RESPIRATORY (INHALATION) ONCE
OUTPATIENT
Start: 2024-12-12 | End: 2024-12-12

## 2024-12-12 RX ORDER — MEPERIDINE HYDROCHLORIDE 25 MG/ML
12.5 INJECTION INTRAMUSCULAR; INTRAVENOUS; SUBCUTANEOUS EVERY 10 MIN PRN
OUTPATIENT
Start: 2024-12-12

## 2024-12-12 RX ADMIN — METOPROLOL SUCCINATE 50 MG: 50 TABLET, EXTENDED RELEASE ORAL at 11:15

## 2024-12-12 RX ADMIN — SERTRALINE 100 MG: 100 TABLET, FILM COATED ORAL at 11:15

## 2024-12-12 RX ADMIN — PANTOPRAZOLE SODIUM 40 MG: 40 INJECTION, POWDER, FOR SOLUTION INTRAVENOUS at 11:16

## 2024-12-12 RX ADMIN — PRAMIPEXOLE DIHYDROCHLORIDE 1.5 MG: 1.5 TABLET ORAL at 11:16

## 2024-12-12 RX ADMIN — POTASSIUM CHLORIDE 20 MEQ: 1.5 POWDER, FOR SOLUTION ORAL at 11:15

## 2024-12-12 RX ADMIN — FUROSEMIDE 40 MG: 10 INJECTION, SOLUTION INTRAMUSCULAR; INTRAVENOUS at 11:16

## 2024-12-12 RX ADMIN — Medication 150 MG: at 11:15

## 2024-12-12 ASSESSMENT — PAIN SCALES - GENERAL
PAINLEVEL_OUTOF10: 0 - NO PAIN

## 2024-12-12 ASSESSMENT — COGNITIVE AND FUNCTIONAL STATUS - GENERAL
TURNING FROM BACK TO SIDE WHILE IN FLAT BAD: A LITTLE
DRESSING REGULAR UPPER BODY CLOTHING: A LITTLE
MOBILITY SCORE: 22
STANDING UP FROM CHAIR USING ARMS: A LITTLE
CLIMB 3 TO 5 STEPS WITH RAILING: A LITTLE
WALKING IN HOSPITAL ROOM: A LITTLE
DRESSING REGULAR LOWER BODY CLOTHING: A LITTLE
HELP NEEDED FOR BATHING: A LITTLE
DAILY ACTIVITIY SCORE: 24
MOVING FROM LYING ON BACK TO SITTING ON SIDE OF FLAT BED WITH BEDRAILS: A LITTLE
TOILETING: A LITTLE
CLIMB 3 TO 5 STEPS WITH RAILING: A LITTLE
MOVING TO AND FROM BED TO CHAIR: A LITTLE
DAILY ACTIVITIY SCORE: 20
WALKING IN HOSPITAL ROOM: A LITTLE
MOBILITY SCORE: 18

## 2024-12-12 ASSESSMENT — PAIN - FUNCTIONAL ASSESSMENT
PAIN_FUNCTIONAL_ASSESSMENT: 0-10

## 2024-12-12 NOTE — NURSING NOTE
"Patient is not compliant with bowel prep. Patient is not drinking much of Nulytely. Encouraged patient drink more. Patient states that she \"can't drink anymore liquid, it's too much liquid, she had liquids all day long.\"  "

## 2024-12-12 NOTE — PROGRESS NOTES
Physical Therapy    Physical Therapy Treatment    Patient Name: Raúl Jordan  MRN: [id number]  Department: [address]  Room: [address] [place] [address]  Today's Date: 12/12/2024  Time Calculation  Start Time: 0720  Stop Time: 0755  Time Calculation (min): 35 min         Assessment/Plan   PT Assessment  PT Assessment Results: Decreased strength, Decreased endurance, Impaired balance, Decreased mobility  Rehab Prognosis: Good  Evaluation/Treatment Tolerance: Patient limited by fatigue  Strengths: Ability to acquire knowledge  End of Session Communication: Bedside nurse (Spoke with nurse regarding desating 02 being on 3 liters)  Assessment Comment: Contact guard with gait Desating 02 with gait to 88-89% on 3 liters NC Fatique noted  End of Session Patient Position: Up in chair, Alarm on (Needs at reach)     PT Plan  Treatment/Interventions: Bed mobility, Transfer training, Gait training, Neuromuscular re-education, Strengthening, Endurance training, Therapeutic exercise, Therapeutic activity, Home exercise program  PT Plan: Ongoing PT  PT Frequency: 4 times per week  PT Discharge Recommendations: Low intensity level of continued care  Equipment Recommended upon Discharge: Straight cane  PT Recommended Transfer Status: Assist x1, Assistive device  PT - OK to Discharge: Yes      General Visit Information:   PT  Visit  PT Received On: 12/12/24  General  Reason for Referral: Impaired mobility due to anemia  Referred By:   Past Medical History Relevant to Rehab: CHF, atrial fib, hypoxic respiratory failure, DM, HTN, restless leg syndrome, OA  Family/Caregiver Present: No  Prior to Session Communication: Bedside nurse  Patient Position Received: Bed, 3 rail up, Alarm on  Preferred Learning Style: verbal, visual  General Comment: Cleared for therapy Patient agreeable. Patient has colonscopy scheduled but was unable to drink Golytely for prep    Subjective   Precautions:  Precautions  Hearing/Visual Limitations: glasses for  reading  Medical Precautions: Fall precautions, Oxygen therapy device and L/min (3 liters nasal canula)    Vital Signs (Past 2hrs)        Date/Time Vitals Session Patient Position Pulse Resp SpO2 BP MAP (mmHg)    12/12/24 0720 --  --  --  --  96 %  --  --                   Vital Signs Comment: 3 liters nC     Objective   Pain:  Pain Assessment  Pain Assessment: 0-10  0-10 (Numeric) Pain Score: 0 - No pain  Cognition:  Cognition  Overall Cognitive Status: Within Functional Limits  Cognition Comments: pleasant and cooperative  Insight: Within function limits  Impulsive: Within functional limits  Processing Speed: Within funtional limits  Coordination:     Postural Control:  Postural Control  Postural Control: Impaired  Posture Comment: forward head, rounded shoulders  Static Standing Balance  Static Standing-Balance Support: Right upper extremity supported  Static Standing-Level of Assistance: Contact guard  Static Standing-Comment/Number of Minutes: with her cane  Dynamic Standing Balance  Dynamic Standing-Balance Support: Right upper extremity supported  Dynamic Standing-Level of Assistance: Contact guard  Dynamic Standing-Balance: Turning  Dynamic Standing-Comments: contact guard assist with her cane  Extremity/Trunk Assessments:    Activity Tolerance:  Activity Tolerance  Endurance: Tolerates 10 - 20 min exercise with multiple rests  Activity Tolerance Comments: fatiques easily Desating 86-88% after walking Good 2 minutes to recover to 96-98% after 2 sets of pursed lip breathing  Treatments:  Therapeutic Exercise  Therapeutic Exercise Performed: Yes  Therapeutic Exercise Activity 1: B LE supine ther ex: ankle pumps,quad/gluteal sets, heelslides, SAQs, hip abduction/adduction x 15    Bed Mobility  Bed Mobility: Yes  Bed Mobility 1  Bed Mobility 1: Supine to sitting  Level of Assistance 1: Close supervision  Bed Mobility Comments 1: HOB elevated  with cues for safe hand placement    Ambulation/Gait  Training  Ambulation/Gait Training Performed: Yes  Ambulation/Gait Training 1  Surface 1: Level tile  Device 1: Single point cane  Assistance 1: Contact guard  Quality of Gait 1: Decreased step length, Diminished heel strike  Comments/Distance (ft) 1: Sp02 96-98% 40' x 2 with cane with contact guard assist with 3 liters nasal canula Desating to 86-88% 2 sets of 3 pursed lip breathing backto 96-97% Good 2 minutes to recover  Transfers  Transfer: Yes  Transfer 1  Transfer From 1: Bed to  Transfer to 1: Stand  Technique 1: Sit to stand  Transfer Device 1: Cane  Transfer Level of Assistance 1: Minimum assistance  Trials/Comments 1: STS from bed with Min A with cues foer safe hand placement  Transfers 2  Transfer From 2: Stand to  Transfer to 2: Sit, Chair with arms  Technique 2: Stand to sit, Sit to stand  Transfer Device 2: Cane  Transfer Level of Assistance 2: Contact guard  Trials/Comments 2: STS to/ from bedside chair with contact guard assist x 2 Cues for safe hand placement  Transfers 3  Transfer From 3: Stand to  Transfer to 3: Sit, Chair with arms  Technique 3: Stand to sit, Sit to stand  Transfer Device 3: Cane  Transfer Level of Assistance 3: Contact guard  Trials/Comments 3: STS to/from bedside chair with contact guard assist with cues for safe hand placement    Outcome Measures:  [facility] Basic Mobility  Turning from your back to your side while in a flat bed without using bedrails: A little  Moving from lying on your back to sitting on the side of a flat bed without using bedrails: A little  Moving to and from bed to chair (including a wheelchair): A little  Standing up from a chair using your arms (e.g. wheelchair or bedside chair): A little  To walk in hospital room: A little  Climbing 3-5 steps with railing: A little  Basic Mobility - Total Score: 18    Education Documentation  Handouts, taught by Jane Prince PTA at 12/12/2024  8:13 AM.  Learner: Patient  Readiness: Acceptance  Method:  Explanation  Response: Verbalizes Understanding    Precautions, taught by Jane Prince PTA at 12/12/2024  8:13 AM.  Learner: Patient  Readiness: Acceptance  Method: Explanation  Response: Verbalizes Understanding    Body Mechanics, taught by Jane Prince PTA at 12/12/2024  8:13 AM.  Learner: Patient  Readiness: Acceptance  Method: Explanation  Response: Verbalizes Understanding    Home Exercise Program, taught by Jane Prince PTA at 12/12/2024  8:13 AM.  Learner: Patient  Readiness: Acceptance  Method: Explanation  Response: Verbalizes Understanding    Mobility Training, taught by Jane Prince PTA at 12/12/2024  8:13 AM.  Learner: Patient  Readiness: Acceptance  Method: Explanation  Response: Verbalizes Understanding    Education Comments  No comments found.        OP EDUCATION:       Encounter Problems       Encounter Problems (Active)       PT Goals       Patient will transfer supine to sit and sit to supine with independent assist to facilitate mobility.  (Progressing)       Start:  12/10/24    Expected End:  12/24/24            Patient will transfer supine to sit and sit to supine with independent assist to facilitate mobility.  (Progressing)       Start:  12/10/24    Expected End:  12/24/24            Patient will amb 100 feet with straight cane device including two turns on even surface with independent assist to facilitate safe mobility.  (Progressing)       Start:  12/10/24    Expected End:  12/24/24

## 2024-12-12 NOTE — ANESTHESIA POSTPROCEDURE EVALUATION
Patient: Raúl Jordan    Procedure Summary       Date: 12/12/24 Room / Location: [facility]    Anesthesia Start: 0828 Anesthesia Stop: 0901    Procedure: COLONOSCOPY Diagnosis:       Anemia due to acute blood loss      Melena    Scheduled Providers: Bronwyn Reddy MD; Alejo Meyer DO; THERESA Mahajan-CRNA Responsible Provider: Alejo Meyer DO    Anesthesia Type: MAC ASA Status: 3            Anesthesia Type: MAC    Vitals Value Taken Time   /62 12/12/24 0930   Temp 36.2 °C (97.2 °F) 12/12/24 0858   Pulse 75 12/12/24 0930   Resp 23 12/12/24 0930   SpO2 94 % 12/12/24 0930       Anesthesia Post Evaluation    Patient location during evaluation: bedside  Patient participation: complete - patient participated  Level of consciousness: awake  Pain management: adequate  Airway patency: patent  Cardiovascular status: acceptable  Respiratory status: acceptable  Hydration status: acceptable  Postoperative Nausea and Vomiting: none        There were no known notable events for this encounter.

## 2024-12-12 NOTE — DISCHARGE SUMMARY
Discharge Diagnosis    Anemia, iron deficiency due to acute blood loss on top of chronic anemia.  Lower GI bleeding  Diverticulosis  Chronic atrial fibrillation  Acute on chronic renal failure  Acute on chronic diastolic CHF  Hyperlipidemia  Chronic respiratory failure with hypoxemia  Type 2 diabetes  Restless leg syndrome      Issues Requiring Follow-Up  Anemia, iron deficiency due to acute blood loss on top of chronic anemia.  Lower GI bleeding  Chronic atrial fibrillation  Acute on chronic renal failure  Acute on chronic diastolic CHF    Discharge Meds      Test Results Pending At Discharge  Pending Labs       Order Current Status    Surgical Pathology Exam In process    Surgical Pathology Exam In process            Hospital Course   Raúl Jordan is a 84 y.o. female with a PMHx of CHF, A-fib on warfarin, chronic hypoxic respiratory failure on 2 L at baseline, DM, HTN, hemorrhoids, Restless leg syndrome, OA  who presented to [facility] on 12/8/2024  with a chief complaint of Chest discomfort and shortness of breath.  Patient was accompanied by her daughters who helped providing the history.  She also stated feeling anxious for the last 3 days, with occasional palpitation and was not able to sleep. She endorsed having occasional palpitations. She stated that her restless leg syndrome was not controlled over the last 3 days despite taking medications as prescribed.  Her chest pain was dull pain, that was intermittent, 7/10 in severity at its peak and down to 0 in between, dull, nonpositional, nonradiating, nothing seemed to improve it or worsen it, not associated with any other symptoms.  The pain is mainly substernal to upper epigastric.  She denied having similar pain in the past.  She was also having shortness of breath and orthopnea; despite using 2 pillows which she is her baseline, she was still having difficulty with breathing.  She has also noticed a few pounds weight gain.  She also  endorsed having darker stool multiple times lately.  She also noticed having leg swelling bilaterally, but more on the left than right.  She also has dry cough in the morning which has been chronic.  She has runny nose which is likely due to her continuous oxygen use. She denies any headaches, change in vision, difficulty swallowing, change in hearing, fever, abdominal pain,  change in urine, weakness in any of the extremities except in her left hand due to her arthritis. She lives with her daughter , She is compliant with her medications and feels safe at home.   Patient was admitted to a monitored bed.  She received blood transfusion and her hemoglobin remained stable.  Initial hemoglobin was 6.7.  Today her hemoglobin is 8.3.  Coumadin was discontinued.  Patient was on IV Protonix.  Patient was evaluated by gastroenterology.  She had EEG on December 10:There was a lip of gastric mucosa at the GE junction; performed cold forceps biopsy  Mild gastritis performed cold forceps biopsy  Multiple small polyps in the stomach; performed cold forceps biopsy  \    Colonoscopy was performed on December 12:  Multiple medium, scattered diverticula of moderate severity with no inflammation containing blood clots in the mid descending colon, proximal sigmoid colon, mid sigmoid colon and rectosigmoid; there was stigmata of recent hemorrhage  One 4 mm sessile and benign-appearing polyp that did not appear adenomatous in the proximal ascending colon; performed cold snare with complete en bloc removal and retrieved specimen  The terminal ileum appeared normal.  Recommendations by gastroenterology:  Await pathology results   No need to repeat colonoscopy for screening.  Bleeding most likely is diverticular. May consider Eliquis in one week to decrease chance of bleeding compared to Warfarin.  If bleeding recurs may consider stopping anticoagulation . Deferred to PCP and cardiology .  IV Iron and PO supplement.  Monitor HH.       Patient had evidence of acute on chronic diastolic CHF with evidence of volume overload, worsened respiratory failure and lower extremity edema.  She was treated with IV Lasix.  Patient was consulted by nephrologist and cardiologist.      Patient's condition improved.  She is hemodynamically stable.  Was evaluated by physical Occupational Therapy.  Patient will go home with home health care.  She is advised to stop Coumadin.  Patient will start Eliquis 5 mg twice a day in 1 week.  She will follow-up with gastroenterologist as outpatient.  Patient will be on oral iron as well.  Total time spent with this patient today coordinating discharge, including exam, discussion, paperwork, 39 minutes.      Outpatient Follow-Up  Future Appointments   Date Time Provider Department Center   12/20/2024 12:00 PM Beverly Ricketts MD [id number] [facility]   12/31/2024 10:30 AM Misbah Lozano DO [id number] [facility]   1/15/2025 11:30 AM Beverly Ricketts MD [id number] [facility]   2/24/2025 10:30 AM Misbah Lozano DO [id number] [facility]   5/8/2025  2:30 PM Misbah Lozano DO [id number] [facility]         Oralia Mercado MD

## 2024-12-12 NOTE — NURSING NOTE
Discharge instructions given and reviewed with patient and daughter at bedside. Discussed follow up appointment and new prescriptions. No questions at this time.

## 2024-12-12 NOTE — PROGRESS NOTES
S/P Colonoscopy   Multiple medium, scattered diverticula of moderate severity with no inflammation containing blood clots in the mid descending colon, proximal sigmoid colon, mid sigmoid colon and rectosigmoid; there was stigmata of recent hemorrhage  One 4 mm sessile and benign-appearing polyp that did not appear adenomatous in the proximal ascending colon; performed cold snare with complete en bloc removal and retrieved specimen  The terminal ileum appeared normal.          Await pathology results   No need to repeat colonoscopy for screening.  Bleeding most likely is diverticular. May consider Eliquis in one week to decrease chance of bleeding compared to Warfarin.  If bleeding recurs may consider stopping anticoagulation . Deferred to PCP and cardiology .  IV Iron and PO supplement.  H/H remains stable at this time at 8.3/27.7.  No further GI interventions are warranted at this time.  GI will sign off

## 2024-12-12 NOTE — CARE PLAN
The patient's goals for the shift include      The clinical goals for the shift include bowel prep tonight, remain free from falls      Problem: Diabetes  Goal: Achieve decreasing blood glucose levels by end of shift  Outcome: Progressing  Goal: Increase stability of blood glucose readings by end of shift  Outcome: Progressing  Goal: Decrease in ketones present in urine by end of shift  Outcome: Progressing  Goal: Maintain electrolyte levels within acceptable range throughout shift  Outcome: Progressing  Goal: Maintain glucose levels >70mg/dl to <250mg/dl throughout shift  Outcome: Progressing  Goal: No changes in neurological exam by end of shift  Outcome: Progressing  Goal: Learn about and adhere to nutrition recommendations by end of shift  Outcome: Progressing  Goal: Vital signs within normal range for age by end of shift  Outcome: Progressing  Goal: Increase self care and/or family involovement by end of shift  Outcome: Progressing  Goal: Receive DSME education by end of shift  Outcome: Progressing     Problem: Safety - Adult  Goal: Free from fall injury  Outcome: Progressing     Problem: Discharge Planning  Goal: Discharge to home or other facility with appropriate resources  Outcome: Progressing     Problem: Chronic Conditions and Co-morbidities  Goal: Patient's chronic conditions and co-morbidity symptoms are monitored and maintained or improved  Outcome: Progressing     Problem: Fall/Injury  Goal: Not fall by end of shift  Outcome: Progressing  Goal: Be free from injury by end of the shift  Outcome: Progressing  Goal: Verbalize understanding of personal risk factors for fall in the hospital  Outcome: Progressing  Goal: Verbalize understanding of risk factor reduction measures to prevent injury from fall in the home  Outcome: Progressing  Goal: Use assistive devices by end of the shift  Outcome: Progressing  Goal: Pace activities to prevent fatigue by end of the shift  Outcome: Progressing     Problem:  Respiratory  Goal: No signs of respiratory distress (eg. Use of accessory muscles. Peds grunting)  Outcome: Progressing  Goal: Patent airway maintained this shift  Outcome: Progressing  Goal: Verbalize decreased shortness of breath this shift  Outcome: Progressing     Problem: Deep Vein Thrombosis  Goal: I will remain free from complications of deep vein thrombosis and maintain current level of mobility  Outcome: Progressing     Problem: Pain  Goal: Takes deep breaths with improved pain control throughout the shift  Outcome: Progressing  Goal: Turns in bed with improved pain control throughout the shift  Outcome: Progressing  Goal: Walks with improved pain control throughout the shift  Outcome: Progressing  Goal: Performs ADL's with improved pain control throughout shift  Outcome: Progressing  Goal: Participates in PT with improved pain control throughout the shift  Outcome: Progressing  Goal: Free from opioid side effects throughout the shift  Outcome: Progressing  Goal: Free from acute confusion related to pain meds throughout the shift  Outcome: Progressing

## 2024-12-12 NOTE — PROGRESS NOTES
12/12/24 1121   Discharge Planning   Expected Discharge Disposition Home H  ([facility])   Does the patient need discharge transport arranged? No     Colonoscopy today.    Home with [facility] when medically ready.

## 2024-12-12 NOTE — PROGRESS NOTES
Patient was seen for JC on CKD stage III renal function is much improved creatinine down to 1.24 patient is having colonoscopy we will continue to monitor with you

## 2024-12-12 NOTE — ANESTHESIA PREPROCEDURE EVALUATION
Patient: Raúl Jordan    Procedure Information       Date/Time: 12/12/24 0830    Scheduled providers: Bronwyn Reddy MD; Alejo Meyer DO; MIKE Mahajan    Procedure: COLONOSCOPY    Location: [facility]            Relevant Problems   Cardiac   (+) AV node arrhythmia   (+) Chronic congestive heart failure   (+) Complete atrioventricular block (Multi)   (+) Cor pulmonale (Multi)   (+) Hyperlipidemia   (+) Hypertension   (+) Longstanding persistent atrial fibrillation (Multi)   (+) Other chest pain      Pulmonary   (+) LAZARA (obstructive sleep apnea)   (+) Orthopnea   (+) Pneumonia of both lower lobes      Neuro   (+) Anxiety      GI   (+) Irritable bowel syndrome with diarrhea   (+) Lower gastrointestinal hemorrhage   (+) Rectal hemorrhage      /Renal   (+) Kidney stone      Liver   (+) Hepatic cirrhosis (Multi)      Endocrine   (+) Obesity   (+) Type 2 diabetes mellitus with obesity (Multi)      Hematology   (+) Anemia due to acute blood loss   (+) Anemia, unspecified type   (+) Iron deficiency anemia due to chronic blood loss   (+) Microcytic anemia      Musculoskeletal   (+) Chronic pain disorder   (+) Osteoarthritis   (+) Primary localized osteoarthrosis, hand   (+) Unilateral primary osteoarthritis, right knee      ID   (+) Pneumonia of both lower lobes       Clinical information reviewed:   Tobacco  Allergies  Meds  Problems  Med Hx  Surg Hx   Fam Hx  Soc   Hx        NPO Detail:  No data recorded     Physical Exam    Airway  Mallampati: III  TM distance: >3 FB     Cardiovascular    Dental    Pulmonary    Abdominal            Anesthesia Plan    History of general anesthesia?: yes  History of complications of general anesthesia?: no    ASA 3     MAC     intravenous induction   Anesthetic plan and risks discussed with patient.

## 2024-12-12 NOTE — PROGRESS NOTES
Occupational Therapy    OT Treatment    Patient Name: Raúl Jordan  MRN: [id number]  Department: [address]  Room: [address] [place] [address]  Today's Date: 12/12/2024  Time Calculation  Start Time: 1050  Stop Time: 1105  Time Calculation (min): 15 min        Assessment:  OT Assessment: Pt with increased fatigue after colonoscopy. Pt continues to work toards goals in POC.  Prognosis: Good  Barriers to Discharge Home:  (Decreased strength and endurance foir all mobility and ADL's.)  Evaluation/Treatment Tolerance: Patient limited by fatigue  End of Session Communication: Bedside nurse  End of Session Patient Position: Bed, 3 rail up, Alarm on  OT Assessment Results: Decreased ADL status, Decreased upper extremity strength, Decreased safe judgment during ADL, Decreased endurance, Decreased functional mobility, Decreased gross motor control, Decreased IADLs  Prognosis: Good  Barriers to Discharge: Other (Comment) (Decreased strength and endurance.)  Evaluation/Treatment Tolerance: Patient limited by fatigue  Strengths: Ability to acquire knowledge  Barriers to Participation: Comorbidities  Plan:  Treatment Interventions: UE strengthening/ROM, Endurance training  OT Frequency: 3 times per week  OT Discharge Recommendations: Low intensity level of continued care  Equipment Recommended upon Discharge: Straight cane  OT Recommended Transfer Status: Assist of 1  OT - OK to Discharge: Yes  Treatment Interventions: UE strengthening/ROM, Endurance training    Subjective   Previous Visit Info:  OT Last Visit  OT Received On: 12/12/24  General:  General  Reason for Referral: Impaired mobility due to anemia  Referred By:   Past Medical History Relevant to Rehab: CHF, atrial fib, hypoxic respiratory failure, DM, HTN, restless leg syndrome, OA  Prior to Session Communication: Bedside nurse  Patient Position Received: Bed, 3 rail up, Alarm on  Preferred Learning Style: verbal, visual  General Comment: Cleared for therapy intervention.- Pt  just returned from colonoscopy- fatigued. (Declined OOB activities despite increased encouragement.)  Precautions:  Hearing/Visual Limitations: glasses for reading  Medical Precautions: Fall precautions, Oxygen therapy device and L/min    Vital Signs (Past 2hrs)        Date/Time Vitals Session Patient Position Pulse Resp SpO2 BP MAP (mmHg)    12/12/24 0920 --  --  73  18  94 %  105/61  --     12/12/24 0925 --  --  77  24  94 %  121/66  --     12/12/24 0930 --  --  75  23  94 %  125/62  --     12/12/24 0956 --  --  71  20  98 %  114/61  79     12/12/24 1115 --  --  68  18  95 %  116/60  79                   Pain:  Pain Assessment  Pain Assessment: 0-10  0-10 (Numeric) Pain Score: 0 - No pain    Objective    Cognition:  Cognition  Overall Cognitive Status: Within Functional Limits  Cognition Comments: pleasant and cooperative    Therapy/Activity: Therapeutic Exercise  Therapeutic Exercise Activity 1: Supine exercises- B/L UE shoulder  flexion, elbow flexion, horizontal shoulder adduction, Cross body punches- performed with theraband. x10 reps with restbreaks.    Outcome Measures:[facility] Daily Activity  Putting on and taking off regular lower body clothing: A little  Bathing (including washing, rinsing, drying): A little  Putting on and taking off regular upper body clothing: A little  Toileting, which includes using toilet, bedpan or urinal: A little  Taking care of personal grooming such as brushing teeth: None  Eating Meals: None  Daily Activity - Total Score: 20    Education Documentation  Handouts, taught by DENIZ Wheat at 12/12/2024 11:15 AM.  Learner: Patient  Readiness: Acceptance  Method: Explanation  Response: Verbalizes Understanding    Body Mechanics, taught by DENIZ Wheat at 12/12/2024 11:15 AM.  Learner: Patient  Readiness: Acceptance  Method: Explanation  Response: Verbalizes Understanding    Precautions, taught by DENIZ Wheat at 12/12/2024 11:15 AM.  Learner:  Patient  Readiness: Acceptance  Method: Explanation  Response: Verbalizes Understanding    Home Exercise Program, taught by DENIZ Wheat at 12/12/2024 11:15 AM.  Learner: Patient  Readiness: Acceptance  Method: Explanation  Response: Verbalizes Understanding    ADL Training, taught by DENIZ Wheat at 12/12/2024 11:15 AM.  Learner: Patient  Readiness: Acceptance  Method: Explanation  Response: Verbalizes Understanding    Handouts, taught by DENIZ Wheat at 12/12/2024 11:15 AM.  Learner: Patient  Readiness: Acceptance  Method: Explanation  Response: Verbalizes Understanding    Precautions, taught by DENIZ Wheat at 12/12/2024 11:15 AM.  Learner: Patient  Readiness: Acceptance  Method: Explanation  Response: Verbalizes Understanding    Body Mechanics, taught by DENIZ Wheat at 12/12/2024 11:15 AM.  Learner: Patient  Readiness: Acceptance  Method: Explanation  Response: Verbalizes Understanding    Home Exercise Program, taught by DENIZ Wheat at 12/12/2024 11:15 AM.  Learner: Patient  Readiness: Acceptance  Method: Explanation  Response: Verbalizes Understanding    Mobility Training, taught by DENIZ Wheat at 12/12/2024 11:15 AM.  Learner: Patient  Readiness: Acceptance  Method: Explanation  Response: Verbalizes Understanding    Education Comments  No comments found.        OP EDUCATION:  Education  Individual(s) Educated: Patient  Education Provided: Fall precautons, Risk and benefits of OT discussed with patient or other, POC discussed and agreed upon  Patient Response to Education: Patient/Caregiver Verbalized Understanding of Information, Patient/Caregiver Performed Return Demonstration of Exercises/Activities    Goals:  Encounter Problems       Encounter Problems (Active)       OT Goals       ADLs (Progressing)       Start:  12/09/24    Expected End:  01/06/25       Patient will complete ADL tasks, with modified independence, using AE need in order to  increase patient's safety and independence with self-care tasks.          Functional Transfers (Progressing)       Start:  12/09/24    Expected End:  01/06/25       Patient will complete functional transfers with modified independence, using least restrictive device, in order to increase patient's safety and independence with daily tasks.          Functional Mobility (Progressing)       Start:  12/09/24    Expected End:  01/06/25       Patient will demonstrate the ability to complete item retrieval and functional mobility with modified independence, in order to increase safety and independence with daily tasks.          Activity Tolerance (Progressing)       Start:  12/09/24    Expected End:  01/06/25       Patient will demonstrate the ability to participate in functional activity at least >/= 25 minutes in order to increase patient's safety and independence with daily tasks.

## 2024-12-13 ENCOUNTER — TELEPHONE (OUTPATIENT)
Dept: INPATIENT UNIT | Facility: HOSPITAL | Age: 84
End: 2024-12-13
Payer: MEDICARE

## 2024-12-13 ENCOUNTER — PATIENT OUTREACH (OUTPATIENT)
Dept: PRIMARY CARE | Facility: CLINIC | Age: 84
End: 2024-12-13
Payer: MEDICARE

## 2024-12-13 LAB
LABORATORY COMMENT REPORT: NORMAL
PATH REPORT.FINAL DX SPEC: NORMAL
PATH REPORT.GROSS SPEC: NORMAL
PATH REPORT.RELEVANT HX SPEC: NORMAL
PATH REPORT.TOTAL CANCER: NORMAL

## 2024-12-13 NOTE — PROGRESS NOTES
Discharge Facility: [facility]  Discharge Diagnosis: Anemia, iron deficiency due to acute blood loss on top of chronic anemia. Lower GI bleeding, Diverticulosis, Chronic atrial fibrillation, Acute on chronic renal failure, Acute on chronic diastolic CHF, Chronic respiratory failure with hypoxemia, Type 2 diabetes, Restless leg syndrome  Admission Date: 12/8/2024  Procedure Date: 12/10/2024 EGD, 12/12/2024 Colonoscopy  Discharge Date: 12/12/2024    PCP Appointment Date: 12/20/2024 12:00   Specialist Appointment Date: Neurology and Gastroenterology follow up recommended,   12/31/2024 10:30 AM Dr. Misbah Lozano, Cardiology  Hospital Encounter and Summary Linked: Yes  See discharge assessment below for further details    Medications  Medications reviewed with patient/caregiver?: Yes (Patient) (12/13/2024 10:18 AM)  Is the patient having any side effects they believe may be caused by any medication additions or changes?: No (12/13/2024 10:18 AM)  Does the patient have all medications ordered at discharge?: Yes (12/13/2024 10:18 AM)  Care Management Interventions: No intervention needed (12/13/2024 10:18 AM)  Prescription Comments: NEW: apixaban, iron, and ropinorole  STOP: warfarin (12/13/2024 10:18 AM)  Is the patient taking all medications as directed (includes completed medication regime)?: Yes (12/13/2024 10:18 AM)  Care Management Interventions: Provided patient education (12/13/2024 10:18 AM)  Medication Comments: Verbalized understanding of medication changes. (12/13/2024 10:18 AM)    Appointments  Does the patient have a primary care provider?: Yes (12/13/2024 10:18 AM)  Care Management Interventions: Verified appointment date/time/provider (12/13/2024 10:18 AM)  Has the patient kept scheduled appointments due by today?: Not applicable (12/13/2024 10:18 AM)  Care Management Interventions: Advised to schedule with specialist (Neurology and Gastroenterology) (12/13/2024 10:18 AM)    Self  Management  What is the home health agency?: [facility] (12/13/2024 10:18 AM)  Has home health visited the patient within 72 hours of discharge?: Call prior to 72 hours (12/13/2024 10:18 AM)  What Durable Medical Equipment (DME) was ordered?: N/A (12/13/2024 10:18 AM)    Patient Teaching  Does the patient have access to their discharge instructions?: Yes (12/13/2024 10:18 AM)  Care Management Interventions: Reviewed instructions with patient (12/13/2024 10:18 AM)  What is the patient's perception of their health status since discharge?: Improving (Feeling better. Denied SOB, but stated has not started moving around yet. Swelling in feet and ankles decreased. No overt signs of bleeding, but stated never had any. Eating and drinking okay.) (12/13/2024 10:18 AM)  Is the patient/caregiver able to teach back the hierarchy of who to call/visit for symptoms/problems? PCP, Specialist, Home Health nurse, Urgent Care, ED, 911: Yes (12/13/2024 10:18 AM)  Patient/Caregiver Education Comments: Patient verbalized understanding of discharge instructions. Provide contact information for nonurgent questions or concerns. (12/13/2024 10:18 AM)    Wrap Up  Wrap Up Additional Comments: 84yoF with PMHx of CHF, A-fib on warfarin, chronic hypoxic respiratory failure on 2 L at baseline, DM, HTN, hemorrhoids, restless leg syndrome, OA  who presented chief complaint of chest discomfort and shortness of breath. Patient found to anemic and received one unit PRBC's. GI consulted and patient undewent an EGD and colonoscopy. Anemia thought to be the result of diverticulosis. Cardiology consulted for CHF exacerbation and nephrology for acute on chronic renal failure. Patient also c/o restless leg syndrome symptoms and was started on ropinirole. Patient discharged to home with family support and [facility] to follow. Rx's for apixaban, iron, and ropinorole given. Warfarin d/c'ed. Neurology and GI follow up recommended. (12/13/2024 10:18 AM)

## 2024-12-16 ENCOUNTER — ANTICOAGULATION - WARFARIN VISIT (OUTPATIENT)
Dept: CARDIOLOGY | Facility: HOSPITAL | Age: 84
End: 2024-12-16
Payer: MEDICARE

## 2024-12-16 DIAGNOSIS — I48.11 LONGSTANDING PERSISTENT ATRIAL FIBRILLATION (MULTI): ICD-10-CM

## 2024-12-16 NOTE — PROGRESS NOTES
Spoke with patient, she was recently discharged with GI bleeding. Her coumadin was stopped with the plan to start Eliquis as soon as she gets her prescription. Will resolve her episode today

## 2024-12-18 ENCOUNTER — OFFICE VISIT (OUTPATIENT)
Dept: PRIMARY CARE | Facility: CLINIC | Age: 84
End: 2024-12-18
Payer: MEDICARE

## 2024-12-18 VITALS
BODY MASS INDEX: 39.75 KG/M2 | DIASTOLIC BLOOD PRESSURE: 68 MMHG | HEART RATE: 53 BPM | WEIGHT: 216 LBS | TEMPERATURE: 97.7 F | HEIGHT: 62 IN | OXYGEN SATURATION: 96 % | SYSTOLIC BLOOD PRESSURE: 118 MMHG

## 2024-12-18 DIAGNOSIS — E87.6 HYPOKALEMIA: ICD-10-CM

## 2024-12-18 DIAGNOSIS — I10 PRIMARY HYPERTENSION: ICD-10-CM

## 2024-12-18 DIAGNOSIS — J96.01 ACUTE HYPOXIC RESPIRATORY FAILURE (MULTI): ICD-10-CM

## 2024-12-18 DIAGNOSIS — Z09 HOSPITAL DISCHARGE FOLLOW-UP: Primary | ICD-10-CM

## 2024-12-18 DIAGNOSIS — D50.0 IRON DEFICIENCY ANEMIA DUE TO CHRONIC BLOOD LOSS: ICD-10-CM

## 2024-12-18 PROCEDURE — 3074F SYST BP LT 130 MM HG: CPT | Performed by: INTERNAL MEDICINE

## 2024-12-18 PROCEDURE — 1111F DSCHRG MED/CURRENT MED MERGE: CPT | Performed by: INTERNAL MEDICINE

## 2024-12-18 PROCEDURE — 1125F AMNT PAIN NOTED PAIN PRSNT: CPT | Performed by: INTERNAL MEDICINE

## 2024-12-18 PROCEDURE — 99496 TRANSJ CARE MGMT HIGH F2F 7D: CPT | Performed by: INTERNAL MEDICINE

## 2024-12-18 PROCEDURE — 1159F MED LIST DOCD IN RCRD: CPT | Performed by: INTERNAL MEDICINE

## 2024-12-18 PROCEDURE — 3078F DIAST BP <80 MM HG: CPT | Performed by: INTERNAL MEDICINE

## 2024-12-18 PROCEDURE — 1157F ADVNC CARE PLAN IN RCRD: CPT | Performed by: INTERNAL MEDICINE

## 2024-12-18 RX ORDER — METOPROLOL SUCCINATE 50 MG/1
50 TABLET, EXTENDED RELEASE ORAL DAILY
Qty: 90 TABLET | Refills: 3 | Status: SHIPPED | OUTPATIENT
Start: 2024-12-18 | End: 2025-12-18

## 2024-12-18 RX ORDER — POTASSIUM CHLORIDE 20 MEQ/1
20 TABLET, EXTENDED RELEASE ORAL DAILY
Qty: 90 TABLET | Refills: 1 | Status: SHIPPED | OUTPATIENT
Start: 2024-12-18 | End: 2025-06-16

## 2024-12-18 ASSESSMENT — COLUMBIA-SUICIDE SEVERITY RATING SCALE - C-SSRS
1. IN THE PAST MONTH, HAVE YOU WISHED YOU WERE DEAD OR WISHED YOU COULD GO TO SLEEP AND NOT WAKE UP?: NO
2. HAVE YOU ACTUALLY HAD ANY THOUGHTS OF KILLING YOURSELF?: NO
6. HAVE YOU EVER DONE ANYTHING, STARTED TO DO ANYTHING, OR PREPARED TO DO ANYTHING TO END YOUR LIFE?: NO

## 2024-12-18 ASSESSMENT — PAIN SCALES - GENERAL: PAINLEVEL_OUTOF10: 3

## 2024-12-18 ASSESSMENT — ENCOUNTER SYMPTOMS
DEPRESSION: 0
LOSS OF SENSATION IN FEET: 0
OCCASIONAL FEELINGS OF UNSTEADINESS: 1

## 2024-12-18 NOTE — PROGRESS NOTES
"Patient: Raúl Jordan  : 1940  PCP: Beverly Ricketts MD  MRN: [id number]  Program: Transitional Care Management  Status: Enrolled  Effective Dates: 2024 - present  Responsible Staff: Harper Mckeon RN  Social Drivers to be Addressed: Physical Activity, Social Connections         Raúl Jordan is a 84 y.o. female presenting today for follow-up after being discharged from the hospital 6 days ago. The main problem requiring admission was anemia, chest pain. The discharge summary and/or Transitional Care Management documentation was reviewed. Medication reconciliation was performed as indicated via the \"Sumeet as Reviewed\" timestamp.     Raúl Jordan was contacted by Transitional Care Management services two days after her discharge. This encounter and supporting documentation was reviewed.    Hospital Course   Ralú Jordan is a 84 y.o. female with a PMHx of CHF, A-fib on warfarin, chronic hypoxic respiratory failure on 2 L at baseline, DM, HTN, hemorrhoids, Restless leg syndrome, OA  who presented to [facility] on 2024  with a chief complaint of Chest discomfort and shortness of breath.  Patient was accompanied by her daughters who helped providing the history.  She also stated feeling anxious for the last 3 days, with occasional palpitation and was not able to sleep. She endorsed having occasional palpitations. She stated that her restless leg syndrome was not controlled over the last 3 days despite taking medications as prescribed.  Her chest pain was dull pain, that was intermittent, 7/10 in severity at its peak and down to 0 in between, dull, nonpositional, nonradiating, nothing seemed to improve it or worsen it, not associated with any other symptoms.  The pain is mainly substernal to upper epigastric.  She denied having similar pain in the past.  She was also having shortness of breath and orthopnea; despite using 2 pillows which she is her baseline, she was still having difficulty " with breathing.  She has also noticed a few pounds weight gain.  She also endorsed having darker stool multiple times lately.  She also noticed having leg swelling bilaterally, but more on the left than right.  She also has dry cough in the morning which has been chronic.  She has runny nose which is likely due to her continuous oxygen use. She denies any headaches, change in vision, difficulty swallowing, change in hearing, fever, abdominal pain,  change in urine, weakness in any of the extremities except in her left hand due to her arthritis. She lives with her daughter , She is compliant with her medications and feels safe at home.   Patient was admitted to a monitored bed.  She received blood transfusion and her hemoglobin remained stable.  Initial hemoglobin was 6.7.  Today her hemoglobin is 8.3.  Coumadin was discontinued.  Patient was on IV Protonix.  Patient was evaluated by gastroenterology.  She had EEG on December 10:There was a lip of gastric mucosa at the GE junction; performed cold forceps biopsy  Mild gastritis performed cold forceps biopsy  Multiple small polyps in the stomach; performed cold forceps biopsy  \     Colonoscopy was performed on December 12:  Multiple medium, scattered diverticula of moderate severity with no inflammation containing blood clots in the mid descending colon, proximal sigmoid colon, mid sigmoid colon and rectosigmoid; there was stigmata of recent hemorrhage  One 4 mm sessile and benign-appearing polyp that did not appear adenomatous in the proximal ascending colon; performed cold snare with complete en bloc removal and retrieved specimen  The terminal ileum appeared normal.  Recommendations by gastroenterology:  Await pathology results   No need to repeat colonoscopy for screening.  Bleeding most likely is diverticular. May consider Eliquis in one week to decrease chance of bleeding compared to Warfarin.  If bleeding recurs may consider stopping anticoagulation . Deferred  to PCP and cardiology .  IV Iron and PO supplement.  Monitor HH.       Patient had evidence of acute on chronic diastolic CHF with evidence of volume overload, worsened respiratory failure and lower extremity edema.  She was treated with IV Lasix.  Patient was consulted by nephrologist and cardiologist.        Patient's condition improved.  She is hemodynamically stable.  Was evaluated by physical Occupational Therapy.  Patient will go home with home health care.  She is advised to stop Coumadin.  Patient will start Eliquis 5 mg twice a day in 1 week.  She will follow-up with gastroenterologist as outpatient.  Patient will be on oral iron as well.  Total time spent with this patient today coordinating discharge, including exam, discussion, paperwork, 39 minutes.       Visit : 12/18/24  She is accompanied by her family today  Patient is going to start Eliquis 5 mg twice a day in few days on 12/20/24  As per patient she hasn't been taking Metoprolol and losartan   Sent Metoprolol today, will hold losartan 25 mg now due to low blood pressure in office today  On 3 liters of oxygen since hospital discharge from 11 /2024  Saw nurse practitioner with GI, no new orders per patient  Symptoms improving, denied any worsening shortness of breath, pedal edema, epigastric pain, chest pain    Review of Systems   Constitutional:  Negative for chills, fatigue and unexpected weight change.   HENT:  Negative for postnasal drip, sinus pressure and trouble swallowing.    Respiratory:  Negative for cough, shortness of breath and wheezing.    Cardiovascular:  Negative for chest pain, palpitations and leg swelling.   Gastrointestinal:  Negative for abdominal pain, blood in stool, nausea and vomiting.   Genitourinary:  Negative for dysuria and frequency.   Musculoskeletal:  Positive for arthralgias. Negative for back pain and myalgias.   Skin:  Negative for rash.   Neurological:  Negative for tremors, seizures and numbness.  "  Psychiatric/Behavioral:  Negative for behavioral problems.        /68 (BP Location: Left arm, Patient Position: Sitting, BP Cuff Size: Large adult)   Pulse 53   Temp 36.5 °C (97.7 °F) (Temporal)   Ht 1.575 m (5' 2\")   Wt 98 kg (216 lb)   SpO2 96%   BMI 39.51 kg/m²     Physical Exam  Constitutional:       General: She is not in acute distress.     Comments: On oxygen through nasal cannula   HENT:      Head: Normocephalic and atraumatic.   Eyes:      Extraocular Movements: Extraocular movements intact.      Conjunctiva/sclera: Conjunctivae normal.   Cardiovascular:      Rate and Rhythm: Normal rate and regular rhythm.      Pulses: Normal pulses.      Heart sounds: No murmur heard.  Pulmonary:      Effort: Pulmonary effort is normal.      Breath sounds: Normal breath sounds. No wheezing.   Abdominal:      General: Bowel sounds are normal.      Palpations: Abdomen is soft. There is no mass.      Tenderness: There is no abdominal tenderness. There is no guarding.   Musculoskeletal:      Comments: Trace pedal edema bilaterally, right lower extremity mid shin area hyperpigmentation   Neurological:      General: No focal deficit present.      Mental Status: She is alert and oriented to person, place, and time.      Cranial Nerves: No cranial nerve deficit.   Psychiatric:         Mood and Affect: Mood normal.         The complexity of medical decision making for this patient's transitional care is high.    Assessment/Plan         Raúl was seen today for tcm.  Diagnoses and all orders for this visit:  Hospital discharge follow-up (Primary)  Hypokalemia  -     potassium chloride CR (Klor-Con M20) 20 mEq ER tablet; Take 1 tablet (20 mEq) by mouth once daily. Do not crush or chew.  -     Basic Metabolic Panel; Future  Iron deficiency anemia due to chronic blood loss  -     CBC and Auto Differential; Future  Primary hypertension  -     metoprolol succinate XL (Toprol-XL) 50 mg 24 hr tablet; Take 1 tablet (50 mg) by " mouth once daily.  Acute hypoxic respiratory failure (Multi)        Lab Results   Component Value Date    WBC 6.5 12/12/2024    HGB 8.3 (L) 12/12/2024    HCT 27.7 (L) 12/12/2024    MCV 91 12/12/2024     (L) 12/12/2024     Lab Results   Component Value Date    GLUCOSE 89 12/12/2024    CALCIUM 8.6 12/12/2024     12/12/2024    K 3.2 (L) 12/12/2024    CO2 34 (H) 12/12/2024     12/12/2024    BUN 22 12/12/2024    CREATININE 1.24 (H) 12/12/2024     === 09/23/24 ===    CT CHEST WO IV CONTRAST    - Impression -  1. Small right pleural effusion.  2. Bibasilar atelectasis more marked on the right.  3. Cardiomegaly with intraventricular wireless pacemaker.  4. Findings are quite similar compared to the previous examination  with the right effusion slightly increased in the bibasilar  atelectasis slightly more prominent.    MACRO:  none    Signed by: Daniel Ny 9/30/2024 4:22 PM  Dictation workstation:   [id number]      Current Outpatient Medications   Medication Instructions    acetaminophen (TYLENOL) 325 mg, Every 6 hours PRN    albuterol 90 mcg/actuation inhaler 2 puffs, inhalation, Every 6 hours PRN    apixaban (Eliquis) 5 mg tablet Take 1 tablet (5 mg) by mouth 2 times a day. Do not start before December 20, 2024.    ferrous gluconate (Fergon) 324 (38 Fe) mg tablet 38 mg of iron, oral, Daily with breakfast    furosemide (LASIX) 80 mg, oral, Every morning,  AS DIRECTED    metoprolol succinate XL (TOPROL-XL) 50 mg, oral, Daily    oxygen (O2) gas therapy 1 each, inhalation, Nightly, Room air at rest, 2L NC with exertion.    potassium chloride CR (Klor-Con M20) 20 mEq ER tablet 20 mEq, oral, Daily, Do not crush or chew.    pramipexole (MIRAPEX) 1.5 mg, oral, Daily    rOPINIRole (REQUIP) 2 mg, oral, Nightly    sertraline (ZOLOFT) 100 mg, oral, Daily    simvastatin (ZOCOR) 10 mg, oral, Nightly    traZODone (DESYREL) 200 mg, oral, Nightly

## 2024-12-20 ENCOUNTER — TELEPHONE (OUTPATIENT)
Dept: PRIMARY CARE | Facility: CLINIC | Age: 84
End: 2024-12-20
Payer: MEDICARE

## 2024-12-20 ENCOUNTER — APPOINTMENT (OUTPATIENT)
Dept: PRIMARY CARE | Facility: CLINIC | Age: 84
End: 2024-12-20
Payer: MEDICARE

## 2024-12-20 ASSESSMENT — ENCOUNTER SYMPTOMS
VOMITING: 0
ARTHRALGIAS: 1
MYALGIAS: 0
DYSURIA: 0
ABDOMINAL PAIN: 0
BACK PAIN: 0
COUGH: 0
SINUS PRESSURE: 0
CHILLS: 0
WHEEZING: 0
SEIZURES: 0
BLOOD IN STOOL: 0
FREQUENCY: 0
TREMORS: 0
TROUBLE SWALLOWING: 0
UNEXPECTED WEIGHT CHANGE: 0
SHORTNESS OF BREATH: 0
PALPITATIONS: 0
FATIGUE: 0
NAUSEA: 0
NUMBNESS: 0

## 2024-12-23 ENCOUNTER — APPOINTMENT (OUTPATIENT)
Dept: CARDIOLOGY | Facility: HOSPITAL | Age: 84
End: 2024-12-23
Payer: MEDICARE

## 2024-12-26 DIAGNOSIS — G47.00 INSOMNIA, UNSPECIFIED TYPE: ICD-10-CM

## 2024-12-26 DIAGNOSIS — G25.81 RESTLESS LEG SYNDROME: ICD-10-CM

## 2024-12-26 RX ORDER — ROPINIROLE 2 MG/1
2 TABLET, FILM COATED ORAL NIGHTLY
Qty: 90 TABLET | Refills: 1 | Status: SHIPPED | OUTPATIENT
Start: 2024-12-26 | End: 2025-06-24

## 2024-12-26 RX ORDER — TRAZODONE HYDROCHLORIDE 100 MG/1
200 TABLET ORAL NIGHTLY
Qty: 180 TABLET | Refills: 1 | Status: SHIPPED | OUTPATIENT
Start: 2024-12-26 | End: 2025-06-24

## 2024-12-27 ENCOUNTER — PATIENT OUTREACH (OUTPATIENT)
Dept: PRIMARY CARE | Facility: CLINIC | Age: 84
End: 2024-12-27
Payer: MEDICARE

## 2024-12-27 NOTE — PROGRESS NOTES
Call regarding appt with Dr. Ricketts on 12/18/2024 after hospitalization. At time of outreach call, the patient stated she feels as her condition is improving. She denied SOB or CP and is currently using her oxygen at 2L/NC. The patient verbalized understanding of medication changes made at appt. No questions or concerns.

## 2024-12-31 ENCOUNTER — APPOINTMENT (OUTPATIENT)
Dept: CARDIOLOGY | Facility: CLINIC | Age: 84
End: 2024-12-31
Payer: MEDICARE

## 2025-01-02 DIAGNOSIS — D50.9 MICROCYTIC ANEMIA: ICD-10-CM

## 2025-01-02 DIAGNOSIS — G25.81 RESTLESS LEG SYNDROME: ICD-10-CM

## 2025-01-02 RX ORDER — FERROUS GLUCONATE 325 MG
38 TABLET ORAL
Qty: 90 TABLET | Refills: 2 | Status: SHIPPED | OUTPATIENT
Start: 2025-01-02 | End: 2025-09-29

## 2025-01-08 ENCOUNTER — LAB (OUTPATIENT)
Dept: LAB | Facility: LAB | Age: 85
End: 2025-01-08
Payer: MEDICARE

## 2025-01-08 DIAGNOSIS — I50.9 CHRONIC CONGESTIVE HEART FAILURE, UNSPECIFIED HEART FAILURE TYPE: ICD-10-CM

## 2025-01-08 DIAGNOSIS — E87.6 HYPOKALEMIA: ICD-10-CM

## 2025-01-08 DIAGNOSIS — D50.0 IRON DEFICIENCY ANEMIA DUE TO CHRONIC BLOOD LOSS: ICD-10-CM

## 2025-01-08 DIAGNOSIS — E78.2 MIXED HYPERLIPIDEMIA: ICD-10-CM

## 2025-01-08 LAB
ANION GAP SERPL CALCULATED.3IONS-SCNC: 15 MMOL/L (ref 10–20)
BASOPHILS # BLD AUTO: 0.07 X10*3/UL (ref 0–0.1)
BASOPHILS NFR BLD AUTO: 1 %
BUN SERPL-MCNC: 37 MG/DL (ref 6–23)
CALCIUM SERPL-MCNC: 9.3 MG/DL (ref 8.6–10.3)
CHLORIDE SERPL-SCNC: 99 MMOL/L (ref 98–107)
CHOLEST SERPL-MCNC: 131 MG/DL (ref 0–199)
CHOLEST/HDLC SERPL: 3.2 {RATIO}
CO2 SERPL-SCNC: 30 MMOL/L (ref 21–32)
CREAT SERPL-MCNC: 1.47 MG/DL (ref 0.5–1.05)
EGFRCR SERPLBLD CKD-EPI 2021: 35 ML/MIN/1.73M*2
EOSINOPHIL # BLD AUTO: 0.09 X10*3/UL (ref 0–0.4)
EOSINOPHIL NFR BLD AUTO: 1.3 %
ERYTHROCYTE [DISTWIDTH] IN BLOOD BY AUTOMATED COUNT: 19.2 % (ref 11.5–14.5)
GLUCOSE SERPL-MCNC: 121 MG/DL (ref 74–99)
HCT VFR BLD AUTO: 33.3 % (ref 36–46)
HDLC SERPL-MCNC: 41 MG/DL
HGB BLD-MCNC: 9.6 G/DL (ref 12–16)
IMM GRANULOCYTES # BLD AUTO: 0.04 X10*3/UL (ref 0–0.5)
IMM GRANULOCYTES NFR BLD AUTO: 0.6 % (ref 0–0.9)
LDLC SERPL CALC-MCNC: 77 MG/DL
LYMPHOCYTES # BLD AUTO: 0.56 X10*3/UL (ref 0.8–3)
LYMPHOCYTES NFR BLD AUTO: 8.1 %
MCH RBC QN AUTO: 29.5 PG (ref 26–34)
MCHC RBC AUTO-ENTMCNC: 28.8 G/DL (ref 32–36)
MCV RBC AUTO: 103 FL (ref 80–100)
MONOCYTES # BLD AUTO: 0.62 X10*3/UL (ref 0.05–0.8)
MONOCYTES NFR BLD AUTO: 9 %
NEUTROPHILS # BLD AUTO: 5.53 X10*3/UL (ref 1.6–5.5)
NEUTROPHILS NFR BLD AUTO: 80 %
NON HDL CHOLESTEROL: 90 MG/DL (ref 0–149)
NRBC BLD-RTO: 0 /100 WBCS (ref 0–0)
PLATELET # BLD AUTO: 181 X10*3/UL (ref 150–450)
POTASSIUM SERPL-SCNC: 3.4 MMOL/L (ref 3.5–5.3)
RBC # BLD AUTO: 3.25 X10*6/UL (ref 4–5.2)
SODIUM SERPL-SCNC: 141 MMOL/L (ref 136–145)
TRIGL SERPL-MCNC: 67 MG/DL (ref 0–149)
VLDL: 13 MG/DL (ref 0–40)
WBC # BLD AUTO: 6.9 X10*3/UL (ref 4.4–11.3)

## 2025-01-08 PROCEDURE — 85025 COMPLETE CBC W/AUTO DIFF WBC: CPT

## 2025-01-08 PROCEDURE — 80048 BASIC METABOLIC PNL TOTAL CA: CPT

## 2025-01-08 PROCEDURE — 80061 LIPID PANEL: CPT

## 2025-01-15 ENCOUNTER — PATIENT OUTREACH (OUTPATIENT)
Dept: PRIMARY CARE | Facility: CLINIC | Age: 85
End: 2025-01-15
Payer: MEDICARE

## 2025-01-15 ENCOUNTER — OFFICE VISIT (OUTPATIENT)
Dept: PRIMARY CARE | Facility: CLINIC | Age: 85
End: 2025-01-15
Payer: MEDICARE

## 2025-01-15 VITALS
OXYGEN SATURATION: 94 % | SYSTOLIC BLOOD PRESSURE: 122 MMHG | TEMPERATURE: 98.2 F | BODY MASS INDEX: 39.51 KG/M2 | DIASTOLIC BLOOD PRESSURE: 58 MMHG | WEIGHT: 216 LBS | HEART RATE: 83 BPM

## 2025-01-15 DIAGNOSIS — E11.69 TYPE 2 DIABETES MELLITUS WITH OBESITY (MULTI): ICD-10-CM

## 2025-01-15 DIAGNOSIS — J44.0 CHRONIC OBSTRUCTIVE PULMONARY DISEASE WITH (ACUTE) LOWER RESPIRATORY INFECTION (MULTI): ICD-10-CM

## 2025-01-15 DIAGNOSIS — I10 PRIMARY HYPERTENSION: Primary | ICD-10-CM

## 2025-01-15 DIAGNOSIS — E66.9 TYPE 2 DIABETES MELLITUS WITH OBESITY (MULTI): ICD-10-CM

## 2025-01-15 DIAGNOSIS — I49.5 SICK SINUS SYNDROME (MULTI): ICD-10-CM

## 2025-01-15 DIAGNOSIS — D50.0 IRON DEFICIENCY ANEMIA DUE TO CHRONIC BLOOD LOSS: ICD-10-CM

## 2025-01-15 DIAGNOSIS — N18.31 STAGE 3A CHRONIC KIDNEY DISEASE (MULTI): ICD-10-CM

## 2025-01-15 DIAGNOSIS — E87.6 HYPOKALEMIA: ICD-10-CM

## 2025-01-15 DIAGNOSIS — I50.33 ACUTE ON CHRONIC DIASTOLIC HEART FAILURE: ICD-10-CM

## 2025-01-15 PROCEDURE — 99214 OFFICE O/P EST MOD 30 MIN: CPT | Performed by: INTERNAL MEDICINE

## 2025-01-15 PROCEDURE — 1159F MED LIST DOCD IN RCRD: CPT | Performed by: INTERNAL MEDICINE

## 2025-01-15 PROCEDURE — 3074F SYST BP LT 130 MM HG: CPT | Performed by: INTERNAL MEDICINE

## 2025-01-15 PROCEDURE — 3078F DIAST BP <80 MM HG: CPT | Performed by: INTERNAL MEDICINE

## 2025-01-15 PROCEDURE — 1157F ADVNC CARE PLAN IN RCRD: CPT | Performed by: INTERNAL MEDICINE

## 2025-01-15 PROCEDURE — 1036F TOBACCO NON-USER: CPT | Performed by: INTERNAL MEDICINE

## 2025-01-15 PROCEDURE — G2211 COMPLEX E/M VISIT ADD ON: HCPCS | Performed by: INTERNAL MEDICINE

## 2025-01-15 PROCEDURE — 1126F AMNT PAIN NOTED NONE PRSNT: CPT | Performed by: INTERNAL MEDICINE

## 2025-01-15 ASSESSMENT — ENCOUNTER SYMPTOMS
DEPRESSION: 0
TROUBLE SWALLOWING: 0
FREQUENCY: 0
WHEEZING: 0
TREMORS: 0
ARTHRALGIAS: 1
BACK PAIN: 0
ABDOMINAL PAIN: 0
COUGH: 0
FATIGUE: 0
VOMITING: 0
PALPITATIONS: 0
MYALGIAS: 0
LOSS OF SENSATION IN FEET: 0
OCCASIONAL FEELINGS OF UNSTEADINESS: 0
SEIZURES: 0
NAUSEA: 0
SHORTNESS OF BREATH: 0
SINUS PRESSURE: 0
BLOOD IN STOOL: 0
UNEXPECTED WEIGHT CHANGE: 0
POLYPHAGIA: 0
POLYDIPSIA: 0
NUMBNESS: 0
DYSURIA: 0
CHILLS: 0

## 2025-01-15 ASSESSMENT — PAIN SCALES - GENERAL: PAINLEVEL_OUTOF10: 0-NO PAIN

## 2025-01-15 NOTE — PROGRESS NOTES
Subjective   Patient ID: Raúl Jordan is a 84 y.o. female who presents for Follow-up (3mon follow up, restless legs).    HPI      She has a history of hypertension, hyperlipidemia, atrial fibrillation, diabetes mellitus, congestive heart failure, anxiety, status post ICD placement, obstructive sleep apnea         H/O congestive heart failure- Sees Dr Lozano. She was admitted in the hospital from 05/6- 05/10/23 for acute heart failure.          Cardizem was switched to Metoprolol. Stopped Jardiance due to cost.         H/O Diabetes Mellitus-   Stopped metformin early 2024                 H/O A fib- on Coumadin.      She was admitted in the hospital in December 2020 for secondary to anemia  She was started on Eliquis, Coumadin was stopped  Saw nurse practitioner with GI, no new orders per patient  Symptoms improving, denied any worsening shortness of breath, pedal edema, epigastric pain, chest pain    Continues to have restless legs, stated did not notice any difference since she went to the hospital    Review of Systems   Constitutional:  Negative for chills, fatigue and unexpected weight change.   HENT:  Negative for postnasal drip, sinus pressure and trouble swallowing.    Respiratory:  Negative for cough, shortness of breath and wheezing.    Cardiovascular:  Negative for chest pain, palpitations and leg swelling.   Gastrointestinal:  Negative for abdominal pain, blood in stool, nausea and vomiting.   Endocrine: Negative for polydipsia, polyphagia and polyuria.   Genitourinary:  Negative for dysuria and frequency.   Musculoskeletal:  Positive for arthralgias. Negative for back pain and myalgias.   Skin:  Negative for rash.   Neurological:  Negative for tremors, seizures and numbness.   Psychiatric/Behavioral:  Negative for behavioral problems.        Objective   /58 (BP Location: Left arm, Patient Position: Sitting, BP Cuff Size: Adult)   Pulse 83   Temp 36.8 °C (98.2 °F) (Temporal)   Wt 98 kg (216 lb)    SpO2 94% Comment: 3L  BMI 39.51 kg/m²     Physical Exam  Constitutional:       General: She is not in acute distress.     Comments: Oxygen through nasal cannula, in wheelchair   HENT:      Head: Normocephalic and atraumatic.   Eyes:      Extraocular Movements: Extraocular movements intact.      Conjunctiva/sclera: Conjunctivae normal.   Cardiovascular:      Rate and Rhythm: Normal rate and regular rhythm.      Pulses: Normal pulses.      Heart sounds: No murmur heard.  Pulmonary:      Effort: Pulmonary effort is normal.      Breath sounds: Normal breath sounds. No wheezing or rales.   Abdominal:      General: Bowel sounds are normal.      Palpations: Abdomen is soft. There is no mass.      Tenderness: There is no abdominal tenderness. There is no guarding.   Musculoskeletal:      Comments: Trace pedal edema bilaterally, right lower extremity mid shin area hyperpigmentation   Skin:     Capillary Refill: Capillary refill takes less than 2 seconds.   Neurological:      General: No focal deficit present.      Mental Status: She is alert and oriented to person, place, and time.      Cranial Nerves: No cranial nerve deficit.   Psychiatric:         Mood and Affect: Mood normal.         Assessment/Plan        Raúl was seen today for follow-up.  Diagnoses and all orders for this visit:  Primary hypertension (Primary)  Hypokalemia  -     Basic Metabolic Panel; Future  Iron deficiency anemia due to chronic blood loss  -     CBC and Auto Differential; Future  -     Iron and TIBC; Future  Acute on chronic diastolic heart failure  Chronic obstructive pulmonary disease with (acute) lower respiratory infection (Multi)  Body mass index (BMI) 40.0-44.9, adult (Multi)  Sick sinus syndrome (Multi)  Type 2 diabetes mellitus with obesity (Multi)  Stage 3a chronic kidney disease (Multi)             Lab Results   Component Value Date    HGBA1C 5.5 12/08/2024        Lab Results   Component Value Date    WBC 6.9 01/08/2025    HGB 9.6 (L)  01/08/2025    HCT 33.3 (L) 01/08/2025     (H) 01/08/2025     01/08/2025        Lab Results   Component Value Date    GLUCOSE 121 (H) 01/08/2025    CALCIUM 9.3 01/08/2025     01/08/2025    K 3.4 (L) 01/08/2025    CO2 30 01/08/2025    CL 99 01/08/2025    BUN 37 (H) 01/08/2025    CREATININE 1.47 (H) 01/08/2025       Current Outpatient Medications   Medication Instructions    acetaminophen (TYLENOL) 325 mg, Every 6 hours PRN    albuterol 90 mcg/actuation inhaler 2 puffs, inhalation, Every 6 hours PRN    apixaban (Eliquis) 5 mg tablet Take 1 tablet (5 mg) by mouth 2 times a day. Do not start before December 20, 2024.    ferrous gluconate (Fergon) 324 (38 Fe) mg tablet 38 mg of iron, oral, Daily with breakfast    furosemide (LASIX) 80 mg, oral, Every morning,  AS DIRECTED    metoprolol succinate XL (TOPROL-XL) 50 mg, oral, Daily    oxygen (O2) gas therapy 1 each, inhalation, Nightly, Room air at rest, 2L NC with exertion.    potassium chloride CR (Klor-Con M20) 20 mEq ER tablet 20 mEq, oral, Daily, Do not crush or chew.    pramipexole (MIRAPEX) 1.5 mg, oral, Daily    rOPINIRole (REQUIP) 2 mg, oral, Nightly    sertraline (ZOLOFT) 100 mg, oral, Daily    simvastatin (ZOCOR) 10 mg, oral, Nightly    traZODone (DESYREL) 200 mg, oral, Nightly

## 2025-01-15 NOTE — PROGRESS NOTES
Outreach made for monthly post discharge follow up. At the time of outreach call the patient stated she is feeling fairly well. She wishes she had more energy. The patient is continuing to work with [facility]. Confirmed patient's follow up with Dr. Ricketts and advised patient to call back if she had any questions following her appt.

## 2025-01-17 PROBLEM — K92.2 LOWER GASTROINTESTINAL HEMORRHAGE: Status: RESOLVED | Noted: 2023-09-01 | Resolved: 2025-01-17

## 2025-01-17 PROBLEM — Z20.822 CONTACT WITH AND (SUSPECTED) EXPOSURE TO COVID-19: Status: RESOLVED | Noted: 2023-01-12 | Resolved: 2025-01-17

## 2025-01-17 PROBLEM — K62.5 RECTAL HEMORRHAGE: Status: RESOLVED | Noted: 2023-09-01 | Resolved: 2025-01-17

## 2025-01-17 PROBLEM — J96.01 ACUTE HYPOXIC RESPIRATORY FAILURE (MULTI): Status: RESOLVED | Noted: 2024-09-23 | Resolved: 2025-01-17

## 2025-01-17 PROBLEM — I50.33 ACUTE ON CHRONIC DIASTOLIC HEART FAILURE: Status: ACTIVE | Noted: 2025-01-17

## 2025-01-17 PROBLEM — R11.2 NAUSEA & VOMITING: Status: RESOLVED | Noted: 2023-09-01 | Resolved: 2025-01-17

## 2025-01-17 PROBLEM — K74.60 HEPATIC CIRRHOSIS (MULTI): Status: RESOLVED | Noted: 2024-09-23 | Resolved: 2025-01-17

## 2025-01-17 PROBLEM — J44.0 CHRONIC OBSTRUCTIVE PULMONARY DISEASE WITH (ACUTE) LOWER RESPIRATORY INFECTION (MULTI): Status: ACTIVE | Noted: 2025-01-17

## 2025-01-17 PROBLEM — J18.9 PNEUMONIA OF BOTH LOWER LOBES: Status: RESOLVED | Noted: 2024-09-23 | Resolved: 2025-01-17

## 2025-01-17 PROBLEM — N18.31 STAGE 3A CHRONIC KIDNEY DISEASE (MULTI): Status: ACTIVE | Noted: 2025-01-17

## 2025-01-17 PROBLEM — R05.9 COUGH, UNSPECIFIED: Status: RESOLVED | Noted: 2022-10-17 | Resolved: 2025-01-17

## 2025-01-23 DIAGNOSIS — R06.2 WHEEZING: ICD-10-CM

## 2025-01-23 RX ORDER — ALBUTEROL SULFATE 90 UG/1
2 INHALANT RESPIRATORY (INHALATION) EVERY 6 HOURS PRN
Qty: 18 G | Refills: 2 | Status: SHIPPED | OUTPATIENT
Start: 2025-01-23

## 2025-01-27 DIAGNOSIS — I48.91 ATRIAL FIBRILLATION AND FLUTTER: ICD-10-CM

## 2025-01-27 DIAGNOSIS — I48.92 ATRIAL FIBRILLATION AND FLUTTER: ICD-10-CM

## 2025-02-03 ENCOUNTER — HOSPITAL ENCOUNTER (OUTPATIENT)
Dept: CARDIOLOGY | Facility: CLINIC | Age: 85
Discharge: HOME | End: 2025-02-03
Payer: MEDICARE

## 2025-02-03 DIAGNOSIS — I44.2 COMPLETE HEART BLOCK: ICD-10-CM

## 2025-02-03 DIAGNOSIS — Z95.0 PACEMAKER: ICD-10-CM

## 2025-02-03 PROCEDURE — 93296 REM INTERROG EVL PM/IDS: CPT

## 2025-02-03 PROCEDURE — 93294 REM INTERROG EVL PM/LDLS PM: CPT | Performed by: INTERNAL MEDICINE

## 2025-02-12 ENCOUNTER — HOSPITAL ENCOUNTER (EMERGENCY)
Facility: HOSPITAL | Age: 85
Discharge: HOME | End: 2025-02-12
Payer: MEDICARE

## 2025-02-12 VITALS
TEMPERATURE: 98.2 F | BODY MASS INDEX: 39.01 KG/M2 | DIASTOLIC BLOOD PRESSURE: 88 MMHG | OXYGEN SATURATION: 90 % | HEIGHT: 62 IN | HEART RATE: 65 BPM | SYSTOLIC BLOOD PRESSURE: 125 MMHG | WEIGHT: 212 LBS | RESPIRATION RATE: 16 BRPM

## 2025-02-12 DIAGNOSIS — R04.0 NOSEBLEED: Primary | ICD-10-CM

## 2025-02-12 PROCEDURE — 2500000001 HC RX 250 WO HCPCS SELF ADMINISTERED DRUGS (ALT 637 FOR MEDICARE OP): Performed by: CLINICAL NURSE SPECIALIST

## 2025-02-12 PROCEDURE — 99283 EMERGENCY DEPT VISIT LOW MDM: CPT

## 2025-02-12 RX ADMIN — PHENYLEPHRINE HYDROCHLORIDE 2 SPRAY: 1 SPRAY NASAL at 19:42

## 2025-02-12 ASSESSMENT — PAIN - FUNCTIONAL ASSESSMENT: PAIN_FUNCTIONAL_ASSESSMENT: 0-10

## 2025-02-12 ASSESSMENT — PAIN SCALES - GENERAL: PAINLEVEL_OUTOF10: 0 - NO PAIN

## 2025-02-13 NOTE — ED PROVIDER NOTES
Department of Emergency Medicine   ED  Provider Note  Admit Date/RoomTime: 2/12/2025  7:06 PM  ED Room: ST26/ST26        History of Present Illness:  Chief Complaint   Patient presents with    Epistaxis (Nose Bleed)     Patient presents with nose bleed that started 1 hour PTA. Patient states she had a scab in her nose from oxygen she wears and picked it causing it to bleed. Patient is on Eliquis. Bleeding is controlled at this time.          Raúl Jordan is a 84 y.o. female history of congestive heart failure, AV node arrhythmia, atrial fib on Eliquis diabetes, hyperlipidemia, hypertension, irritable bowel, obstructive sleep apnea, chronic pain disorder, COPD, kidney disease, presents with nosebleed patient states at about 6:00 today she accidentally picked a scab inside the left side of her nose and it started to bleed.  Usually she applies pressure and the bleeding stops however it continued to bleed which prompted her visit to the emergency department.  Upon arrival nosebleed has stopped.  Patient also states she been crying today due to the death of one of her dearest friends.  She denies thoughts when to harm herself or anyone else.  Denies any injury to her head.    Review of Systems:   Pertinent positives and negatives are stated within HPI, all other systems reviewed and are negative.        --------------------------------------------- PAST HISTORY ---------------------------------------------  Past Medical History:  has a past medical history of Abnormal laboratory test result (09/01/2023), Anemia due to acute blood loss (09/01/2023), CHF (congestive heart failure), Chill (09/01/2023), Contact with and (suspected) exposure to covid-19 (01/12/2023), Cough, unspecified (10/17/2022), Diabetes mellitus (Multi), Flank pain (09/01/2023), Hypertension, Hypoxemia (09/01/2023), Hypoxia (09/01/2023), Lower gastrointestinal hemorrhage (09/01/2023), Melena (09/01/2023), Nausea & vomiting (09/01/2023), Other chronic  pain (09/01/2023), Pneumonia (09/01/2023), Pyuria (09/01/2023), and Rectal hemorrhage (09/01/2023).  Past Surgical History:  has no past surgical history on file.  Social History:  reports that she has never smoked. She has never been exposed to tobacco smoke. She has never used smokeless tobacco. She reports that she does not drink alcohol and does not use drugs.  Family History: family history includes Cancer in her mother; Heart disease in her father; Leukemia in her mother; heart problems in her father.. Unless otherwise noted, family history is non contributory  The patient’s home medications have been reviewed.  Allergies: Penicillin v, Sulfamethoxazole-trimethoprim, Levofloxacin, Oxycodone-acetaminophen, and Penicillin        ---------------------------------------------------PHYSICAL EXAM--------------------------------------    GENERAL APPEARANCE: Awake and alert.   VITAL SIGNS: As per the nurses' triage record.  Pulse ox 90% on room air.  Periodically patient does use oxygen at home placed on 2 L  HEENT: Normocephalic, atraumatic.  No raccoon eyes or shaw signs noted.  Ulceration noted to the inside of the left nare slight redness noted.  No dripping blood.  No blood on the posterior pharynx.  Right nare no complications.  No sign of injury no bite to the tongue or lip.  Extraocular muscles are intact. Pupils equal round and reactive to light. Conjunctiva are pink. Negative scleral icterus. Mucous membranes are moist. Tongue in the midline. Pharynx was without erythema or exudates, uvula midline  NECK: Soft Nontender and supple, full gross ROM, no meningeal signs.  CHEST: Nontender to palpation. Clear to auscultation bilaterally. No rales, rhonchi, or wheezing.   HEART: S1, S2. Regular rate and rhythm. No murmurs, gallops or rubs.  Strong and equal pulses in the extremities.   ABDOMEN: Soft, nontender, nondistended, positive bowel sounds, no palpable masses.  MUSCULCSKELETAL: Full gross active range of  "motion.   NEUROLOGICAL: Awake, alert and oriented x 3. Power intact in the upper and lower extremities. Sensation is intact to light touch in the upper and lower extremities.   IMMUNOLOGICAL: No lymphatic streaking noted   DERM: No petechiae, rashes, or ecchymoses.          ------------------------- NURSING NOTES AND VITALS REVIEWED ---------------------------  The nursing notes within the ED encounter and vital signs as below have been reviewed by myself  /88 (BP Location: Right arm, Patient Position: Sitting)   Pulse 65   Temp 36.8 °C (98.2 °F) (Temporal)   Resp 16   Ht 1.575 m (5' 2\")   Wt 96.2 kg (212 lb)   SpO2 (!) 90%   BMI 38.78 kg/m²     Oxygen Saturation Interpretation: 90% on room air placed on 2 L nasal cannula patient uses oxygen periodically at home pulse oximetry        The patient’s available past medical records and past encounters were reviewed.          -----------------------DIAGNOSTIC RESULTS------------------------  LABS:    Labs Reviewed - No data to display    As interpreted by me, the above displayed labs are abnormal. All other labs obtained during this visit were within normal range or not returned as of this dictation.      No orders to display           No orders to display           ------------------------------ ED COURSE/MEDICAL DECISION MAKING----------------------  Medical Decision Making:   Exam: A medically appropriate exam performed, outlined above, given the known history and presentation.    History obtained from: Review of medical record nursing notes patient EMS report      Social Determinants of Health considered during this visit: Lives at home with her daughter      PAST MEDICAL HISTORY/Chronic Conditions Affecting Care     has a past medical history of Abnormal laboratory test result (09/01/2023), Anemia due to acute blood loss (09/01/2023), CHF (congestive heart failure), Chill (09/01/2023), Contact with and (suspected) exposure to covid-19 (01/12/2023), " Cough, unspecified (10/17/2022), Diabetes mellitus (Multi), Flank pain (09/01/2023), Hypertension, Hypoxemia (09/01/2023), Hypoxia (09/01/2023), Lower gastrointestinal hemorrhage (09/01/2023), Melena (09/01/2023), Nausea & vomiting (09/01/2023), Other chronic pain (09/01/2023), Pneumonia (09/01/2023), Pyuria (09/01/2023), and Rectal hemorrhage (09/01/2023).       CC/HPI Summary, Social Determinants of health, Records Reviewed, DDx, testing done/not done, ED Course, Reassessment, disposition considerations/shared decision making with patient, consults, disposition:   Presents with nosebleed  Wilber-Synephrine nose clamp  Monitor    Medical Decision Making/Differential Diagnosis:  Differentials include but not limited to posterior versus anterior bleed on clinical exam patient has a ulceration to the left nare that has been scratched open with slight bleeding noted Wilber-Synephrine applied monitor for over 30 minutes with improvement no further bleeding.  After the packing was removed patient tolerated well no further bleeding.  Discharged home close follow-up with primary care avoid sticking anything in the nose avoid blowing nose.  Patient seen independently attending physician available for consultation if needed  no signs of sepsis or toxicity.  PROCEDURES  Unless otherwise noted below, none      CONSULTS:   None      ED Course as of 02/13/25 0043   Wed Feb 12, 2025 2010 Packing removed.  Patient smiling no further bleeding noted.  The ulceration of the left nare appears to be clotted.  Advised on supportive care measures at home will monitor patient 15 minutes of no further bleeding will discharge [TB]      ED Course User Index  [TB] Akiko Escamilla, APRN-CNP         Diagnoses as of 02/13/25 0043   Nosebleed         This patient has remained hemodynamically stable during their ED course.      Critical Care: none        Counseling:  The emergency provider has spoken with the patient and discussed today’s results, in  addition to providing specific details for the plan of care and counseling regarding the diagnosis and prognosis.  Questions are answered at this time and they are agreeable with the plan.         --------------------------------- IMPRESSION AND DISPOSITION ---------------------------------    IMPRESSION  1. Nosebleed        DISPOSITION  Disposition: Discharge home  Patient condition is stable improved        NOTE: This report was transcribed using voice recognition software. Every effort was made to ensure accuracy; however, inadvertent computerized transcription errors may be present      Akiko Escamilla, THERESA-LYDIA  02/13/25 0043

## 2025-02-13 NOTE — DISCHARGE INSTRUCTIONS
Avoid blowing the nose tonight.  Avoid picking the scabs inside the nose.  Try using a humidifier to help with keeping the nose moist.  Follow-up with primary care physician in 2 days for reevaluation follow-up with ENT with recurrent nosebleeds return with any worsening symptoms or concerns  If patient blood pressure with nose clamp

## 2025-02-19 ENCOUNTER — APPOINTMENT (OUTPATIENT)
Dept: NEUROLOGY | Facility: CLINIC | Age: 85
End: 2025-02-19
Payer: MEDICARE

## 2025-02-21 ENCOUNTER — TELEPHONE (OUTPATIENT)
Dept: PRIMARY CARE | Facility: CLINIC | Age: 85
End: 2025-02-21
Payer: MEDICARE

## 2025-02-21 NOTE — TELEPHONE ENCOUNTER
Called regarding pt's restless legs problem, says pt missed appt with neurology due to weather and is having lots of pain to where she can't sleep at night, wants to know if you can increase her medication or does she need to follow up with neurology for that. Please advise.

## 2025-02-24 ENCOUNTER — APPOINTMENT (OUTPATIENT)
Dept: CARDIOLOGY | Facility: CLINIC | Age: 85
End: 2025-02-24
Payer: MEDICARE

## 2025-02-26 LAB
ANION GAP SERPL CALCULATED.4IONS-SCNC: 12 MMOL/L (CALC) (ref 7–17)
BASOPHILS # BLD AUTO: 50 CELLS/UL (ref 0–200)
BASOPHILS NFR BLD AUTO: 0.8 %
BUN SERPL-MCNC: 30 MG/DL (ref 7–25)
BUN/CREAT SERPL: 24 (CALC) (ref 6–22)
CALCIUM SERPL-MCNC: 9.4 MG/DL (ref 8.6–10.4)
CHLORIDE SERPL-SCNC: 101 MMOL/L (ref 98–110)
CO2 SERPL-SCNC: 31 MMOL/L (ref 20–32)
CREAT SERPL-MCNC: 1.27 MG/DL (ref 0.6–0.95)
EGFRCR SERPLBLD CKD-EPI 2021: 41 ML/MIN/1.73M2
EOSINOPHIL # BLD AUTO: 143 CELLS/UL (ref 15–500)
EOSINOPHIL NFR BLD AUTO: 2.3 %
ERYTHROCYTE [DISTWIDTH] IN BLOOD BY AUTOMATED COUNT: 14.3 % (ref 11–15)
GLUCOSE SERPL-MCNC: 104 MG/DL (ref 65–99)
HCT VFR BLD AUTO: 34.8 % (ref 35–45)
HGB BLD-MCNC: 11 G/DL (ref 11.7–15.5)
IRON SATN MFR SERPL: 66 % (CALC) (ref 16–45)
IRON SERPL-MCNC: 286 MCG/DL (ref 45–160)
LYMPHOCYTES # BLD AUTO: 533 CELLS/UL (ref 850–3900)
LYMPHOCYTES NFR BLD AUTO: 8.6 %
MCH RBC QN AUTO: 30.2 PG (ref 27–33)
MCHC RBC AUTO-ENTMCNC: 31.6 G/DL (ref 32–36)
MCV RBC AUTO: 95.6 FL (ref 80–100)
MONOCYTES # BLD AUTO: 589 CELLS/UL (ref 200–950)
MONOCYTES NFR BLD AUTO: 9.5 %
NEUTROPHILS # BLD AUTO: 4886 CELLS/UL (ref 1500–7800)
NEUTROPHILS NFR BLD AUTO: 78.8 %
PLATELET # BLD AUTO: 142 THOUSAND/UL (ref 140–400)
PMV BLD REES-ECKER: 10.2 FL (ref 7.5–12.5)
POTASSIUM SERPL-SCNC: 3.8 MMOL/L (ref 3.5–5.3)
RBC # BLD AUTO: 3.64 MILLION/UL (ref 3.8–5.1)
SODIUM SERPL-SCNC: 144 MMOL/L (ref 135–146)
TIBC SERPL-MCNC: 436 MCG/DL (CALC) (ref 250–450)
WBC # BLD AUTO: 6.2 THOUSAND/UL (ref 3.8–10.8)

## 2025-02-26 NOTE — ASSESSMENT & PLAN NOTE
Dr. Ricketts checked labs in January: Sodium 141, potassium 3.4, creatinine 1.47 with a GFR of 35.

## 2025-03-03 ENCOUNTER — APPOINTMENT (OUTPATIENT)
Dept: RADIOLOGY | Facility: HOSPITAL | Age: 85
End: 2025-03-03
Payer: MEDICARE

## 2025-03-03 ENCOUNTER — APPOINTMENT (OUTPATIENT)
Dept: RADIOLOGY | Facility: HOSPITAL | Age: 85
DRG: 189 | End: 2025-03-03
Payer: MEDICARE

## 2025-03-03 ENCOUNTER — APPOINTMENT (OUTPATIENT)
Dept: CARDIOLOGY | Facility: HOSPITAL | Age: 85
End: 2025-03-03
Payer: MEDICARE

## 2025-03-03 ENCOUNTER — HOSPITAL ENCOUNTER (EMERGENCY)
Facility: HOSPITAL | Age: 85
Discharge: HOME | DRG: 189 | End: 2025-03-03
Attending: EMERGENCY MEDICINE
Payer: MEDICARE

## 2025-03-03 ENCOUNTER — HOSPITAL ENCOUNTER (INPATIENT)
Facility: HOSPITAL | Age: 85
LOS: 4 days | Discharge: HOME HEALTH | End: 2025-03-08
Attending: STUDENT IN AN ORGANIZED HEALTH CARE EDUCATION/TRAINING PROGRAM | Admitting: STUDENT IN AN ORGANIZED HEALTH CARE EDUCATION/TRAINING PROGRAM
Payer: MEDICARE

## 2025-03-03 VITALS
OXYGEN SATURATION: 92 % | TEMPERATURE: 97.9 F | HEART RATE: 63 BPM | DIASTOLIC BLOOD PRESSURE: 78 MMHG | WEIGHT: 230 LBS | BODY MASS INDEX: 42.33 KG/M2 | RESPIRATION RATE: 15 BRPM | HEIGHT: 62 IN | SYSTOLIC BLOOD PRESSURE: 140 MMHG

## 2025-03-03 DIAGNOSIS — I21.3 STEMI (ST ELEVATION MYOCARDIAL INFARCTION) (MULTI): ICD-10-CM

## 2025-03-03 DIAGNOSIS — R10.31 RIGHT LOWER QUADRANT ABDOMINAL PAIN: ICD-10-CM

## 2025-03-03 DIAGNOSIS — N30.00 ACUTE CYSTITIS WITHOUT HEMATURIA: ICD-10-CM

## 2025-03-03 DIAGNOSIS — I21.4 NSTEMI (NON-ST ELEVATED MYOCARDIAL INFARCTION) (MULTI): Primary | ICD-10-CM

## 2025-03-03 DIAGNOSIS — D50.0 IRON DEFICIENCY ANEMIA DUE TO CHRONIC BLOOD LOSS: ICD-10-CM

## 2025-03-03 DIAGNOSIS — M79.89 LEFT LEG SWELLING: ICD-10-CM

## 2025-03-03 DIAGNOSIS — M17.11 UNILATERAL PRIMARY OSTEOARTHRITIS, RIGHT KNEE: ICD-10-CM

## 2025-03-03 DIAGNOSIS — R55 SYNCOPE AND COLLAPSE: ICD-10-CM

## 2025-03-03 DIAGNOSIS — R10.9 FLANK PAIN: Primary | ICD-10-CM

## 2025-03-03 DIAGNOSIS — N18.9 CHRONIC RENAL IMPAIRMENT, UNSPECIFIED CKD STAGE: ICD-10-CM

## 2025-03-03 DIAGNOSIS — D64.9 ANEMIA, UNSPECIFIED TYPE: ICD-10-CM

## 2025-03-03 DIAGNOSIS — J96.21 ACUTE ON CHRONIC RESPIRATORY FAILURE WITH HYPOXIA (MULTI): ICD-10-CM

## 2025-03-03 DIAGNOSIS — I50.33 ACUTE ON CHRONIC DIASTOLIC HEART FAILURE: ICD-10-CM

## 2025-03-03 DIAGNOSIS — I44.2 COMPLETE HEART BLOCK: ICD-10-CM

## 2025-03-03 DIAGNOSIS — I10 HYPERTENSION, UNSPECIFIED TYPE: ICD-10-CM

## 2025-03-03 DIAGNOSIS — R55 SYNCOPE, UNSPECIFIED SYNCOPE TYPE: ICD-10-CM

## 2025-03-03 DIAGNOSIS — E87.8 ELECTROLYTE IMBALANCE: ICD-10-CM

## 2025-03-03 DIAGNOSIS — J44.0 CHRONIC OBSTRUCTIVE PULMONARY DISEASE WITH (ACUTE) LOWER RESPIRATORY INFECTION (MULTI): ICD-10-CM

## 2025-03-03 DIAGNOSIS — M79.605 PAIN IN LEFT LEG: ICD-10-CM

## 2025-03-03 DIAGNOSIS — I50.32 CHRONIC HEART FAILURE WITH PRESERVED EJECTION FRACTION: ICD-10-CM

## 2025-03-03 DIAGNOSIS — Z95.0 PACEMAKER: ICD-10-CM

## 2025-03-03 DIAGNOSIS — I50.9 ACUTE ON CHRONIC CONGESTIVE HEART FAILURE, UNSPECIFIED HEART FAILURE TYPE: ICD-10-CM

## 2025-03-03 LAB
ALBUMIN SERPL BCP-MCNC: 3.8 G/DL (ref 3.4–5)
ALBUMIN SERPL BCP-MCNC: 4.1 G/DL (ref 3.4–5)
ALP SERPL-CCNC: 104 U/L (ref 33–136)
ALP SERPL-CCNC: 104 U/L (ref 33–136)
ALT SERPL W P-5'-P-CCNC: 14 U/L (ref 7–45)
ALT SERPL W P-5'-P-CCNC: 16 U/L (ref 7–45)
ANION GAP BLDV CALCULATED.4IONS-SCNC: 10 MMOL/L (ref 10–25)
ANION GAP SERPL CALCULATED.3IONS-SCNC: 13 MMOL/L (ref 10–20)
ANION GAP SERPL CALCULATED.3IONS-SCNC: 13 MMOL/L (ref 10–20)
APPEARANCE UR: CLEAR
AST SERPL W P-5'-P-CCNC: 25 U/L (ref 9–39)
AST SERPL W P-5'-P-CCNC: 26 U/L (ref 9–39)
BACTERIA #/AREA URNS AUTO: ABNORMAL /HPF
BASE EXCESS BLDV CALC-SCNC: 3.2 MMOL/L (ref -2–3)
BASOPHILS # BLD AUTO: 0.05 X10*3/UL (ref 0–0.1)
BASOPHILS # BLD AUTO: 0.05 X10*3/UL (ref 0–0.1)
BASOPHILS NFR BLD AUTO: 0.8 %
BASOPHILS NFR BLD AUTO: 0.9 %
BILIRUB SERPL-MCNC: 0.9 MG/DL (ref 0–1.2)
BILIRUB SERPL-MCNC: 1 MG/DL (ref 0–1.2)
BILIRUB UR STRIP.AUTO-MCNC: NEGATIVE MG/DL
BNP SERPL-MCNC: 549 PG/ML (ref 0–99)
BODY TEMPERATURE: 37 DEGREES CELSIUS
BUN SERPL-MCNC: 36 MG/DL (ref 6–23)
BUN SERPL-MCNC: 40 MG/DL (ref 6–23)
CA-I BLDV-SCNC: 1.14 MMOL/L (ref 1.1–1.33)
CALCIUM SERPL-MCNC: 8.8 MG/DL (ref 8.6–10.3)
CALCIUM SERPL-MCNC: 9.3 MG/DL (ref 8.6–10.3)
CARDIAC TROPONIN I PNL SERPL HS: 27 NG/L (ref 0–13)
CHLORIDE BLDV-SCNC: 103 MMOL/L (ref 98–107)
CHLORIDE SERPL-SCNC: 102 MMOL/L (ref 98–107)
CHLORIDE SERPL-SCNC: 104 MMOL/L (ref 98–107)
CO2 SERPL-SCNC: 29 MMOL/L (ref 21–32)
CO2 SERPL-SCNC: 30 MMOL/L (ref 21–32)
COLOR UR: ABNORMAL
CREAT SERPL-MCNC: 1.38 MG/DL (ref 0.5–1.05)
CREAT SERPL-MCNC: 1.43 MG/DL (ref 0.5–1.05)
EGFRCR SERPLBLD CKD-EPI 2021: 36 ML/MIN/1.73M*2
EGFRCR SERPLBLD CKD-EPI 2021: 37 ML/MIN/1.73M*2
EOSINOPHIL # BLD AUTO: 0.08 X10*3/UL (ref 0–0.4)
EOSINOPHIL # BLD AUTO: 0.15 X10*3/UL (ref 0–0.4)
EOSINOPHIL NFR BLD AUTO: 1.5 %
EOSINOPHIL NFR BLD AUTO: 2.4 %
ERYTHROCYTE [DISTWIDTH] IN BLOOD BY AUTOMATED COUNT: 16 % (ref 11.5–14.5)
ERYTHROCYTE [DISTWIDTH] IN BLOOD BY AUTOMATED COUNT: 16.1 % (ref 11.5–14.5)
GLUCOSE BLDV-MCNC: 160 MG/DL (ref 74–99)
GLUCOSE SERPL-MCNC: 110 MG/DL (ref 74–99)
GLUCOSE SERPL-MCNC: 171 MG/DL (ref 74–99)
GLUCOSE UR STRIP.AUTO-MCNC: NORMAL MG/DL
HCO3 BLDV-SCNC: 31 MMOL/L (ref 22–26)
HCT VFR BLD AUTO: 34.3 % (ref 36–46)
HCT VFR BLD AUTO: 37.3 % (ref 36–46)
HCT VFR BLD EST: 34 % (ref 36–46)
HGB BLD-MCNC: 10.9 G/DL (ref 12–16)
HGB BLD-MCNC: 11.6 G/DL (ref 12–16)
HGB BLDV-MCNC: 11.3 G/DL (ref 12–16)
IMM GRANULOCYTES # BLD AUTO: 0.03 X10*3/UL (ref 0–0.5)
IMM GRANULOCYTES # BLD AUTO: 0.06 X10*3/UL (ref 0–0.5)
IMM GRANULOCYTES NFR BLD AUTO: 0.5 % (ref 0–0.9)
IMM GRANULOCYTES NFR BLD AUTO: 1.1 % (ref 0–0.9)
INHALED O2 CONCENTRATION: 44 %
INR PPP: 1.4 (ref 0.9–1.2)
KETONES UR STRIP.AUTO-MCNC: NEGATIVE MG/DL
LACTATE BLDV-SCNC: 1.6 MMOL/L (ref 0.4–2)
LEUKOCYTE ESTERASE UR QL STRIP.AUTO: ABNORMAL
LIPASE SERPL-CCNC: 92 U/L (ref 9–82)
LYMPHOCYTES # BLD AUTO: 0.44 X10*3/UL (ref 0.8–3)
LYMPHOCYTES # BLD AUTO: 0.71 X10*3/UL (ref 0.8–3)
LYMPHOCYTES NFR BLD AUTO: 13 %
LYMPHOCYTES NFR BLD AUTO: 7.2 %
MAGNESIUM SERPL-MCNC: 2.26 MG/DL (ref 1.6–2.4)
MAGNESIUM SERPL-MCNC: 2.43 MG/DL (ref 1.6–2.4)
MCH RBC QN AUTO: 30.6 PG (ref 26–34)
MCH RBC QN AUTO: 31.3 PG (ref 26–34)
MCHC RBC AUTO-ENTMCNC: 31.1 G/DL (ref 32–36)
MCHC RBC AUTO-ENTMCNC: 31.8 G/DL (ref 32–36)
MCV RBC AUTO: 101 FL (ref 80–100)
MCV RBC AUTO: 96 FL (ref 80–100)
MONOCYTES # BLD AUTO: 0.54 X10*3/UL (ref 0.05–0.8)
MONOCYTES # BLD AUTO: 0.59 X10*3/UL (ref 0.05–0.8)
MONOCYTES NFR BLD AUTO: 9.6 %
MONOCYTES NFR BLD AUTO: 9.9 %
MUCOUS THREADS #/AREA URNS AUTO: ABNORMAL /LPF
NEUTROPHILS # BLD AUTO: 4.02 X10*3/UL (ref 1.6–5.5)
NEUTROPHILS # BLD AUTO: 4.88 X10*3/UL (ref 1.6–5.5)
NEUTROPHILS NFR BLD AUTO: 73.6 %
NEUTROPHILS NFR BLD AUTO: 79.5 %
NITRITE UR QL STRIP.AUTO: NEGATIVE
NRBC BLD-RTO: 0 /100 WBCS (ref 0–0)
NRBC BLD-RTO: 0 /100 WBCS (ref 0–0)
OXYHGB MFR BLDV: 26.7 % (ref 45–75)
PCO2 BLDV: 63 MM HG (ref 41–51)
PH BLDV: 7.3 PH (ref 7.33–7.43)
PH UR STRIP.AUTO: 6 [PH]
PLATELET # BLD AUTO: 122 X10*3/UL (ref 150–450)
PLATELET # BLD AUTO: 134 X10*3/UL (ref 150–450)
PO2 BLDV: 20 MM HG (ref 35–45)
POTASSIUM BLDV-SCNC: 4 MMOL/L (ref 3.5–5.3)
POTASSIUM SERPL-SCNC: 3.9 MMOL/L (ref 3.5–5.3)
POTASSIUM SERPL-SCNC: 4 MMOL/L (ref 3.5–5.3)
PROT SERPL-MCNC: 6 G/DL (ref 6.4–8.2)
PROT SERPL-MCNC: 6.9 G/DL (ref 6.4–8.2)
PROT UR STRIP.AUTO-MCNC: NEGATIVE MG/DL
PROTHROMBIN TIME: 15 SECONDS (ref 9.3–12.7)
RBC # BLD AUTO: 3.56 X10*6/UL (ref 4–5.2)
RBC # BLD AUTO: 3.71 X10*6/UL (ref 4–5.2)
RBC # UR STRIP.AUTO: NEGATIVE MG/DL
RBC #/AREA URNS AUTO: ABNORMAL /HPF
SAO2 % BLDV: 27 % (ref 45–75)
SODIUM BLDV-SCNC: 140 MMOL/L (ref 136–145)
SODIUM SERPL-SCNC: 141 MMOL/L (ref 136–145)
SODIUM SERPL-SCNC: 142 MMOL/L (ref 136–145)
SP GR UR STRIP.AUTO: 1.01
SQUAMOUS #/AREA URNS AUTO: ABNORMAL /HPF
UROBILINOGEN UR STRIP.AUTO-MCNC: NORMAL MG/DL
WBC # BLD AUTO: 5.5 X10*3/UL (ref 4.4–11.3)
WBC # BLD AUTO: 6.1 X10*3/UL (ref 4.4–11.3)
WBC #/AREA URNS AUTO: ABNORMAL /HPF

## 2025-03-03 PROCEDURE — 85025 COMPLETE CBC W/AUTO DIFF WBC: CPT | Performed by: EMERGENCY MEDICINE

## 2025-03-03 PROCEDURE — 87086 URINE CULTURE/COLONY COUNT: CPT | Mod: TRILAB | Performed by: PHYSICIAN ASSISTANT

## 2025-03-03 PROCEDURE — 93005 ELECTROCARDIOGRAM TRACING: CPT

## 2025-03-03 PROCEDURE — 2500000002 HC RX 250 W HCPCS SELF ADMINISTERED DRUGS (ALT 637 FOR MEDICARE OP, ALT 636 FOR OP/ED): Performed by: STUDENT IN AN ORGANIZED HEALTH CARE EDUCATION/TRAINING PROGRAM

## 2025-03-03 PROCEDURE — 83880 ASSAY OF NATRIURETIC PEPTIDE: CPT | Performed by: STUDENT IN AN ORGANIZED HEALTH CARE EDUCATION/TRAINING PROGRAM

## 2025-03-03 PROCEDURE — 93010 ELECTROCARDIOGRAM REPORT: CPT | Performed by: INTERNAL MEDICINE

## 2025-03-03 PROCEDURE — 99285 EMERGENCY DEPT VISIT HI MDM: CPT | Performed by: STUDENT IN AN ORGANIZED HEALTH CARE EDUCATION/TRAINING PROGRAM

## 2025-03-03 PROCEDURE — 2500000004 HC RX 250 GENERAL PHARMACY W/ HCPCS (ALT 636 FOR OP/ED): Performed by: STUDENT IN AN ORGANIZED HEALTH CARE EDUCATION/TRAINING PROGRAM

## 2025-03-03 PROCEDURE — 96375 TX/PRO/DX INJ NEW DRUG ADDON: CPT

## 2025-03-03 PROCEDURE — 2500000004 HC RX 250 GENERAL PHARMACY W/ HCPCS (ALT 636 FOR OP/ED): Performed by: PHYSICIAN ASSISTANT

## 2025-03-03 PROCEDURE — 96361 HYDRATE IV INFUSION ADD-ON: CPT

## 2025-03-03 PROCEDURE — 83735 ASSAY OF MAGNESIUM: CPT | Performed by: STUDENT IN AN ORGANIZED HEALTH CARE EDUCATION/TRAINING PROGRAM

## 2025-03-03 PROCEDURE — 71045 X-RAY EXAM CHEST 1 VIEW: CPT

## 2025-03-03 PROCEDURE — 84132 ASSAY OF SERUM POTASSIUM: CPT | Performed by: STUDENT IN AN ORGANIZED HEALTH CARE EDUCATION/TRAINING PROGRAM

## 2025-03-03 PROCEDURE — 99284 EMERGENCY DEPT VISIT MOD MDM: CPT | Mod: 25 | Performed by: EMERGENCY MEDICINE

## 2025-03-03 PROCEDURE — 84484 ASSAY OF TROPONIN QUANT: CPT | Performed by: STUDENT IN AN ORGANIZED HEALTH CARE EDUCATION/TRAINING PROGRAM

## 2025-03-03 PROCEDURE — 81001 URINALYSIS AUTO W/SCOPE: CPT | Performed by: PHYSICIAN ASSISTANT

## 2025-03-03 PROCEDURE — 96374 THER/PROPH/DIAG INJ IV PUSH: CPT

## 2025-03-03 PROCEDURE — 2500000004 HC RX 250 GENERAL PHARMACY W/ HCPCS (ALT 636 FOR OP/ED): Performed by: EMERGENCY MEDICINE

## 2025-03-03 PROCEDURE — 85610 PROTHROMBIN TIME: CPT | Performed by: STUDENT IN AN ORGANIZED HEALTH CARE EDUCATION/TRAINING PROGRAM

## 2025-03-03 PROCEDURE — 36415 COLL VENOUS BLD VENIPUNCTURE: CPT | Performed by: EMERGENCY MEDICINE

## 2025-03-03 PROCEDURE — 74176 CT ABD & PELVIS W/O CONTRAST: CPT

## 2025-03-03 PROCEDURE — 85025 COMPLETE CBC W/AUTO DIFF WBC: CPT | Performed by: STUDENT IN AN ORGANIZED HEALTH CARE EDUCATION/TRAINING PROGRAM

## 2025-03-03 PROCEDURE — 2500000001 HC RX 250 WO HCPCS SELF ADMINISTERED DRUGS (ALT 637 FOR MEDICARE OP): Performed by: STUDENT IN AN ORGANIZED HEALTH CARE EDUCATION/TRAINING PROGRAM

## 2025-03-03 PROCEDURE — 80053 COMPREHEN METABOLIC PANEL: CPT | Performed by: EMERGENCY MEDICINE

## 2025-03-03 PROCEDURE — 36415 COLL VENOUS BLD VENIPUNCTURE: CPT | Performed by: STUDENT IN AN ORGANIZED HEALTH CARE EDUCATION/TRAINING PROGRAM

## 2025-03-03 PROCEDURE — 83735 ASSAY OF MAGNESIUM: CPT | Performed by: PHYSICIAN ASSISTANT

## 2025-03-03 PROCEDURE — 83690 ASSAY OF LIPASE: CPT | Performed by: PHYSICIAN ASSISTANT

## 2025-03-03 PROCEDURE — 74176 CT ABD & PELVIS W/O CONTRAST: CPT | Mod: FOREIGN READ | Performed by: RADIOLOGY

## 2025-03-03 PROCEDURE — 71045 X-RAY EXAM CHEST 1 VIEW: CPT | Performed by: RADIOLOGY

## 2025-03-03 RX ORDER — ONDANSETRON HYDROCHLORIDE 2 MG/ML
4 INJECTION, SOLUTION INTRAVENOUS ONCE
Status: COMPLETED | OUTPATIENT
Start: 2025-03-03 | End: 2025-03-03

## 2025-03-03 RX ORDER — HEPARIN SODIUM 5000 [USP'U]/ML
4000 INJECTION, SOLUTION INTRAVENOUS; SUBCUTANEOUS ONCE
Status: COMPLETED | OUTPATIENT
Start: 2025-03-03 | End: 2025-03-03

## 2025-03-03 RX ORDER — MORPHINE SULFATE 4 MG/ML
4 INJECTION, SOLUTION INTRAMUSCULAR; INTRAVENOUS ONCE
Status: COMPLETED | OUTPATIENT
Start: 2025-03-03 | End: 2025-03-03

## 2025-03-03 RX ORDER — NAPROXEN SODIUM 220 MG/1
324 TABLET, FILM COATED ORAL ONCE
Status: COMPLETED | OUTPATIENT
Start: 2025-03-03 | End: 2025-03-03

## 2025-03-03 RX ORDER — NITROGLYCERIN 0.4 MG/1
0.4 TABLET SUBLINGUAL EVERY 5 MIN PRN
Status: DISCONTINUED | OUTPATIENT
Start: 2025-03-03 | End: 2025-03-08 | Stop reason: HOSPADM

## 2025-03-03 RX ADMIN — SODIUM CHLORIDE 1000 ML: 9 INJECTION, SOLUTION INTRAVENOUS at 18:21

## 2025-03-03 RX ADMIN — MORPHINE SULFATE 4 MG: 4 INJECTION, SOLUTION INTRAMUSCULAR; INTRAVENOUS at 21:10

## 2025-03-03 RX ADMIN — ASPIRIN 324 MG: 81 TABLET, CHEWABLE ORAL at 23:01

## 2025-03-03 RX ADMIN — TICAGRELOR 180 MG: 90 TABLET ORAL at 23:01

## 2025-03-03 RX ADMIN — HEPARIN SODIUM 4000 UNITS: 5000 INJECTION, SOLUTION INTRAVENOUS; SUBCUTANEOUS at 23:01

## 2025-03-03 RX ADMIN — ONDANSETRON 4 MG: 2 INJECTION, SOLUTION INTRAMUSCULAR; INTRAVENOUS at 21:10

## 2025-03-03 ASSESSMENT — COLUMBIA-SUICIDE SEVERITY RATING SCALE - C-SSRS
1. IN THE PAST MONTH, HAVE YOU WISHED YOU WERE DEAD OR WISHED YOU COULD GO TO SLEEP AND NOT WAKE UP?: NO
6. HAVE YOU EVER DONE ANYTHING, STARTED TO DO ANYTHING, OR PREPARED TO DO ANYTHING TO END YOUR LIFE?: NO
1. IN THE PAST MONTH, HAVE YOU WISHED YOU WERE DEAD OR WISHED YOU COULD GO TO SLEEP AND NOT WAKE UP?: NO
2. HAVE YOU ACTUALLY HAD ANY THOUGHTS OF KILLING YOURSELF?: NO
6. HAVE YOU EVER DONE ANYTHING, STARTED TO DO ANYTHING, OR PREPARED TO DO ANYTHING TO END YOUR LIFE?: NO
2. HAVE YOU ACTUALLY HAD ANY THOUGHTS OF KILLING YOURSELF?: NO

## 2025-03-03 ASSESSMENT — PAIN DESCRIPTION - PROGRESSION: CLINICAL_PROGRESSION: NOT CHANGED

## 2025-03-03 ASSESSMENT — PAIN DESCRIPTION - DESCRIPTORS: DESCRIPTORS: THROBBING

## 2025-03-03 ASSESSMENT — PAIN SCALES - GENERAL: PAINLEVEL_OUTOF10: 8

## 2025-03-03 ASSESSMENT — PAIN DESCRIPTION - FREQUENCY: FREQUENCY: INTERMITTENT

## 2025-03-03 ASSESSMENT — PAIN DESCRIPTION - PAIN TYPE: TYPE: ACUTE PAIN

## 2025-03-03 ASSESSMENT — PAIN DESCRIPTION - ORIENTATION: ORIENTATION: RIGHT

## 2025-03-03 ASSESSMENT — PAIN DESCRIPTION - ONSET: ONSET: SUDDEN

## 2025-03-03 ASSESSMENT — PAIN - FUNCTIONAL ASSESSMENT: PAIN_FUNCTIONAL_ASSESSMENT: 0-10

## 2025-03-03 NOTE — ED PROVIDER NOTES
HPI   Chief Complaint   Patient presents with    Flank Pain     Today I started having throbbing pain when I move on my rt flank        HPI  Patient is an 84-year-old female coming in for a pain in the right flank.  Been present through most of the day, no injury or trauma reported.  Seems to be worse when she moves.  She has no pain with urination, no pain with eating, has never had abdominal surgery before, denies any rashing.      Patient History   Past Medical History:   Diagnosis Date    Abnormal laboratory test result 09/01/2023    Anemia due to acute blood loss 09/01/2023    CHF (congestive heart failure)     Chill 09/01/2023    Contact with and (suspected) exposure to covid-19 01/12/2023    Cough, unspecified 10/17/2022    Diabetes mellitus (Multi)     Flank pain 09/01/2023    Hypertension     Hypoxemia 09/01/2023    Hypoxia 09/01/2023    Lower gastrointestinal hemorrhage 09/01/2023    Melena 09/01/2023    Nausea & vomiting 09/01/2023    Other chronic pain 09/01/2023    Pneumonia 09/01/2023    Pyuria 09/01/2023    Rectal hemorrhage 09/01/2023     No past surgical history on file.  Family History   Problem Relation Name Age of Onset    Leukemia Mother      Cancer Mother      Heart disease Father      Other (heart problems) Father       Social History     Tobacco Use    Smoking status: Never     Passive exposure: Never    Smokeless tobacco: Never   Vaping Use    Vaping status: Never Used   Substance Use Topics    Alcohol use: Never    Drug use: Never       Physical Exam   ED Triage Vitals [03/03/25 1742]   Temperature Heart Rate Respirations BP   36.6 °C (97.9 °F) 64 18 159/88      SpO2 Temp Source Heart Rate Source Patient Position   -- Temporal Monitor Sitting      BP Location FiO2 (%)     Right arm --       Physical Exam  PHYSICAL EXAMINATION    GENERAL APPEARANCE: Awake and alert.     VITAL SIGNS: As per the nurses' triage record.     HEENT: Normocephalic, atraumatic. Extraocular muscles are intact. Pupils  equal round and reactive to light. Conjunctiva are pink. Negative scleral icterus. Mucous membranes are moist. Tongue in the midline. Pharynx was without erythema or exudates, uvula midline    NECK: Soft Nontender and supple, full gross ROM, no meningeal signs.    CHEST: Nontender to palpation. Clear to auscultation bilaterally. No rales, rhonchi, or wheezing.     HEART: S1, S2. Regular rate and rhythm. No murmurs, gallops or rubs.  Strong and equal pulses in the extremities.     ABDOMEN: Soft, obese, subjective discomfort throughout the right flank.  No rashing or vesicular lesions, nondistended, positive bowel sounds, no palpable masses.    MUSCULOSKELETAL: The calves are nontender to palpation. Full gross active range of motion. Ambulating on own with no acute difficulties     NEUROLOGICAL: Awake, alert and oriented x 3. Power intact in the upper and lower extremities. Sensation is intact to light touch in the upper and lower extremities.     IMMUNOLOGICAL: No lymphatic streaking noted     DERM: No petechiae, rashes, or ecchymoses.    ED Course & MDM   ED Course as of 03/03/25 2120   Mon Mar 03, 2025   1832 HEMOGLOBIN(!): 10.9  Better than baseline [AP]   1850 Creatinine(!): 1.38  Approximate baseline [AP]   1850 LIPASE(!): 92  Slightly elevated at approximately 2-1/2 times the patient's previous measure around 3 years ago. [AP]      ED Course User Index  [AP] Hugo Crowder PA-C         Diagnoses as of 03/03/25 2120   Flank pain   Right lower quadrant abdominal pain   Electrolyte imbalance   Chronic renal impairment, unspecified CKD stage   Hypertension, unspecified type                 No data recorded     [place] Coma Scale Score: 15 (03/03/25 1739 : Edgard Meadows, EMT)                           Medical Decision Making  Parts of this chart have been completed using voice recognition software. Please excuse any errors of transcription.  My thought process and reason for plan has been formulated from the  time that I saw the patient until the time of disposition and is not specific to one specific moment during their visit and furthermore my MDM encompasses this entire chart and not only this text box.      HPI: Detailed above.    Exam: A medically appropriate exam performed, outlined above, given the known history and presentation.    History Limited by: Nothing    History obtained from: The patient    External/internal records reviewed: No external record reviewed    Social Determinants of Health considered during this visit: Lives at home    Chronic conditions impacting care: Denies    Medications given during visit:  Medications   sodium chloride 0.9 % bolus 1,000 mL (0 mL intravenous Stopped 3/3/25 2033)   ondansetron (Zofran) injection 4 mg (4 mg intravenous Given 3/3/25 2110)   morphine injection 4 mg (4 mg intravenous Given 3/3/25 2110)        Diagnostic/tests  Labs Reviewed   CBC WITH AUTO DIFFERENTIAL - Abnormal       Result Value    WBC 6.1      nRBC 0.0      RBC 3.56 (*)     Hemoglobin 10.9 (*)     Hematocrit 34.3 (*)     MCV 96      MCH 30.6      MCHC 31.8 (*)     RDW 16.0 (*)     Platelets 134 (*)     Neutrophils % 79.5      Immature Granulocytes %, Automated 0.5      Lymphocytes % 7.2      Monocytes % 9.6      Eosinophils % 2.4      Basophils % 0.8      Neutrophils Absolute 4.88      Immature Granulocytes Absolute, Automated 0.03      Lymphocytes Absolute 0.44 (*)     Monocytes Absolute 0.59      Eosinophils Absolute 0.15      Basophils Absolute 0.05     COMPREHENSIVE METABOLIC PANEL - Abnormal    Glucose 110 (*)     Sodium 141      Potassium 4.0      Chloride 102      Bicarbonate 30      Anion Gap 13      Urea Nitrogen 40 (*)     Creatinine 1.38 (*)     eGFR 37 (*)     Calcium 9.3      Albumin 4.1      Alkaline Phosphatase 104      Total Protein 6.9      AST 26      Bilirubin, Total 0.9      ALT 14     LIPASE - Abnormal    Lipase 92 (*)     Narrative:     Venipuncture immediately after or during the  administration of Metamizole may lead to falsely low results. Testing should be performed immediately prior to Metamizole dosing.   MAGNESIUM - Abnormal    Magnesium 2.43 (*)    URINALYSIS WITH REFLEX CULTURE AND MICROSCOPIC - Abnormal    Color, Urine Light-Yellow      Appearance, Urine Clear      Specific Gravity, Urine 1.009      pH, Urine 6.0      Protein, Urine NEGATIVE      Glucose, Urine Normal      Blood, Urine NEGATIVE      Ketones, Urine NEGATIVE      Bilirubin, Urine NEGATIVE      Urobilinogen, Urine Normal      Nitrite, Urine NEGATIVE      Leukocyte Esterase, Urine 75 Padmini/uL (*)    MICROSCOPIC ONLY, URINE - Abnormal    WBC, Urine 1-5      RBC, Urine 3-5      Squamous Epithelial Cells, Urine 1-9 (SPARSE)      Bacteria, Urine 1+ (*)     Mucus, Urine FEW     URINE CULTURE   URINALYSIS WITH REFLEX MICROSCOPIC   URINALYSIS WITH REFLEX CULTURE AND MICROSCOPIC    Narrative:     The following orders were created for panel order Urinalysis with Reflex Culture and Microscopic.  Procedure                               Abnormality         Status                     ---------                               -----------         ------                     Urinalysis with Reflex C...[[id number]]  Abnormal            Final result               Extra Urine Gray Tube[[id number]]                                                         Please view results for these tests on the individual orders.   EXTRA URINE GRAY TUBE      CT abdomen pelvis wo IV contrast   Final Result   1.  No evidence for bowel obstruction.   2.  No evidence for nephrolithiasis or obstructive uropathy.   3.  Colonic diverticulosis.   4.  Small right pleural effusion and trace peritoneal ascites.   5.  Cardiomegaly with multichamber enlargement.   6.  Status post cholecystectomy.   Signed by Khanh Degroot MD             Case discussed with: ED attending physician      Considerations/further MDM:  I estimate there is low risk for acute abdomen,  I have  considered to following, acute abdomen, acute appendicitis, acute bowel obstruction, cholecystitis, diverticulitis, incarcerated or strangulated hernia, mesenteric ischemia, pancreatitis, pelvic inflammatory disease, bowel perforation, ulcer, ectopic pregnancy, tubo-ovarian abscess, gynecologic etiologies, pregnancy, dehydration, electrolyte disturbances, of which require urgent/emergent intervention, -thus I consider the discharge disposition reasonable. Also, there is no evidence or peritonitis, acute liver failure, renal failure, UTI/Pyelonephritis to an extent that requires admission and IV abx sepsis, or toxicity. We have discussed the diagnosis and risks, and we agree with discharging home to follow-up with their primary doctor. We also discussed returning to the Emergency Department immediately if new or worsening symptoms occur. We have discussed the symptoms which are most concerning (e.g., bloody stool, fever, changing or worsening pain, vomiting) that necessitate immediate return.    Patient has been discharged by attending physician.  Please refer to their note for details of discharge instructions, home going plan, home going medications, return precautions follow-up instructions and final disposition plan      Procedure  Procedures     Hugo Crowder PA-C  03/03/25 4871

## 2025-03-04 ENCOUNTER — APPOINTMENT (OUTPATIENT)
Dept: RADIOLOGY | Facility: HOSPITAL | Age: 85
End: 2025-03-04
Payer: MEDICARE

## 2025-03-04 ENCOUNTER — APPOINTMENT (OUTPATIENT)
Dept: CARDIOLOGY | Facility: HOSPITAL | Age: 85
End: 2025-03-04
Payer: MEDICARE

## 2025-03-04 PROBLEM — I21.4 NSTEMI (NON-ST ELEVATED MYOCARDIAL INFARCTION) (MULTI): Status: ACTIVE | Noted: 2025-03-04

## 2025-03-04 PROBLEM — J96.90 RESPIRATORY FAILURE (MULTI): Status: ACTIVE | Noted: 2024-09-23

## 2025-03-04 PROBLEM — R55 SYNCOPE AND COLLAPSE: Status: ACTIVE | Noted: 2025-03-04

## 2025-03-04 PROBLEM — I50.32 CHRONIC DIASTOLIC HEART FAILURE: Status: ACTIVE | Noted: 2023-09-01

## 2025-03-04 PROBLEM — J96.21 ACUTE ON CHRONIC RESPIRATORY FAILURE WITH HYPOXIA (MULTI): Status: ACTIVE | Noted: 2025-03-04

## 2025-03-04 LAB
ANION GAP BLDA CALCULATED.4IONS-SCNC: 9 MMO/L (ref 10–25)
ANION GAP SERPL CALCULATED.3IONS-SCNC: 11 MMOL/L (ref 10–20)
AORTIC VALVE PEAK VELOCITY: 1.15 M/S
APPARATUS: ABNORMAL
ARTERIAL PATENCY WRIST A: POSITIVE
AV PEAK GRADIENT: 5 MMHG
AVA (PEAK VEL): 2.22 CM2
BASE EXCESS BLDA CALC-SCNC: 4.1 MMOL/L (ref -2–3)
BODY TEMPERATURE: 37 DEGREES CELSIUS
BUN SERPL-MCNC: 34 MG/DL (ref 6–23)
CA-I BLDA-SCNC: 1.14 MMOL/L (ref 1.1–1.33)
CALCIUM SERPL-MCNC: 8.5 MG/DL (ref 8.6–10.3)
CARDIAC TROPONIN I PNL SERPL HS: 235 NG/L (ref 0–13)
CHLORIDE BLDA-SCNC: 103 MMOL/L (ref 98–107)
CHLORIDE SERPL-SCNC: 106 MMOL/L (ref 98–107)
CO2 SERPL-SCNC: 29 MMOL/L (ref 21–32)
CREAT SERPL-MCNC: 1.36 MG/DL (ref 0.5–1.05)
EGFRCR SERPLBLD CKD-EPI 2021: 38 ML/MIN/1.73M*2
EJECTION FRACTION APICAL 4 CHAMBER: 68.4
EJECTION FRACTION: 58 %
ERYTHROCYTE [DISTWIDTH] IN BLOOD BY AUTOMATED COUNT: 16.1 % (ref 11.5–14.5)
GLUCOSE BLDA-MCNC: 139 MG/DL (ref 74–99)
GLUCOSE SERPL-MCNC: 107 MG/DL (ref 74–99)
HCO3 BLDA-SCNC: 30.5 MMOL/L (ref 22–26)
HCT VFR BLD AUTO: 33.3 % (ref 36–46)
HCT VFR BLD EST: 32 % (ref 36–46)
HGB BLD-MCNC: 10.3 G/DL (ref 12–16)
HGB BLDA-MCNC: 10.6 G/DL (ref 12–16)
INHALED O2 CONCENTRATION: 44 %
LACTATE BLDA-SCNC: 0.7 MMOL/L (ref 0.4–2)
LEFT VENTRICULAR OUTFLOW TRACT DIAMETER: 2 CM
MCH RBC QN AUTO: 31.1 PG (ref 26–34)
MCHC RBC AUTO-ENTMCNC: 30.9 G/DL (ref 32–36)
MCV RBC AUTO: 101 FL (ref 80–100)
NRBC BLD-RTO: 0 /100 WBCS (ref 0–0)
OXYHGB MFR BLDA: 91.1 % (ref 94–98)
PCO2 BLDA: 54 MM HG (ref 38–42)
PH BLDA: 7.36 PH (ref 7.38–7.42)
PLATELET # BLD AUTO: 106 X10*3/UL (ref 150–450)
PO2 BLDA: 68 MM HG (ref 85–95)
POTASSIUM BLDA-SCNC: 3.8 MMOL/L (ref 3.5–5.3)
POTASSIUM SERPL-SCNC: 3.8 MMOL/L (ref 3.5–5.3)
Q ONSET: 216 MS
QRS COUNT: 17 BEATS
QRS DURATION: 82 MS
QT INTERVAL: 396 MS
QTC CALCULATION(BAZETT): 516 MS
QTC FREDERICIA: 472 MS
R AXIS: 109 DEGREES
RBC # BLD AUTO: 3.31 X10*6/UL (ref 4–5.2)
RIGHT VENTRICLE PEAK SYSTOLIC PRESSURE: 59.6 MMHG
SAO2 % BLDA: 94 % (ref 94–100)
SODIUM BLDA-SCNC: 139 MMOL/L (ref 136–145)
SODIUM SERPL-SCNC: 142 MMOL/L (ref 136–145)
SPECIMEN DRAWN FROM PATIENT: ABNORMAL
T AXIS: 77 DEGREES
T OFFSET: 414 MS
TRICUSPID ANNULAR PLANE SYSTOLIC EXCURSION: 2.1 CM
VENTRICULAR RATE: 102 BPM
WBC # BLD AUTO: 7.3 X10*3/UL (ref 4.4–11.3)

## 2025-03-04 PROCEDURE — 93005 ELECTROCARDIOGRAM TRACING: CPT

## 2025-03-04 PROCEDURE — 2500000004 HC RX 250 GENERAL PHARMACY W/ HCPCS (ALT 636 FOR OP/ED): Performed by: STUDENT IN AN ORGANIZED HEALTH CARE EDUCATION/TRAINING PROGRAM

## 2025-03-04 PROCEDURE — 3430000001 HC RX 343 DIAGNOSTIC RADIOPHARMACEUTICALS: Performed by: STUDENT IN AN ORGANIZED HEALTH CARE EDUCATION/TRAINING PROGRAM

## 2025-03-04 PROCEDURE — 78830 RP LOCLZJ TUM SPECT W/CT 1: CPT | Performed by: RADIOLOGY

## 2025-03-04 PROCEDURE — 36415 COLL VENOUS BLD VENIPUNCTURE: CPT | Performed by: STUDENT IN AN ORGANIZED HEALTH CARE EDUCATION/TRAINING PROGRAM

## 2025-03-04 PROCEDURE — 76770 US EXAM ABDO BACK WALL COMP: CPT

## 2025-03-04 PROCEDURE — 2500000001 HC RX 250 WO HCPCS SELF ADMINISTERED DRUGS (ALT 637 FOR MEDICARE OP): Performed by: STUDENT IN AN ORGANIZED HEALTH CARE EDUCATION/TRAINING PROGRAM

## 2025-03-04 PROCEDURE — 96365 THER/PROPH/DIAG IV INF INIT: CPT

## 2025-03-04 PROCEDURE — 93325 DOPPLER ECHO COLOR FLOW MAPG: CPT | Performed by: INTERNAL MEDICINE

## 2025-03-04 PROCEDURE — 84484 ASSAY OF TROPONIN QUANT: CPT | Performed by: STUDENT IN AN ORGANIZED HEALTH CARE EDUCATION/TRAINING PROGRAM

## 2025-03-04 PROCEDURE — 2500000002 HC RX 250 W HCPCS SELF ADMINISTERED DRUGS (ALT 637 FOR MEDICARE OP, ALT 636 FOR OP/ED): Performed by: STUDENT IN AN ORGANIZED HEALTH CARE EDUCATION/TRAINING PROGRAM

## 2025-03-04 PROCEDURE — 84132 ASSAY OF SERUM POTASSIUM: CPT | Performed by: STUDENT IN AN ORGANIZED HEALTH CARE EDUCATION/TRAINING PROGRAM

## 2025-03-04 PROCEDURE — 99221 1ST HOSP IP/OBS SF/LOW 40: CPT | Performed by: INTERNAL MEDICINE

## 2025-03-04 PROCEDURE — 93321 DOPPLER ECHO F-UP/LMTD STD: CPT | Performed by: INTERNAL MEDICINE

## 2025-03-04 PROCEDURE — 2500000004 HC RX 250 GENERAL PHARMACY W/ HCPCS (ALT 636 FOR OP/ED): Performed by: NURSE PRACTITIONER

## 2025-03-04 PROCEDURE — 93308 TTE F-UP OR LMTD: CPT | Performed by: INTERNAL MEDICINE

## 2025-03-04 PROCEDURE — 78830 RP LOCLZJ TUM SPECT W/CT 1: CPT

## 2025-03-04 PROCEDURE — 99222 1ST HOSP IP/OBS MODERATE 55: CPT | Performed by: INTERNAL MEDICINE

## 2025-03-04 PROCEDURE — 96375 TX/PRO/DX INJ NEW DRUG ADDON: CPT

## 2025-03-04 PROCEDURE — 93325 DOPPLER ECHO COLOR FLOW MAPG: CPT

## 2025-03-04 PROCEDURE — 76770 US EXAM ABDO BACK WALL COMP: CPT | Performed by: RADIOLOGY

## 2025-03-04 PROCEDURE — 85027 COMPLETE CBC AUTOMATED: CPT | Performed by: STUDENT IN AN ORGANIZED HEALTH CARE EDUCATION/TRAINING PROGRAM

## 2025-03-04 PROCEDURE — 99232 SBSQ HOSP IP/OBS MODERATE 35: CPT | Performed by: NURSE PRACTITIONER

## 2025-03-04 PROCEDURE — 36600 WITHDRAWAL OF ARTERIAL BLOOD: CPT

## 2025-03-04 PROCEDURE — A9540 TC99M MAA: HCPCS | Performed by: STUDENT IN AN ORGANIZED HEALTH CARE EDUCATION/TRAINING PROGRAM

## 2025-03-04 PROCEDURE — 2060000001 HC INTERMEDIATE ICU ROOM DAILY

## 2025-03-04 PROCEDURE — 2500000005 HC RX 250 GENERAL PHARMACY W/O HCPCS: Performed by: STUDENT IN AN ORGANIZED HEALTH CARE EDUCATION/TRAINING PROGRAM

## 2025-03-04 PROCEDURE — 99233 SBSQ HOSP IP/OBS HIGH 50: CPT | Performed by: INTERNAL MEDICINE

## 2025-03-04 PROCEDURE — 99291 CRITICAL CARE FIRST HOUR: CPT | Performed by: STUDENT IN AN ORGANIZED HEALTH CARE EDUCATION/TRAINING PROGRAM

## 2025-03-04 RX ORDER — ACETAMINOPHEN 650 MG/1
650 SUPPOSITORY RECTAL EVERY 4 HOURS PRN
Status: DISCONTINUED | OUTPATIENT
Start: 2025-03-04 | End: 2025-03-08 | Stop reason: HOSPADM

## 2025-03-04 RX ORDER — TRAZODONE HYDROCHLORIDE 100 MG/1
100 TABLET ORAL NIGHTLY
Status: DISCONTINUED | OUTPATIENT
Start: 2025-03-04 | End: 2025-03-08 | Stop reason: HOSPADM

## 2025-03-04 RX ORDER — ACETAMINOPHEN 325 MG/1
650 TABLET ORAL EVERY 4 HOURS PRN
Status: DISCONTINUED | OUTPATIENT
Start: 2025-03-04 | End: 2025-03-08 | Stop reason: HOSPADM

## 2025-03-04 RX ORDER — ROPINIROLE 1 MG/1
2 TABLET, FILM COATED ORAL NIGHTLY
Status: DISCONTINUED | OUTPATIENT
Start: 2025-03-04 | End: 2025-03-08 | Stop reason: HOSPADM

## 2025-03-04 RX ORDER — SIMVASTATIN 10 MG/1
10 TABLET, FILM COATED ORAL NIGHTLY
Status: DISCONTINUED | OUTPATIENT
Start: 2025-03-04 | End: 2025-03-08 | Stop reason: HOSPADM

## 2025-03-04 RX ORDER — POTASSIUM CHLORIDE 20 MEQ/1
20 TABLET, EXTENDED RELEASE ORAL
Status: DISCONTINUED | OUTPATIENT
Start: 2025-03-04 | End: 2025-03-08 | Stop reason: HOSPADM

## 2025-03-04 RX ORDER — POLYETHYLENE GLYCOL 3350 17 G/17G
17 POWDER, FOR SOLUTION ORAL DAILY
Status: DISCONTINUED | OUTPATIENT
Start: 2025-03-04 | End: 2025-03-08 | Stop reason: HOSPADM

## 2025-03-04 RX ORDER — ACETAMINOPHEN 160 MG/5ML
650 SOLUTION ORAL EVERY 4 HOURS PRN
Status: DISCONTINUED | OUTPATIENT
Start: 2025-03-04 | End: 2025-03-08 | Stop reason: HOSPADM

## 2025-03-04 RX ORDER — SERTRALINE HYDROCHLORIDE 100 MG/1
100 TABLET, FILM COATED ORAL DAILY
Status: DISCONTINUED | OUTPATIENT
Start: 2025-03-04 | End: 2025-03-08 | Stop reason: HOSPADM

## 2025-03-04 RX ORDER — METOPROLOL SUCCINATE 50 MG/1
50 TABLET, EXTENDED RELEASE ORAL DAILY
Status: DISCONTINUED | OUTPATIENT
Start: 2025-03-04 | End: 2025-03-08 | Stop reason: HOSPADM

## 2025-03-04 RX ORDER — PRAMIPEXOLE DIHYDROCHLORIDE 1.5 MG/1
1.5 TABLET ORAL DAILY
Status: DISCONTINUED | OUTPATIENT
Start: 2025-03-04 | End: 2025-03-04

## 2025-03-04 RX ORDER — FUROSEMIDE 80 MG/1
80 TABLET ORAL EVERY MORNING
Status: DISCONTINUED | OUTPATIENT
Start: 2025-03-04 | End: 2025-03-06 | Stop reason: SDUPTHER

## 2025-03-04 RX ORDER — FERROUS GLUCONATE 325 MG
38 TABLET ORAL
Status: DISCONTINUED | OUTPATIENT
Start: 2025-03-04 | End: 2025-03-08 | Stop reason: HOSPADM

## 2025-03-04 RX ORDER — CEFTRIAXONE 2 G/50ML
2 INJECTION, SOLUTION INTRAVENOUS ONCE
Status: COMPLETED | OUTPATIENT
Start: 2025-03-04 | End: 2025-03-04

## 2025-03-04 RX ADMIN — ROPINIROLE HYDROCHLORIDE 2 MG: 2 TABLET, FILM COATED ORAL at 20:24

## 2025-03-04 RX ADMIN — PERFLUTREN 3 ML OF DILUTION: 6.52 INJECTION, SUSPENSION INTRAVENOUS at 13:55

## 2025-03-04 RX ADMIN — CEFTRIAXONE SODIUM 2 G: 2 INJECTION, SOLUTION INTRAVENOUS at 00:48

## 2025-03-04 RX ADMIN — SERTRALINE 100 MG: 100 TABLET, FILM COATED ORAL at 10:05

## 2025-03-04 RX ADMIN — Medication 6 L/MIN: at 02:29

## 2025-03-04 RX ADMIN — SODIUM CHLORIDE 500 ML: 900 INJECTION, SOLUTION INTRAVENOUS at 02:39

## 2025-03-04 RX ADMIN — Medication 38 MG OF IRON: at 10:05

## 2025-03-04 RX ADMIN — POTASSIUM CHLORIDE 20 MEQ: 1500 TABLET, EXTENDED RELEASE ORAL at 10:05

## 2025-03-04 RX ADMIN — ACETAMINOPHEN 650 MG: 325 TABLET, FILM COATED ORAL at 10:04

## 2025-03-04 RX ADMIN — SIMVASTATIN 10 MG: 10 TABLET, FILM COATED ORAL at 20:24

## 2025-03-04 RX ADMIN — POTASSIUM CHLORIDE 20 MEQ: 1500 TABLET, EXTENDED RELEASE ORAL at 17:41

## 2025-03-04 RX ADMIN — SIMVASTATIN 10 MG: 10 TABLET, FILM COATED ORAL at 03:59

## 2025-03-04 RX ADMIN — KIT FOR THE PREPARATION OF TECHNETIUM TC 99M ALBUMIN AGGREGATED 4.2 MILLICURIE: 2.5 INJECTION, POWDER, FOR SOLUTION INTRAVENOUS at 08:57

## 2025-03-04 RX ADMIN — TRAZODONE HYDROCHLORIDE 100 MG: 100 TABLET ORAL at 20:24

## 2025-03-04 RX ADMIN — DEXTROSE MONOHYDRATE 500 MG: 50 INJECTION, SOLUTION INTRAVENOUS at 01:18

## 2025-03-04 RX ADMIN — ACETAMINOPHEN 650 MG: 325 TABLET, FILM COATED ORAL at 20:34

## 2025-03-04 SDOH — SOCIAL STABILITY: SOCIAL INSECURITY: WITHIN THE LAST YEAR, HAVE YOU BEEN HUMILIATED OR EMOTIONALLY ABUSED IN OTHER WAYS BY YOUR PARTNER OR EX-PARTNER?: NO

## 2025-03-04 SDOH — SOCIAL STABILITY: SOCIAL INSECURITY: DOES ANYONE TRY TO KEEP YOU FROM HAVING/CONTACTING OTHER FRIENDS OR DOING THINGS OUTSIDE YOUR HOME?: NO

## 2025-03-04 SDOH — ECONOMIC STABILITY: INCOME INSECURITY: IN THE PAST 12 MONTHS HAS THE ELECTRIC, GAS, OIL, OR WATER COMPANY THREATENED TO SHUT OFF SERVICES IN YOUR HOME?: NO

## 2025-03-04 SDOH — SOCIAL STABILITY: SOCIAL NETWORK
DO YOU BELONG TO ANY CLUBS OR ORGANIZATIONS SUCH AS CHURCH GROUPS, UNIONS, FRATERNAL OR ATHLETIC GROUPS, OR SCHOOL GROUPS?: PATIENT DECLINED

## 2025-03-04 SDOH — ECONOMIC STABILITY: FOOD INSECURITY: HOW HARD IS IT FOR YOU TO PAY FOR THE VERY BASICS LIKE FOOD, HOUSING, MEDICAL CARE, AND HEATING?: NOT VERY HARD

## 2025-03-04 SDOH — ECONOMIC STABILITY: HOUSING INSECURITY: AT ANY TIME IN THE PAST 12 MONTHS, WERE YOU HOMELESS OR LIVING IN A SHELTER (INCLUDING NOW)?: NO

## 2025-03-04 SDOH — SOCIAL STABILITY: SOCIAL INSECURITY: HAVE YOU HAD THOUGHTS OF HARMING ANYONE ELSE?: NO

## 2025-03-04 SDOH — ECONOMIC STABILITY: FOOD INSECURITY: WITHIN THE PAST 12 MONTHS, THE FOOD YOU BOUGHT JUST DIDN'T LAST AND YOU DIDN'T HAVE MONEY TO GET MORE.: NEVER TRUE

## 2025-03-04 SDOH — SOCIAL STABILITY: SOCIAL INSECURITY: HAS ANYONE EVER THREATENED TO HURT YOUR FAMILY OR YOUR PETS?: NO

## 2025-03-04 SDOH — HEALTH STABILITY: MENTAL HEALTH: HOW OFTEN DO YOU HAVE SIX OR MORE DRINKS ON ONE OCCASION?: NEVER

## 2025-03-04 SDOH — ECONOMIC STABILITY: FOOD INSECURITY: WITHIN THE PAST 12 MONTHS, YOU WORRIED THAT YOUR FOOD WOULD RUN OUT BEFORE YOU GOT THE MONEY TO BUY MORE.: NEVER TRUE

## 2025-03-04 SDOH — SOCIAL STABILITY: SOCIAL INSECURITY: ARE THERE ANY APPARENT SIGNS OF INJURIES/BEHAVIORS THAT COULD BE RELATED TO ABUSE/NEGLECT?: NO

## 2025-03-04 SDOH — HEALTH STABILITY: MENTAL HEALTH: HOW OFTEN DO YOU HAVE A DRINK CONTAINING ALCOHOL?: NEVER

## 2025-03-04 SDOH — ECONOMIC STABILITY: HOUSING INSECURITY: IN THE PAST 12 MONTHS, HOW MANY TIMES HAVE YOU MOVED WHERE YOU WERE LIVING?: 0

## 2025-03-04 SDOH — SOCIAL STABILITY: SOCIAL INSECURITY: ARE YOU MARRIED, WIDOWED, DIVORCED, SEPARATED, NEVER MARRIED, OR LIVING WITH A PARTNER?: WIDOWED

## 2025-03-04 SDOH — SOCIAL STABILITY: SOCIAL INSECURITY: ABUSE: ADULT

## 2025-03-04 SDOH — SOCIAL STABILITY: SOCIAL INSECURITY: DO YOU FEEL ANYONE HAS EXPLOITED OR TAKEN ADVANTAGE OF YOU FINANCIALLY OR OF YOUR PERSONAL PROPERTY?: NO

## 2025-03-04 SDOH — ECONOMIC STABILITY: TRANSPORTATION INSECURITY: IN THE PAST 12 MONTHS, HAS LACK OF TRANSPORTATION KEPT YOU FROM MEDICAL APPOINTMENTS OR FROM GETTING MEDICATIONS?: NO

## 2025-03-04 SDOH — ECONOMIC STABILITY: HOUSING INSECURITY: IN THE LAST 12 MONTHS, WAS THERE A TIME WHEN YOU WERE NOT ABLE TO PAY THE MORTGAGE OR RENT ON TIME?: NO

## 2025-03-04 SDOH — SOCIAL STABILITY: SOCIAL INSECURITY: WITHIN THE LAST YEAR, HAVE YOU BEEN AFRAID OF YOUR PARTNER OR EX-PARTNER?: NO

## 2025-03-04 SDOH — HEALTH STABILITY: MENTAL HEALTH
DO YOU FEEL STRESS - TENSE, RESTLESS, NERVOUS, OR ANXIOUS, OR UNABLE TO SLEEP AT NIGHT BECAUSE YOUR MIND IS TROUBLED ALL THE TIME - THESE DAYS?: NOT AT ALL

## 2025-03-04 SDOH — SOCIAL STABILITY: SOCIAL NETWORK: HOW OFTEN DO YOU ATTEND MEETINGS OF THE CLUBS OR ORGANIZATIONS YOU BELONG TO?: PATIENT DECLINED

## 2025-03-04 SDOH — SOCIAL STABILITY: SOCIAL NETWORK: HOW OFTEN DO YOU ATTEND CHURCH OR RELIGIOUS SERVICES?: PATIENT DECLINED

## 2025-03-04 SDOH — HEALTH STABILITY: PHYSICAL HEALTH
HOW OFTEN DO YOU NEED TO HAVE SOMEONE HELP YOU WHEN YOU READ INSTRUCTIONS, PAMPHLETS, OR OTHER WRITTEN MATERIAL FROM YOUR DOCTOR OR PHARMACY?: RARELY

## 2025-03-04 SDOH — SOCIAL STABILITY: SOCIAL INSECURITY: ARE YOU OR HAVE YOU BEEN THREATENED OR ABUSED PHYSICALLY, EMOTIONALLY, OR SEXUALLY BY ANYONE?: NO

## 2025-03-04 SDOH — HEALTH STABILITY: PHYSICAL HEALTH: ON AVERAGE, HOW MANY DAYS PER WEEK DO YOU ENGAGE IN MODERATE TO STRENUOUS EXERCISE (LIKE A BRISK WALK)?: 0 DAYS

## 2025-03-04 SDOH — HEALTH STABILITY: MENTAL HEALTH: HOW MANY DRINKS CONTAINING ALCOHOL DO YOU HAVE ON A TYPICAL DAY WHEN YOU ARE DRINKING?: PATIENT DOES NOT DRINK

## 2025-03-04 SDOH — SOCIAL STABILITY: SOCIAL NETWORK: HOW OFTEN DO YOU GET TOGETHER WITH FRIENDS OR RELATIVES?: MORE THAN THREE TIMES A WEEK

## 2025-03-04 SDOH — SOCIAL STABILITY: SOCIAL INSECURITY: WERE YOU ABLE TO COMPLETE ALL THE BEHAVIORAL HEALTH SCREENINGS?: YES

## 2025-03-04 SDOH — HEALTH STABILITY: PHYSICAL HEALTH: ON AVERAGE, HOW MANY MINUTES DO YOU ENGAGE IN EXERCISE AT THIS LEVEL?: 0 MIN

## 2025-03-04 SDOH — SOCIAL STABILITY: SOCIAL INSECURITY: DO YOU FEEL UNSAFE GOING BACK TO THE PLACE WHERE YOU ARE LIVING?: NO

## 2025-03-04 ASSESSMENT — ENCOUNTER SYMPTOMS
WOUND: 0
LIGHT-HEADEDNESS: 1
UNEXPECTED WEIGHT CHANGE: 0
SORE THROAT: 0
SEIZURES: 0
CONFUSION: 0
CHILLS: 0
HEMATURIA: 0
CONSTIPATION: 0
WHEEZING: 0
HEADACHES: 0
FREQUENCY: 0
POLYPHAGIA: 0
VOMITING: 0
SLEEP DISTURBANCE: 0
FATIGUE: 1
SPEECH DIFFICULTY: 0
SINUS PRESSURE: 0
NECK PAIN: 0
HALLUCINATIONS: 0
SHORTNESS OF BREATH: 0
BACK PAIN: 0
PHOTOPHOBIA: 0
DIARRHEA: 0
ABDOMINAL PAIN: 1
ARTHRALGIAS: 0
ABDOMINAL DISTENTION: 0
DIZZINESS: 0
DYSURIA: 0
PALPITATIONS: 0
NAUSEA: 0
WEAKNESS: 0
MYALGIAS: 0
CHEST TIGHTNESS: 0
POLYDIPSIA: 0
ADENOPATHY: 0
BRUISES/BLEEDS EASILY: 0
RECTAL PAIN: 0
FLANK PAIN: 1
LIGHT-HEADEDNESS: 0
ACTIVITY CHANGE: 1
BLOOD IN STOOL: 0
COLOR CHANGE: 0
APNEA: 0
COUGH: 0
TREMORS: 0
FEVER: 0
JOINT SWELLING: 0
RHINORRHEA: 0
NUMBNESS: 0

## 2025-03-04 ASSESSMENT — PAIN DESCRIPTION - LOCATION: LOCATION: GENERALIZED

## 2025-03-04 ASSESSMENT — ACTIVITIES OF DAILY LIVING (ADL)
WALKS IN HOME: INDEPENDENT
JUDGMENT_ADEQUATE_SAFELY_COMPLETE_DAILY_ACTIVITIES: YES
LACK_OF_TRANSPORTATION: NO
HEARING - LEFT EAR: FUNCTIONAL
LACK_OF_TRANSPORTATION: NO
TOILETING: INDEPENDENT
HEARING - RIGHT EAR: FUNCTIONAL
ADEQUATE_TO_COMPLETE_ADL: YES
LACK_OF_TRANSPORTATION: NO
FEEDING YOURSELF: INDEPENDENT
PATIENT'S MEMORY ADEQUATE TO SAFELY COMPLETE DAILY ACTIVITIES?: YES
DRESSING YOURSELF: INDEPENDENT
BATHING: INDEPENDENT
GROOMING: INDEPENDENT

## 2025-03-04 ASSESSMENT — COGNITIVE AND FUNCTIONAL STATUS - GENERAL
STANDING UP FROM CHAIR USING ARMS: A LOT
TURNING FROM BACK TO SIDE WHILE IN FLAT BAD: A LITTLE
DRESSING REGULAR UPPER BODY CLOTHING: A LITTLE
DRESSING REGULAR LOWER BODY CLOTHING: A LITTLE
TURNING FROM BACK TO SIDE WHILE IN FLAT BAD: A LITTLE
MOVING TO AND FROM BED TO CHAIR: A LOT
CLIMB 3 TO 5 STEPS WITH RAILING: A LOT
MOVING FROM LYING ON BACK TO SITTING ON SIDE OF FLAT BED WITH BEDRAILS: A LITTLE
MOVING TO AND FROM BED TO CHAIR: A LOT
DRESSING REGULAR LOWER BODY CLOTHING: A LITTLE
PERSONAL GROOMING: A LITTLE
HELP NEEDED FOR BATHING: A LITTLE
DAILY ACTIVITIY SCORE: 18
STANDING UP FROM CHAIR USING ARMS: A LOT
TURNING FROM BACK TO SIDE WHILE IN FLAT BAD: A LITTLE
EATING MEALS: A LITTLE
TOILETING: A LITTLE
TOILETING: A LITTLE
HELP NEEDED FOR BATHING: A LITTLE
TOILETING: A LITTLE
DRESSING REGULAR UPPER BODY CLOTHING: A LITTLE
WALKING IN HOSPITAL ROOM: A LITTLE
WALKING IN HOSPITAL ROOM: A LITTLE
PERSONAL GROOMING: A LITTLE
PERSONAL GROOMING: A LITTLE
DAILY ACTIVITIY SCORE: 19
MOVING FROM LYING ON BACK TO SITTING ON SIDE OF FLAT BED WITH BEDRAILS: A LITTLE
DRESSING REGULAR LOWER BODY CLOTHING: A LITTLE
HELP NEEDED FOR BATHING: A LITTLE
MOBILITY SCORE: 14
MOVING FROM LYING ON BACK TO SITTING ON SIDE OF FLAT BED WITH BEDRAILS: A LITTLE
DAILY ACTIVITIY SCORE: 18
STANDING UP FROM CHAIR USING ARMS: A LOT
CLIMB 3 TO 5 STEPS WITH RAILING: TOTAL
EATING MEALS: A LITTLE
MOVING TO AND FROM BED TO CHAIR: A LOT
PATIENT BASELINE BEDBOUND: NO
CLIMB 3 TO 5 STEPS WITH RAILING: A LOT
DRESSING REGULAR UPPER BODY CLOTHING: A LITTLE
WALKING IN HOSPITAL ROOM: A LOT
MOBILITY SCORE: 14
MOBILITY SCORE: 15

## 2025-03-04 ASSESSMENT — LIFESTYLE VARIABLES
HOW MANY STANDARD DRINKS CONTAINING ALCOHOL DO YOU HAVE ON A TYPICAL DAY: PATIENT DOES NOT DRINK
AUDIT-C TOTAL SCORE: 0
AUDIT-C TOTAL SCORE: 0
HOW OFTEN DO YOU HAVE A DRINK CONTAINING ALCOHOL: NEVER
AUDIT-C TOTAL SCORE: 0
HOW OFTEN DO YOU HAVE 6 OR MORE DRINKS ON ONE OCCASION: NEVER
SKIP TO QUESTIONS 9-10: 1
SKIP TO QUESTIONS 9-10: 1

## 2025-03-04 ASSESSMENT — PAIN SCALES - GENERAL
PAINLEVEL_OUTOF10: 0 - NO PAIN
PAINLEVEL_OUTOF10: 3
PAINLEVEL_OUTOF10: 3

## 2025-03-04 ASSESSMENT — PAIN - FUNCTIONAL ASSESSMENT
PAIN_FUNCTIONAL_ASSESSMENT: 0-10
PAIN_FUNCTIONAL_ASSESSMENT: CPOT (CRITICAL CARE PAIN OBSERVATION TOOL)

## 2025-03-04 ASSESSMENT — PATIENT HEALTH QUESTIONNAIRE - PHQ9
SUM OF ALL RESPONSES TO PHQ9 QUESTIONS 1 & 2: 0
1. LITTLE INTEREST OR PLEASURE IN DOING THINGS: NOT AT ALL
2. FEELING DOWN, DEPRESSED OR HOPELESS: NOT AT ALL

## 2025-03-04 ASSESSMENT — PAIN SCALES - WONG BAKER
WONGBAKER_NUMERICALRESPONSE: NO HURT
WONGBAKER_NUMERICALRESPONSE: NO HURT

## 2025-03-04 ASSESSMENT — PAIN DESCRIPTION - ORIENTATION: ORIENTATION: OTHER (COMMENT)

## 2025-03-04 ASSESSMENT — PAIN DESCRIPTION - DESCRIPTORS: DESCRIPTORS: ACHING

## 2025-03-04 NOTE — DISCHARGE INSTRUCTIONS
Follow-up with your primary care physician within 1 to 2 days for further management of your current symptoms and repeat check of your blood pressure.    Follow-up with gastroenterology within 1 week for further management of your abdominal pain    Return to the emergency department sooner with worsening of symptoms or onset of new symptoms

## 2025-03-04 NOTE — PROGRESS NOTES
03/04/25 1432   Discharge Planning   Living Arrangements Family members;Children   Support Systems Children;Family members   Type of Residence Private residence   Number of Stairs to Enter Residence 3   Number of Stairs Within Residence 0   Do you have animals or pets at home? No   Who is requesting discharge planning? Provider   Home or Post Acute Services In home services   Type of Home Care Services Home nursing visits;Home OT;Home PT   Expected Discharge Disposition Home Health   Does the patient need discharge transport arranged? No   Financial Resource Strain   How hard is it for you to pay for the very basics like food, housing, medical care, and heating? Not very   Housing Stability   In the last 12 months, was there a time when you were not able to pay the mortgage or rent on time? N   In the past 12 months, how many times have you moved where you were living? 0   At any time in the past 12 months, were you homeless or living in a shelter (including now)? N   Transportation Needs   In the past 12 months, has lack of transportation kept you from medical appointments or from getting medications? no   In the past 12 months, has lack of transportation kept you from meetings, work, or from getting things needed for daily living? No     TCC spoke to patient at bedside with travis Schulz present. PT lives in a house with daughter. There are 2 steps to enter and 0 steps inside. Pt has a shower chair and wears 2L O2 at HS, provider is mikey. Pt is not on dialysis. Pt uses a cane, does not drive. Daughter drives and does the shopping, cooking and cleaning. Pt is active with [facility] and would like to resume services. Sent referral via careport. Pt is open to possibility if needing SNF. PCP is Dr. Ricketts. Pharmacy of choice is [address] Pt is not a . Pt has a LW and POA is daughter Jazz. TCC will follow.     DISCHARGE PLAN TO RETURN HOME WITH [facility], WILL NEED EXTERNAL REFERRAL  FOR HOME CARE.

## 2025-03-04 NOTE — CONSULTS
Inpatient consult to Cardiology  Consult performed by: THERESA Aguilar-CNP  Consult ordered by: Sang Landrum MD  Reason for consult: nstemi        History Of Present Illness:    Raúl Jordan is a 84 y.o. female presenting with syncope.  This patient has a history of longstanding persistent atrial fibrillation, hypertension, hyperlipidemia and a past history of a Micra leadless pacemaker insertion who is brought to the emergency department after a syncopal episode.  This patient has longstanding persistent atrial fibrillation and follows with myself on a regular basis.  She has been relatively stable from a cardiac standpoint.  She states that earlier today she developed a right flank pain.  She reported to the emergency department at [facility] where she had a negative workup and then was discharged home.  She came home was climbing up the steps with her daughter behind her.  The daughter states she became wobbly and then lost consciousness falling to the ground.  911 was called and she was transported to the emergency department.  Initial EKG and squad had some ST segment changes of unclear significance and a code STEMI was activated.  On arrival to the emergency department EKG was repeated revealing no evidence of ST segment elevation.  She states she had no chest pain or anginal symptoms.  No shortness of breath.  She states she just became lightheaded and dizzy and does not remember much more than that.  She did have an echocardiogram done in September 2024 revealing preserved left ventricular systolic function with ejection fraction estimated at 65 to 70%.  Currently she states she feels better at this time again with no chest pain or shortness of breath.     Last Recorded Vitals:  Vitals:    03/04/25 0000 03/04/25 0100 03/04/25 0121 03/04/25 0130   BP: 93/66 98/58  97/58   Pulse: 69 62  65   Resp: 15 16  15   Temp:   36.3 °C (97.4 °F)    TempSrc:   Oral    SpO2: 97% 98%  98%    Weight:       Height:           Last Labs:  CBC - 3/3/2025: 10:54 PM  5.5 11.6 122    37.3      CMP - 3/3/2025: 10:54 PM  8.8 6.0 25 --- 1.0   2.5 3.8 16 104      PTT - No results in last year.  1.4   15.0 _     Troponin I, High Sensitivity   Date/Time Value Ref Range Status   03/03/2025 10:54 PM 27 (H) 0 - 13 ng/L Final   12/08/2024 01:26 PM 21 (H) 0 - 13 ng/L Final   12/08/2024 12:37 PM 25 (H) 0 - 13 ng/L Final     BNP   Date/Time Value Ref Range Status   03/03/2025 10:54  (H) 0 - 99 pg/mL Final   12/08/2024 12:37  (H) 0 - 99 pg/mL Final     POC HEMOGLOBIN A1c   Date/Time Value Ref Range Status   06/05/2024 10:22 AM 6.0 4.2 - 6.5 % Final   01/23/2024 11:01 AM 5.6 4.2 - 6.5 % Final     Hemoglobin A1C   Date/Time Value Ref Range Status   12/08/2024 06:50 PM 5.5 See comment % Final     LDL Calculated   Date/Time Value Ref Range Status   01/08/2025 09:48 AM 77 <=99 mg/dL Final     Comment:                                 Near   Borderline      AGE      Desirable  Optimal    High     High     Very High     0-19 Y     0 - 109     ---    110-129   >/= 130     ----    20-24 Y     0 - 119     ---    120-159   >/= 160     ----      >24 Y     0 -  99   100-129  130-159   160-189     >/=190     08/30/2023 10:00 AM 74 65 - 130 MG/DL Final   12/12/2022 10:05 AM 69 65 - 130 MG/DL Final   03/08/2019 08:25 AM 76 65 - 130 MG/DL Final     VLDL   Date/Time Value Ref Range Status   01/08/2025 09:48 AM 13 0 - 40 mg/dL Final      Last I/O:  No intake/output data recorded.    Past Cardiology Tests (Last 3 Years):  EKG:  Electrocardiogram, 12-lead PRN ACS symptoms 12/08/2024      ECG 12 lead 12/08/2024      ECG 12 lead 05/01/2024    Echo:  Transthoracic Echo (TTE) Limited 09/27/2024    Ejection Fractions:  EF   Date/Time Value Ref Range Status   09/27/2024 11:44 AM 68 %      Cath:  No results found for this or any previous visit from the past 1095 days.    Stress Test:  No results found for this or any previous visit from  the past 1095 days.    Cardiac Imaging:  No results found for this or any previous visit from the past 1095 days.      Past Medical History:  She has a past medical history of Abnormal laboratory test result (09/01/2023), Anemia due to acute blood loss (09/01/2023), CHF (congestive heart failure), Chill (09/01/2023), Contact with and (suspected) exposure to covid-19 (01/12/2023), Cough, unspecified (10/17/2022), Diabetes mellitus (Multi), Flank pain (09/01/2023), Hypertension, Hypoxemia (09/01/2023), Hypoxia (09/01/2023), Lower gastrointestinal hemorrhage (09/01/2023), Melena (09/01/2023), Nausea & vomiting (09/01/2023), Other chronic pain (09/01/2023), Pneumonia (09/01/2023), Pyuria (09/01/2023), and Rectal hemorrhage (09/01/2023).    Past Surgical History:  She has no past surgical history on file.      Social History:  She reports that she has never smoked. She has never been exposed to tobacco smoke. She has never used smokeless tobacco. She reports that she does not drink alcohol and does not use drugs.    Family History:  Family History   Problem Relation Name Age of Onset    Leukemia Mother      Cancer Mother      Heart disease Father      Other (heart problems) Father          Allergies:  Penicillin v, Sulfamethoxazole-trimethoprim, Levofloxacin, Oxycodone-acetaminophen, and Penicillin    Inpatient Medications:  Scheduled medications   Medication Dose Route Frequency    azithromycin  500 mg intravenous Once     PRN medications   Medication    nitroglycerin    oxygen     Continuous Medications   Medication Dose Last Rate     Outpatient Medications:  Current Outpatient Medications   Medication Instructions    acetaminophen (TYLENOL) 325 mg, Every 6 hours PRN    albuterol 90 mcg/actuation inhaler 2 puffs, inhalation, Every 6 hours PRN    apixaban (Eliquis) 5 mg tablet Take 1 tablet (5 mg) by mouth 2 times a day. Do not start before December 20, 2024.    ferrous gluconate (Fergon) 324 (38 Fe) mg tablet 38 mg of  iron, oral, Daily with breakfast    furosemide (LASIX) 80 mg, oral, Every morning,  AS DIRECTED    metoprolol succinate XL (TOPROL-XL) 50 mg, oral, Daily    oxygen (O2) gas therapy 1 each, inhalation, Nightly, Room air at rest, 2L NC with exertion.    potassium chloride CR (Klor-Con M20) 20 mEq ER tablet 20 mEq, oral, 2 times daily, Do not crush or chew.    pramipexole (MIRAPEX) 1.5 mg, oral, Daily    rOPINIRole (REQUIP) 2 mg, oral, Nightly    sertraline (ZOLOFT) 100 mg, oral, Daily    simvastatin (ZOCOR) 10 mg, oral, Nightly    traZODone (DESYREL) 200 mg, oral, Nightly       Physical Exam:  Constitutional:       Appearance: Not in distress.   Eyes:      Conjunctiva/sclera: Conjunctivae normal.   HENT:    Mouth/Throat:      Pharynx: Oropharynx is clear.   Neck:      Vascular: No carotid bruit. JVD normal.   Pulmonary:      Breath sounds: Normal breath sounds. No wheezing. No rales.   Cardiovascular:      Irregularly irregular rhythm.      Murmurs: There is no murmur.      No gallop.  No click. No rub.   Abdominal:      Palpations: Abdomen is soft.      Tenderness: There is no abdominal tenderness.   Musculoskeletal:         General: No deformity. Neurological:      General: No focal deficit present.           Assessment/Plan     Syncope  Longstanding persistent atrial fibrillation  Primary hypertension  Hyperlipidemia  Chronic kidney disease  Micra leadless pacemaker insertion November 2018    3/3: The patient was seen in the emergency department following her syncopal episode.  Again she describes no chest pain or anginal type symptoms.  EKG in the emergency department revealed atrial fibrillation and no significant ST segment changes.  She was hemodynamically stable in the emergency department but the etiology of the syncope remains unclear.  Echocardiogram in September revealed normal left ventricular systolic function with ejection fraction estimated at 65 to 70%.  The patient also has implanted a leadless Micra  pacemaker.  Thus a bradycardia arrhythmia would be very unlikely.  Would monitor the patient's rhythm on telemetry and follow blood pressures as well.  Will follow with you.    Peripheral IV 03/03/25 18 G Right Antecubital (Active)   Line Status Blood return noted;Flushed 03/03/25 9105   Number of days: 1       Code Status:  DNR and No Intubation    I spent 50 minutes in the professional and overall care of this patient.        Misbah Lozano, DO

## 2025-03-04 NOTE — ASSESSMENT & PLAN NOTE
Last echo mentioned above completed September 2024.  I do not believe she is in acute exacerbation.  Chest x-ray showing mild congestion.  Lasix held due to BP and heart rate  Resume when appropriate

## 2025-03-04 NOTE — CONSULTS
.Reason For Consult  Chronic kidney disease stage III    History Of Present Illness  Raúl Jordan is a 84 y.o. female is known to have underlying chronic kidney disease stage IIIb, she also had a history of chronic respiratory failure she uses oxygen at home 2 L by nasal cannula during the night, history of congestive heart failure who apparently presented to the emergency room because of right flank pain as well as right lower quadrant abdominal pain today when she moves around also the patient did fall and have a syncopal episode.  Had a CAT scan of the abdomen pelvis which showed diverticulosis without any acute diverticulitis, no evidence of any acute process asked to see the patient in consultation for chronic kidney disease stage III with elevated BUN/creatinine.     Review of Systems  Review of Systems   Constitutional:  Positive for activity change and fatigue. Negative for chills and fever.   HENT:  Negative for sinus pressure, sore throat and tinnitus.    Eyes:  Negative for photophobia and visual disturbance.   Respiratory:  Negative for apnea, cough, shortness of breath and wheezing.    Cardiovascular:  Negative for chest pain, palpitations and leg swelling.   Gastrointestinal:  Positive for abdominal pain. Negative for blood in stool, constipation, diarrhea, nausea, rectal pain and vomiting.   Endocrine: Negative for cold intolerance, heat intolerance, polydipsia, polyphagia and polyuria.   Genitourinary:  Positive for flank pain. Negative for decreased urine volume, dysuria, frequency, hematuria and urgency.   Musculoskeletal:  Negative for arthralgias, back pain, joint swelling, myalgias and neck pain.   Skin:  Negative for color change, pallor, rash and wound.   Neurological:  Negative for dizziness, tremors, seizures, syncope, speech difficulty, weakness, light-headedness, numbness and headaches.   Hematological:  Negative for adenopathy. Does not bruise/bleed easily.   Psychiatric/Behavioral:   Negative for confusion, hallucinations, sleep disturbance and suicidal ideas.         Past Medical History  She has a past medical history of Abnormal laboratory test result (09/01/2023), Anemia due to acute blood loss (09/01/2023), CHF (congestive heart failure), Chill (09/01/2023), Contact with and (suspected) exposure to covid-19 (01/12/2023), Cough, unspecified (10/17/2022), Diabetes mellitus (Multi), Flank pain (09/01/2023), Hypertension, Hypoxemia (09/01/2023), Hypoxia (09/01/2023), Lower gastrointestinal hemorrhage (09/01/2023), Melena (09/01/2023), Nausea & vomiting (09/01/2023), Other chronic pain (09/01/2023), Pneumonia (09/01/2023), Pyuria (09/01/2023), and Rectal hemorrhage (09/01/2023).    Surgical History  She has no past surgical history on file.     Social History  She reports that she has never smoked. She has never been exposed to tobacco smoke. She has never used smokeless tobacco. She reports that she does not drink alcohol and does not use drugs.    Family History  Family History   Problem Relation Name Age of Onset    Leukemia Mother      Cancer Mother      Heart disease Father      Other (heart problems) Father          Current Facility-Administered Medications:     acetaminophen (Tylenol) tablet 650 mg, 650 mg, oral, q4h PRN, 650 mg at 03/04/25 1004 **OR** acetaminophen (Tylenol) oral liquid 650 mg, 650 mg, nasogastric tube, q4h PRN **OR** acetaminophen (Tylenol) suppository 650 mg, 650 mg, rectal, q4h PRN, Sang Landrum MD    [Held by provider] apixaban (Eliquis) tablet 5 mg, 5 mg, oral, BID, Kecia Hinson, APRN-CNP    ferrous gluconate (Fergon) 324 (38 Fe) mg tablet 38 mg of iron, 38 mg of iron, oral, Daily with breakfast, Sang Landrum MD, 38 mg of iron at 03/04/25 1005    [Held by provider] furosemide (Lasix) tablet 80 mg, 80 mg, oral, q AM, Sang Landrum MD    [Held by provider] metoprolol succinate XL (Toprol-XL) 24 hr tablet 50 mg, 50 mg, oral, Daily, Sang Landrum MD    nitroglycerin  (Nitrostat) SL tablet 0.4 mg, 0.4 mg, sublingual, q5 min PRN, Beckie العلي Sales, DO    polyethylene glycol (Glycolax, Miralax) packet 17 g, 17 g, oral, Daily, Sang Landrum MD    potassium chloride CR (Klor-Con M20) ER tablet 20 mEq, 20 mEq, oral, BID, Sang Landrum MD, 20 mEq at 03/04/25 1005    rOPINIRole (Requip) tablet 2 mg, 2 mg, oral, Nightly, Sang Landrum MD    sertraline (Zoloft) tablet 100 mg, 100 mg, oral, Daily, Sang Landrum MD, 100 mg at 03/04/25 1005    simvastatin (Zocor) tablet 10 mg, 10 mg, oral, Nightly, Sang Landrum MD, 10 mg at 03/04/25 0359    traZODone (Desyrel) tablet 100 mg, 100 mg, oral, Nightly, Sang Landrum MD   Allergies  Penicillin v, Sulfamethoxazole-trimethoprim, Levofloxacin, Oxycodone-acetaminophen, and Penicillin         Physical Exam  Physical Exam  Constitutional:       General: She is not in acute distress.     Appearance: She is not toxic-appearing.   HENT:      Head: Normocephalic and atraumatic.   Eyes:      Extraocular Movements: Extraocular movements intact.      Pupils: Pupils are equal, round, and reactive to light.   Neck:      Vascular: No carotid bruit.   Cardiovascular:      Rate and Rhythm: Normal rate and regular rhythm.   Pulmonary:      Effort: No respiratory distress.      Breath sounds: No stridor. No wheezing, rhonchi or rales.   Chest:      Chest wall: No tenderness.   Abdominal:      General: There is no distension.      Palpations: There is no mass.      Tenderness: There is abdominal tenderness (Mild right lower quadrant). There is no right CVA tenderness, left CVA tenderness or guarding.      Hernia: No hernia is present.   Musculoskeletal:         General: No swelling or tenderness.      Cervical back: No rigidity.      Right lower leg: No edema.      Left lower leg: No edema.   Lymphadenopathy:      Cervical: No cervical adenopathy.   Skin:     General: Skin is warm and dry.      Coloration: Skin is not jaundiced or pale.      Findings: No bruising or  "erythema.   Neurological:      General: No focal deficit present.      Mental Status: She is alert and oriented to person, place, and time.   Psychiatric:         Mood and Affect: Mood normal.         Behavior: Behavior normal.              I&O 24HR    Intake/Output Summary (Last 24 hours) at 3/4/2025 1245  Last data filed at 3/4/2025 0800  Gross per 24 hour   Intake 650 ml   Output --   Net 650 ml       Vitals 24HR  Heart Rate:  []   Temp:  [36 °C (96.8 °F)-36.6 °C (97.9 °F)]   Resp:  [15-21]   BP: ()/(48-92)   Height:  [157.5 cm (5' 2\")]   Weight:  [104 kg (230 lb)-108 kg (237 lb 3.4 oz)]   SpO2:  [77 %-100 %]     Relevant Results        Results for orders placed or performed during the hospital encounter of 03/03/25 (from the past 96 hours)   CBC and Auto Differential   Result Value Ref Range    WBC 5.5 4.4 - 11.3 x10*3/uL    nRBC 0.0 0.0 - 0.0 /100 WBCs    RBC 3.71 (L) 4.00 - 5.20 x10*6/uL    Hemoglobin 11.6 (L) 12.0 - 16.0 g/dL    Hematocrit 37.3 36.0 - 46.0 %     (H) 80 - 100 fL    MCH 31.3 26.0 - 34.0 pg    MCHC 31.1 (L) 32.0 - 36.0 g/dL    RDW 16.1 (H) 11.5 - 14.5 %    Platelets 122 (L) 150 - 450 x10*3/uL    Neutrophils % 73.6 40.0 - 80.0 %    Immature Granulocytes %, Automated 1.1 (H) 0.0 - 0.9 %    Lymphocytes % 13.0 13.0 - 44.0 %    Monocytes % 9.9 2.0 - 10.0 %    Eosinophils % 1.5 0.0 - 6.0 %    Basophils % 0.9 0.0 - 2.0 %    Neutrophils Absolute 4.02 1.60 - 5.50 x10*3/uL    Immature Granulocytes Absolute, Automated 0.06 0.00 - 0.50 x10*3/uL    Lymphocytes Absolute 0.71 (L) 0.80 - 3.00 x10*3/uL    Monocytes Absolute 0.54 0.05 - 0.80 x10*3/uL    Eosinophils Absolute 0.08 0.00 - 0.40 x10*3/uL    Basophils Absolute 0.05 0.00 - 0.10 x10*3/uL   Comprehensive Metabolic Panel   Result Value Ref Range    Glucose 171 (H) 74 - 99 mg/dL    Sodium 142 136 - 145 mmol/L    Potassium 3.9 3.5 - 5.3 mmol/L    Chloride 104 98 - 107 mmol/L    Bicarbonate 29 21 - 32 mmol/L    Anion Gap 13 10 - 20 mmol/L "    Urea Nitrogen 36 (H) 6 - 23 mg/dL    Creatinine 1.43 (H) 0.50 - 1.05 mg/dL    eGFR 36 (L) >60 mL/min/1.73m*2    Calcium 8.8 8.6 - 10.3 mg/dL    Albumin 3.8 3.4 - 5.0 g/dL    Alkaline Phosphatase 104 33 - 136 U/L    Total Protein 6.0 (L) 6.4 - 8.2 g/dL    AST 25 9 - 39 U/L    Bilirubin, Total 1.0 0.0 - 1.2 mg/dL    ALT 16 7 - 45 U/L   Troponin I, High Sensitivity   Result Value Ref Range    Troponin I, High Sensitivity 27 (H) 0 - 13 ng/L   Protime-INR   Result Value Ref Range    Protime 15.0 (H) 9.3 - 12.7 seconds    INR 1.4 (H) 0.9 - 1.2   Magnesium   Result Value Ref Range    Magnesium 2.26 1.60 - 2.40 mg/dL   B-Type Natriuretic Peptide   Result Value Ref Range     (H) 0 - 99 pg/mL   Blood Gas Venous Full Panel   Result Value Ref Range    POCT pH, Venous 7.30 (L) 7.33 - 7.43 pH    POCT pCO2, Venous 63 (H) 41 - 51 mm Hg    POCT pO2, Venous 20 (L) 35 - 45 mm Hg    POCT SO2, Venous 27 (L) 45 - 75 %    POCT Oxy Hemoglobin, Venous 26.7 (L) 45.0 - 75.0 %    POCT Hematocrit Calculated, Venous 34.0 (L) 36.0 - 46.0 %    POCT Sodium, Venous 140 136 - 145 mmol/L    POCT Potassium, Venous 4.0 3.5 - 5.3 mmol/L    POCT Chloride, Venous 103 98 - 107 mmol/L    POCT Ionized Calicum, Venous 1.14 1.10 - 1.33 mmol/L    POCT Glucose, Venous 160 (H) 74 - 99 mg/dL    POCT Lactate, Venous 1.6 0.4 - 2.0 mmol/L    POCT Base Excess, Venous 3.2 (H) -2.0 - 3.0 mmol/L    POCT HCO3 Calculated, Venous 31.0 (H) 22.0 - 26.0 mmol/L    POCT Hemoglobin, Venous 11.3 (L) 12.0 - 16.0 g/dL    POCT Anion Gap, Venous 10.0 10.0 - 25.0 mmol/L    Patient Temperature 37.0 degrees Celsius    FiO2 44 %   ECG 12 lead   Result Value Ref Range    Ventricular Rate 102 BPM    QRS Duration 82 ms    QT Interval 396 ms    QTC Calculation(Bazett) 516 ms    R Axis 109 degrees    T Axis 77 degrees    QRS Count 17 beats    Q Onset 216 ms    T Offset 414 ms    QTC Fredericia 472 ms   Blood Gas Arterial Full Panel   Result Value Ref Range    POCT pH, Arterial 7.36  (L) 7.38 - 7.42 pH    POCT pCO2, Arterial 54 (H) 38 - 42 mm Hg    POCT pO2, Arterial 68 (L) 85 - 95 mm Hg    POCT SO2, Arterial 94 94 - 100 %    POCT Oxy Hemoglobin, Arterial 91.1 (L) 94.0 - 98.0 %    POCT Hematocrit Calculated, Arterial 32.0 (L) 36.0 - 46.0 %    POCT Sodium, Arterial 139 136 - 145 mmol/L    POCT Potassium, Arterial 3.8 3.5 - 5.3 mmol/L    POCT Chloride, Arterial 103 98 - 107 mmol/L    POCT Ionized Calcium, Arterial 1.14 1.10 - 1.33 mmol/L    POCT Glucose, Arterial 139 (H) 74 - 99 mg/dL    POCT Lactate, Arterial 0.7 0.4 - 2.0 mmol/L    POCT Base Excess, Arterial 4.1 (H) -2.0 - 3.0 mmol/L    POCT HCO3 Calculated, Arterial 30.5 (H) 22.0 - 26.0 mmol/L    POCT Hemoglobin, Arterial 10.6 (L) 12.0 - 16.0 g/dL    POCT Anion Gap, Arterial 9 (L) 10 - 25 mmo/L    Patient Temperature 37.0 degrees Celsius    FiO2 44 %    Apparatus CANNULA     Site of Arterial Puncture Radial Right     Kentrell's Test Positive    Troponin I, High Sensitivity   Result Value Ref Range    Troponin I, High Sensitivity 235 (HH) 0 - 13 ng/L   CBC   Result Value Ref Range    WBC 7.3 4.4 - 11.3 x10*3/uL    nRBC 0.0 0.0 - 0.0 /100 WBCs    RBC 3.31 (L) 4.00 - 5.20 x10*6/uL    Hemoglobin 10.3 (L) 12.0 - 16.0 g/dL    Hematocrit 33.3 (L) 36.0 - 46.0 %     (H) 80 - 100 fL    MCH 31.1 26.0 - 34.0 pg    MCHC 30.9 (L) 32.0 - 36.0 g/dL    RDW 16.1 (H) 11.5 - 14.5 %    Platelets 106 (L) 150 - 450 x10*3/uL   Basic metabolic panel   Result Value Ref Range    Glucose 107 (H) 74 - 99 mg/dL    Sodium 142 136 - 145 mmol/L    Potassium 3.8 3.5 - 5.3 mmol/L    Chloride 106 98 - 107 mmol/L    Bicarbonate 29 21 - 32 mmol/L    Anion Gap 11 10 - 20 mmol/L    Urea Nitrogen 34 (H) 6 - 23 mg/dL    Creatinine 1.36 (H) 0.50 - 1.05 mg/dL    eGFR 38 (L) >60 mL/min/1.73m*2    Calcium 8.5 (L) 8.6 - 10.3 mg/dL          Assessment/Plan     ECG 12 lead    Result Date: 3/4/2025  Atrial fibrillation with rapid ventricular response with occasional ventricular-paced  complexes Rightward axis ST depression, consider subendocardial injury Prolonged QT Abnormal ECG When compared with ECG of 08-DEC-2024 14:10, Atrial fibrillation  has replaced Sinus rhythm Confirmed by Misbah Lozano ([id number]) on 3/4/2025 12:25:04 PM    NM Lung perfusion with spect/ct    Result Date: 3/4/2025  Interpreted By:  Car Morse  and Cuca Carter STUDY: NM LUNG PERFUSION WITH SPECT/CT;  3/4/2025 9:25 am   INDICATION: Signs/Symptoms:Acute on chronic respiratory failure.   COMPARISON: Chest x-ray from 03/03/2025   ACCESSION NUMBER(S): [id number]   ORDERING CLINICIAN: KRANTHI CLIFTON   TECHNIQUE: DIVISION OF NUCLEAR MEDICINE PERFUSION LUNG SCANS   Multiple perfusion images of the lungs were acquired after the intravenous administration of 4.2 mCi of Tc-99m macroaggregated albumin (MAA). In addition, SPECT/CT of the chest was performed.   FINDINGS: Perfusion images of both lungs demonstrate mild heterogeneity throughout the lung fields bilaterally. Nonsegmental perfusion defect extending from left upper to left lower lobe likely corresponds with major fissure rather than true perfusion defect. No evidence of segmental perfusion defects.       There is nonspecific heterogeneity in perfusion of both lungs and an apparent fissure sign in the left lung indicating low probability for acute pulmonary embolism.   The interpretation above is based on modified PIOPED II and PISAPED criteria.   This study was analyzed and interpreted at [place], [place].   MACRO: None   Signed by: Car Morse 3/4/2025 9:37 AM Dictation workstation:   [id number]    Procedure aborted - Cath    Result Date: 3/4/2025  Aborted invasive cardiology procedure -- see Procedure Log link for details.    XR chest 1 view    Result Date: 3/3/2025  Interpreted By:  Zack Rogers, STUDY: XR CHEST 1 VIEW;  3/3/2025 11:08 pm   INDICATION: Signs/Symptoms:syncope, hypoxia.   COMPARISON: Chest x-ray 12/08/2024   ACCESSION NUMBER(S):  [id number]   ORDERING CLINICIAN: MI MALLOY   FINDINGS: Multiple overlying leads are present.   CARDIOMEDIASTINAL SILHOUETTE: Cardiac silhouette is enlarged. A leadless pacer device overlies the right ventricle. Atherosclerotic calcification of the aorta.   LUNGS: Mild diffuse interstitial prominence. Minimal blunting of the right costophrenic angle likely relates to layering effusion and atelectasis.   ABDOMEN: No remarkable upper abdominal findings.   BONES: Multilevel degenerative changes of the spine.       Cardiomegaly with mild pulmonary vascular congestion.   Right basilar opacity likely relates to small effusion and atelectasis. Superimposed infection not excluded. Correlate clinically.   MACRO: None   Signed by: Zack Rogers 3/3/2025 11:34 PM Dictation workstation:   [id number]    CT abdomen pelvis wo IV contrast    Result Date: 3/3/2025  STUDY: CT Abdomen and Pelvis without IV Contrast; 3/3/2025 at 6:34 p.m. INDICATION: Right flank pain. COMPARISON: CT chest 9/29/2024.  XR right hip with pelvis 7/15/2024. ACCESSION NUMBER(S): [id number] ORDERING CLINICIAN: ESTRELLA BABCOCK TECHNIQUE: CT of the abdomen and pelvis was performed.  Contiguous axial images were obtained at 3 mm slice thickness through the abdomen and pelvis. Coronal and sagittal reconstructions at 3 mm slice thickness were performed. No intravenous contrast was administered.  Automated mA/kV exposure control was utilized and patient examination was performed in strict accordance with principles of ALARA. FINDINGS: Please note that the evaluation of vessels, lymph nodes and organs is limited without intravenous contrast.  LOWER CHEST: The heart size is enlarged with multichamber enlargement.  No pericardial effusion.  Small pleural effusion layers dependently in the right hemithorax.  Subsegmental dependent atelectasis identified at the right lower lobe  ABDOMEN:  LIVER: No hepatomegaly.  Smooth surface contour.  Normal attenuation.   BILE DUCTS: No intrahepatic or extrahepatic biliary ductal dilatation.  GALLBLADDER: The gallbladder is surgically absent. STOMACH: No abnormalities identified.  PANCREAS: No masses or ductal dilatation.  SPLEEN: No splenomegaly or focal splenic lesion.  ADRENAL GLANDS: No thickening or nodules.  KIDNEYS AND URETERS: Kidneys are normal in size and location.  No renal or ureteral calculi.  PELVIS:  BLADDER: No abnormalities identified.  REPRODUCTIVE ORGANS: No abnormalities identified.  BOWEL: Scattered diverticula in the large bowel represent diverticulosis. The loops of small and large bowel demonstrate no abnormal dilatation or focal wall thickening.  VESSELS: No abnormalities identified.  Abdominal aorta is normal in caliber.  PERITONEUM/RETROPERITONEUM/LYMPH NODES: Free fluid adjacent to the liver represents trace peritoneal ascites. No pneumoperitoneum. No lymphadenopathy.  ABDOMINAL WALL: No abnormalities identified. SOFT TISSUES: Subcutaneous fat stranding throughout the soft tissues suggestive of anasarca.  BONES: No acute fracture or aggressive osseous lesion.    1.  No evidence for bowel obstruction. 2.  No evidence for nephrolithiasis or obstructive uropathy. 3.  Colonic diverticulosis. 4.  Small right pleural effusion and trace peritoneal ascites. 5.  Cardiomegaly with multichamber enlargement. 6.  Status post cholecystectomy. Signed by Khanh Degroot MD     Assessment:  Chronic kidney disease stage III most like secondary to hypertension and diabetes mellitus with nephrosclerosis  Abdominal pain and right flank pain etiology is not clear at this time  Syncope and fall  Diabetes mellitus type 2  Hypertension  Atrial fibrillation  Of AV block with pacemaker placement  Congestive heart failure  Chronic respiratory failure on home oxygen    Recommendations:  Will obtain ultrasound of the kidneys, I agree with discontinuation of IV antibiotics I do not think the patient has bacterial pneumonia, I would  resume diuretics tomorrow morning, avoid nephrotoxins, cardiology workup is being done at this time we will continue to monitor renal function very closely with you.    Thank you for your consultation    I spent 60 minutes in the professional and overall care of this patient.      Leon Odell MDInpatient consult to Nephrology  Consult performed by: Leon Odell MD  Consult ordered by: Amos Yang MD

## 2025-03-04 NOTE — PROGRESS NOTES
"Raúl Jordan is a 84 y.o. female on day 0 of admission presenting with Syncope and collapse.    Subjective   Patient resting in bed. Reports she is sleepy as she didn't get much rest overnight. Denies any chest discomfort, SOB, palpitations, or lightheadedness this morning.        Objective     Physical Exam  Vitals reviewed.   Constitutional:       General: She is not in acute distress.     Appearance: Normal appearance. She is obese. She is not ill-appearing, toxic-appearing or diaphoretic.      Interventions: Nasal cannula in place.   HENT:      Head: Normocephalic and atraumatic.      Right Ear: External ear normal.      Left Ear: External ear normal.      Nose: Nose normal.      Mouth/Throat:      Mouth: Mucous membranes are moist.   Eyes:      Extraocular Movements: Extraocular movements intact.   Cardiovascular:      Rate and Rhythm: Bradycardia present. Rhythm irregular.      Pulses: Normal pulses.      Heart sounds: Normal heart sounds. No murmur heard.  Pulmonary:      Effort: Pulmonary effort is normal. No respiratory distress.      Comments: Diminished lung sounds bilaterally  Abdominal:      General: Bowel sounds are normal. There is no distension.      Palpations: Abdomen is soft.   Musculoskeletal:         General: Normal range of motion.      Cervical back: Normal range of motion and neck supple.      Right lower leg: No edema.      Left lower leg: No edema.   Skin:     General: Skin is warm and dry.      Capillary Refill: Capillary refill takes less than 2 seconds.   Neurological:      General: No focal deficit present.      Mental Status: She is alert and oriented to person, place, and time.   Psychiatric:         Mood and Affect: Mood normal.         Behavior: Behavior normal.         Last Recorded Vitals  Blood pressure (!) 104/92, pulse 65, temperature 36.2 °C (97.2 °F), temperature source Temporal, resp. rate 17, height 1.575 m (5' 2\"), weight 108 kg (237 lb 3.4 oz), SpO2 100%.  Intake/Output " last 3 Shifts:  I/O last 3 completed shifts:  In: 550 (5.1 mL/kg) [P.O.:50; IV Piggyback:500]  Out: - (0 mL/kg)   Weight: 107.6 kg     Relevant Results         Scheduled medications  [Held by provider] apixaban, 5 mg, oral, BID  ferrous gluconate, 38 mg of iron, oral, Daily with breakfast  [Held by provider] furosemide, 80 mg, oral, q AM  [Held by provider] metoprolol succinate XL, 50 mg, oral, Daily  polyethylene glycol, 17 g, oral, Daily  potassium chloride CR, 20 mEq, oral, BID  rOPINIRole, 2 mg, oral, Nightly  sertraline, 100 mg, oral, Daily  simvastatin, 10 mg, oral, Nightly  traZODone, 100 mg, oral, Nightly      Continuous medications     PRN medications  PRN medications: acetaminophen **OR** acetaminophen **OR** acetaminophen, nitroglycerin   Results for orders placed or performed during the hospital encounter of 03/03/25 (from the past 24 hours)   CBC and Auto Differential   Result Value Ref Range    WBC 5.5 4.4 - 11.3 x10*3/uL    nRBC 0.0 0.0 - 0.0 /100 WBCs    RBC 3.71 (L) 4.00 - 5.20 x10*6/uL    Hemoglobin 11.6 (L) 12.0 - 16.0 g/dL    Hematocrit 37.3 36.0 - 46.0 %     (H) 80 - 100 fL    MCH 31.3 26.0 - 34.0 pg    MCHC 31.1 (L) 32.0 - 36.0 g/dL    RDW 16.1 (H) 11.5 - 14.5 %    Platelets 122 (L) 150 - 450 x10*3/uL    Neutrophils % 73.6 40.0 - 80.0 %    Immature Granulocytes %, Automated 1.1 (H) 0.0 - 0.9 %    Lymphocytes % 13.0 13.0 - 44.0 %    Monocytes % 9.9 2.0 - 10.0 %    Eosinophils % 1.5 0.0 - 6.0 %    Basophils % 0.9 0.0 - 2.0 %    Neutrophils Absolute 4.02 1.60 - 5.50 x10*3/uL    Immature Granulocytes Absolute, Automated 0.06 0.00 - 0.50 x10*3/uL    Lymphocytes Absolute 0.71 (L) 0.80 - 3.00 x10*3/uL    Monocytes Absolute 0.54 0.05 - 0.80 x10*3/uL    Eosinophils Absolute 0.08 0.00 - 0.40 x10*3/uL    Basophils Absolute 0.05 0.00 - 0.10 x10*3/uL   Comprehensive Metabolic Panel   Result Value Ref Range    Glucose 171 (H) 74 - 99 mg/dL    Sodium 142 136 - 145 mmol/L    Potassium 3.9 3.5 - 5.3  mmol/L    Chloride 104 98 - 107 mmol/L    Bicarbonate 29 21 - 32 mmol/L    Anion Gap 13 10 - 20 mmol/L    Urea Nitrogen 36 (H) 6 - 23 mg/dL    Creatinine 1.43 (H) 0.50 - 1.05 mg/dL    eGFR 36 (L) >60 mL/min/1.73m*2    Calcium 8.8 8.6 - 10.3 mg/dL    Albumin 3.8 3.4 - 5.0 g/dL    Alkaline Phosphatase 104 33 - 136 U/L    Total Protein 6.0 (L) 6.4 - 8.2 g/dL    AST 25 9 - 39 U/L    Bilirubin, Total 1.0 0.0 - 1.2 mg/dL    ALT 16 7 - 45 U/L   Troponin I, High Sensitivity   Result Value Ref Range    Troponin I, High Sensitivity 27 (H) 0 - 13 ng/L   Protime-INR   Result Value Ref Range    Protime 15.0 (H) 9.3 - 12.7 seconds    INR 1.4 (H) 0.9 - 1.2   Magnesium   Result Value Ref Range    Magnesium 2.26 1.60 - 2.40 mg/dL   B-Type Natriuretic Peptide   Result Value Ref Range     (H) 0 - 99 pg/mL   Blood Gas Venous Full Panel   Result Value Ref Range    POCT pH, Venous 7.30 (L) 7.33 - 7.43 pH    POCT pCO2, Venous 63 (H) 41 - 51 mm Hg    POCT pO2, Venous 20 (L) 35 - 45 mm Hg    POCT SO2, Venous 27 (L) 45 - 75 %    POCT Oxy Hemoglobin, Venous 26.7 (L) 45.0 - 75.0 %    POCT Hematocrit Calculated, Venous 34.0 (L) 36.0 - 46.0 %    POCT Sodium, Venous 140 136 - 145 mmol/L    POCT Potassium, Venous 4.0 3.5 - 5.3 mmol/L    POCT Chloride, Venous 103 98 - 107 mmol/L    POCT Ionized Calicum, Venous 1.14 1.10 - 1.33 mmol/L    POCT Glucose, Venous 160 (H) 74 - 99 mg/dL    POCT Lactate, Venous 1.6 0.4 - 2.0 mmol/L    POCT Base Excess, Venous 3.2 (H) -2.0 - 3.0 mmol/L    POCT HCO3 Calculated, Venous 31.0 (H) 22.0 - 26.0 mmol/L    POCT Hemoglobin, Venous 11.3 (L) 12.0 - 16.0 g/dL    POCT Anion Gap, Venous 10.0 10.0 - 25.0 mmol/L    Patient Temperature 37.0 degrees Celsius    FiO2 44 %   ECG 12 lead   Result Value Ref Range    Ventricular Rate 102 BPM    QRS Duration 82 ms    QT Interval 396 ms    QTC Calculation(Bazett) 516 ms    R Axis 109 degrees    T Axis 77 degrees    QRS Count 17 beats    Q Onset 216 ms    T Offset 414 ms    QTC  [place] [address] ms   Blood Gas Arterial Full Panel   Result Value Ref Range    POCT pH, Arterial 7.36 (L) 7.38 - 7.42 pH    POCT pCO2, Arterial 54 (H) 38 - 42 mm Hg    POCT pO2, Arterial 68 (L) 85 - 95 mm Hg    POCT SO2, Arterial 94 94 - 100 %    POCT Oxy Hemoglobin, Arterial 91.1 (L) 94.0 - 98.0 %    POCT Hematocrit Calculated, Arterial 32.0 (L) 36.0 - 46.0 %    POCT Sodium, Arterial 139 136 - 145 mmol/L    POCT Potassium, Arterial 3.8 3.5 - 5.3 mmol/L    POCT Chloride, Arterial 103 98 - 107 mmol/L    POCT Ionized Calcium, Arterial 1.14 1.10 - 1.33 mmol/L    POCT Glucose, Arterial 139 (H) 74 - 99 mg/dL    POCT Lactate, Arterial 0.7 0.4 - 2.0 mmol/L    POCT Base Excess, Arterial 4.1 (H) -2.0 - 3.0 mmol/L    POCT HCO3 Calculated, Arterial 30.5 (H) 22.0 - 26.0 mmol/L    POCT Hemoglobin, Arterial 10.6 (L) 12.0 - 16.0 g/dL    POCT Anion Gap, Arterial 9 (L) 10 - 25 mmo/L    Patient Temperature 37.0 degrees Celsius    FiO2 44 %    Apparatus CANNULA     Site of Arterial Puncture Radial Right     Kentrell's Test Positive    Troponin I, High Sensitivity   Result Value Ref Range    Troponin I, High Sensitivity 235 (HH) 0 - 13 ng/L   CBC   Result Value Ref Range    WBC 7.3 4.4 - 11.3 x10*3/uL    nRBC 0.0 0.0 - 0.0 /100 WBCs    RBC 3.31 (L) 4.00 - 5.20 x10*6/uL    Hemoglobin 10.3 (L) 12.0 - 16.0 g/dL    Hematocrit 33.3 (L) 36.0 - 46.0 %     (H) 80 - 100 fL    MCH 31.1 26.0 - 34.0 pg    MCHC 30.9 (L) 32.0 - 36.0 g/dL    RDW 16.1 (H) 11.5 - 14.5 %    Platelets 106 (L) 150 - 450 x10*3/uL   Basic metabolic panel   Result Value Ref Range    Glucose 107 (H) 74 - 99 mg/dL    Sodium 142 136 - 145 mmol/L    Potassium 3.8 3.5 - 5.3 mmol/L    Chloride 106 98 - 107 mmol/L    Bicarbonate 29 21 - 32 mmol/L    Anion Gap 11 10 - 20 mmol/L    Urea Nitrogen 34 (H) 6 - 23 mg/dL    Creatinine 1.36 (H) 0.50 - 1.05 mg/dL    eGFR 38 (L) >60 mL/min/1.73m*2    Calcium 8.5 (L) 8.6 - 10.3 mg/dL     *Note: Due to a large number of results and/or  encounters for the requested time period, some results have not been displayed. A complete set of results can be found in Results Review.   ECG 12 lead    Result Date: 3/4/2025  Atrial fibrillation with rapid ventricular response with occasional ventricular-paced complexes and with premature ventricular or aberrantly conducted complexes Rightward axis ST depression, consider subendocardial injury Prolonged QT Abnormal ECG When compared with ECG of 08-DEC-2024 14:10, Electronic ventricular pacemaker has replaced Sinus rhythm    XR chest 1 view    Result Date: 3/3/2025  Interpreted By:  Zack Rogers, STUDY: XR CHEST 1 VIEW;  3/3/2025 11:08 pm   INDICATION: Signs/Symptoms:syncope, hypoxia.   COMPARISON: Chest x-ray 12/08/2024   ACCESSION NUMBER(S): [id number]   ORDERING CLINICIAN: MI MALLOY   FINDINGS: Multiple overlying leads are present.   CARDIOMEDIASTINAL SILHOUETTE: Cardiac silhouette is enlarged. A leadless pacer device overlies the right ventricle. Atherosclerotic calcification of the aorta.   LUNGS: Mild diffuse interstitial prominence. Minimal blunting of the right costophrenic angle likely relates to layering effusion and atelectasis.   ABDOMEN: No remarkable upper abdominal findings.   BONES: Multilevel degenerative changes of the spine.       Cardiomegaly with mild pulmonary vascular congestion.   Right basilar opacity likely relates to small effusion and atelectasis. Superimposed infection not excluded. Correlate clinically.   MACRO: None   Signed by: Zack Rogers 3/3/2025 11:34 PM Dictation workstation:   [id number]    CT abdomen pelvis wo IV contrast    Result Date: 3/3/2025  STUDY: CT Abdomen and Pelvis without IV Contrast; 3/3/2025 at 6:34 p.m. INDICATION: Right flank pain. COMPARISON: CT chest 9/29/2024.  XR right hip with pelvis 7/15/2024. ACCESSION NUMBER(S): [id number] ORDERING CLINICIAN: ESTRELLA BABCOCK TECHNIQUE: CT of the abdomen and pelvis was performed.  Contiguous axial  images were obtained at 3 mm slice thickness through the abdomen and pelvis. Coronal and sagittal reconstructions at 3 mm slice thickness were performed. No intravenous contrast was administered.  Automated mA/kV exposure control was utilized and patient examination was performed in strict accordance with principles of ALARA. FINDINGS: Please note that the evaluation of vessels, lymph nodes and organs is limited without intravenous contrast.  LOWER CHEST: The heart size is enlarged with multichamber enlargement.  No pericardial effusion.  Small pleural effusion layers dependently in the right hemithorax.  Subsegmental dependent atelectasis identified at the right lower lobe  ABDOMEN:  LIVER: No hepatomegaly.  Smooth surface contour.  Normal attenuation.  BILE DUCTS: No intrahepatic or extrahepatic biliary ductal dilatation.  GALLBLADDER: The gallbladder is surgically absent. STOMACH: No abnormalities identified.  PANCREAS: No masses or ductal dilatation.  SPLEEN: No splenomegaly or focal splenic lesion.  ADRENAL GLANDS: No thickening or nodules.  KIDNEYS AND URETERS: Kidneys are normal in size and location.  No renal or ureteral calculi.  PELVIS:  BLADDER: No abnormalities identified.  REPRODUCTIVE ORGANS: No abnormalities identified.  BOWEL: Scattered diverticula in the large bowel represent diverticulosis. The loops of small and large bowel demonstrate no abnormal dilatation or focal wall thickening.  VESSELS: No abnormalities identified.  Abdominal aorta is normal in caliber.  PERITONEUM/RETROPERITONEUM/LYMPH NODES: Free fluid adjacent to the liver represents trace peritoneal ascites. No pneumoperitoneum. No lymphadenopathy.  ABDOMINAL WALL: No abnormalities identified. SOFT TISSUES: Subcutaneous fat stranding throughout the soft tissues suggestive of anasarca.  BONES: No acute fracture or aggressive osseous lesion.    1.  No evidence for bowel obstruction. 2.  No evidence for nephrolithiasis or obstructive  uropathy. 3.  Colonic diverticulosis. 4.  Small right pleural effusion and trace peritoneal ascites. 5.  Cardiomegaly with multichamber enlargement. 6.  Status post cholecystectomy. Signed by Khanh Degroot MD                Assessment/Plan   Assessment & Plan  Syncope and collapse    Chronic diastolic heart failure    Longstanding persistent atrial fibrillation (Multi)    LAZARA (obstructive sleep apnea)    Stage 3a chronic kidney disease (Multi)    Acute on chronic respiratory failure with hypoxia (Multi)    Syncope  Longstanding persistent atrial fibrillation  Primary hypertension  Hyperlipidemia  Chronic kidney disease IIIa  Micra leadless pacemaker insertion November 2018 for complete heart block     3/3: The patient was seen in the emergency department following her syncopal episode.  Again she describes no chest pain or anginal type symptoms.  EKG in the emergency department revealed atrial fibrillation and no significant ST segment changes.  She was hemodynamically stable in the emergency department but the etiology of the syncope remains unclear.  Echocardiogram in September revealed normal left ventricular systolic function with ejection fraction estimated at 65 to 70%.  The patient also has implanted a leadless Micra pacemaker.  Thus a bradycardia arrhythmia would be very unlikely.  Would monitor the patient's rhythm on telemetry and follow blood pressures as well.  Will follow with you.    3/4: As above. Labs this AM with sodium 142, creatinine at baseline 1.46, troponin 235, hemoglobin 10.3. Blood pressures have been mildly hypotensive with most recent reading of 104/92. SpO2 100% on 6L NC. Baseline O2 use is 2L NC. Telemetry with atrial fibrillation with rates mid 40s-low 60s. She is not currently on any rate control agents; metoprolol being held. Continue to hold diuretics and antihypertensives for low pressures. NM lung perfusion CT ordered for today. Will order device interrogation of her pacemaker.  Check limited TTE to assess LV. Check orthostatic VS. Continue to hold Eliquis for now until we see TTE results. Will discuss with my collaborating physician Dr. Moser. We will continue to follow with you.     This patient follows with Dr. Lozano and Dr. Sheth.          I spent 35 minutes in the professional and overall care of this patient.      Kecia Hinsno, APRN-CNP

## 2025-03-04 NOTE — CONSULTS
"Nutrition Assessement Note    Nutrition Assessment    Reason for Assessment: Dietitian discretion (CHF)    Reason for Hospital Admission:  Raúl Jordan is a 84 y.o. female who is admitted for abdominal pain, syncopal event. Patient reports ongoing poor appetite, states she \"grazes\". Agreeable to Mighty Shakes TID. Declines heart healthy diet education.    Malnutrition Screening Tool (MST)  Have you recently lost weight without trying?: No  If yes, how much weight have you lost?: Unsure  Weight Loss Score: 0  Have you been eating poorly because of a decreased appetite?: No  Malnutrition Score: 0  Nutrition Screen  Stage 3 or 4 Pressure Injury or Multiple Non-Healing Wounds: No  Home Tube Feeding or Total Parenteral Nutrition (TPN): No  Dietitian Consult Needed: No    Past Medical History:   Diagnosis Date    Abnormal laboratory test result 09/01/2023    Anemia due to acute blood loss 09/01/2023    CHF (congestive heart failure)     Chill 09/01/2023    Contact with and (suspected) exposure to covid-19 01/12/2023    Cough, unspecified 10/17/2022    Diabetes mellitus (Multi)     Flank pain 09/01/2023    Hypertension     Hypoxemia 09/01/2023    Hypoxia 09/01/2023    Lower gastrointestinal hemorrhage 09/01/2023    Melena 09/01/2023    Nausea & vomiting 09/01/2023    Other chronic pain 09/01/2023    Pneumonia 09/01/2023    Pyuria 09/01/2023    Rectal hemorrhage 09/01/2023      No past surgical history on file.    Nutrition History:  Energy Intake: Poor < 50 %  Food and Nutrient History: patient reports ongoing low appetite     Anthropometrics:  Ht: 157.5 cm (5' 2\"), Wt: 108 kg (237 lb 3.4 oz), BMI: 43.38  IBW/kg (Dietitian Calculated): 50 kg  Percent of IBW: 215 %     Weight Change:  Daily Weight  03/04/25 : 108 kg (237 lb 3.4 oz)  03/03/25 : 104 kg (230 lb)  02/12/25 : 96.2 kg (212 lb)  01/15/25 : 98 kg (216 lb)  12/18/24 : 98 kg (216 lb)  12/08/24 : 95.3 kg (210 lb)  11/07/24 : 100 kg (221 lb)  10/16/24 : 102 kg (224 " lb)  10/08/24 : 99.3 kg (219 lb)  09/23/24 : 92.1 kg (203 lb)    Weight History / % Weight Change: patient reports stable weight  Significant Weight Loss: No        Nutrition Focused Physical Exam Findings: defer: severe obesity     Nutrition Significant Labs:  Lab Results   Component Value Date    WBC 7.3 03/04/2025    HGB 10.3 (L) 03/04/2025    HCT 33.3 (L) 03/04/2025     (L) 03/04/2025    CHOL 131 01/08/2025    TRIG 67 01/08/2025    HDL 41.0 01/08/2025    ALT 16 03/03/2025    AST 25 03/03/2025     03/04/2025    K 3.8 03/04/2025     03/04/2025    CREATININE 1.36 (H) 03/04/2025    BUN 34 (H) 03/04/2025    CO2 29 03/04/2025    TSH 1.66 11/08/2022    INR 1.4 (H) 03/03/2025    HGBA1C 5.5 12/08/2024    ALBUR 13 11/08/2022     Nutrition Specific Medications:  [Held by provider] apixaban, 5 mg, oral, BID  ferrous gluconate, 38 mg of iron, oral, Daily with breakfast  [Held by provider] furosemide, 80 mg, oral, q AM  [Held by provider] metoprolol succinate XL, 50 mg, oral, Daily  polyethylene glycol, 17 g, oral, Daily  potassium chloride CR, 20 mEq, oral, BID  rOPINIRole, 2 mg, oral, Nightly  sertraline, 100 mg, oral, Daily  simvastatin, 10 mg, oral, Nightly  traZODone, 100 mg, oral, Nightly         Dietary Orders (From admission, onward)       Start     Ordered    03/04/25 1342  Oral nutritional supplements  Until discontinued        Comments: Chocolate   Question Answer Comment   Deliver with All meals    Select supplement: Sugar Free Mighty Shake        03/04/25 1344    03/04/25 0409  May Participate in Room Service  ( ROOM SERVICE MAY PARTICIPATE)  Once        Question:  .  Answer:  Yes    03/04/25 0408    03/04/25 0136  Adult diet Regular  Diet effective now        Question:  Diet type  Answer:  Regular    03/04/25 0136                   Estimated Needs:   Estimated Energy Needs  Total Energy Estimated Needs in 24 hours (kCal): 1625 kCal  Energy Estimated Needs per kg Body Weight in 24 hours  (kCal/kg): 25 kCal/kg  Method for Estimating Needs: Adj Wt    Estimated Protein Needs  Total Protein Estimated Needs in 24 Hours (g): 78 g  Protein Estimated Needs per kg Body Weight in 24 Hours (g/kg): 1.2 g/kg  Method for Estimating 24 Hour Protein Needs: Adj Wt    Estimated Fluid Needs  Total Fluid Estimated Needs in 24 Hours (mL): 1625 mL  Method for Estimating 24 Hour Fluid Needs: 1 mL/kcal      Nutrition Diagnosis   Nutrition Diagnosis:     Nutrition Diagnosis  Patient has Nutrition Diagnosis: Yes  Diagnosis Status (1): New  Nutrition Diagnosis 1: Inadequate oral intake  Related to (1): decreased ability to consume sufficient energy  As Evidenced by (1): decrease appetite       Nutrition Interventions/Recommendations   Nutrition Interventions and Recommendations:  Nutrition Prescription: Nutrition prescription for oral nutrition    Nutrition Recommendations:  Individualized Nutrition Prescription Provided for : 1625 calories, 78 gm protein to be provided via diet/nutritional supplement    Nutrition Interventions/Goals:   Food and/or Nutrient Delivery Interventions  Interventions: Meals and snacks, Medical food supplement  Meals and Snacks: General healthful diet  Goal: provide as ordered  Medical Food Supplement: Commercial beverage medical food supplement therapy  Goal: mighty shake once daily to provide 200 kcals and 7g protein    Education Documentation  No documentation found.         Nutrition Monitoring and Evaluation   Monitoring/Evaluation:   Food/Nutrient Related History Monitoring  Monitoring and Evaluation Plan: Estimated Energy Intake  Estimated Energy Intake: Energy intake greater or equal to 75% of estimated energy needs    Goal Status: New goal(s) identified    Follow Up  Time Spent (min): 30 minutes  Last Date of Nutrition Visit: 03/04/25  Nutrition Follow-Up Needed?: 7-10 days  Follow up Comment: 3/11/25

## 2025-03-04 NOTE — ASSESSMENT & PLAN NOTE
Patient on 2 L home nasal cannula nightly, increased oxygen requirement to 6 saturating at 96% L.  Patient was given Rocephin and azithromycin in the ED.  I do not suspect she has pneumonia.  Wean down oxygen to baseline  VQ scan pending  Pulmonology consulted

## 2025-03-04 NOTE — ED PROVIDER NOTES
EMERGENCY DEPARTMENT ENCOUNTER      Pt Name: Raúl Jordan  MRN: [id number]  Birthdate 1940  Date of evaluation: 3/3/2025  Provider: Beckie Horan DO         Chief Complaint   Patient presents with    Unresponsive       HPI     84-year-old female presents to the emergency department after syncopal episode, she was brought in by EMS, initial concern for STEMI on their EKG with ST elevations in leads V2, V3, V4 and V5 and reciprocal depressions in leads III and aVF, however the patient was unresponsive at that time, she began to come around in the ambulance, and at that time a repeat EKG showed no ST elevations but did show depressions in the lateral leads, patient arrives to the emergency department she is not currently complaining of pain, she is hypoxic, chart review reveals that she was seen at outside hospital earlier today for right lower quadrant and flank pain              Patient History   Past Medical History:   Diagnosis Date    Abnormal laboratory test result 09/01/2023    Anemia due to acute blood loss 09/01/2023    CHF (congestive heart failure)     Chill 09/01/2023    Contact with and (suspected) exposure to covid-19 01/12/2023    Cough, unspecified 10/17/2022    Diabetes mellitus (Multi)     Flank pain 09/01/2023    Hypertension     Hypoxemia 09/01/2023    Hypoxia 09/01/2023    Lower gastrointestinal hemorrhage 09/01/2023    Melena 09/01/2023    Nausea & vomiting 09/01/2023    Other chronic pain 09/01/2023    Pneumonia 09/01/2023    Pyuria 09/01/2023    Rectal hemorrhage 09/01/2023     No past surgical history on file.  Family History   Problem Relation Name Age of Onset    Leukemia Mother      Cancer Mother      Heart disease Father      Other (heart problems) Father       Social History     Tobacco Use    Smoking status: Never     Passive exposure: Never    Smokeless tobacco: Never   Vaping Use    Vaping status: Never Used   Substance Use Topics    Alcohol use: Never    Drug use: Never        Physical Exam   ED Triage Vitals [03/03/25 2300]   Temp Heart Rate Respirations BP   -- 100 17 100/75      Pulse Ox Temp src Heart Rate Source Patient Position   (!) 85 % -- -- --      BP Location FiO2 (%)     -- --       Physical Exam  Vitals and nursing note reviewed.   Constitutional:       Appearance: She is well-developed. She is obese. She is ill-appearing.      Comments: Somnolent and pale   HENT:      Head: Normocephalic and atraumatic.   Eyes:      Conjunctiva/sclera: Conjunctivae normal.   Cardiovascular:      Rate and Rhythm: Tachycardia present. Rhythm irregular.      Heart sounds: No murmur heard.  Pulmonary:      Effort: Pulmonary effort is normal. No respiratory distress.      Breath sounds: Normal breath sounds.   Abdominal:      Palpations: Abdomen is soft.      Tenderness: There is no abdominal tenderness.   Musculoskeletal:         General: No swelling.      Cervical back: Neck supple.   Skin:     General: Skin is warm and dry.      Capillary Refill: Capillary refill takes less than 2 seconds.      Coloration: Skin is pale.   Psychiatric:         Mood and Affect: Mood normal.         Results:  Abnormal Labs Reviewed   CBC WITH AUTO DIFFERENTIAL - Abnormal; Notable for the following components:       Result Value    RBC 3.71 (*)     Hemoglobin 11.6 (*)      (*)     MCHC 31.1 (*)     RDW 16.1 (*)     Platelets 122 (*)     Immature Granulocytes %, Automated 1.1 (*)     Lymphocytes Absolute 0.71 (*)     All other components within normal limits   COMPREHENSIVE METABOLIC PANEL - Abnormal; Notable for the following components:    Glucose 171 (*)     Urea Nitrogen 36 (*)     Creatinine 1.43 (*)     eGFR 36 (*)     Total Protein 6.0 (*)     All other components within normal limits   TROPONIN I, HIGH SENSITIVITY - Abnormal; Notable for the following components:    Troponin I, High Sensitivity 27 (*)     All other components within normal limits    Narrative:     Less than 99th percentile of  normal range cutoff-  Female and children under 18 years old <14 ng/L; Male <21 ng/L: Negative  Repeat testing should be performed if clinically indicated.     Female and children under 18 years old 14-50 ng/L; Male 21-50 ng/L:  Consistent with possible cardiac damage and possible increased clinical   risk. Serial measurements may help to assess extent of myocardial damage.     >50 ng/L: Consistent with cardiac damage, increased clinical risk and  myocardial infarction. Serial measurements may help assess extent of   myocardial damage.      NOTE: Children less than 1 year old may have higher baseline troponin   levels and results should be interpreted in conjunction with the overall   clinical context.     NOTE: Troponin I testing is performed using a different   testing methodology at [facility] than at other   [facility]. Direct result comparisons should only   be made within the same method.   PROTIME-INR - Abnormal; Notable for the following components:    Protime 15.0 (*)     INR 1.4 (*)     All other components within normal limits    Narrative:     INR Therapeutic Range: 2.0-3.5    Patient's anticoagulant history is not available.   B-TYPE NATRIURETIC PEPTIDE - Abnormal; Notable for the following components:     (*)     All other components within normal limits    Narrative:        <100 pg/mL - Heart failure unlikely  100-299 pg/mL - Intermediate probability of acute heart                  failure exacerbation. Correlate with clinical                  context and patient history.    >=300 pg/mL - Heart Failure likely. Correlate with clinical                  context and patient history.    BNP testing is performed using different testing methodology at [facility] than at other [facility]. Direct result comparisons should only be made within the same method.      BLOOD GAS VENOUS FULL PANEL - Abnormal; Notable for the following components:    POCT pH, Venous 7.30  (*)     POCT pCO2, Venous 63 (*)     POCT pO2, Venous 20 (*)     POCT SO2, Venous 27 (*)     POCT Oxy Hemoglobin, Venous 26.7 (*)     POCT Hematocrit Calculated, Venous 34.0 (*)     POCT Glucose, Venous 160 (*)     POCT Base Excess, Venous 3.2 (*)     POCT HCO3 Calculated, Venous 31.0 (*)     POCT Hemoglobin, Venous 11.3 (*)     All other components within normal limits   BLOOD GAS ARTERIAL FULL PANEL - Abnormal; Notable for the following components:    POCT pH, Arterial 7.36 (*)     POCT pCO2, Arterial 54 (*)     POCT pO2, Arterial 68 (*)     POCT Oxy Hemoglobin, Arterial 91.1 (*)     POCT Hematocrit Calculated, Arterial 32.0 (*)     POCT Glucose, Arterial 139 (*)     POCT Base Excess, Arterial 4.1 (*)     POCT HCO3 Calculated, Arterial 30.5 (*)     POCT Hemoglobin, Arterial 10.6 (*)     POCT Anion Gap, Arterial 9 (*)     All other components within normal limits   TROPONIN I, HIGH SENSITIVITY - Abnormal; Notable for the following components:    Troponin I, High Sensitivity 235 (*)     All other components within normal limits    Narrative:     Less than 99th percentile of normal range cutoff-  Female and children under 18 years old <14 ng/L; Male <21 ng/L: Negative  Repeat testing should be performed if clinically indicated.     Female and children under 18 years old 14-50 ng/L; Male 21-50 ng/L:  Consistent with possible cardiac damage and possible increased clinical   risk. Serial measurements may help to assess extent of myocardial damage.     >50 ng/L: Consistent with cardiac damage, increased clinical risk and  myocardial infarction. Serial measurements may help assess extent of   myocardial damage.      NOTE: Children less than 1 year old may have higher baseline troponin   levels and results should be interpreted in conjunction with the overall   clinical context.     NOTE: Troponin I testing is performed using a different   testing methodology at [facility] than at other   [facility] hospitals.  Direct result comparisons should only   be made within the same method.   CBC - Abnormal; Notable for the following components:    RBC 3.31 (*)     Hemoglobin 10.3 (*)     Hematocrit 33.3 (*)      (*)     MCHC 30.9 (*)     RDW 16.1 (*)     Platelets 106 (*)     All other components within normal limits   BASIC METABOLIC PANEL - Abnormal; Notable for the following components:    Glucose 107 (*)     Urea Nitrogen 34 (*)     Creatinine 1.36 (*)     eGFR 38 (*)     Calcium 8.5 (*)     All other components within normal limits   CBC - Abnormal; Notable for the following components:    RBC 3.24 (*)     Hemoglobin 10.0 (*)     Hematocrit 32.6 (*)      (*)     MCHC 30.7 (*)     RDW 15.7 (*)     Platelets 126 (*)     All other components within normal limits   BASIC METABOLIC PANEL - Abnormal; Notable for the following components:    Glucose 107 (*)     Urea Nitrogen 41 (*)     Creatinine 1.38 (*)     eGFR 37 (*)     All other components within normal limits       All other labs were normal or not returned as of the time of this dictation.     US renal complete   Final Result   Normal ultrasound of the kidneys.        MACRO:   None.        Signed by: Aileen Pavon 3/4/2025 8:32 PM   Dictation workstation:   [id number]      Transthoracic Echo (TTE) Limited   Final Result      NM Lung perfusion with spect/ct   Final Result   There is nonspecific heterogeneity in perfusion of both lungs and an   apparent fissure sign in the left lung indicating low probability for   acute pulmonary embolism.        The interpretation above is based on modified PIOPED II and PISAPED   criteria.        This study was analyzed and interpreted at [place], [place].        MACRO:   None        Signed by: Car Morse 3/4/2025 9:37 AM   Dictation workstation:   [id number]      Procedure aborted - Cath   Final Result      XR chest 1 view   Final Result   Cardiomegaly with mild pulmonary vascular congestion.         Right basilar opacity likely relates to small effusion and   atelectasis. Superimposed infection not excluded. Correlate   clinically.        MACRO:   None        Signed by: Zack Rogers 3/3/2025 11:34 PM   Dictation workstation:   [id number]      Cardiac device check - Inpatient    (Results Pending)         ED Course & MDM   Raúl Jordan is a 84 y.o. female presenting to the ED for evaluation of had concerns including Unresponsive.. External records reviewed: I reviewed external records including outpatient, PCP records, and prior discharge summaries.    Medical Decision Making  Squad EKG consistent with STEMI, protocol activated, however on arrival patient status clinically changed, and repeat EKG is with lateral depressions but no ST segment elevations, conferred with cardiologist Dr. Lozano, who agrees with our assessment at this time to hold off, STEMI procedure canceled, she was loaded with heparin, still hypoxic she is currently requiring 6 L nasal cannula, and although she is pale and somewhat cyanotic, she is awake enough to protect her airway.     The remainder of her workup showed a chest x-ray with fluffy infiltrates, but no discrete pneumonia, there is a right basilar opacity which may be a small effusion or infection, she was covered with Rocephin and azithromycin.  She was moderately hypoxic, and started on 4 L nasal cannula.  Her BNP is somewhat elevated.  Concern with her A-fib RVR per syncopal episode, and her initial ST elevation that this may represent a pulmonary embolism for this patient, however she is already on Eliquis, her INR is 1.4, and we started her on heparin for ACS anyway.  Ultimately this patient required admission for further evaluation and management    Approximately 45 minutes of critical care time were spent in the management of this patient, in obtaining history, examining the patient, ordering and performing interventions, reviewing and interpreting test results and  assessing response to interventions.  Critical care time was necessary to assess sources of and prevent deterioration from NSTEMI, altered mental status, hypoxia, pneumonia, and A-fib RVR Critical care time noted here is independent of any related procedures.              ED Course as of 03/05/25 0730   Wed Mar 05, 2025   0724 Initial EKG performed by ailyn  at 2224 and read by me shows atrial fibrillation with rapid ventricular response 112 bpm, with ST elevations in leads V2, V3, V4 and V5 and reciprocal depressions in leads III and aVF concerning for ST elevation MI [AS]   0725 Repeat EKG on arrival to this facility at 2250 and read by me shows atrial fibrillation, 102 bpm, with ST depressions in leads V2, V3, V4 and V5 and no reciprocal elevations.  She has a rightward axis and there is some evidence of aberrantly conducted beats [AS]      ED Course User Index  [AS] Beckie Horan DO         Diagnoses as of 03/05/25 0730   NSTEMI (non-ST elevated myocardial infarction) (Multi)   Syncope, unspecified syncope type   Acute on chronic congestive heart failure, unspecified heart failure type           Procedure  Procedures            Beckie Horan DO  Emergency Medicine    The above documentation was completed with the use of speech recognition software. It may contain dictation errors secondary to limitations of the software.      ED Medications administered this visit:    Medications   nitroglycerin (Nitrostat) SL tablet 0.4 mg (has no administration in time range)   ferrous gluconate (Fergon) 324 (38 Fe) mg tablet 38 mg of iron (38 mg of iron oral Given 3/4/25 1005)   furosemide (Lasix) tablet 80 mg ( oral Dose Auto Held 3/12/25 0900)   metoprolol succinate XL (Toprol-XL) 24 hr tablet 50 mg ( oral Unheld by provider 3/5/25 0721)   potassium chloride CR (Klor-Con M20) ER tablet 20 mEq (20 mEq oral Given 3/4/25 1741)   rOPINIRole (Requip) tablet 2 mg (2 mg oral Given 3/4/25 2024)   sertraline (Zoloft) tablet 100 mg  (100 mg oral Given 3/4/25 1005)   simvastatin (Zocor) tablet 10 mg (10 mg oral Given 3/4/25 2024)   traZODone (Desyrel) tablet 100 mg (100 mg oral Given 3/4/25 2024)   polyethylene glycol (Glycolax, Miralax) packet 17 g (17 g oral Not Given 3/4/25 1027)   acetaminophen (Tylenol) tablet 650 mg (650 mg oral Given 3/4/25 2034)     Or   acetaminophen (Tylenol) oral liquid 650 mg ( nasogastric tube See Alternative 3/4/25 2034)     Or   acetaminophen (Tylenol) suppository 650 mg ( rectal See Alternative 3/4/25 2034)   apixaban (Eliquis) tablet 5 mg ( oral Dose Auto Held 3/12/25 2100)   aspirin chewable tablet 324 mg (324 mg oral Given 3/3/25 2301)   ticagrelor (Brilinta) tablet 180 mg (180 mg oral Given 3/3/25 2301)   heparin (porcine) injection 4,000 Units (4,000 Units intravenous Given 3/3/25 2301)   cefTRIAXone (Rocephin) 2 g in dextrose (iso) IV 50 mL (0 g intravenous Stopped 3/4/25 0117)   azithromycin (Zithromax) 500 mg in dextrose 5%  mL (0 mg intravenous Stopped 3/4/25 0235)   sodium chloride 0.9 % bolus 500 mL (0 mL intravenous Stopped 3/4/25 0501)   Tc-99m-albumin (Draximage MAA) injection 4.2 millicurie (4.2 millicuries intravenous Given 3/4/25 0857)   perflutren lipid microspheres (Definity) injection 0.5-10 mL of dilution (3 mL of dilution intravenous Given 3/4/25 1355)       New Prescriptions from this visit:    Current Discharge Medication List          Final Impression:   1. NSTEMI (non-ST elevated myocardial infarction) (Multi)    2. STEMI (ST elevation myocardial infarction) (Multi)    3. Syncope, unspecified syncope type    4. Acute on chronic congestive heart failure, unspecified heart failure type    5. Syncope and collapse          (Please note that portions of this note were completed with a voice recognition program.  Efforts were made to edit the dictations but occasionally words are mis-transcribed.)     Beckie Horan DO  03/05/25 0731

## 2025-03-04 NOTE — PROGRESS NOTES
Attestation/Supervisory note for FELICITA Crowder      The patient is an 84-year-old female presenting to the emergency department for evaluation of right lower quadrant abdominal pain and right-sided flank pain.  She states it started early this morning.  She states that sometimes it is worse when she is moving.  No other better or worse.  No radiation.  It is a dull aching pain.  She denies any nausea, vomiting or diarrhea.  No urinary complaints.  No constipation.  No vaginal discharge.  No fever or chills.  No blood in her urine.  No previous abdominal surgeries.  No recent injury or trauma.  She denies any headache or visual changes.  No chest pain or shortness of breath.  No midline neck or back pain.  No focal weakness or numbness.  No bowel or bladder incontinence.  No urinary retention.  No history of IV drug abuse.  No history of malignancy.  She denies any fever or chills.  All pertinent positives and negatives are recorded above.  All other systems reviewed and otherwise negative.  Vital signs with mild hypertension but otherwise within normal limits.  Physical exam with a well-nourished well-developed female in no acute distress.  HEENT exam with dry mucous membranes but unremarkable.  She has no evidence of airway compromise or respiratory distress.  Abdominal exam with tenderness to palpation of the right lower quadrant.  No rebound or guarding.  No palpable masses.  No flank pain with percussion or palpation.  Pulses are equal bilaterally.  She does not have any gross motor, neurologic or vascular episodes on exam.      IV fluids, IV morphine and IV Zofran were ordered.      Diagnostic labs with diagnostic labs with mild chronic renal insufficiency, mild anemia, mildly elevated lipase and an electrolyte imbalance but otherwise unremarkable.      CT abdomen pelvis wo IV contrast   Final Result   1.  No evidence for bowel obstruction.   2.  No evidence for nephrolithiasis or obstructive uropathy.   3.   Colonic diverticulosis.   4.  Small right pleural effusion and trace peritoneal ascites.   5.  Cardiomegaly with multichamber enlargement.   6.  Status post cholecystectomy.   Signed by Khanh Degroot MD           The patient does not have any evidence of airway compromise or respiratory distress on exam but she is well-perfused on exam.  She does not have any significant lab abnormalities.  There is some mild elevation in her lipase, mild anemia, mild chronic renal insufficiency and electrolyte imbalance but otherwise unremarkable.  She is well-perfused on reperfusion exam.  No evidence of sepsis.  The CT abdomen pelvis shows no evidence of cholecystitis, pancreatitis, diverticulitis, appendicitis, or bowel obstruction.  She does have some colonic diverticulosis without evidence of diverticulitis.  There is no evidence of ureteral stone.  There is a small right pleural effusion and trace peritoneal ascites per the radiology report.      The patient was released in good condition.  She was instructed to follow-up with her primary care physician within 1 to 2 days for further management of her current symptoms.  She will return to the emergency department sooner with worsening of symptoms or onset of new symptoms.  She was given a referral to gastroenterology as well for further management of her abdominal pain.      Impression/diagnosis:  1.  Abdominal pain, right lower quadrant  2.  Right-sided flank pain  3.  Chronic renal insufficiency  4.  Anemia, unspecified  5.  Electrolyte imbalance      I personally saw the patient and made/approve the management plan and take responsibility for the patient management.      I independently interpreted the following study (S) diagnostic labs      I personally discussed the patient's management with the patient      I reviewed the results of the diagnostic labs and diagnostic imaging.  Formal radiology read was completed by the radiologist.      Rylie Benitez MD

## 2025-03-04 NOTE — NURSING NOTE
Dr. Landrum returned page. Notified physician via phone call that patient's Troponin was 235. Dr. Landrum is aware.

## 2025-03-04 NOTE — CONSULTS
Inpatient consult to Pulmonology  Consult performed by: Heriberto Monroy MD  Consult ordered by: Sang Landrum MD  Reason for consult: Acute on chronic hypoxic respiratory failure      Department of Medicine  Division of Pulmonary, Critical Care, and Sleep Medicine  Consultation Note      History Of Present Illness:    Raúl Jordan is a 84 y.o. 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  Pulmonary was consulted for acute hypoxic respiratory failure.    Per ED record patient presented via EMS after having near syncope/syncopal event that was witnessed by her daughter.  Per charting patient was in [facility] ED earlier today complaining of right lower quadrant abdominal pain and right-sided flank pain which yielded a negative workup and she was discharged home.  Patient was walking upstairs in front of her daughter when she became wobbly and lost consciousness.  EMS was called and she was brought into [facility] ED.  Per EMS run sheet patient had abnormal ST segment elevations.  Repeat EKG was performed in the ED which did not show these ST changes.    ED workup consisted of laboratory studies and imaging studies.  CMP showed hyperglycemia to 171, elevated BUN and creatinine of 36 and 1.43 respectively.  Elevated BNP of 549.  Elevated troponins trended at 27 and up trended to 235.  CBC showed no leukocytosis.  Anemia present with a hemoglobin of 10.3 and hematocrit of 33.3.  Thrombocytopenia with platelet count of 106.  ABG obtained showed pH of 7.36, PaCO2 of 54, PaO2 of 68.  CT abdomen and pelvis was performed and reviewed by myself and showed a right lower lobe compressive atelectasis secondary to pleural effusion.  Chest x-ray performed shows cardiomegaly with vascular congestion.    Review of systems:    Review of Systems   Constitutional:  Negative for chills, fever and unexpected weight change.   HENT:  Negative for congestion, rhinorrhea and sore throat.    Eyes:  Negative for visual disturbance.   Respiratory:  Negative for chest tightness and shortness of breath.    Cardiovascular:  Negative for chest pain and leg swelling.   Gastrointestinal:  Positive for abdominal pain. Negative for abdominal distention, blood in stool, diarrhea, nausea and vomiting.   Endocrine: Negative for cold intolerance and heat intolerance.   Genitourinary:  Negative for dysuria.   Musculoskeletal:  Negative for myalgias.   Skin:  Negative for rash.   Neurological:  Positive for syncope and light-headedness. Negative for dizziness.   Hematological:  Negative for adenopathy.   Psychiatric/Behavioral:  Negative for behavioral problems.       A 10+ point ROS was completed and otherwise negative except as noted above and per HPI.    Past Medical History:  Past Medical History:   Diagnosis Date    Abnormal laboratory test result 09/01/2023    Anemia due to acute blood loss 09/01/2023    CHF (congestive heart failure)     Chill 09/01/2023    Contact with and (suspected) exposure to covid-19 01/12/2023    Cough, unspecified 10/17/2022    Diabetes mellitus (Multi)     Flank pain 09/01/2023    Hypertension     Hypoxemia 09/01/2023    Hypoxia 09/01/2023    Lower gastrointestinal hemorrhage 09/01/2023    Melena 09/01/2023    Nausea & vomiting 09/01/2023    Other chronic pain 09/01/2023    Pneumonia 09/01/2023    Pyuria 09/01/2023    Rectal hemorrhage 09/01/2023       Past Surgical History:  No past surgical history on file.     Family History:  Family History   Problem Relation Name Age of Onset    Leukemia Mother      Cancer Mother      Heart disease Father      Other (heart problems) Father          Social History:   reports that she has never smoked. She has never been exposed to tobacco smoke. She has never used smokeless tobacco. She  reports that she does not drink alcohol and does not use drugs.    Objective     Last Recorded Vitals:  Vitals:    03/04/25 0744 03/04/25 0749 03/04/25 0758 03/04/25 1221   BP: 102/69 96/60 (!) 104/92 (!) 92/48   BP Location: Right arm Right arm Right arm Left arm   Patient Position: Lying Lying Standing Lying   Pulse: 65   62   Resp: 17   19   Temp: 36.2 °C (97.2 °F)   36.3 °C (97.3 °F)   TempSrc: Temporal   Temporal   SpO2: 100%   95%   Weight:       Height:           Oxygen Therapy  SpO2: 95 %  Medical Gas Therapy: Supplemental oxygen (6L)  Medical Gas Delivery Method: Nasal cannula       Physical Exam:  Physical Exam  Constitutional:       General: She is not in acute distress.     Appearance: She is obese. She is not toxic-appearing.   HENT:      Head: Normocephalic and atraumatic.      Nose: Nose normal.      Comments: Nasal cannula in place     Mouth/Throat:      Pharynx: Oropharynx is clear.   Eyes:      Extraocular Movements: Extraocular movements intact.   Neck:      Comments: No visualized masses  Cardiovascular:      Rate and Rhythm: Normal rate and regular rhythm.      Heart sounds: No murmur heard.     No friction rub. No gallop.   Pulmonary:      Effort: No respiratory distress.      Breath sounds: Normal breath sounds. No wheezing, rhonchi or rales.   Abdominal:      General: There is no distension.      Palpations: Abdomen is soft.      Tenderness: There is no abdominal tenderness. There is no guarding.   Musculoskeletal:         General: No tenderness.      Right lower leg: Edema present.      Left lower leg: Edema present.      Comments: Bilateral lower extremity edema left greater than right   Skin:     General: Skin is warm and dry.   Neurological:      General: No focal deficit present.      Mental Status: She is alert and oriented to person, place, and time.   Psychiatric:         Mood and Affect: Mood normal.         Allergies:  Allergies   Allergen Reactions    Penicillin V Itching     Sulfamethoxazole-Trimethoprim Shortness of breath and Other     Abdominal pain    Levofloxacin Swelling    Oxycodone-Acetaminophen Unknown    Penicillin Unknown     paralysis       Inpatient Medications:  [Held by provider] apixaban, 5 mg, oral, BID  ferrous gluconate, 38 mg of iron, oral, Daily with breakfast  [Held by provider] furosemide, 80 mg, oral, q AM  [Held by provider] metoprolol succinate XL, 50 mg, oral, Daily  polyethylene glycol, 17 g, oral, Daily  potassium chloride CR, 20 mEq, oral, BID  rOPINIRole, 2 mg, oral, Nightly  sertraline, 100 mg, oral, Daily  simvastatin, 10 mg, oral, Nightly  traZODone, 100 mg, oral, Nightly         PRN medications: acetaminophen **OR** acetaminophen **OR** acetaminophen, nitroglycerin    Outpatient Medications:  Prior to Admission medications    Medication Sig Start Date End Date Taking? Authorizing Provider   acetaminophen (Tylenol) 325 mg capsule Take 1 capsule (325 mg) by mouth every 6 hours if needed.    Historical Provider, MD   albuterol 90 mcg/actuation inhaler Inhale 2 puffs every 6 hours if needed for wheezing. 1/23/25   Beverly Ricketts MD   apixaban (Eliquis) 5 mg tablet Take 1 tablet (5 mg) by mouth 2 times a day. Do not start before December 20, 2024. 1/27/25 7/26/25  Beverly Ricketts MD   ferrous gluconate (Fergon) 324 (38 Fe) mg tablet Take 1 tablet (38 mg of iron) by mouth once daily with breakfast. 1/2/25 9/29/25  Beverly Ricketts MD   furosemide (Lasix) 80 mg tablet Take 1 tablet (80 mg) by mouth once daily in the morning.  AS DIRECTED 10/16/24 10/16/25  Kit Hart MD   metoprolol succinate XL (Toprol-XL) 50 mg 24 hr tablet Take 1 tablet (50 mg) by mouth once daily. 12/18/24 12/18/25  Beverly Ricketts MD   oxygen (O2) gas therapy Inhale 1 each once daily at bedtime. Room air at rest, 2L NC with exertion. 10/1/24   Rhonda Lopez, APRN-CNP   potassium chloride CR (Klor-Con M20) 20 mEq ER tablet Take 1 tablet (20 mEq) by mouth 2 times a day. Do not crush or chew.  1/20/25 7/19/25  Beverly Ricketts MD   pramipexole (Mirapex) 1.5 mg tablet TAKE ONE TABLET BY MOUTH once a DAY 10/23/24   Beverly Ricketts MD   rOPINIRole (Requip) 2 mg tablet Take 1 tablet (2 mg) by mouth once daily at bedtime. 12/26/24 6/24/25  Beverly Ricketts MD   sertraline (Zoloft) 100 mg tablet TAKE ONE TABLET BY MOUTH EVERY DAY 6/7/24   Beverly Ricketts MD   simvastatin (Zocor) 10 mg tablet Take 1 tablet (10 mg) by mouth once daily at bedtime. 6/5/24 5/31/25  Beverly Ricketts MD   traZODone (Desyrel) 100 mg tablet Take 2 tablets (200 mg) by mouth once daily at bedtime. 12/26/24 6/24/25  Beverly Ricketts MD       LABS/IMAGING/DIAGNOSTICS REVIEW:    Labs:  Lab Results   Component Value Date    WBC 7.3 03/04/2025    HGB 10.3 (L) 03/04/2025    HCT 33.3 (L) 03/04/2025     (H) 03/04/2025     (L) 03/04/2025      Lab Results   Component Value Date    GLUCOSE 107 (H) 03/04/2025    CALCIUM 8.5 (L) 03/04/2025     03/04/2025    K 3.8 03/04/2025    CO2 29 03/04/2025     03/04/2025    BUN 34 (H) 03/04/2025    CREATININE 1.36 (H) 03/04/2025      Lab Results   Component Value Date    ALT 16 03/03/2025    AST 25 03/03/2025    ALKPHOS 104 03/03/2025    BILITOT 1.0 03/03/2025        No results found for this or any previous visit from the past 10 days.    XR chest 1 view 03/03/2025    Narrative  Interpreted By:  Zack Rogers,  STUDY:  XR CHEST 1 VIEW;  3/3/2025 11:08 pm    INDICATION:  Signs/Symptoms:syncope, hypoxia.    COMPARISON:  Chest x-ray 12/08/2024    ACCESSION NUMBER(S):  [id number]    ORDERING CLINICIAN:  MI MALLOY    FINDINGS:  Multiple overlying leads are present.    CARDIOMEDIASTINAL SILHOUETTE:  Cardiac silhouette is enlarged. A leadless pacer device overlies the  right ventricle. Atherosclerotic calcification of the aorta.    LUNGS:  Mild diffuse interstitial prominence. Minimal blunting of the right  costophrenic angle likely relates to layering effusion and  atelectasis.    ABDOMEN:  No remarkable  "upper abdominal findings.    BONES:  Multilevel degenerative changes of the spine.    Impression  Cardiomegaly with mild pulmonary vascular congestion.    Right basilar opacity likely relates to small effusion and  atelectasis. Superimposed infection not excluded. Correlate  clinically.    MACRO:  None    Signed by: Zack Rogers 3/3/2025 11:34 PM  Dictation workstation:   [id number]      Pulmonary Functions Testing Results:  No results found for: \"FEV1\", \"FVC\", \"YPC6MKO\", \"TLC\", \"DLCO\"     Relevant imaging results were personally reviewed.      Assessment/Plan     Raúl Jordan is a 84 y.o. 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  Pulmonary was consulted for acute hypoxic respiratory failure.    Patient was seen and assessed by myself and his chart was reviewed for previous pulmonary visits, Labs and imaging.  Patient has chronic respiratory failure and wears 2 L nasal cannula nightly.  Furthermore patient does have a history of HFpEF with multiple other cardiac comorbidities.  Patient was likely fluid overloaded.  Do not have concern for acute infection or pneumonia currently.  Will consider diuresis once approved by nephrology and primary team.  Will follow-up echocardiogram for syncope workup.    Assessment:  #Syncope  #LAZARA requiring 2 L nasal cannula nightly  #Acute on chronic hypoxic respiratory failure  #HFpEF  #CKD    Recommendations are as follows:  -Follow-up echocardiogram  -Titrate FiO2 to SpO2 greater than 92%  -Diuresis as tolerated per hemodynamics and kidney function  -Bronchopulmonary hygiene with incentive spirometer  -As needed bronchodilators    I spent 60 minutes in the professional and overall care of this patient.    Pulmonary team will continue to follow the patient while they " are in house.     Heriberto Monroy MD, MPH  Pulmonary and Critical Care Medicine     Please excuse any typographical or unwanted errors as voice recognition software was used to complete this note.

## 2025-03-04 NOTE — NURSING NOTE
Four Eye Skin Check completed by Linda COULTER and Kelton COULTER.     BUE with bruising, redness to bilateral shins. Otherwise skin wnl.

## 2025-03-04 NOTE — H&P
History Of Present Illness  Raúl Jordan is a 84 y.o. female presenting with syncope and collapse.    84-year-old female with past medical history of persistent A-fib, complete heart block s/p pacemaker, chronic diastolic heart failure, chronic respiratory failure/LAZARA on 2 L nightly oxygen, T2DM, HTN, HLD, WALE comorbidities presented to the ED by EMS after syncope and collapse. On 3/3/2025 she had presented to [facility] ED with right lower quadrant abdominal pain and right-sided flank pain which had started earlier in the morning and had a negative workup, she was discharged home.  At home while she was going up the stairs being followed by her daughter, she lost consciousness falling to the ground.  Daughter caught her and prevented head strike.  Patient was brought to [facility] ED by squad which reported ST segment elevation on EKG which was later ruled out after patient was seen by cardiology and repeat EKG negative for ST elevation.  Patient does not remember much.  She denies chest pain or discomfort, palpitations, nausea, vomiting, diarrhea, fever, chills, abdominal pain.      The ED on admission vitals notable for pulse 120, RR 19, SpO2 92% on room air, SpO2 later dropped to 77%, patient was placed on 6 L nasal cannula.    Labs notable for glucose 171, BUN 36, CR 1.43, EGFR 36, magnesium 2.26, , troponin 27, lipase 92, PT 15, INR 1.4, hemoglobin 11.6, platelets 122, hemoglobin 11.6,, HCT 37.3.      EKG: A-fib with RVR, occasional ventricular paced complexes, and with premature ventricular or aberrantly conducted complexes.    Most recent echo 9/20/2024 with LVEF 60 to 70%, impaired relaxation of left ventricular diastolic filling.  Left and right atrium dilation    Chest x-ray: Impression  Cardiomegaly with mild pulmonary vascular congestion.   Right basilar opacity likely relates to small effusion and   atelectasis. Superimposed infection not excluded. Correlate   clinically.     CT abdomen pelvis:  Impression  1.  No evidence for bowel obstruction.   2.  No evidence for nephrolithiasis or obstructive uropathy.   3.  Colonic diverticulosis.   4.  Small right pleural effusion and trace peritoneal ascites.   5.  Cardiomegaly with multichamber enlargement.   6.  Status post cholecystectomy.     In the ED patient was given Zithromax, ceftriaxone, Breo Ellipta 180, and heparin injection.    Patient admitted for syncope and collapse        Past Medical History  Past Medical History:   Diagnosis Date    Abnormal laboratory test result 09/01/2023    Anemia due to acute blood loss 09/01/2023    CHF (congestive heart failure)     Chill 09/01/2023    Contact with and (suspected) exposure to covid-19 01/12/2023    Cough, unspecified 10/17/2022    Diabetes mellitus (Multi)     Flank pain 09/01/2023    Hypertension     Hypoxemia 09/01/2023    Hypoxia 09/01/2023    Lower gastrointestinal hemorrhage 09/01/2023    Melena 09/01/2023    Nausea & vomiting 09/01/2023    Other chronic pain 09/01/2023    Pneumonia 09/01/2023    Pyuria 09/01/2023    Rectal hemorrhage 09/01/2023       Surgical History  No past surgical history on file.     Social History  She reports that she has never smoked. She has never been exposed to tobacco smoke. She has never used smokeless tobacco. She reports that she does not drink alcohol and does not use drugs.    Family History  Family History   Problem Relation Name Age of Onset    Leukemia Mother      Cancer Mother      Heart disease Father      Other (heart problems) Father          Allergies  Penicillin v, Sulfamethoxazole-trimethoprim, Levofloxacin, Oxycodone-acetaminophen, and Penicillin    Review of Systems   General: no fatigue, no malaise, no fevers/chills   HENT: no rhinorrhea, no sore throat, no ear pain   Eyes: no change in vision, denies eye pain or discharge   Lungs: SOB, no cough, no hemoptysis   CV: no chest pain, no palpitations, no leg edema   Abd: no nausea/vomiting, no  "constipation/diarrhea, no abdominal pain   : no dysuria, no frequency, no nocturia, no flank pain   Endocrine: no polydipsia/polyuria, no hot or cold intolerance   Neuro: no headaches, no syncope, no seizures   MSK: no back pain, no neck pain, no joint problems   Psych: no anxiety, no depression, no hallucinations    Physical Exam   General: alert, no diaphoresis   HENT: mucous membranes moist, external ears normal, no rhinorrhea   Eyes: no icterus or injection, no discharge   Lungs: Adventitious lung sounds   Heart: Regularly regular, no murmurs, 1+ pitting LE edema BL   GI: abdomen soft, nontender, nondistended, BS present   MSK: no joint effusion or deformity   Skin: no rashes, erythema, or ecchymosis   Neuro: grossly normal cognition, motor strength, sensation    Last Recorded Vitals  Blood pressure 97/58, pulse 65, temperature 36.3 °C (97.4 °F), temperature source Oral, resp. rate 15, height 1.575 m (5' 2\"), weight 104 kg (230 lb), SpO2 98%.    Relevant Results  Scheduled medications  [Held by provider] apixaban, 5 mg, oral, BID  ferrous gluconate, 38 mg of iron, oral, Daily with breakfast  furosemide, 80 mg, oral, q AM  metoprolol succinate XL, 50 mg, oral, Daily  polyethylene glycol, 17 g, oral, Daily  potassium chloride CR, 20 mEq, oral, BID  pramipexole, 1.5 mg, oral, Daily  rOPINIRole, 2 mg, oral, Nightly  sertraline, 100 mg, oral, Daily  simvastatin, 10 mg, oral, Nightly  traZODone, 100 mg, oral, Nightly      Continuous medications     PRN medications  PRN medications: acetaminophen **OR** acetaminophen **OR** acetaminophen, nitroglycerin  Results for orders placed or performed during the hospital encounter of 03/03/25 (from the past 24 hours)   CBC and Auto Differential   Result Value Ref Range    WBC 5.5 4.4 - 11.3 x10*3/uL    nRBC 0.0 0.0 - 0.0 /100 WBCs    RBC 3.71 (L) 4.00 - 5.20 x10*6/uL    Hemoglobin 11.6 (L) 12.0 - 16.0 g/dL    Hematocrit 37.3 36.0 - 46.0 %     (H) 80 - 100 fL    MCH 31.3 " 26.0 - 34.0 pg    MCHC 31.1 (L) 32.0 - 36.0 g/dL    RDW 16.1 (H) 11.5 - 14.5 %    Platelets 122 (L) 150 - 450 x10*3/uL    Neutrophils % 73.6 40.0 - 80.0 %    Immature Granulocytes %, Automated 1.1 (H) 0.0 - 0.9 %    Lymphocytes % 13.0 13.0 - 44.0 %    Monocytes % 9.9 2.0 - 10.0 %    Eosinophils % 1.5 0.0 - 6.0 %    Basophils % 0.9 0.0 - 2.0 %    Neutrophils Absolute 4.02 1.60 - 5.50 x10*3/uL    Immature Granulocytes Absolute, Automated 0.06 0.00 - 0.50 x10*3/uL    Lymphocytes Absolute 0.71 (L) 0.80 - 3.00 x10*3/uL    Monocytes Absolute 0.54 0.05 - 0.80 x10*3/uL    Eosinophils Absolute 0.08 0.00 - 0.40 x10*3/uL    Basophils Absolute 0.05 0.00 - 0.10 x10*3/uL   Comprehensive Metabolic Panel   Result Value Ref Range    Glucose 171 (H) 74 - 99 mg/dL    Sodium 142 136 - 145 mmol/L    Potassium 3.9 3.5 - 5.3 mmol/L    Chloride 104 98 - 107 mmol/L    Bicarbonate 29 21 - 32 mmol/L    Anion Gap 13 10 - 20 mmol/L    Urea Nitrogen 36 (H) 6 - 23 mg/dL    Creatinine 1.43 (H) 0.50 - 1.05 mg/dL    eGFR 36 (L) >60 mL/min/1.73m*2    Calcium 8.8 8.6 - 10.3 mg/dL    Albumin 3.8 3.4 - 5.0 g/dL    Alkaline Phosphatase 104 33 - 136 U/L    Total Protein 6.0 (L) 6.4 - 8.2 g/dL    AST 25 9 - 39 U/L    Bilirubin, Total 1.0 0.0 - 1.2 mg/dL    ALT 16 7 - 45 U/L   Troponin I, High Sensitivity   Result Value Ref Range    Troponin I, High Sensitivity 27 (H) 0 - 13 ng/L   Protime-INR   Result Value Ref Range    Protime 15.0 (H) 9.3 - 12.7 seconds    INR 1.4 (H) 0.9 - 1.2   Magnesium   Result Value Ref Range    Magnesium 2.26 1.60 - 2.40 mg/dL   B-Type Natriuretic Peptide   Result Value Ref Range     (H) 0 - 99 pg/mL   Blood Gas Venous Full Panel   Result Value Ref Range    POCT pH, Venous 7.30 (L) 7.33 - 7.43 pH    POCT pCO2, Venous 63 (H) 41 - 51 mm Hg    POCT pO2, Venous 20 (L) 35 - 45 mm Hg    POCT SO2, Venous 27 (L) 45 - 75 %    POCT Oxy Hemoglobin, Venous 26.7 (L) 45.0 - 75.0 %    POCT Hematocrit Calculated, Venous 34.0 (L) 36.0 - 46.0  %    POCT Sodium, Venous 140 136 - 145 mmol/L    POCT Potassium, Venous 4.0 3.5 - 5.3 mmol/L    POCT Chloride, Venous 103 98 - 107 mmol/L    POCT Ionized Calicum, Venous 1.14 1.10 - 1.33 mmol/L    POCT Glucose, Venous 160 (H) 74 - 99 mg/dL    POCT Lactate, Venous 1.6 0.4 - 2.0 mmol/L    POCT Base Excess, Venous 3.2 (H) -2.0 - 3.0 mmol/L    POCT HCO3 Calculated, Venous 31.0 (H) 22.0 - 26.0 mmol/L    POCT Hemoglobin, Venous 11.3 (L) 12.0 - 16.0 g/dL    POCT Anion Gap, Venous 10.0 10.0 - 25.0 mmol/L    Patient Temperature 37.0 degrees Celsius    FiO2 44 %     *Note: Due to a large number of results and/or encounters for the requested time period, some results have not been displayed. A complete set of results can be found in Results Review.     XR chest 1 view    Result Date: 3/3/2025  Interpreted By:  Zack Rogers, STUDY: XR CHEST 1 VIEW;  3/3/2025 11:08 pm   INDICATION: Signs/Symptoms:syncope, hypoxia.   COMPARISON: Chest x-ray 12/08/2024   ACCESSION NUMBER(S): [id number]   ORDERING CLINICIAN: MI MALLOY   FINDINGS: Multiple overlying leads are present.   CARDIOMEDIASTINAL SILHOUETTE: Cardiac silhouette is enlarged. A leadless pacer device overlies the right ventricle. Atherosclerotic calcification of the aorta.   LUNGS: Mild diffuse interstitial prominence. Minimal blunting of the right costophrenic angle likely relates to layering effusion and atelectasis.   ABDOMEN: No remarkable upper abdominal findings.   BONES: Multilevel degenerative changes of the spine.       Cardiomegaly with mild pulmonary vascular congestion.   Right basilar opacity likely relates to small effusion and atelectasis. Superimposed infection not excluded. Correlate clinically.   MACRO: None   Signed by: Zack Rogers 3/3/2025 11:34 PM Dictation workstation:   [id number]    CT abdomen pelvis wo IV contrast    Result Date: 3/3/2025  STUDY: CT Abdomen and Pelvis without IV Contrast; 3/3/2025 at 6:34 p.m. INDICATION: Right flank pain.  COMPARISON: CT chest 9/29/2024.  XR right hip with pelvis 7/15/2024. ACCESSION NUMBER(S): [id number] ORDERING CLINICIAN: ESTRELLA BABCOCK TECHNIQUE: CT of the abdomen and pelvis was performed.  Contiguous axial images were obtained at 3 mm slice thickness through the abdomen and pelvis. Coronal and sagittal reconstructions at 3 mm slice thickness were performed. No intravenous contrast was administered.  Automated mA/kV exposure control was utilized and patient examination was performed in strict accordance with principles of ALARA. FINDINGS: Please note that the evaluation of vessels, lymph nodes and organs is limited without intravenous contrast.  LOWER CHEST: The heart size is enlarged with multichamber enlargement.  No pericardial effusion.  Small pleural effusion layers dependently in the right hemithorax.  Subsegmental dependent atelectasis identified at the right lower lobe  ABDOMEN:  LIVER: No hepatomegaly.  Smooth surface contour.  Normal attenuation.  BILE DUCTS: No intrahepatic or extrahepatic biliary ductal dilatation.  GALLBLADDER: The gallbladder is surgically absent. STOMACH: No abnormalities identified.  PANCREAS: No masses or ductal dilatation.  SPLEEN: No splenomegaly or focal splenic lesion.  ADRENAL GLANDS: No thickening or nodules.  KIDNEYS AND URETERS: Kidneys are normal in size and location.  No renal or ureteral calculi.  PELVIS:  BLADDER: No abnormalities identified.  REPRODUCTIVE ORGANS: No abnormalities identified.  BOWEL: Scattered diverticula in the large bowel represent diverticulosis. The loops of small and large bowel demonstrate no abnormal dilatation or focal wall thickening.  VESSELS: No abnormalities identified.  Abdominal aorta is normal in caliber.  PERITONEUM/RETROPERITONEUM/LYMPH NODES: Free fluid adjacent to the liver represents trace peritoneal ascites. No pneumoperitoneum. No lymphadenopathy.  ABDOMINAL WALL: No abnormalities identified. SOFT TISSUES: Subcutaneous fat  stranding throughout the soft tissues suggestive of anasarca.  BONES: No acute fracture or aggressive osseous lesion.    1.  No evidence for bowel obstruction. 2.  No evidence for nephrolithiasis or obstructive uropathy. 3.  Colonic diverticulosis. 4.  Small right pleural effusion and trace peritoneal ascites. 5.  Cardiomegaly with multichamber enlargement. 6.  Status post cholecystectomy. Signed by Khanh Degroot MD         Assessment/Plan   84-year-old female presented to the ED for syncope and collapse.  She was discharged on 3/3/2025 from [facility] for evaluation of abdominal and flank pain.  She returns to ED by EMS today for an episode of syncope and collapse witnessed by daughter who is helping her upstairs.  Squad EKG initially showed ST elevation MI, repeated in the ED showing A-fib RVR.   Assessment & Plan  Syncope and collapse  Denies chest pain, palpitations, EKG in the emergency department with A-fib no ST wave changes.  Echo 9/20/2024 LVEF 60 to 65% with impaired right ventricular diastolic filling pacemaker present.  BMP slightly elevated but around baseline.  Slight lower extremity edema.  Chest x-ray with mild pulmonary congestion.  I do not suspect she is an acute exacerbation of heart failure.  In the ED patient became hypotensive.  Holding 80 mg Lasix for BP, resume when appropriate  500 cc normal saline over 2 hours  Telemetry monitoring  Cardiology following, possible heart cath tomorrow, n.p.o., Eliquis on hold.    Acute on chronic respiratory failure with hypoxia (Multi)  Patient on 2 L home nasal cannula nightly, increased oxygen requirement to 6 saturating at 96% L.  Patient was given Rocephin and azithromycin in the ED.  I do not suspect she has pneumonia.  Wean down oxygen to baseline  VQ scan pending  Pulmonology consulted    Chronic diastolic heart failure  Last echo mentioned above completed September 2024.  I do not believe she is in acute exacerbation.  Chest x-ray showing mild  congestion.  Lasix held due to BP and heart rate  Resume when appropriate    LAZARA (obstructive sleep apnea)  On 2 L home nasal cannula nightly    Longstanding persistent atrial fibrillation (Multi)  Has pacemaker, rate controlled  Helt metoprolol for heart rate and blood pressure    Stage 3a chronic kidney disease (Multi)  Stable      DVT prophylaxis: Heparin tonight, resume Eliquis tomorrow after heart cath,   I spent 75 minutes in the professional and overall care of this patient.    CODE STATUS: DNR/DNI      Sang Landrum MD

## 2025-03-04 NOTE — ASSESSMENT & PLAN NOTE
Denies chest pain, palpitations, EKG in the emergency department with A-fib no ST wave changes.  Echo 9/20/2024 LVEF 60 to 65% with impaired right ventricular diastolic filling pacemaker present.  BMP slightly elevated but around baseline.  Slight lower extremity edema.  Chest x-ray with mild pulmonary congestion.  I do not suspect she is an acute exacerbation of heart failure.  In the ED patient became hypotensive.  Holding 80 mg Lasix for BP, resume when appropriate  500 cc normal saline over 2 hours  Telemetry monitoring  Cardiology following, possible heart cath tomorrow, n.p.o., Eliquis on hold.

## 2025-03-04 NOTE — PROGRESS NOTES
Raúl Jordan is a 84 y.o. female on day 0 of admission presenting with Syncope and collapse.      Subjective   Patient presented with near syncope.  Patient denied chest pain or shortness of breath.     Objective     Last Recorded Vitals  BP (!) 104/92 (BP Location: Right arm, Patient Position: Standing)   Pulse 65   Temp 36.2 °C (97.2 °F) (Temporal)   Resp 17   Wt 108 kg (237 lb 3.4 oz)   SpO2 100%   Intake/Output last 3 Shifts:    Intake/Output Summary (Last 24 hours) at 3/4/2025 0817  Last data filed at 3/4/2025 0501  Gross per 24 hour   Intake 550 ml   Output --   Net 550 ml       Admission Weight  Weight: 104 kg (230 lb) (03/03/25 2300)    Daily Weight  03/04/25 : 108 kg (237 lb 3.4 oz)    Image Results  ECG 12 lead  Atrial fibrillation with rapid ventricular response with occasional ventricular-paced complexes and with premature ventricular or aberrantly conducted  complexes  Rightward axis  ST depression, consider subendocardial injury  Prolonged QT  Abnormal ECG  When compared with ECG of 08-DEC-2024 14:10,  Electronic ventricular pacemaker has replaced Sinus rhythm      Physical Exam    General: Morbidly obese, pleasant, cooperating during physical exam.  HEENT: Pupils are equal and reactive to light and commendation , oral mucosa dry, no JVD  Cardiovascular: PMI nondisplaced  Lungs: Clear to auscultation bilaterally, no wheezing, no crackles, no dullness to percussion.  Abdomen: No hepatosplenomegaly appreciated, soft , not tender, positive bowel sounds, positive bowel movement.  Neuro: Alert and oriented x3, strength in upper and lower extremities , sensation intact.  Musculoskeletal: +1 pedal edema both lower extremity  Vascular: Pulses are intact in upper and lower extremities  Skin: No petechiae, ecchymosis or other stigmata for dermatology disease.     Assessment/Plan      Near syncope  2D echo done on 9/20/2024 revealed EF 60 to 65%.  Chest x-ray revealed mild pulmonary congestion.    Patient  has been on Lasix her  Evaluated by Dr. Lozano  Monitor close  Patient has history of A-fib.  Check orthostatics    Elevated troponin  NSTEMI  Cardiology on case.  Monitor close  Continue with current medication.  Evaluated by Dr. Lozano    Acute respiratory failure with hypoxia  Chest x-ray revealed pulmonary congestion  Pulmonary on the case  VQ scan pending    Chronic congestive heart failure with diastolic dysfunction  Patient has been on Lasix.  Lasix on hold    Chronic atrial fibrillation  Status post pacemaker implant  Patient is on Eliquis  Continue with current medication    Chronic kidney disease stage III  Monitor close  Diuretics on hold.  Consult Dr. Odell from nephrology service    Obstructive sleep apnea  Patient is on 2 L oxygen at home.    Deconditioning  PT OT to see her  Fall precaution  Ambulate with assist  Check CBC and BMP in AM.    Time spent with patient 50 minutes      Amos Yang MD

## 2025-03-04 NOTE — CARE PLAN
The patient's goals for the shift include      The clinical goals for the shift include remain hemodynamically stable    Over the shift, the patient did not make progress toward the following goals. Barriers to progression include syncope and collapse. Recommendations to address these barriers include monitor VS, monitor labs, monitor I/O's, promote good oral intake, bed alarm for safety.

## 2025-03-05 ENCOUNTER — APPOINTMENT (OUTPATIENT)
Facility: CLINIC | Age: 85
End: 2025-03-05
Payer: MEDICARE

## 2025-03-05 ENCOUNTER — APPOINTMENT (OUTPATIENT)
Dept: CARDIOLOGY | Facility: CLINIC | Age: 85
End: 2025-03-05
Payer: MEDICARE

## 2025-03-05 ENCOUNTER — APPOINTMENT (OUTPATIENT)
Dept: CARDIOLOGY | Facility: HOSPITAL | Age: 85
End: 2025-03-05
Payer: MEDICARE

## 2025-03-05 DIAGNOSIS — I10 PRIMARY HYPERTENSION: Primary | ICD-10-CM

## 2025-03-05 DIAGNOSIS — N18.31 STAGE 3A CHRONIC KIDNEY DISEASE (MULTI): ICD-10-CM

## 2025-03-05 DIAGNOSIS — E78.2 MIXED HYPERLIPIDEMIA: ICD-10-CM

## 2025-03-05 DIAGNOSIS — I48.11 LONGSTANDING PERSISTENT ATRIAL FIBRILLATION (MULTI): ICD-10-CM

## 2025-03-05 DIAGNOSIS — Z95.0 S/P PLACEMENT OF CARDIAC PACEMAKER: ICD-10-CM

## 2025-03-05 LAB
ANION GAP SERPL CALCULATED.3IONS-SCNC: 13 MMOL/L (ref 10–20)
BUN SERPL-MCNC: 41 MG/DL (ref 6–23)
CALCIUM SERPL-MCNC: 9.1 MG/DL (ref 8.6–10.3)
CHLORIDE SERPL-SCNC: 106 MMOL/L (ref 98–107)
CO2 SERPL-SCNC: 29 MMOL/L (ref 21–32)
CREAT SERPL-MCNC: 1.38 MG/DL (ref 0.5–1.05)
EGFRCR SERPLBLD CKD-EPI 2021: 37 ML/MIN/1.73M*2
ERYTHROCYTE [DISTWIDTH] IN BLOOD BY AUTOMATED COUNT: 15.7 % (ref 11.5–14.5)
GLUCOSE SERPL-MCNC: 107 MG/DL (ref 74–99)
HCT VFR BLD AUTO: 32.6 % (ref 36–46)
HGB BLD-MCNC: 10 G/DL (ref 12–16)
MCH RBC QN AUTO: 30.9 PG (ref 26–34)
MCHC RBC AUTO-ENTMCNC: 30.7 G/DL (ref 32–36)
MCV RBC AUTO: 101 FL (ref 80–100)
NRBC BLD-RTO: 0 /100 WBCS (ref 0–0)
PLATELET # BLD AUTO: 126 X10*3/UL (ref 150–450)
POTASSIUM SERPL-SCNC: 4.7 MMOL/L (ref 3.5–5.3)
RBC # BLD AUTO: 3.24 X10*6/UL (ref 4–5.2)
SODIUM SERPL-SCNC: 143 MMOL/L (ref 136–145)
WBC # BLD AUTO: 6.9 X10*3/UL (ref 4.4–11.3)

## 2025-03-05 PROCEDURE — 2500000004 HC RX 250 GENERAL PHARMACY W/ HCPCS (ALT 636 FOR OP/ED): Performed by: STUDENT IN AN ORGANIZED HEALTH CARE EDUCATION/TRAINING PROGRAM

## 2025-03-05 PROCEDURE — 2500000002 HC RX 250 W HCPCS SELF ADMINISTERED DRUGS (ALT 637 FOR MEDICARE OP, ALT 636 FOR OP/ED): Performed by: STUDENT IN AN ORGANIZED HEALTH CARE EDUCATION/TRAINING PROGRAM

## 2025-03-05 PROCEDURE — 93971 EXTREMITY STUDY: CPT

## 2025-03-05 PROCEDURE — 80048 BASIC METABOLIC PNL TOTAL CA: CPT | Performed by: STUDENT IN AN ORGANIZED HEALTH CARE EDUCATION/TRAINING PROGRAM

## 2025-03-05 PROCEDURE — 97162 PT EVAL MOD COMPLEX 30 MIN: CPT | Mod: GP

## 2025-03-05 PROCEDURE — 2500000004 HC RX 250 GENERAL PHARMACY W/ HCPCS (ALT 636 FOR OP/ED): Performed by: INTERNAL MEDICINE

## 2025-03-05 PROCEDURE — 99232 SBSQ HOSP IP/OBS MODERATE 35: CPT

## 2025-03-05 PROCEDURE — 99232 SBSQ HOSP IP/OBS MODERATE 35: CPT | Performed by: INTERNAL MEDICINE

## 2025-03-05 PROCEDURE — 85027 COMPLETE CBC AUTOMATED: CPT | Performed by: STUDENT IN AN ORGANIZED HEALTH CARE EDUCATION/TRAINING PROGRAM

## 2025-03-05 PROCEDURE — 2500000004 HC RX 250 GENERAL PHARMACY W/ HCPCS (ALT 636 FOR OP/ED)

## 2025-03-05 PROCEDURE — 2500000001 HC RX 250 WO HCPCS SELF ADMINISTERED DRUGS (ALT 637 FOR MEDICARE OP): Performed by: INTERNAL MEDICINE

## 2025-03-05 PROCEDURE — 36415 COLL VENOUS BLD VENIPUNCTURE: CPT | Performed by: STUDENT IN AN ORGANIZED HEALTH CARE EDUCATION/TRAINING PROGRAM

## 2025-03-05 PROCEDURE — 2060000001 HC INTERMEDIATE ICU ROOM DAILY

## 2025-03-05 PROCEDURE — 93971 EXTREMITY STUDY: CPT | Performed by: SURGERY

## 2025-03-05 PROCEDURE — 97530 THERAPEUTIC ACTIVITIES: CPT | Mod: GO

## 2025-03-05 PROCEDURE — 97165 OT EVAL LOW COMPLEX 30 MIN: CPT | Mod: GO

## 2025-03-05 PROCEDURE — 2500000001 HC RX 250 WO HCPCS SELF ADMINISTERED DRUGS (ALT 637 FOR MEDICARE OP): Performed by: STUDENT IN AN ORGANIZED HEALTH CARE EDUCATION/TRAINING PROGRAM

## 2025-03-05 PROCEDURE — 97530 THERAPEUTIC ACTIVITIES: CPT | Mod: GP

## 2025-03-05 RX ORDER — FUROSEMIDE 10 MG/ML
40 INJECTION INTRAMUSCULAR; INTRAVENOUS ONCE
Status: COMPLETED | OUTPATIENT
Start: 2025-03-05 | End: 2025-03-05

## 2025-03-05 RX ORDER — IPRATROPIUM BROMIDE AND ALBUTEROL SULFATE 2.5; .5 MG/3ML; MG/3ML
3 SOLUTION RESPIRATORY (INHALATION) EVERY 6 HOURS PRN
Status: DISCONTINUED | OUTPATIENT
Start: 2025-03-05 | End: 2025-03-08 | Stop reason: HOSPADM

## 2025-03-05 RX ORDER — CEFTRIAXONE 1 G/50ML
1 INJECTION, SOLUTION INTRAVENOUS EVERY 24 HOURS
Status: DISCONTINUED | OUTPATIENT
Start: 2025-03-05 | End: 2025-03-07

## 2025-03-05 RX ADMIN — ROPINIROLE HYDROCHLORIDE 2 MG: 2 TABLET, FILM COATED ORAL at 20:10

## 2025-03-05 RX ADMIN — SIMVASTATIN 10 MG: 10 TABLET, FILM COATED ORAL at 20:10

## 2025-03-05 RX ADMIN — FUROSEMIDE 40 MG: 10 INJECTION, SOLUTION INTRAMUSCULAR; INTRAVENOUS at 17:11

## 2025-03-05 RX ADMIN — POTASSIUM CHLORIDE 20 MEQ: 1500 TABLET, EXTENDED RELEASE ORAL at 08:59

## 2025-03-05 RX ADMIN — CEFTRIAXONE SODIUM 1 G: 1 INJECTION, SOLUTION INTRAVENOUS at 17:10

## 2025-03-05 RX ADMIN — POLYETHYLENE GLYCOL 3350 17 G: 17 POWDER, FOR SOLUTION ORAL at 09:08

## 2025-03-05 RX ADMIN — Medication 38 MG OF IRON: at 08:59

## 2025-03-05 RX ADMIN — ACETAMINOPHEN 650 MG: 325 TABLET, FILM COATED ORAL at 09:06

## 2025-03-05 RX ADMIN — POTASSIUM CHLORIDE 20 MEQ: 1500 TABLET, EXTENDED RELEASE ORAL at 17:11

## 2025-03-05 RX ADMIN — TRAZODONE HYDROCHLORIDE 100 MG: 100 TABLET ORAL at 20:10

## 2025-03-05 RX ADMIN — METOPROLOL SUCCINATE 50 MG: 50 TABLET, EXTENDED RELEASE ORAL at 08:58

## 2025-03-05 RX ADMIN — SERTRALINE 100 MG: 100 TABLET, FILM COATED ORAL at 08:59

## 2025-03-05 RX ADMIN — ACETAMINOPHEN 650 MG: 325 TABLET, FILM COATED ORAL at 20:35

## 2025-03-05 ASSESSMENT — PAIN SCALES - GENERAL
PAINLEVEL_OUTOF10: 2
PAINLEVEL_OUTOF10: 0 - NO PAIN
PAINLEVEL_OUTOF10: 0 - NO PAIN
PAINLEVEL_OUTOF10: 6
PAINLEVEL_OUTOF10: 3
PAINLEVEL_OUTOF10: 0 - NO PAIN

## 2025-03-05 ASSESSMENT — PAIN DESCRIPTION - DESCRIPTORS
DESCRIPTORS: HEADACHE
DESCRIPTORS: HEADACHE

## 2025-03-05 ASSESSMENT — COGNITIVE AND FUNCTIONAL STATUS - GENERAL
MOVING FROM LYING ON BACK TO SITTING ON SIDE OF FLAT BED WITH BEDRAILS: A LITTLE
PERSONAL GROOMING: A LOT
MOBILITY SCORE: 14
TOILETING: A LITTLE
HELP NEEDED FOR BATHING: A LITTLE
TURNING FROM BACK TO SIDE WHILE IN FLAT BAD: A LITTLE
DRESSING REGULAR UPPER BODY CLOTHING: A LITTLE
MOBILITY SCORE: 18
STANDING UP FROM CHAIR USING ARMS: A LOT
MOVING TO AND FROM BED TO CHAIR: A LOT
DRESSING REGULAR LOWER BODY CLOTHING: A LITTLE
STANDING UP FROM CHAIR USING ARMS: A LITTLE
PERSONAL GROOMING: A LITTLE
MOBILITY SCORE: 21
DRESSING REGULAR UPPER BODY CLOTHING: A LOT
TURNING FROM BACK TO SIDE WHILE IN FLAT BAD: A LITTLE
HELP NEEDED FOR BATHING: A LITTLE
DAILY ACTIVITIY SCORE: 19
MOVING FROM LYING ON BACK TO SITTING ON SIDE OF FLAT BED WITH BEDRAILS: A LITTLE
HELP NEEDED FOR BATHING: A LITTLE
TOILETING: A LOT
DRESSING REGULAR LOWER BODY CLOTHING: A LITTLE
CLIMB 3 TO 5 STEPS WITH RAILING: A LITTLE
DAILY ACTIVITIY SCORE: 14
WALKING IN HOSPITAL ROOM: A LITTLE
CLIMB 3 TO 5 STEPS WITH RAILING: A LOT
EATING MEALS: A LOT
CLIMB 3 TO 5 STEPS WITH RAILING: A LITTLE
STANDING UP FROM CHAIR USING ARMS: A LITTLE
DAILY ACTIVITIY SCORE: 23
TURNING FROM BACK TO SIDE WHILE IN FLAT BAD: A LITTLE
WALKING IN HOSPITAL ROOM: A LOT
MOVING TO AND FROM BED TO CHAIR: A LITTLE

## 2025-03-05 ASSESSMENT — PAIN - FUNCTIONAL ASSESSMENT
PAIN_FUNCTIONAL_ASSESSMENT: 0-10

## 2025-03-05 ASSESSMENT — ACTIVITIES OF DAILY LIVING (ADL)
ADLS_ADDRESSED: NO
ADL_ASSISTANCE: INDEPENDENT
BATHING_ASSISTANCE: MINIMAL
ADL_ASSISTANCE: INDEPENDENT

## 2025-03-05 ASSESSMENT — PAIN DESCRIPTION - LOCATION: LOCATION: OTHER (COMMENT)

## 2025-03-05 ASSESSMENT — PAIN DESCRIPTION - ORIENTATION: ORIENTATION: LEFT;RIGHT

## 2025-03-05 NOTE — CARE PLAN
The patient's goals for the shift include  get some rest    The clinical goals for the shift include get some rest and remain safe by calling before getting up

## 2025-03-05 NOTE — PROGRESS NOTES
Occupational Therapy    Evaluation/Treatment    Patient Name: Raúl Jordan  MRN: [id number]  Department: [facility] NONV1  Room: 01/01-A  Today's Date: 03/05/25  Time Calculation  Start Time: 1336  Stop Time: 1354  Time Calculation (min): 18 min       Assessment:  OT Assessment: Pt presents on eval with generalized weakness,  decreased activity tolerance, and impaired standing balance affecting self-care and functional transfers/mobility. Pt will benefit from continued skilled OT to address these deficits and facilitate returning to functional baseline.  Prognosis: Good  Barriers to Discharge Home: No anticipated barriers  Evaluation/Treatment Tolerance: Patient limited by fatigue  End of Session Communication: Bedside nurse  End of Session Patient Position: Bed, 3 rail up, Alarm on (Needs in reach.)  OT Assessment Results: Decreased ADL status, Decreased endurance, Decreased functional mobility, Decreased IADLs    Plan:  Treatment Interventions: ADL retraining, Functional transfer training, Endurance training, Patient/family training, Equipment evaluation/education, Neuromuscular reeducation, Compensatory technique education  OT Frequency: 3 times per week  OT Discharge Recommendations: Low intensity level of continued care  Equipment Recommended upon Discharge: Wheeled walker  OT Recommended Transfer Status: Stand by assist  OT - OK to Discharge: Yes      Subjective     General:   OT Received On: 03/05/25  General  Reason for Referral: Impaired ADL's/Mobility  Referred By: Amos Yang MD  Past Medical History Relevant to Rehab: afib, complete heart block, pacemaker, LAZARA, CHF, DM, HTN, GIB, PNA, R TKR  Family/Caregiver Present: No  Prior to Session Communication: Bedside nurse  Patient Position Received: Bed, 3 rail up, Alarm on  General Comment: 84 y.o. female admitted after passing out on the stairs at home. In the ED,  bpm, 77% on RA and placed on 6L O2 via NC. pt had recently been discharged from the ED  earlier in the day after work-up for abdominal pain was negative.     Precautions:  Hearing/Visual Limitations: reading glasses  Medical Precautions: Fall precautions, Oxygen therapy device and L/min (6L 02)    Pain:  Pain Assessment  Pain Assessment: 0-10  0-10 (Numeric) Pain Score: 0 - No pain    Objective     Cognition:  Overall Cognitive Status: Within Functional Limits  Orientation Level: Oriented X4  Insight: Mild    Home Living:  Type of Home: House  Lives With: Adult children (daughter and son-in-law)  Home Adaptive Equipment: Walker rolling or standard, Cane, Long-handled shoehorn, Reacher  Home Layout: One level, Able to live on main level with bedroom/bathroom  Home Access: Stairs to enter with rails  Entrance Stairs-Rails: Left  Entrance Stairs-Number of Steps: 2  Bathroom Shower/Tub: Walk-in shower  Bathroom Toilet: Standard  Bathroom Equipment: Grab bars in shower, Shower chair with back    Prior Function:  Level of [place]: Independent with ADLs and functional transfers, Needs assistance with homemaking  Receives Help From: Family  ADL Assistance: Independent  Homemaking Assistance:  (daughter performs most IADLs, patient does some occasionally)  Ambulatory Assistance: Independent (cane in the home, rollator is in the car)  Vocational: Retired  Hand Dominance: Right  Prior Function Comments: denies falls other than this fall after passing out    ADL:  Eating Assistance: Independent  Grooming Assistance: Stand by  Grooming Deficit: Supervision/safety (in standing)  Bathing Assistance: Minimal  UE Dressing Assistance: Stand by  UE Dressing Deficit: Setup  LE Dressing Assistance: Minimal  Toileting Assistance with Device: Minimal    Activity Tolerance:  Activity Tolerance Comments: Fair, but reports fatigue    Bed Mobility/Transfers: Bed Mobility  Bed Mobility:  (Pt completed supine<>sit in bed with CGA for safety.)    Transfers  Transfer:  (Pt completed sit<>stand with close S for safety and due to  mildly impaired balance.)    Functional Mobility:  Functional Mobility  Functional Mobility Performed:  (Pt able to complete short functional mobility at bedside using no device with min A for balance/safety.)    Sitting Balance:  Static Sitting Balance  Static Sitting-Balance Support: Bilateral upper extremity supported, Feet supported  Static Sitting-Level of Assistance: Distant supervision    Standing Balance:  Static Standing Balance  Static Standing-Balance Support: No upper extremity supported  Static Standing-Level of Assistance: Close supervision    Therapy/Activity: Therapeutic Activity  Therapeutic Activity Performed:  (See bed mobility, transfers, functional mobility, and static sitting/standing balance activity.)    Sensation:  Sensation Comment: denies paresthesias    Strength:  Strength Comments: BUE's WFL    Coordination:  Coordination Comment: BUE's WFL grossly     Hand Function:  Hand Function  Gross Grasp: Functional  Coordination: Functional      Outcome Measures: [facility] Daily Activity  Putting on and taking off regular lower body clothing: A little  Bathing (including washing, rinsing, drying): A little  Putting on and taking off regular upper body clothing: A little  Toileting, which includes using toilet, bedpan or urinal: A little  Taking care of personal grooming such as brushing teeth: A little  Eating Meals: None  Daily Activity - Total Score: 19    Education Documentation  ADL Training, taught by Pelon Betts Jr., OT at 3/5/2025  3:04 PM.  Learner: Patient  Readiness: Acceptance  Method: Explanation  Response: Verbalizes Understanding    Education Comments  No comments found.    Goals:  Encounter Problems       Encounter Problems (Active)       OT Goals       Pt will complete all ADL's with mod indep/indep using adaptive equipment as needed. (Progressing)       Start:  03/05/25    Expected End:  04/05/25            Pt will complete all functional transfers and mobility with mod  indep/indep using a device or no device. (Progressing)       Start:  03/05/25    Expected End:  04/05/25            Pt will tolerate functional mobility for a household distance using a device or no device with mod indep/indep. (Progressing)       Start:  03/05/25    Expected End:  04/05/25

## 2025-03-05 NOTE — PROGRESS NOTES
Department of Medicine  Division of Pulmonary, Critical Care, and Sleep Medicine  Progress Note    Subjective     Raúl Jordan is a 84 y.o. female on day 1 of admission presenting with Syncope and collapse.    Pt was seen today resting comfortably in bed wearing 6 L nasal cannula.  This is increased from her 2 L baseline that she was on yesterday.  Denies any chest pain or shortness of breath.  Denies getting out of bed and ambulating.    Objective     Vitals:      3/4/2025     7:58 AM 3/4/2025    12:21 PM 3/4/2025     4:33 PM 3/4/2025     8:03 PM 3/4/2025    11:32 PM 3/5/2025     4:01 AM 3/5/2025     7:57 AM   Vitals   Systolic 104 92 103 124 117 127 110   Diastolic 92 48 44 63 74 61 61   BP Location Right arm Left arm Right arm Left arm Left arm Right arm Right arm   Heart Rate  62 64 93 88 103 101   Temp  36.3 °C (97.3 °F) 36.7 °C (98.1 °F) 36.9 °C (98.4 °F) 36.8 °C (98.2 °F) 36.7 °C (98.1 °F) 35.3 °C (95.5 °F)   Resp  19 17 17 17 17 20   Weight (lb)      243.83    BMI      44.6 kg/m2    BSA (m2)      2.2 m2         Oxygen Therapy:  SpO2: 98 %  Medical Gas Therapy: Supplemental oxygen  Medical Gas Delivery Method: Nasal cannula       Intake/Output::  I/O last 3 completed shifts:  In: 1130 (10.2 mL/kg) [P.O.:630; IV Piggyback:500]  Out: 950 (8.6 mL/kg) [Urine:950 (0.2 mL/kg/hr)]  Weight: 110.6 kg     Physical Exam:  Physical Exam  Constitutional:       General: She is not in acute distress.     Appearance: She is obese. She is not toxic-appearing.      Comments: Frail elderly female   HENT:      Head: Normocephalic and atraumatic.      Nose: Nose normal.      Comments: Nasal cannula in place     Mouth/Throat:      Pharynx: Oropharynx is clear.   Eyes:      Extraocular Movements: Extraocular movements intact.   Neck:      Comments: No visualized masses  Cardiovascular:      Rate and Rhythm: Tachycardia present. Rhythm irregular.      Heart sounds: No murmur heard.     No friction rub. No gallop.   Pulmonary:       Effort: No respiratory distress.      Breath sounds: Rales present. No wheezing or rhonchi.   Abdominal:      General: There is no distension.      Palpations: Abdomen is soft.      Tenderness: There is no abdominal tenderness. There is no guarding.   Musculoskeletal:         General: No tenderness.      Right lower leg: Edema present.      Left lower leg: Edema present.      Comments: Left greater than right   Skin:     General: Skin is warm and dry.      Findings: Rash (On right lower extremity) present.   Neurological:      Mental Status: She is alert and oriented to person, place, and time.   Psychiatric:         Mood and Affect: Mood normal.         Inpatient Medications:  [Held by provider] apixaban, 5 mg, oral, BID  ferrous gluconate, 38 mg of iron, oral, Daily with breakfast  [Held by provider] furosemide, 80 mg, oral, q AM  metoprolol succinate XL, 50 mg, oral, Daily  polyethylene glycol, 17 g, oral, Daily  potassium chloride CR, 20 mEq, oral, BID  rOPINIRole, 2 mg, oral, Nightly  sertraline, 100 mg, oral, Daily  simvastatin, 10 mg, oral, Nightly  traZODone, 100 mg, oral, Nightly         PRN medications: acetaminophen **OR** acetaminophen **OR** acetaminophen, nitroglycerin    Lab/Radiology/Diagnostic Review:  All labs and Imaging have been personally reviewed.     Assessment/Plan       Raúl Jordan is a Raúl ROSA Jordan is a 84 y.o. 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  Pulmonary was consulted for acute hypoxic respiratory failure.     Patient was seen and assessed by myself and his chart was reviewed for previous pulmonary visits, Labs and imaging.  Patient has chronic respiratory failure and wears 2 L nasal cannula nightly.  Furthermore patient does have a history of HFpEF with multiple  other cardiac comorbidities.  Patient is likely fluid overloaded.  Do not have concern for acute infection or pneumonia currently.  Echo performed yesterday shows preserved ejection fraction with diastolic filling defects.  Nuclear perfusion scan is negative for PE.     Assessment:  #Syncope  #LAZARA requiring 2 L nasal cannula nightly  #Acute on chronic hypoxic respiratory failure  #HFpEF  #CKD  #Left lower extremity swelling greater than right lower extremity swelling     Recommendations are as follows:  -Titrate FiO2 to SpO2 greater than 92%  -Highly recommend diuresis as patient's O2 requirements continue to increase   -Bronchopulmonary hygiene with incentive spirometer  -As needed bronchodilators   -Recommend left lower extremity venous Doppler    Patient was seen and assessed by myself and his chart was reviewed for previous pulmonary visits, and Pulmonary related labs and imaging.       I spent 35 minutes in the professional and overall care of this patient.    Pulmonary team will continue to follow the patient while they are in house.    Heriberto Monroy MD, MPH  Pulmonary and Critical Care Medicine     Please excuse any typographical or unwanted errors as voice recognition software was used to complete this note.

## 2025-03-05 NOTE — PROGRESS NOTES
Raúl Gil is a 84 y.o. female on day 1 of admission presenting with Syncope and collapse.      Subjective   Patient presented with near syncope.  Patient denied chest pain or shortness of breath.     Objective     Last Recorded Vitals  /61 (BP Location: Right arm, Patient Position: Lying)   Pulse 101   Temp 35.3 °C (95.5 °F) (Temporal)   Resp 20   Wt 111 kg (243 lb 13.3 oz)   SpO2 98%   Intake/Output last 3 Shifts:    Intake/Output Summary (Last 24 hours) at 3/5/2025 0941  Last data filed at 3/4/2025 2332  Gross per 24 hour   Intake 480 ml   Output 950 ml   Net -470 ml       Admission Weight  Weight: 104 kg (230 lb) (03/03/25 2300)    Daily Weight  03/05/25 : 111 kg (243 lb 13.3 oz)    Image Results  US renal complete  Narrative: Interpreted By:  Aileen Pavon,   STUDY:  US RENAL COMPLETE; 3/4/2025 7:26 pm      INDICATION:  Signs/Symptoms:Chronic kidney disease stage III.      COMPARISON:  04/13/2020      ACCESSION NUMBER(S):  [id number]      ORDERING CLINICIAN:  LUCIO TAYLOR      TECHNIQUE:  Grayscale and color Doppler imaging of the kidneys and urinary  bladder.      FINDINGS:  The right kidney measures 10.9 cm.  The left kidney measures 10.6 cm.      There is no shadowing calculus, hydronephrosis, or solid mass  identified. The renal cortical thickness and echogenicity is normal.      Limited bladder evaluation: No stones or debris seen in the urinary  bladder. No ureteral jets are identified.      Impression: Normal ultrasound of the kidneys.      MACRO:  None.      Signed by: Aileen Pavon 3/4/2025 8:32 PM  Dictation workstation:   [id number]  Transthoracic Echo (TTE) [facility]             [place] [address]             Phone [phone]    TRANSTHORACIC ECHOCARDIOGRAM REPORT    Patient Name:       RAÚL GIL        Reading Physician:    [id number] Moser DO  Study Date:          3/4/2025             Ordering Provider:    [id number] KOSTA BERRY  MRN/PID:            [id number]             Fellow:  Accession#:         [id number]         Nurse:  Date of Birth/Age:  1940 / 84 years Sonographer:          Dionne Fitzgerald RDCS  Gender Assigned at  F                    Additional Staff:  Birth:  Height:             157.48 cm            Admit Date:  Weight:             107.50 kg            Admission Status:     Inpatient -                                                                 Routine  BSA / BMI:          2.05 m2 / 43.35      Department Location:  [facility]                      kg/m2                                      Down  Blood Pressure: 104 /92 mmHg    Study Type:    TRANSTHORACIC ECHO (TTE) LIMITED  Diagnosis/ICD: Syncope and collapse-R55  Indication:    syncope  CPT Codes:     Echo Limited-21347; Color Doppler-37302; Doppler Limited-54788    Patient History:  Diabetes:          Yes  BMI:               Obese >30  Pacer/Defib:       Permanent pacemaker  Pertinent History: Syncope, CHF, Chest Pain, HTN, A-Fib and COPD. ldt syncope,                     bmi 41, cor pulm, heart failure, nstemi, fran, chr pain, resp                     failure, pleural effusion, ascites, s/p mitra oa.    Study Detail: The following Echo studies were performed: 2D, M-Mode, Doppler and                color flow. Technically challenging study due to poor acoustic                windows, prominent lung artifact and body habitus. Definity used                as a contrast agent for endocardial border definition. Total                contrast used for this procedure was 3 mL via IV push.       PHYSICIAN INTERPRETATION:  Left Ventricle: Left ventricular ejection fraction is normal, by visual estimate at 55-60%. There are no regional wall motion abnormalities. The left ventricular  cavity size is normal. Spectral Doppler shows a Grade I (impaired relaxation pattern) of left ventricular diastolic filling with normal left atrial filling pressure.  Left Atrium: The left atrial size is normal.  Right Ventricle: The right ventricle is normal in size. There is normal right ventricular global systolic function.  Right Atrium: The right atrial size is normal.  Aortic Valve: The aortic valve was not well visualized. There is no evidence of aortic valve regurgitation. The peak instantaneous gradient of the aortic valve is 5 mmHg.  Mitral Valve: The mitral valve was not well visualized. There is no evidence of mitral valve regurgitation.  Tricuspid Valve: The tricuspid valve was not well visualized. Tricuspid regurgitation was not assessed.  Pulmonic Valve: The pulmonic valve is not well visualized. The pulmonic valve regurgitation was not well visualized.  Pericardium: No pericardial effusion noted.  Aorta: The aortic root is normal.       CONCLUSIONS:   1. Poorly visualized anatomical structures due to suboptimal image quality.   2. Left ventricular ejection fraction is normal, by visual estimate at 55-60%.   3. Spectral Doppler shows a Grade I (impaired relaxation pattern) of left ventricular diastolic filling with normal left atrial filling pressure.   4. There is normal right ventricular global systolic function.    QUANTITATIVE DATA SUMMARY:     2D MEASUREMENTS:         Normal Ranges:  LAs:             4.00 cm (2.7-4.0cm)       LV SYSTOLIC FUNCTION:                       Normal Ranges:  EF-A4C View:    68 % (>=55%)  EF-Visual:      58 %  LV EF Reported: 58 %       LV DIASTOLIC FUNCTION:          Normal Ranges:  MV Peak E:             1.21 m/s (0.7-1.2 m/s)       MITRAL VALVE:          Normal Ranges:  MV DT:        159 msec (150-240msec)       AORTIC VALVE:           Normal Ranges:  AoV Vmax:      1.15 m/s (<=1.7m/s)  AoV Peak P.3 mmHg (<20mmHg)  LVOT Max Ravin:  0.81 m/s (<=1.1m/s)  LVOT  Diameter: 2.00 cm  (1.8-2.4cm)  AoV Area,Vmax: 2.22 cm2 (2.5-4.5cm2)       RIGHT VENTRICLE:  TAPSE: 20.8 mm       TRICUSPID VALVE/RVSP:          Normal Ranges:  Peak TR Velocity:     3.34 m/s  RV Syst Pressure:     60 mmHg  (< 30mmHg)  IVC Diam:             2.32 cm       PULMONIC VALVE:          Normal Ranges:  PV Max Ravin:     0.8 m/s  (0.6-0.9m/s)  PV Max P.7 mmHg       [id number] Aleoj Moser DO  Electronically signed on 3/4/2025 at 3:35:49 PM       ** Final **  ECG 12 lead  Atrial fibrillation with rapid ventricular response with occasional ventricular-paced complexes  Rightward axis  ST depression, consider subendocardial injury  Prolonged QT  Abnormal ECG  When compared with ECG of 08-DEC-2024 14:10,  Atrial fibrillation  has replaced Sinus rhythm  Confirmed by Misbah Lozano ([id number]) on 3/4/2025 12:25:04 PM  NM Lung perfusion with spect/ct  Narrative: Interpreted By:  Car Morse and Booth Cameron   STUDY:  NM LUNG PERFUSION WITH SPECT/CT;  3/4/2025 9:25 am      INDICATION:  Signs/Symptoms:Acute on chronic respiratory failure.      COMPARISON:  Chest x-ray from 2025      ACCESSION NUMBER(S):  [id number]      ORDERING CLINICIAN:  KRANTHI CLIFTON      TECHNIQUE:  DIVISION OF NUCLEAR MEDICINE  PERFUSION LUNG SCANS      Multiple perfusion images of the lungs were acquired after the  intravenous administration of 4.2 mCi of Tc-99m macroaggregated  albumin (MAA). In addition, SPECT/CT of the chest was performed.      FINDINGS:  Perfusion images of both lungs demonstrate mild heterogeneity  throughout the lung fields bilaterally. Nonsegmental perfusion defect  extending from left upper to left lower lobe likely corresponds with  major fissure rather than true perfusion defect. No evidence of  segmental perfusion defects.      Impression: There is nonspecific heterogeneity in perfusion of both lungs and an  apparent fissure sign in the left lung indicating low probability for  acute pulmonary embolism.       The interpretation above is based on modified PIOPED II and PISAPED  criteria.      This study was analyzed and interpreted at [place], [place].      MACRO:  None      Signed by: Car Morse 3/4/2025 9:37 AM  Dictation workstation:   [id number]  Procedure aborted - Cath  Aborted invasive cardiology procedure -- see Procedure Log link for   details.      Physical Exam    General: Morbidly obese, pleasant, cooperating during physical exam.  HEENT: Pupils are equal and reactive to light and commendation , oral mucosa dry, no JVD  Cardiovascular: PMI nondisplaced  Lungs: Clear to auscultation bilaterally, no wheezing, no crackles, no dullness to percussion.  Abdomen: No hepatosplenomegaly appreciated, soft , not tender, positive bowel sounds, positive bowel movement.  Neuro: Alert and oriented x3, strength in upper and lower extremities , sensation intact.  Musculoskeletal: +1 pedal edema both lower extremity  Vascular: Pulses are intact in upper and lower extremities  Skin: No petechiae, ecchymosis or other stigmata for dermatology disease.     Assessment/Plan      Near syncope  2D echo done on 9/20/2024 revealed EF 60 to 65%.  2D echo done yesterday revealed ejection fraction 55 -60%  Chest x-ray revealed mild pulmonary congestion.    Patient has been on Lasix her  Evaluated by cardio  Monitor close  Patient has history of A-fib.  Patient was slightly orthostatic.  Defer to nephrology when to resume diuretics    Elevated troponin  NSTEMI  Cardiology on case.  Monitor close  Continue with current medication.  Evaluated by Dr. Lozano, no further workup    Acute respiratory failure with hypoxia  Chest x-ray revealed pulmonary congestion  Pulmonary on the case  VQ scan no evidence of pulmonary emboli  Continue with oxygen to maintain pulse ox more than 92%.  Patient is on 2 L oxygen at home.  Now she is on 5 to 6 L oxygen    Chronic congestive heart failure with diastolic dysfunction  Patient has  been on Lasix.  Lasix on hold    Chronic atrial fibrillation  Status post pacemaker implant  Patient is on Eliquis  Continue with current medication    Chronic kidney disease stage III  Monitor close  Diuretics on hold.  Consult Dr. Odell from nephrology service    Obstructive sleep apnea  Patient is on 2 L oxygen at home, now on 6 L oxygen    Deconditioning    Fall precaution  Ambulate with assist  Check CBC and BMP in AM.  PT OT to see her today  Discussed with care coordination on the case.    I expect the patient to get discharge in 24 hours      Amos Yang MD

## 2025-03-05 NOTE — PROGRESS NOTES
03/05/25 1528   Discharge Planning   Home or Post Acute Services In home services   Type of Home Care Services Home nursing visits;Home OT;Home PT   Expected Discharge Disposition [facility]  ([facility])     PT recommendation is for low intensity rehab. Patient will resume care with [facility].  Will need an external referral.

## 2025-03-05 NOTE — CARE PLAN
The patient's goals for the shift include      The clinical goals for the shift include get some rest and remain safe by calling before getting up    Over the shift, the patient did not make progress toward the following goals. Barriers to progression include Recommendations to address these barriers include   Problem: Pain - Adult  Goal: Verbalizes/displays adequate comfort level or baseline comfort level  Outcome: Progressing     Problem: Safety - Adult  Goal: Free from fall injury  Outcome: Progressing     Problem: Chronic Conditions and Co-morbidities  Goal: Patient's chronic conditions and co-morbidity symptoms are monitored and maintained or improved  Outcome: Progressing     Problem: Nutrition  Goal: Nutrient intake appropriate for maintaining nutritional needs  Outcome: Progressing

## 2025-03-05 NOTE — PROGRESS NOTES
"Raúl Jordan is a 84 y.o. female on day 1 of admission presenting with Syncope and collapse.    Subjective      Patient is seen for chronic kidney stage III admitted with right side abdominal pain and flank pain no more pain at all feels well    Objective     Physical Exam  Constitutional:       General: She is not in acute distress.     Appearance: Normal appearance. She is not toxic-appearing.   Neck:      Vascular: No carotid bruit.   Cardiovascular:      Heart sounds: No murmur heard.     No friction rub. No gallop.   Pulmonary:      Breath sounds: No wheezing, rhonchi or rales.   Abdominal:      Tenderness: There is no abdominal tenderness. There is no right CVA tenderness, left CVA tenderness or rebound.   Musculoskeletal:         General: No tenderness.      Cervical back: Neck supple.      Right lower leg: No edema.      Left lower leg: No edema.   Lymphadenopathy:      Cervical: No cervical adenopathy.         Last Recorded Vitals  Blood pressure 117/59, pulse 101, temperature 36.4 °C (97.5 °F), temperature source Temporal, resp. rate 18, height 1.575 m (5' 2\"), weight 111 kg (243 lb 13.3 oz), SpO2 98%.    Intake/Output last 3 Shifts:  I/O last 3 completed shifts:  In: 1130 (10.2 mL/kg) [P.O.:630; IV Piggyback:500]  Out: 950 (8.6 mL/kg) [Urine:950 (0.2 mL/kg/hr)]  Weight: 110.6 kg     Current Facility-Administered Medications:     acetaminophen (Tylenol) tablet 650 mg, 650 mg, oral, q4h PRN, 650 mg at 03/05/25 0906 **OR** acetaminophen (Tylenol) oral liquid 650 mg, 650 mg, nasogastric tube, q4h PRN **OR** acetaminophen (Tylenol) suppository 650 mg, 650 mg, rectal, q4h PRN, Sang Landrum MD    [Held by provider] apixaban (Eliquis) tablet 5 mg, 5 mg, oral, BID, Kecia Hinson, APRN-CNP    ferrous gluconate (Fergon) 324 (38 Fe) mg tablet 38 mg of iron, 38 mg of iron, oral, Daily with breakfast, Sang Landrum MD, 38 mg of iron at 03/05/25 0859    [Held by provider] furosemide (Lasix) tablet 80 mg, 80 mg, oral, " q AM, Sang Landrum MD    metoprolol succinate XL (Toprol-XL) 24 hr tablet 50 mg, 50 mg, oral, Daily, Amos Yang MD, 50 mg at 03/05/25 0858    nitroglycerin (Nitrostat) SL tablet 0.4 mg, 0.4 mg, sublingual, q5 min PRN, Beckie Horan DO    polyethylene glycol (Glycolax, Miralax) packet 17 g, 17 g, oral, Daily, Sang Landrum MD, 17 g at 03/05/25 0908    potassium chloride CR (Klor-Con M20) ER tablet 20 mEq, 20 mEq, oral, BID, Sang Landrum MD, 20 mEq at 03/05/25 0859    rOPINIRole (Requip) tablet 2 mg, 2 mg, oral, Nightly, Sang Landrum MD, 2 mg at 03/04/25 2024    sertraline (Zoloft) tablet 100 mg, 100 mg, oral, Daily, Sang Landrum MD, 100 mg at 03/05/25 0859    simvastatin (Zocor) tablet 10 mg, 10 mg, oral, Nightly, Sang Landrum MD, 10 mg at 03/04/25 2024    traZODone (Desyrel) tablet 100 mg, 100 mg, oral, Nightly, Sang Landrum MD, 100 mg at 03/04/25 2024   Relevant Results    Results for orders placed or performed during the hospital encounter of 03/03/25 (from the past 96 hours)   CBC and Auto Differential   Result Value Ref Range    WBC 5.5 4.4 - 11.3 x10*3/uL    nRBC 0.0 0.0 - 0.0 /100 WBCs    RBC 3.71 (L) 4.00 - 5.20 x10*6/uL    Hemoglobin 11.6 (L) 12.0 - 16.0 g/dL    Hematocrit 37.3 36.0 - 46.0 %     (H) 80 - 100 fL    MCH 31.3 26.0 - 34.0 pg    MCHC 31.1 (L) 32.0 - 36.0 g/dL    RDW 16.1 (H) 11.5 - 14.5 %    Platelets 122 (L) 150 - 450 x10*3/uL    Neutrophils % 73.6 40.0 - 80.0 %    Immature Granulocytes %, Automated 1.1 (H) 0.0 - 0.9 %    Lymphocytes % 13.0 13.0 - 44.0 %    Monocytes % 9.9 2.0 - 10.0 %    Eosinophils % 1.5 0.0 - 6.0 %    Basophils % 0.9 0.0 - 2.0 %    Neutrophils Absolute 4.02 1.60 - 5.50 x10*3/uL    Immature Granulocytes Absolute, Automated 0.06 0.00 - 0.50 x10*3/uL    Lymphocytes Absolute 0.71 (L) 0.80 - 3.00 x10*3/uL    Monocytes Absolute 0.54 0.05 - 0.80 x10*3/uL    Eosinophils Absolute 0.08 0.00 - 0.40 x10*3/uL    Basophils Absolute 0.05 0.00 - 0.10 x10*3/uL    Comprehensive Metabolic Panel   Result Value Ref Range    Glucose 171 (H) 74 - 99 mg/dL    Sodium 142 136 - 145 mmol/L    Potassium 3.9 3.5 - 5.3 mmol/L    Chloride 104 98 - 107 mmol/L    Bicarbonate 29 21 - 32 mmol/L    Anion Gap 13 10 - 20 mmol/L    Urea Nitrogen 36 (H) 6 - 23 mg/dL    Creatinine 1.43 (H) 0.50 - 1.05 mg/dL    eGFR 36 (L) >60 mL/min/1.73m*2    Calcium 8.8 8.6 - 10.3 mg/dL    Albumin 3.8 3.4 - 5.0 g/dL    Alkaline Phosphatase 104 33 - 136 U/L    Total Protein 6.0 (L) 6.4 - 8.2 g/dL    AST 25 9 - 39 U/L    Bilirubin, Total 1.0 0.0 - 1.2 mg/dL    ALT 16 7 - 45 U/L   Troponin I, High Sensitivity   Result Value Ref Range    Troponin I, High Sensitivity 27 (H) 0 - 13 ng/L   Protime-INR   Result Value Ref Range    Protime 15.0 (H) 9.3 - 12.7 seconds    INR 1.4 (H) 0.9 - 1.2   Magnesium   Result Value Ref Range    Magnesium 2.26 1.60 - 2.40 mg/dL   B-Type Natriuretic Peptide   Result Value Ref Range     (H) 0 - 99 pg/mL   Blood Gas Venous Full Panel   Result Value Ref Range    POCT pH, Venous 7.30 (L) 7.33 - 7.43 pH    POCT pCO2, Venous 63 (H) 41 - 51 mm Hg    POCT pO2, Venous 20 (L) 35 - 45 mm Hg    POCT SO2, Venous 27 (L) 45 - 75 %    POCT Oxy Hemoglobin, Venous 26.7 (L) 45.0 - 75.0 %    POCT Hematocrit Calculated, Venous 34.0 (L) 36.0 - 46.0 %    POCT Sodium, Venous 140 136 - 145 mmol/L    POCT Potassium, Venous 4.0 3.5 - 5.3 mmol/L    POCT Chloride, Venous 103 98 - 107 mmol/L    POCT Ionized Calicum, Venous 1.14 1.10 - 1.33 mmol/L    POCT Glucose, Venous 160 (H) 74 - 99 mg/dL    POCT Lactate, Venous 1.6 0.4 - 2.0 mmol/L    POCT Base Excess, Venous 3.2 (H) -2.0 - 3.0 mmol/L    POCT HCO3 Calculated, Venous 31.0 (H) 22.0 - 26.0 mmol/L    POCT Hemoglobin, Venous 11.3 (L) 12.0 - 16.0 g/dL    POCT Anion Gap, Venous 10.0 10.0 - 25.0 mmol/L    Patient Temperature 37.0 degrees Celsius    FiO2 44 %   ECG 12 lead   Result Value Ref Range    Ventricular Rate 102 BPM    QRS Duration 82 ms    QT Interval  396 ms    QTC Calculation(Bazett) 516 ms    R Axis 109 degrees    T Axis 77 degrees    QRS Count 17 beats    Q Onset 216 ms    T Offset 414 ms    QTC Fredericia 472 ms   Blood Gas Arterial Full Panel   Result Value Ref Range    POCT pH, Arterial 7.36 (L) 7.38 - 7.42 pH    POCT pCO2, Arterial 54 (H) 38 - 42 mm Hg    POCT pO2, Arterial 68 (L) 85 - 95 mm Hg    POCT SO2, Arterial 94 94 - 100 %    POCT Oxy Hemoglobin, Arterial 91.1 (L) 94.0 - 98.0 %    POCT Hematocrit Calculated, Arterial 32.0 (L) 36.0 - 46.0 %    POCT Sodium, Arterial 139 136 - 145 mmol/L    POCT Potassium, Arterial 3.8 3.5 - 5.3 mmol/L    POCT Chloride, Arterial 103 98 - 107 mmol/L    POCT Ionized Calcium, Arterial 1.14 1.10 - 1.33 mmol/L    POCT Glucose, Arterial 139 (H) 74 - 99 mg/dL    POCT Lactate, Arterial 0.7 0.4 - 2.0 mmol/L    POCT Base Excess, Arterial 4.1 (H) -2.0 - 3.0 mmol/L    POCT HCO3 Calculated, Arterial 30.5 (H) 22.0 - 26.0 mmol/L    POCT Hemoglobin, Arterial 10.6 (L) 12.0 - 16.0 g/dL    POCT Anion Gap, Arterial 9 (L) 10 - 25 mmo/L    Patient Temperature 37.0 degrees Celsius    FiO2 44 %    Apparatus CANNULA     Site of Arterial Puncture Radial Right     Kentrell's Test Positive    Troponin I, High Sensitivity   Result Value Ref Range    Troponin I, High Sensitivity 235 (HH) 0 - 13 ng/L   CBC   Result Value Ref Range    WBC 7.3 4.4 - 11.3 x10*3/uL    nRBC 0.0 0.0 - 0.0 /100 WBCs    RBC 3.31 (L) 4.00 - 5.20 x10*6/uL    Hemoglobin 10.3 (L) 12.0 - 16.0 g/dL    Hematocrit 33.3 (L) 36.0 - 46.0 %     (H) 80 - 100 fL    MCH 31.1 26.0 - 34.0 pg    MCHC 30.9 (L) 32.0 - 36.0 g/dL    RDW 16.1 (H) 11.5 - 14.5 %    Platelets 106 (L) 150 - 450 x10*3/uL   Basic metabolic panel   Result Value Ref Range    Glucose 107 (H) 74 - 99 mg/dL    Sodium 142 136 - 145 mmol/L    Potassium 3.8 3.5 - 5.3 mmol/L    Chloride 106 98 - 107 mmol/L    Bicarbonate 29 21 - 32 mmol/L    Anion Gap 11 10 - 20 mmol/L    Urea Nitrogen 34 (H) 6 - 23 mg/dL    Creatinine 1.36  (H) 0.50 - 1.05 mg/dL    eGFR 38 (L) >60 mL/min/1.73m*2    Calcium 8.5 (L) 8.6 - 10.3 mg/dL   Transthoracic Echo (TTE) Limited   Result Value Ref Range    AV pk sarika 1.15 m/s    LVOT diam 2.00 cm    Tricuspid annular plane systolic excursion 2.1 cm    LV EF 58 %    RVSP 59.6 mmHg    AV pk grad 5 mmHg    Aortic Valve Area by Continuity of Peak Velocity 2.22 cm2    LV A4C EF 68.4    CBC   Result Value Ref Range    WBC 6.9 4.4 - 11.3 x10*3/uL    nRBC 0.0 0.0 - 0.0 /100 WBCs    RBC 3.24 (L) 4.00 - 5.20 x10*6/uL    Hemoglobin 10.0 (L) 12.0 - 16.0 g/dL    Hematocrit 32.6 (L) 36.0 - 46.0 %     (H) 80 - 100 fL    MCH 30.9 26.0 - 34.0 pg    MCHC 30.7 (L) 32.0 - 36.0 g/dL    RDW 15.7 (H) 11.5 - 14.5 %    Platelets 126 (L) 150 - 450 x10*3/uL   Basic metabolic panel   Result Value Ref Range    Glucose 107 (H) 74 - 99 mg/dL    Sodium 143 136 - 145 mmol/L    Potassium 4.7 3.5 - 5.3 mmol/L    Chloride 106 98 - 107 mmol/L    Bicarbonate 29 21 - 32 mmol/L    Anion Gap 13 10 - 20 mmol/L    Urea Nitrogen 41 (H) 6 - 23 mg/dL    Creatinine 1.38 (H) 0.50 - 1.05 mg/dL    eGFR 37 (L) >60 mL/min/1.73m*2    Calcium 9.1 8.6 - 10.3 mg/dL   Cardiac Device Check - Remote   Result Value Ref Range    BSA 2.2 m2       Assessment/Plan   Chronic kidney disease stage III most like secondary to hypertension and diabetes mellitus with nephrosclerosis and has normal ultrasound of the kidneys to monitor renal function very closely  Urinary tract infection hospitalist will start her on antibiotic therapy  Abdominal pain and right flank pain resolved  Syncope and fall  Diabetes mellitus type 2  Hypertension  Atrial fibrillation  Of AV block with pacemaker placement  Congestive heart failure  Chronic respiratory failure on home oxygen    Leon Odell MD     Stable

## 2025-03-05 NOTE — PROGRESS NOTES
Physical Therapy    Physical Therapy Evaluation & Treatment    Patient Name: Raúl Jordan  MRN: [id number]  Department: [facility] NONV1  Room: 01/01-A  Today's Date: 3/5/2025   Time Calculation  Start Time: 0913  Stop Time: 0935  Time Calculation (min): 22 min    Assessment/Plan   PT Assessment  PT Assessment Results: Decreased strength, Decreased endurance, Impaired balance, Decreased mobility, Decreased coordination, Decreased safety awareness  Rehab Prognosis: Good  Barriers to Discharge Home: No anticipated barriers  Evaluation/Treatment Tolerance: Patient limited by fatigue  Medical Staff Made Aware: Yes  Strengths: Premorbid level of function, Support of Caregivers  Barriers to Participation: Comorbidities  End of Session Communication: Bedside nurse  Assessment Comment: pt with mild deficits in functional mobility, strength, and tolerance to activity. pt is functioning closely to her baseline and will benefit from continued skilled therapy services to improve her functional performance and maximize safety prior to discharge home.  End of Session Patient Position: Bed, 3 rail up, Alarm on (needs in reach)   IP OR SWING BED PT PLAN  Inpatient or Swing Bed: Inpatient  PT Plan  Treatment/Interventions: Bed mobility, Transfer training, Gait training, Balance training, Strengthening, Endurance training, Therapeutic exercise, Therapeutic activity  PT Plan: Ongoing PT  PT Frequency: 4 times per week  PT Discharge Recommendations: Low intensity level of continued care  Equipment Recommended upon Discharge: Wheeled walker  PT Recommended Transfer Status: Assist x1  PT - OK to Discharge: Yes      Subjective     General Visit Information:  General  Reason for Referral: Impaired Mobility  Referred By: Amos Yang MD  Past Medical History Relevant to Rehab: afib, complete heart block, pacemaker, LAZARA, CHF, DM, HTN, GIB, PNA, R TKR  Family/Caregiver Present: No  Patient Position Received: Up in bathroom, Alarm off, not on  at start of session  Preferred Learning Style: verbal  General Comment: 84 y.o. female admitted after passing out on the stairs at home. In the ED,  bpm, 77% on RA and placed on 6L O2 via NC. pt had recently been discharged from the ED earlier in the day after work-up for abdominal pain was negative.  Home Living:  Home Living  Type of Home: House  Lives With: Adult children (daughter and son-in-law)  Home Adaptive Equipment: Walker rolling or standard, Cane, Long-handled shoehorn, Reacher  Home Layout: One level, Able to live on main level with bedroom/bathroom  Home Access: Stairs to enter with rails  Entrance Stairs-Rails: Left  Entrance Stairs-Number of Steps: 2  Bathroom Shower/Tub: Walk-in shower  Bathroom Toilet: Standard  Bathroom Equipment: Grab bars in shower, Shower chair with back  Prior Level of Function:  Prior Function Per Pt/Caregiver Report  Level of [place]: Independent with ADLs and functional transfers, Needs assistance with homemaking  Receives Help From: Family  ADL Assistance: Independent  Homemaking Assistance:  (daughter performs most IADLs, patient does some occasionally)  Ambulatory Assistance:  (cane in the home, rollator is in the car)  Vocational: Retired  Prior Function Comments: denies falls other than this fall after passing out  Precautions:  Precautions  Hearing/Visual Limitations: reading glasses  Medical Precautions: Fall precautions, Oxygen therapy device and L/min (6L O2 via NC)      Vital Signs Comment: 87% on 4L O2 via NC while in the BR, 94% on 6L O2 via NC when returned to bed.    Objective   Pain:  Pain Assessment  Pain Assessment: 0-10  0-10 (Numeric) Pain Score: 0 - No pain  Cognition:  Cognition  Overall Cognitive Status: Within Functional Limits  Orientation Level: Oriented X4    General Assessments:  General Observation  General Observation: B UEs with significant bruising and ecchymosis. bandages on arms with small areas of shadow drainage  observed.    Activity Tolerance  Endurance: Decreased tolerance for upright activites  Activity Tolerance Comments: fair, easily fatigued  Rate of Perceived Exertion (RPE): 5    Sensation  Sensation Comment: denies paresthesias    Strength  Strength Comments: >4-/5  Coordination  Coordination Comment: increased time to complete tasks    Postural Control  Posture Comment: rounded shoulders, forward head, wide [place]    Static Sitting Balance  Static Sitting-Comment/Number of Minutes: sat EOB x 10 minutes after returning from the BR, good seated balance    Static Standing Balance  Static Standing-Comment/Number of Minutes: good- balance with the rolling walker  Functional Assessments:  ADL  ADL's Addressed: No    Bed Mobility  Bed Mobility: Yes  Bed Mobility 1  Bed Mobility 1: Sitting to supine  Level of Assistance 1: Minimum assistance  Bed Mobility Comments 1: HOB flat, pt able to manage trunk, min A to complete lifting B LEs onto the bed.    Transfers  Transfer:  (close supervision sit<->stand from multiple surfaces x 4 trials)    Ambulation/Gait Training  Ambulation/Gait Training Performed:  (15' with rolling walker, close supervision, slow debbie, forward flexed, verbal cues for posture and walker approximation.)    Stairs  Stairs: No  Extremity/Trunk Assessments:  RLE   RLE : Within Functional Limits  LLE   LLE : Within Functional Limits  Outcome Measures:  AMPAC Basic Mobility  Turning from your back to your side while in a flat bed without using bedrails: A little  Moving from lying on your back to sitting on the side of a flat bed without using bedrails: A little  Moving to and from bed to chair (including a wheelchair): A little  Standing up from a chair using your arms (e.g. wheelchair or bedside chair): A little  To walk in hospital room: A little  Climbing 3-5 steps with railing: A little  Basic Mobility - Total Score: 18    Encounter Problems       Encounter Problems (Active)       PT STG Problem        Patient will transfer supine to sit and sit to supine with independent assist to facilitate mobility. (Progressing)       Start:  03/05/25    Expected End:  03/19/25            Patient will transfer sit to stand and stand to sit with independent assist to facilitate mobility. (Progressing)       Start:  03/05/25    Expected End:  03/19/25            Patient will transfer bed to chair and chair to bed with independent assist to facilitate mobility. (Progressing)       Start:  03/05/25    Expected End:  03/19/25            Patient will amb 100 feet rolling walker device including two turns on even surface with supervision assist to facilitate safe mobility.   (Progressing)       Start:  03/05/25    Expected End:  03/19/25            Patient will increase B LE strength to 4+/5 to improve functional mobility.  (Progressing)       Start:  03/05/25    Expected End:  03/19/25                   Education Documentation  Mobility Training, taught by Duyen Sanders PT at 3/5/2025  2:25 PM.  Learner: Patient  Readiness: Acceptance  Method: Explanation  Response: Verbalizes Understanding    Education Comments  03/05/25 4251 Duyen Sanders PT  Educated on PT POC

## 2025-03-05 NOTE — CARE PLAN
The patient's goals for the shift include      The clinical goals for the shift include free from falls    Over the shift, the patient did not make progress toward the following goals. Barriers to progression include   Problem: Pain - Adult  Goal: Verbalizes/displays adequate comfort level or baseline comfort level  3/5/2025 1013 by Zarina Keenan RN  Outcome: Progressing  3/5/2025 1001 by Zarina Keenan RN  Outcome: Progressing     Problem: Safety - Adult  Goal: Free from fall injury  3/5/2025 1013 by Zarina Keenan RN  Outcome: Progressing  3/5/2025 1001 by Zarina Keenan RN  Outcome: Progressing     Problem: Chronic Conditions and Co-morbidities  Goal: Patient's chronic conditions and co-morbidity symptoms are monitored and maintained or improved  3/5/2025 1013 by Zarina Keenan RN  Outcome: Progressing  3/5/2025 1001 by Zarina Keenan RN  Outcome: Progressing    Recommendations to address these barriers include

## 2025-03-05 NOTE — CARE PLAN
Problem: Skin  Goal: Decreased wound size/increased tissue granulation at next dressing change  3/5/2025 1013 by Zarina Keenan RN  Flowsheets (Taken 3/5/2025 1013)  Decreased wound size/increased tissue granulation at next dressing change: Protective dressings over bony prominences  3/5/2025 1013 by Zarina Keenan RN  Outcome: Progressing  Flowsheets (Taken 3/5/2025 1013)  Decreased wound size/increased tissue granulation at next dressing change: Protective dressings over bony prominences  3/5/2025 1001 by Zarina Keenan RN  Outcome: Progressing  Goal: Participates in plan/prevention/treatment measures  3/5/2025 1013 by Zarina Keenan RN  Outcome: Progressing  3/5/2025 1001 by Zarina Keenan RN  Outcome: Progressing  Goal: Prevent/manage excess moisture  3/5/2025 1013 by Zarina Keenan RN  Outcome: Progressing  3/5/2025 1001 by Zarina Keenan RN  Outcome: Progressing  Goal: Prevent/minimize sheer/friction injuries  3/5/2025 1013 by Zarina Keenan RN  Outcome: Progressing  3/5/2025 1001 by Zarnia Keenan RN  Outcome: Progressing  Goal: Promote/optimize nutrition  3/5/2025 1013 by Zarina Keenan RN  Outcome: Progressing  3/5/2025 1001 by Zarina Keenan RN  Outcome: Progressing  Goal: Promote skin healing  3/5/2025 1013 by Zarina Keenan RN  Outcome: Progressing  3/5/2025 1001 by Zarina Keenan RN  Outcome: Progressing   The patient's goals for the shift include      The clinical goals for the shift include free from falls    Over the shift, the patient did not make progress toward the following goals. Barriers to progression include Recommendations to address these barriers include      Ht: 62 in Wt: 147.7 pounds BMI: 27.0   IBW: 118 pounds   Edema: 3+ R arm, 4+ L arm/wrist   Pressure Injuries: none

## 2025-03-05 NOTE — PROGRESS NOTES
"Raúl Jordan is a 84 y.o. female on day 1 of admission presenting with Syncope and collapse.    Subjective   Patient resting comfortably in bed.  Reports feeling fatigued this morning and that she did not sleep well last night.  Denies chest pain, pressure or palpitations.       Objective     Physical Exam  Vitals reviewed.   Cardiovascular:      Rate and Rhythm: Normal rate. Rhythm irregular.      Heart sounds: No murmur heard.     No friction rub. No gallop.   Pulmonary:      Breath sounds: No wheezing, rhonchi or rales.      Comments: Mild conversational dyspnea noted.  Diminished breath sounds throughout.  Abdominal:      General: Bowel sounds are normal.      Palpations: Abdomen is soft.   Musculoskeletal:      Right lower leg: No edema.      Left lower leg: No edema.   Skin:     General: Skin is warm and dry.      Capillary Refill: Capillary refill takes less than 2 seconds.   Neurological:      Mental Status: She is alert and oriented to person, place, and time.   Psychiatric:         Mood and Affect: Mood normal.         Behavior: Behavior normal.         Last Recorded Vitals  Blood pressure 110/61, pulse 103, temperature 35.3 °C (95.5 °F), temperature source Temporal, resp. rate 20, height 1.575 m (5' 2\"), weight 111 kg (243 lb 13.3 oz), SpO2 98%.  Intake/Output last 3 Shifts:  I/O last 3 completed shifts:  In: 1130 (10.2 mL/kg) [P.O.:630; IV Piggyback:500]  Out: 950 (8.6 mL/kg) [Urine:950 (0.2 mL/kg/hr)]  Weight: 110.6 kg     Relevant Results  Results for orders placed or performed during the hospital encounter of 03/03/25 (from the past 24 hours)   Transthoracic Echo (TTE) Limited   Result Value Ref Range    AV pk sarika 1.15 m/s    LVOT diam 2.00 cm    Tricuspid annular plane systolic excursion 2.1 cm    LV EF 58 %    RVSP 59.6 mmHg    AV pk grad 5 mmHg    Aortic Valve Area by Continuity of Peak Velocity 2.22 cm2    LV A4C EF 68.4    CBC   Result Value Ref Range    WBC 6.9 4.4 - 11.3 x10*3/uL    nRBC 0.0 " 0.0 - 0.0 /100 WBCs    RBC 3.24 (L) 4.00 - 5.20 x10*6/uL    Hemoglobin 10.0 (L) 12.0 - 16.0 g/dL    Hematocrit 32.6 (L) 36.0 - 46.0 %     (H) 80 - 100 fL    MCH 30.9 26.0 - 34.0 pg    MCHC 30.7 (L) 32.0 - 36.0 g/dL    RDW 15.7 (H) 11.5 - 14.5 %    Platelets 126 (L) 150 - 450 x10*3/uL   Basic metabolic panel   Result Value Ref Range    Glucose 107 (H) 74 - 99 mg/dL    Sodium 143 136 - 145 mmol/L    Potassium 4.7 3.5 - 5.3 mmol/L    Chloride 106 98 - 107 mmol/L    Bicarbonate 29 21 - 32 mmol/L    Anion Gap 13 10 - 20 mmol/L    Urea Nitrogen 41 (H) 6 - 23 mg/dL    Creatinine 1.38 (H) 0.50 - 1.05 mg/dL    eGFR 37 (L) >60 mL/min/1.73m*2    Calcium 9.1 8.6 - 10.3 mg/dL     *Note: Due to a large number of results and/or encounters for the requested time period, some results have not been displayed. A complete set of results can be found in Results Review.                 Assessment/Plan   Assessment & Plan  Syncope and collapse    Chronic diastolic heart failure    Longstanding persistent atrial fibrillation (Multi)    LAZARA (obstructive sleep apnea)    Stage 3a chronic kidney disease (Multi)    Acute on chronic respiratory failure with hypoxia (Multi)    Syncope  Longstanding persistent atrial fibrillation  Primary hypertension  Hyperlipidemia  Chronic kidney disease IIIa  Micra leadless pacemaker insertion November 2018 for complete heart block     3/3: The patient was seen in the emergency department following her syncopal episode.  Again she describes no chest pain or anginal type symptoms.  EKG in the emergency department revealed atrial fibrillation and no significant ST segment changes.  She was hemodynamically stable in the emergency department but the etiology of the syncope remains unclear.  Echocardiogram in September revealed normal left ventricular systolic function with ejection fraction estimated at 65 to 70%.  The patient also has implanted a leadless Micra pacemaker.  Thus a bradycardia arrhythmia  would be very unlikely.  Would monitor the patient's rhythm on telemetry and follow blood pressures as well.  Will follow with you.     3/4: As above. Labs this AM with sodium 142, creatinine at baseline 1.46, troponin 235, hemoglobin 10.3. Blood pressures have been mildly hypotensive with most recent reading of 104/92. SpO2 100% on 6L NC. Baseline O2 use is 2L NC. Telemetry with atrial fibrillation with rates mid 40s-low 60s. She is not currently on any rate control agents; metoprolol being held. Continue to hold diuretics and antihypertensives for low pressures. NM lung perfusion CT ordered for today. Will order device interrogation of her pacemaker. Check limited TTE to assess LV. Check orthostatic VS. Continue to hold Eliquis for now until we see TTE results. Will discuss with my collaborating physician Dr. Moser. We will continue to follow with you.     3/5: Patient resting comfortably bed this morning.  Denies chest pain, pressure or palpitations.  She denies lightheadedness or dizziness.  Echocardiogram completed yesterday revealing a preserved ejection fraction of 55 to 60%, no regional wall motion abnormalities, grade 1 impaired relaxation pattern of left ventricular diastolic filling with normal left atrial filling pressure and normal right ventricular global systolic function.  Remains in atrial fibrillation on telemetry which is no longer bradycardic at this time and has actually occasionally been tachycardic with rates in the low 100s.  NM lung perfusion CT completed yesterday with low probability of acute pulmonary emboli.  Reviewed lab work this morning which revealed a sodium of 143, potassium 4.7, creatinine 1.38 and hemoglobin 10.0.  Blood pressures have improved with last recorded at 110/61.  The patient is currently on 6 L nasal cannula which is up from her baseline 2 L which she just wears at night at home.  Believe this patient would benefit from diuresis, but will defer this to nephrology  services who are following this patient.  Pacemaker interrogation completed yesterday without any abnormal findings.  Metoprolol has been resumed this morning per the admitting service with parameters for blood pressure and heart rate.  Will continue to follow the patient with you.        THERESA Hirsch-CNP

## 2025-03-06 LAB
ANION GAP SERPL CALCULATED.3IONS-SCNC: 11 MMOL/L (ref 10–20)
BACTERIA UR CULT: ABNORMAL
BUN SERPL-MCNC: 40 MG/DL (ref 6–23)
CALCIUM SERPL-MCNC: 9 MG/DL (ref 8.6–10.3)
CHLORIDE SERPL-SCNC: 107 MMOL/L (ref 98–107)
CO2 SERPL-SCNC: 31 MMOL/L (ref 21–32)
CREAT SERPL-MCNC: 1.3 MG/DL (ref 0.5–1.05)
EGFRCR SERPLBLD CKD-EPI 2021: 40 ML/MIN/1.73M*2
ERYTHROCYTE [DISTWIDTH] IN BLOOD BY AUTOMATED COUNT: 15.6 % (ref 11.5–14.5)
GLUCOSE SERPL-MCNC: 109 MG/DL (ref 74–99)
HCT VFR BLD AUTO: 30.7 % (ref 36–46)
HGB BLD-MCNC: 9.4 G/DL (ref 12–16)
MCH RBC QN AUTO: 30.8 PG (ref 26–34)
MCHC RBC AUTO-ENTMCNC: 30.6 G/DL (ref 32–36)
MCV RBC AUTO: 101 FL (ref 80–100)
NRBC BLD-RTO: 0 /100 WBCS (ref 0–0)
PLATELET # BLD AUTO: 124 X10*3/UL (ref 150–450)
POTASSIUM SERPL-SCNC: 4.1 MMOL/L (ref 3.5–5.3)
RBC # BLD AUTO: 3.05 X10*6/UL (ref 4–5.2)
SODIUM SERPL-SCNC: 145 MMOL/L (ref 136–145)
WBC # BLD AUTO: 6.1 X10*3/UL (ref 4.4–11.3)

## 2025-03-06 PROCEDURE — 97530 THERAPEUTIC ACTIVITIES: CPT | Mod: GP,CQ

## 2025-03-06 PROCEDURE — 2500000005 HC RX 250 GENERAL PHARMACY W/O HCPCS: Performed by: INTERNAL MEDICINE

## 2025-03-06 PROCEDURE — 36415 COLL VENOUS BLD VENIPUNCTURE: CPT | Performed by: STUDENT IN AN ORGANIZED HEALTH CARE EDUCATION/TRAINING PROGRAM

## 2025-03-06 PROCEDURE — 99232 SBSQ HOSP IP/OBS MODERATE 35: CPT | Performed by: INTERNAL MEDICINE

## 2025-03-06 PROCEDURE — 85027 COMPLETE CBC AUTOMATED: CPT | Performed by: STUDENT IN AN ORGANIZED HEALTH CARE EDUCATION/TRAINING PROGRAM

## 2025-03-06 PROCEDURE — 99232 SBSQ HOSP IP/OBS MODERATE 35: CPT

## 2025-03-06 PROCEDURE — 94760 N-INVAS EAR/PLS OXIMETRY 1: CPT

## 2025-03-06 PROCEDURE — 2500000001 HC RX 250 WO HCPCS SELF ADMINISTERED DRUGS (ALT 637 FOR MEDICARE OP): Performed by: STUDENT IN AN ORGANIZED HEALTH CARE EDUCATION/TRAINING PROGRAM

## 2025-03-06 PROCEDURE — 97530 THERAPEUTIC ACTIVITIES: CPT | Mod: GO,CO

## 2025-03-06 PROCEDURE — 2500000001 HC RX 250 WO HCPCS SELF ADMINISTERED DRUGS (ALT 637 FOR MEDICARE OP): Performed by: INTERNAL MEDICINE

## 2025-03-06 PROCEDURE — 2500000002 HC RX 250 W HCPCS SELF ADMINISTERED DRUGS (ALT 637 FOR MEDICARE OP, ALT 636 FOR OP/ED): Performed by: STUDENT IN AN ORGANIZED HEALTH CARE EDUCATION/TRAINING PROGRAM

## 2025-03-06 PROCEDURE — 2500000004 HC RX 250 GENERAL PHARMACY W/ HCPCS (ALT 636 FOR OP/ED): Performed by: INTERNAL MEDICINE

## 2025-03-06 PROCEDURE — 80048 BASIC METABOLIC PNL TOTAL CA: CPT | Performed by: STUDENT IN AN ORGANIZED HEALTH CARE EDUCATION/TRAINING PROGRAM

## 2025-03-06 PROCEDURE — 2500000004 HC RX 250 GENERAL PHARMACY W/ HCPCS (ALT 636 FOR OP/ED): Performed by: STUDENT IN AN ORGANIZED HEALTH CARE EDUCATION/TRAINING PROGRAM

## 2025-03-06 PROCEDURE — 2060000001 HC INTERMEDIATE ICU ROOM DAILY

## 2025-03-06 RX ORDER — FUROSEMIDE 80 MG/1
80 TABLET ORAL DAILY
Status: DISCONTINUED | OUTPATIENT
Start: 2025-03-07 | End: 2025-03-08 | Stop reason: HOSPADM

## 2025-03-06 RX ORDER — FUROSEMIDE 10 MG/ML
60 INJECTION INTRAMUSCULAR; INTRAVENOUS ONCE
Status: DISCONTINUED | OUTPATIENT
Start: 2025-03-06 | End: 2025-03-08 | Stop reason: HOSPADM

## 2025-03-06 RX ADMIN — POTASSIUM CHLORIDE 20 MEQ: 1500 TABLET, EXTENDED RELEASE ORAL at 09:59

## 2025-03-06 RX ADMIN — METOPROLOL SUCCINATE 50 MG: 50 TABLET, EXTENDED RELEASE ORAL at 09:44

## 2025-03-06 RX ADMIN — TRAZODONE HYDROCHLORIDE 100 MG: 100 TABLET ORAL at 20:23

## 2025-03-06 RX ADMIN — SIMVASTATIN 10 MG: 10 TABLET, FILM COATED ORAL at 20:23

## 2025-03-06 RX ADMIN — ROPINIROLE HYDROCHLORIDE 2 MG: 2 TABLET, FILM COATED ORAL at 22:12

## 2025-03-06 RX ADMIN — SERTRALINE 100 MG: 100 TABLET, FILM COATED ORAL at 09:45

## 2025-03-06 RX ADMIN — CEFTRIAXONE SODIUM 1 G: 1 INJECTION, SOLUTION INTRAVENOUS at 15:20

## 2025-03-06 RX ADMIN — Medication 38 MG OF IRON: at 09:57

## 2025-03-06 RX ADMIN — Medication 5 L/MIN: at 10:26

## 2025-03-06 RX ADMIN — APIXABAN 5 MG: 5 TABLET, FILM COATED ORAL at 09:44

## 2025-03-06 RX ADMIN — Medication 44 PERCENT: at 07:15

## 2025-03-06 RX ADMIN — APIXABAN 5 MG: 5 TABLET, FILM COATED ORAL at 20:23

## 2025-03-06 RX ADMIN — Medication 5 L/MIN: at 23:06

## 2025-03-06 RX ADMIN — POLYETHYLENE GLYCOL 3350 17 G: 17 POWDER, FOR SOLUTION ORAL at 09:45

## 2025-03-06 ASSESSMENT — COGNITIVE AND FUNCTIONAL STATUS - GENERAL
DRESSING REGULAR UPPER BODY CLOTHING: A LOT
DRESSING REGULAR LOWER BODY CLOTHING: A LOT
DRESSING REGULAR LOWER BODY CLOTHING: A LITTLE
MOBILITY SCORE: 18
EATING MEALS: A LOT
MOVING FROM LYING ON BACK TO SITTING ON SIDE OF FLAT BED WITH BEDRAILS: A LOT
MOVING TO AND FROM BED TO CHAIR: A LOT
WALKING IN HOSPITAL ROOM: A LOT
MOBILITY SCORE: 18
DAILY ACTIVITIY SCORE: 18
MOBILITY SCORE: 12
WALKING IN HOSPITAL ROOM: A LITTLE
DRESSING REGULAR LOWER BODY CLOTHING: A LITTLE
PERSONAL GROOMING: A LITTLE
MOVING FROM LYING ON BACK TO SITTING ON SIDE OF FLAT BED WITH BEDRAILS: A LITTLE
PERSONAL GROOMING: A LITTLE
MOVING FROM LYING ON BACK TO SITTING ON SIDE OF FLAT BED WITH BEDRAILS: A LITTLE
STANDING UP FROM CHAIR USING ARMS: A LOT
TOILETING: A LITTLE
DRESSING REGULAR UPPER BODY CLOTHING: A LITTLE
HELP NEEDED FOR BATHING: A LITTLE
DRESSING REGULAR UPPER BODY CLOTHING: A LITTLE
CLIMB 3 TO 5 STEPS WITH RAILING: A LOT
STANDING UP FROM CHAIR USING ARMS: A LITTLE
HELP NEEDED FOR BATHING: A LOT
TURNING FROM BACK TO SIDE WHILE IN FLAT BAD: A LOT
CLIMB 3 TO 5 STEPS WITH RAILING: A LITTLE
DAILY ACTIVITIY SCORE: 19
TOILETING: A LOT
CLIMB 3 TO 5 STEPS WITH RAILING: A LITTLE
DAILY ACTIVITIY SCORE: 12
TURNING FROM BACK TO SIDE WHILE IN FLAT BAD: A LITTLE
PERSONAL GROOMING: A LOT
HELP NEEDED FOR BATHING: A LITTLE
MOVING TO AND FROM BED TO CHAIR: A LITTLE
EATING MEALS: A LITTLE
TURNING FROM BACK TO SIDE WHILE IN FLAT BAD: A LITTLE
STANDING UP FROM CHAIR USING ARMS: A LITTLE
WALKING IN HOSPITAL ROOM: A LITTLE
TOILETING: A LITTLE
MOVING TO AND FROM BED TO CHAIR: A LITTLE

## 2025-03-06 ASSESSMENT — PAIN SCALES - GENERAL
PAINLEVEL_OUTOF10: 0 - NO PAIN
PAINLEVEL_OUTOF10: 0 - NO PAIN
PAINLEVEL_OUTOF10: 4
PAINLEVEL_OUTOF10: 0 - NO PAIN

## 2025-03-06 ASSESSMENT — PAIN - FUNCTIONAL ASSESSMENT
PAIN_FUNCTIONAL_ASSESSMENT: 0-10
PAIN_FUNCTIONAL_ASSESSMENT: 0-10

## 2025-03-06 NOTE — PROGRESS NOTES
Physical Therapy    Physical Therapy Treatment    Patient Name: Raúl Jordan  MRN: [id number]  Department: [address]  Room: 01/01-A  Today's Date: 3/6/2025  Time Calculation  Start Time: 0954  Stop Time: 1009  Time Calculation (min): 15 min         Assessment/Plan   PT Assessment  Rehab Prognosis: Good  Barriers to Discharge Home: No anticipated barriers  End of Session Communication: Bedside nurse  Assessment Comment: pt with mild deficits in functional mobility, strength, and tolerance to activity. pt is functioning closely to her baseline and will benefit from continued skilled therapy services to improve her functional performance and maximize safety prior to discharge home.  End of Session Patient Position: Bed, 3 rail up, Alarm on  PT Plan  Inpatient/Swing Bed or Outpatient: Inpatient  PT Plan  Treatment/Interventions: Bed mobility, Transfer training, Gait training, Balance training, Strengthening, Endurance training, Therapeutic exercise, Therapeutic activity  PT Plan: Ongoing PT  PT Frequency: 4 times per week  PT Discharge Recommendations: Low intensity level of continued care  Equipment Recommended upon Discharge: Wheeled walker  PT Recommended Transfer Status: Assist x1  PT - OK to Discharge: Yes      General Visit Information:   PT  Visit  PT Received On: 03/06/25  General  Reason for Referral: Impaired ADL's/Mobility  Referred By: Amos Yang MD  Past Medical History Relevant to Rehab: afib, complete heart block, pacemaker, LAZARA, CHF, DM, HTN, GIB, PNA, R TKR  Prior to Session Communication: Bedside nurse  Patient Position Received: Up in chair, Alarm off, caregiver present  General Comment: Pt cleared for PT by nursing. Pt agreeable to PT services.    Subjective   Precautions:  Precautions  Medical Precautions: Fall precautions, Oxygen therapy device and L/min (5L 02 NC)  Precautions Comment: + tele      Objective   Pain:  Pain Assessment  Pain Assessment: 0-10  0-10 (Numeric) Pain Score: 0 - No  pain  Cognition:  Cognition  Overall Cognitive Status: Within Functional Limits  Orientation Level: Oriented X4     Postural Control:  Static Sitting Balance  Static Sitting-Balance Support: Feet supported  Static Sitting-Level of Assistance: Distant supervision  Static Sitting-Comment/Number of Minutes: good seated static balance  Static Standing Balance  Static Standing-Balance Support: No upper extremity supported  Static Standing-Level of Assistance: Contact guard  Static Standing-Comment/Number of Minutes: good static stand balance    Treatments:  Bed Mobility  Bed Mobility: Yes  Bed Mobility 1  Bed Mobility 1: Sitting to supine  Level of Assistance 1: Minimum assistance  Bed Mobility Comments 1: HOB flat, use of bed rails. Light Lindsey to help bring BLE over EOB.    Ambulation/Gait Training  Ambulation/Gait Training Performed: Yes (Pt reports increased fatigue and declines further amb distances this session.)  Ambulation/Gait Training 1  Surface 1: Level tile  Device 1: No device  Assistance 1: Contact guard  Quality of Gait 1: Decreased step length  Comments/Distance (ft) 1: ~5 pivoting steps from bedside chair to EOB. Light HHA for stability d/t small space. Good safety awareness noted.  Transfers  Transfer: Yes  Transfer 1  Transfer From 1: Sit to, Stand to  Transfer to 1: Stand, Sit  Technique 1: Sit to stand, Stand to sit  Transfer Level of Assistance 1: Contact guard  Trials/Comments 1: Cues for ant scooting. Pt demos safe hand placement and eccentric control.    Outcome Measures:  [facility] Basic Mobility  Turning from your back to your side while in a flat bed without using bedrails: A little  Moving from lying on your back to sitting on the side of a flat bed without using bedrails: A little  Moving to and from bed to chair (including a wheelchair): A little  Standing up from a chair using your arms (e.g. wheelchair or bedside chair): A little  To walk in hospital room: A little  Climbing 3-5 steps with  payton: A little  Basic Mobility - Total Score: 18    Education Documentation  Handouts, taught by Kim Mendez PTA at 3/6/2025 11:16 AM.  Learner: Patient  Readiness: Acceptance  Method: Explanation, Demonstration  Response: Verbalizes Understanding, Demonstrated Understanding    Precautions, taught by Kim Mendez PTA at 3/6/2025 11:16 AM.  Learner: Patient  Readiness: Acceptance  Method: Explanation, Demonstration  Response: Verbalizes Understanding, Demonstrated Understanding    Body Mechanics, taught by Kim Mendez PTA at 3/6/2025 11:16 AM.  Learner: Patient  Readiness: Acceptance  Method: Explanation, Demonstration  Response: Verbalizes Understanding, Demonstrated Understanding    Home Exercise Program, taught by Kim Mendez PTA at 3/6/2025 11:16 AM.  Learner: Patient  Readiness: Acceptance  Method: Explanation, Demonstration  Response: Verbalizes Understanding, Demonstrated Understanding    Mobility Training, taught by Kim Mendez PTA at 3/6/2025 11:16 AM.  Learner: Patient  Readiness: Acceptance  Method: Explanation, Demonstration  Response: Verbalizes Understanding, Demonstrated Understanding    Education Comments  No comments found.      Encounter Problems       Encounter Problems (Active)       PT STG Problem       Patient will transfer supine to sit and sit to supine with independent assist to facilitate mobility. (Progressing)       Start:  03/05/25    Expected End:  03/19/25            Patient will transfer sit to stand and stand to sit with independent assist to facilitate mobility. (Progressing)       Start:  03/05/25    Expected End:  03/19/25            Patient will transfer bed to chair and chair to bed with independent assist to facilitate mobility. (Progressing)       Start:  03/05/25    Expected End:  03/19/25            Patient will amb 100 feet rolling walker device including two turns on even surface with supervision assist to facilitate safe mobility.   (Progressing)        Start:  03/05/25    Expected End:  03/19/25            Patient will increase B LE strength to 4+/5 to improve functional mobility.  (Not Progressing)       Start:  03/05/25    Expected End:  03/19/25

## 2025-03-06 NOTE — CARE PLAN
The patient's goals for the shift include      The clinical goals for the shift include free from falls    Over the shift, the patient did not make progress toward the following goals. Barriers to progression include  Recommendations to address these barriers include   Problem: Safety - Adult  Goal: Free from fall injury  Outcome: Progressing     Problem: Chronic Conditions and Co-morbidities  Goal: Patient's chronic conditions and co-morbidity symptoms are monitored and maintained or improved  Outcome: Progressing     Problem: Nutrition  Goal: Nutrient intake appropriate for maintaining nutritional needs  Outcome: Progressing

## 2025-03-06 NOTE — PROGRESS NOTES
"Raúl Jordan is a 84 y.o. female on day 2 of admission presenting with Syncope and collapse.    Subjective   Patient resting comfortably in bed. Reports feeling fatigued this morning. Denies chest pain, pressure or palpitations.        Objective     Physical Exam  Vitals reviewed.   HENT:      Head: Normocephalic and atraumatic.   Cardiovascular:      Rate and Rhythm: Normal rate. Rhythm irregular.      Heart sounds: No murmur heard.     No friction rub. No gallop.   Pulmonary:      Effort: Pulmonary effort is normal.      Comments: On 5L NC, breath sounds diminished in the bilateral lung bases  Abdominal:      General: Bowel sounds are normal.      Palpations: Abdomen is soft.   Musculoskeletal:      Right lower leg: Edema present.      Left lower leg: Edema present.      Comments: Trace bilateral lower extremity edema   Skin:     General: Skin is warm and dry.      Capillary Refill: Capillary refill takes less than 2 seconds.      Findings: Bruising present.   Neurological:      Mental Status: She is alert and oriented to person, place, and time.   Psychiatric:         Mood and Affect: Mood normal.         Behavior: Behavior normal.         Last Recorded Vitals  Blood pressure (!) 109/46, pulse 77, temperature 36.2 °C (97.1 °F), temperature source Temporal, resp. rate 17, height 1.575 m (5' 2\"), weight 110 kg (242 lb 8.1 oz), SpO2 96%.  Intake/Output last 3 Shifts:  I/O last 3 completed shifts:  In: 110 (1 mL/kg) [P.O.:60; IV Piggyback:50]  Out: 450 (4.1 mL/kg) [Urine:450 (0.1 mL/kg/hr)]  Weight: 110 kg     Relevant Results  Results for orders placed or performed during the hospital encounter of 03/03/25 (from the past 24 hours)   Cardiac Device Check - Remote   Result Value Ref Range    BSA 2.2 m2   CBC   Result Value Ref Range    WBC 6.1 4.4 - 11.3 x10*3/uL    nRBC 0.0 0.0 - 0.0 /100 WBCs    RBC 3.05 (L) 4.00 - 5.20 x10*6/uL    Hemoglobin 9.4 (L) 12.0 - 16.0 g/dL    Hematocrit 30.7 (L) 36.0 - 46.0 %     " (H) 80 - 100 fL    MCH 30.8 26.0 - 34.0 pg    MCHC 30.6 (L) 32.0 - 36.0 g/dL    RDW 15.6 (H) 11.5 - 14.5 %    Platelets 124 (L) 150 - 450 x10*3/uL   Basic metabolic panel   Result Value Ref Range    Glucose 109 (H) 74 - 99 mg/dL    Sodium 145 136 - 145 mmol/L    Potassium 4.1 3.5 - 5.3 mmol/L    Chloride 107 98 - 107 mmol/L    Bicarbonate 31 21 - 32 mmol/L    Anion Gap 11 10 - 20 mmol/L    Urea Nitrogen 40 (H) 6 - 23 mg/dL    Creatinine 1.30 (H) 0.50 - 1.05 mg/dL    eGFR 40 (L) >60 mL/min/1.73m*2    Calcium 9.0 8.6 - 10.3 mg/dL     *Note: Due to a large number of results and/or encounters for the requested time period, some results have not been displayed. A complete set of results can be found in Results Review.                 Assessment/Plan   Assessment & Plan  Syncope and collapse    Chronic diastolic heart failure    Longstanding persistent atrial fibrillation (Multi)    LAZARA (obstructive sleep apnea)    Stage 3a chronic kidney disease (Multi)    Acute on chronic respiratory failure with hypoxia (Multi)    Syncope  Longstanding persistent atrial fibrillation  Primary hypertension  Hyperlipidemia  Chronic kidney disease IIIa  Micra leadless pacemaker insertion November 2018 for complete heart block     3/3: The patient was seen in the emergency department following her syncopal episode.  Again she describes no chest pain or anginal type symptoms.  EKG in the emergency department revealed atrial fibrillation and no significant ST segment changes.  She was hemodynamically stable in the emergency department but the etiology of the syncope remains unclear.  Echocardiogram in September revealed normal left ventricular systolic function with ejection fraction estimated at 65 to 70%.  The patient also has implanted a leadless Micra pacemaker.  Thus a bradycardia arrhythmia would be very unlikely.  Would monitor the patient's rhythm on telemetry and follow blood pressures as well.  Will follow with you.     3/4: As  above. Labs this AM with sodium 142, creatinine at baseline 1.46, troponin 235, hemoglobin 10.3. Blood pressures have been mildly hypotensive with most recent reading of 104/92. SpO2 100% on 6L NC. Baseline O2 use is 2L NC. Telemetry with atrial fibrillation with rates mid 40s-low 60s. She is not currently on any rate control agents; metoprolol being held. Continue to hold diuretics and antihypertensives for low pressures. NM lung perfusion CT ordered for today. Will order device interrogation of her pacemaker. Check limited TTE to assess LV. Check orthostatic VS. Continue to hold Eliquis for now until we see TTE results. Will discuss with my collaborating physician Dr. Moser. We will continue to follow with you.      3/5: Patient resting comfortably bed this morning.  Denies chest pain, pressure or palpitations.  She denies lightheadedness or dizziness.  Echocardiogram completed yesterday revealing a preserved ejection fraction of 55 to 60%, no regional wall motion abnormalities, grade 1 impaired relaxation pattern of left ventricular diastolic filling with normal left atrial filling pressure and normal right ventricular global systolic function.  Remains in atrial fibrillation on telemetry which is no longer bradycardic at this time and has actually occasionally been tachycardic with rates in the low 100s.  NM lung perfusion CT completed yesterday with low probability of acute pulmonary emboli.  Reviewed lab work this morning which revealed a sodium of 143, potassium 4.7, creatinine 1.38 and hemoglobin 10.0.  Blood pressures have improved with last recorded at 110/61.  The patient is currently on 6 L nasal cannula which is up from her baseline 2 L which she just wears at night at home.  Believe this patient would benefit from diuresis, but will defer this to nephrology services who are following this patient.  Pacemaker interrogation completed yesterday without any abnormal findings.  Metoprolol has been resumed  this morning per the admitting service with parameters for blood pressure and heart rate.  Will continue to follow the patient with you.    3/6: Patient resting comfortably in bed this morning.  Reports feeling fatigued and that she did not sleep well last night.  Denies any chest pain or pressure.  Remains in a demand paced rhythm on telemetry without significant ectopy.  She has been weaned to 5 L nasal cannula this morning with adequate pulse oximetry.  Reviewed her lab work this morning and her creatinine has improved slightly to 1.30.  Sodium 145 potassium 4.1, hemoglobin of 9.4.  High-sensitivity troponin I levels on admission did rise from , and while this is likely due to the patient's acute hypoxic respiratory failure she did have some concerning EKG changes on admission and eventually would like to proceed with some sort of ischemic workup, potentially a nuclear stress test but would like her to recover from the respiratory perspective first. Discussed with pulmonary medicine as well as nephrology and nephrology will give further IV diuretics today. Eliquis has been resumed given there are no plans for invasive procedures at this time. Will continue to follow this patient with you.          Karlene Dorado, APRN-CNP

## 2025-03-06 NOTE — CARE PLAN
The patient's goals for the shift include      The clinical goals for the shift include free from falls    Over the shift, the patient did not make progress toward the following goals. Barriers to progression include   Problem: Safety - Adult  Goal: Free from fall injury  3/6/2025 0744 by Zarina Keenan RN  Outcome: Progressing  3/6/2025 0744 by Zarina Keenan RN  Outcome: Progressing     Problem: Nutrition  Goal: Nutrient intake appropriate for maintaining nutritional needs  3/6/2025 0744 by Zarina Keenan RN  Outcome: Progressing  3/6/2025 0744 by Zarina Keenan RN  Outcome: Progressing     Problem: Skin  Goal: Decreased wound size/increased tissue granulation at next dressing change  3/6/2025 0744 by Zarina Keenan RN  Outcome: Progressing  3/6/2025 0744 by Zarina Keenan RN  Outcome: Progressing  Goal: Participates in plan/prevention/treatment measures  3/6/2025 0744 by Zarina Keenan RN  Outcome: Progressing  3/6/2025 0744 by Zarina Keenan RN  Outcome: Progressing  Goal: Prevent/manage excess moisture  3/6/2025 0744 by Zarina Keenan RN  Outcome: Progressing  3/6/2025 0744 by Zarina Keenan RN  Outcome: Progressing  Goal: Prevent/minimize sheer/friction injuries  3/6/2025 0744 by Zarina Keenan RN  Outcome: Progressing  3/6/2025 0744 by Zarina Keenan RN  Outcome: Progressing  Goal: Promote/optimize nutrition  3/6/2025 0744 by Zarina Keenan RN  Outcome: Progressing  3/6/2025 0744 by Zarina Keenan RN  Outcome: Progressing  Goal: Promote skin healing  3/6/2025 0744 by Zarina Keenan RN  Outcome: Progressing  3/6/2025 0744 by Zarina Keenan RN  Outcome: Progressing    Recommendations to address these barriers include

## 2025-03-06 NOTE — PROGRESS NOTES
03/06/25 1536   Discharge Planning   Home or Post Acute Services In home services   Type of Home Care Services Home nursing visits;Home OT;Home PT   Expected Discharge Disposition [facility]  ([facility])   Does the patient need discharge transport arranged? No     Currently on 02 5L.  Baseline at home is 2L. Plan is for a stress test tomorrow.  PT is recommending low intensity rehab. Patient will resume care with [facility].  Will need an external referral.

## 2025-03-06 NOTE — CARE PLAN
The patient's goals for the shift include      The clinical goals for the shift include free from falls    Over the shift, the patient did not make progress toward the following goals. Barriers to progression include syncope. Recommendations to address these barriers include keep call light within reach.

## 2025-03-06 NOTE — PROGRESS NOTES
Department of Medicine  Division of Pulmonary, Critical Care, and Sleep Medicine  Progress Note    Subjective     Raúl Jordan is a 84 y.o. female on day 2 of admission presenting with Syncope and collapse.    Pt was seen today resting comfortably in bed wearing 5 L nasal cannula with her daughter at bedside getting ready to eat lunch.  States she did not get much sleep last night.  Denies chest pain or shortness of breath.      Objective     Vitals:      3/5/2025     7:57 AM 3/5/2025    11:28 AM 3/5/2025     4:36 PM 3/5/2025     8:23 PM 3/5/2025    11:44 PM 3/6/2025     3:39 AM 3/6/2025     8:46 AM   Vitals   Systolic 110 117 120 126 105 111 109   Diastolic 61 59 66 65 62 57 46   BP Location Right arm Right arm Right arm Right arm Right arm Left arm Left arm   Heart Rate 101   89 81 70 77   Temp 35.3 °C (95.5 °F) 36.4 °C (97.5 °F) 36.3 °C (97.3 °F) 36.6 °C (97.9 °F) 36.5 °C (97.7 °F) 36.6 °C (97.9 °F) 36.2 °C (97.1 °F)   Resp 20 18 18 18 18 18 17   Weight (lb)      242.51    BMI      44.35 kg/m2    BSA (m2)      2.19 m2         Oxygen Therapy:  SpO2: 96 %  Medical Gas Therapy: Supplemental oxygen (5L)  Medical Gas Delivery Method: Nasal cannula       Intake/Output::  I/O last 3 completed shifts:  In: 110 (1 mL/kg) [P.O.:60; IV Piggyback:50]  Out: 450 (4.1 mL/kg) [Urine:450 (0.1 mL/kg/hr)]  Weight: 110 kg     Physical Exam:  Physical Exam  Constitutional:       General: She is not in acute distress.     Appearance: She is obese. She is not toxic-appearing.      Comments: Frail elderly female   HENT:      Head: Normocephalic and atraumatic.      Nose: Nose normal.      Comments: Nasal cannula in place     Mouth/Throat:      Pharynx: Oropharynx is clear.   Eyes:      Extraocular Movements: Extraocular movements intact.   Neck:      Comments: No visualized masses  Cardiovascular:      Rate and Rhythm: Tachycardia present. Rhythm irregular.      Heart sounds: No murmur heard.     No friction rub. No gallop.    Pulmonary:      Effort: No respiratory distress.      Breath sounds: Rales present. No wheezing or rhonchi.   Abdominal:      General: There is no distension.      Palpations: Abdomen is soft.      Tenderness: There is no abdominal tenderness. There is no guarding.   Musculoskeletal:         General: No tenderness.      Right lower leg: Edema present.      Left lower leg: Edema present.      Comments: Left greater than right   Skin:     General: Skin is warm and dry.      Findings: Rash (On right lower extremity) present.   Neurological:      Mental Status: She is alert and oriented to person, place, and time.   Psychiatric:         Mood and Affect: Mood normal.         Inpatient Medications:  apixaban, 5 mg, oral, BID  cefTRIAXone, 1 g, intravenous, q24h  ferrous gluconate, 38 mg of iron, oral, Daily with breakfast  [Held by provider] furosemide, 80 mg, oral, q AM  metoprolol succinate XL, 50 mg, oral, Daily  oxygen, , inhalation, Continuous - Inhalation  polyethylene glycol, 17 g, oral, Daily  potassium chloride CR, 20 mEq, oral, BID  rOPINIRole, 2 mg, oral, Nightly  sertraline, 100 mg, oral, Daily  simvastatin, 10 mg, oral, Nightly  traZODone, 100 mg, oral, Nightly         PRN medications: acetaminophen **OR** acetaminophen **OR** acetaminophen, ipratropium-albuteroL, nitroglycerin    Lab/Radiology/Diagnostic Review:  All labs and Imaging have been personally reviewed.     Assessment/Plan       Raúl Jordan is a Raúl Jordan is a 84 y.o. 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  Pulmonary was consulted for acute hypoxic respiratory failure.     Patient was seen and assessed by myself and his chart was reviewed for previous pulmonary visits, Labs and imaging.  Patient has chronic respiratory failure  and wears 2 L nasal cannula nightly.  Furthermore patient does have a history of HFpEF with multiple other cardiac comorbidities.  Patient is likely fluid overloaded.  Do not have concern for acute infection or pneumonia currently.  Echo during this hospitalization shows preserved ejection fraction with diastolic filling defects.  Nuclear perfusion scan is negative for PE.    Of note Home Lasix dose of 80 mg daily has been held.  But she did get a dose of 40 mg Lasix IV yesterday.  Patient will need continued diuresis in order to improve her oxygenation status.  Per review of her CT scan and her chest x-ray she has a right sided small pleural effusion.  Furthermore she appears to be vascular congestion within her chest.  Patient likely unable to coordinate I-S.  Will continue to encourage her.  Can consider hyperinflation therapy with respiratory therapy but again diuresis would be optimal here.  I have discussed the case with Dr. SONIYA Odell who agrees with diuresis today.     Assessment:  #Syncope  #LAZARA requiring 2 L nasal cannula nightly  #Acute on chronic hypoxic respiratory failure  #HFpEF  # JC on CKD  #Bilateral lower extremity swelling     Recommendations are as follows:  -Titrate FiO2 to SpO2 greater than 92%  -Nephrology to direct diuresis.  -Bronchopulmonary hygiene with incentive spirometer  -As needed bronchodilators   -Left lower extremity venous Doppler negative    Patient was seen and assessed by myself and his chart was reviewed for previous pulmonary visits, and Pulmonary related labs and imaging.       I spent 35 minutes in the professional and overall care of this patient.    Pulmonary team will continue to follow the patient peripherally while they are in house.    Heriberto Monroy MD, MPH  Pulmonary and Critical Care Medicine     Please excuse any typographical or unwanted errors as voice recognition software was used to complete this note.

## 2025-03-06 NOTE — PROGRESS NOTES
"Raúl Jordan is a 84 y.o. female on day 2 of admission presenting with Syncope and collapse.    Subjective      Patient is seen for chronic kidney stage III admitted with right side abdominal pain and flank pain she is positive for urinary tract infection with E. coli started on ceftriaxone yesterday received 1 dose of IV furosemide patient stated that feels she feels better she is not having any abdominal pain no fever no chills and less short of breath.  She responded well to diuretics she is in negative fluid balance about 450 mL    Objective     Physical Exam  Constitutional:       General: She is not in acute distress.     Appearance: Normal appearance. She is not toxic-appearing.   Neck:      Vascular: No carotid bruit.   Cardiovascular:      Heart sounds: No murmur heard.     No friction rub. No gallop.   Pulmonary:      Breath sounds: No wheezing, rhonchi or rales.   Abdominal:      Tenderness: There is no abdominal tenderness. There is no right CVA tenderness, left CVA tenderness or rebound.   Musculoskeletal:         General: No tenderness.      Cervical back: Neck supple.      Right lower leg: No edema.      Left lower leg: No edema.   Lymphadenopathy:      Cervical: No cervical adenopathy.         Last Recorded Vitals  Blood pressure (!) 109/46, pulse 77, temperature 36.2 °C (97.1 °F), temperature source Temporal, resp. rate 17, height 1.575 m (5' 2\"), weight 110 kg (242 lb 8.1 oz), SpO2 96%.    Intake/Output last 3 Shifts:  I/O last 3 completed shifts:  In: 110 (1 mL/kg) [P.O.:60; IV Piggyback:50]  Out: 450 (4.1 mL/kg) [Urine:450 (0.1 mL/kg/hr)]  Weight: 110 kg     Current Facility-Administered Medications:     acetaminophen (Tylenol) tablet 650 mg, 650 mg, oral, q4h PRN, 650 mg at 03/05/25 2035 **OR** acetaminophen (Tylenol) oral liquid 650 mg, 650 mg, nasogastric tube, q4h PRN **OR** acetaminophen (Tylenol) suppository 650 mg, 650 mg, rectal, q4h PRN, Sang Landrum MD    apixaban (Eliquis) tablet 5 " mg, 5 mg, oral, BID, Amos Yang MD, 5 mg at 03/06/25 0944    cefTRIAXone (Rocephin) 1 g in dextrose (iso) IV 50 mL, 1 g, intravenous, q24h, Amos Yang MD, Stopped at 03/05/25 2015    ferrous gluconate (Fergon) 324 (38 Fe) mg tablet 38 mg of iron, 38 mg of iron, oral, Daily with breakfast, Sang Landrum MD, 38 mg of iron at 03/06/25 0957    [Held by provider] furosemide (Lasix) tablet 80 mg, 80 mg, oral, q AM, Sang Landrum MD    ipratropium-albuteroL (Duo-Neb) 0.5-2.5 mg/3 mL nebulizer solution 3 mL, 3 mL, nebulization, q6h PRN, Heriberto Monroy MD    metoprolol succinate XL (Toprol-XL) 24 hr tablet 50 mg, 50 mg, oral, Daily, Amos Yang MD, 50 mg at 03/06/25 0944    nitroglycerin (Nitrostat) SL tablet 0.4 mg, 0.4 mg, sublingual, q5 min PRN, Beckie Horan,     oxygen (O2) therapy, , inhalation, Continuous - Inhalation, Amos Yang MD    polyethylene glycol (Glycolax, Miralax) packet 17 g, 17 g, oral, Daily, Sang Landrum MD, 17 g at 03/06/25 0945    potassium chloride CR (Klor-Con M20) ER tablet 20 mEq, 20 mEq, oral, BID, Sang Landrum MD, 20 mEq at 03/06/25 0959    rOPINIRole (Requip) tablet 2 mg, 2 mg, oral, Nightly, Sang Landrum MD, 2 mg at 03/05/25 2010    sertraline (Zoloft) tablet 100 mg, 100 mg, oral, Daily, Sang Landrum MD, 100 mg at 03/06/25 0945    simvastatin (Zocor) tablet 10 mg, 10 mg, oral, Nightly, Sang Landrum MD, 10 mg at 03/05/25 2010    traZODone (Desyrel) tablet 100 mg, 100 mg, oral, Nightly, Sang Landrum MD, 100 mg at 03/05/25 2010   Relevant Results    Results for orders placed or performed during the hospital encounter of 03/03/25 (from the past 96 hours)   CBC and Auto Differential   Result Value Ref Range    WBC 5.5 4.4 - 11.3 x10*3/uL    nRBC 0.0 0.0 - 0.0 /100 WBCs    RBC 3.71 (L) 4.00 - 5.20 x10*6/uL    Hemoglobin 11.6 (L) 12.0 - 16.0 g/dL    Hematocrit 37.3 36.0 - 46.0 %     (H) 80 - 100 fL    MCH 31.3 26.0 - 34.0 pg    MCHC 31.1 (L) 32.0 - 36.0  g/dL    RDW 16.1 (H) 11.5 - 14.5 %    Platelets 122 (L) 150 - 450 x10*3/uL    Neutrophils % 73.6 40.0 - 80.0 %    Immature Granulocytes %, Automated 1.1 (H) 0.0 - 0.9 %    Lymphocytes % 13.0 13.0 - 44.0 %    Monocytes % 9.9 2.0 - 10.0 %    Eosinophils % 1.5 0.0 - 6.0 %    Basophils % 0.9 0.0 - 2.0 %    Neutrophils Absolute 4.02 1.60 - 5.50 x10*3/uL    Immature Granulocytes Absolute, Automated 0.06 0.00 - 0.50 x10*3/uL    Lymphocytes Absolute 0.71 (L) 0.80 - 3.00 x10*3/uL    Monocytes Absolute 0.54 0.05 - 0.80 x10*3/uL    Eosinophils Absolute 0.08 0.00 - 0.40 x10*3/uL    Basophils Absolute 0.05 0.00 - 0.10 x10*3/uL   Comprehensive Metabolic Panel   Result Value Ref Range    Glucose 171 (H) 74 - 99 mg/dL    Sodium 142 136 - 145 mmol/L    Potassium 3.9 3.5 - 5.3 mmol/L    Chloride 104 98 - 107 mmol/L    Bicarbonate 29 21 - 32 mmol/L    Anion Gap 13 10 - 20 mmol/L    Urea Nitrogen 36 (H) 6 - 23 mg/dL    Creatinine 1.43 (H) 0.50 - 1.05 mg/dL    eGFR 36 (L) >60 mL/min/1.73m*2    Calcium 8.8 8.6 - 10.3 mg/dL    Albumin 3.8 3.4 - 5.0 g/dL    Alkaline Phosphatase 104 33 - 136 U/L    Total Protein 6.0 (L) 6.4 - 8.2 g/dL    AST 25 9 - 39 U/L    Bilirubin, Total 1.0 0.0 - 1.2 mg/dL    ALT 16 7 - 45 U/L   Troponin I, High Sensitivity   Result Value Ref Range    Troponin I, High Sensitivity 27 (H) 0 - 13 ng/L   Protime-INR   Result Value Ref Range    Protime 15.0 (H) 9.3 - 12.7 seconds    INR 1.4 (H) 0.9 - 1.2   Magnesium   Result Value Ref Range    Magnesium 2.26 1.60 - 2.40 mg/dL   B-Type Natriuretic Peptide   Result Value Ref Range     (H) 0 - 99 pg/mL   Blood Gas Venous Full Panel   Result Value Ref Range    POCT pH, Venous 7.30 (L) 7.33 - 7.43 pH    POCT pCO2, Venous 63 (H) 41 - 51 mm Hg    POCT pO2, Venous 20 (L) 35 - 45 mm Hg    POCT SO2, Venous 27 (L) 45 - 75 %    POCT Oxy Hemoglobin, Venous 26.7 (L) 45.0 - 75.0 %    POCT Hematocrit Calculated, Venous 34.0 (L) 36.0 - 46.0 %    POCT Sodium, Venous 140 136 - 145  mmol/L    POCT Potassium, Venous 4.0 3.5 - 5.3 mmol/L    POCT Chloride, Venous 103 98 - 107 mmol/L    POCT Ionized Calicum, Venous 1.14 1.10 - 1.33 mmol/L    POCT Glucose, Venous 160 (H) 74 - 99 mg/dL    POCT Lactate, Venous 1.6 0.4 - 2.0 mmol/L    POCT Base Excess, Venous 3.2 (H) -2.0 - 3.0 mmol/L    POCT HCO3 Calculated, Venous 31.0 (H) 22.0 - 26.0 mmol/L    POCT Hemoglobin, Venous 11.3 (L) 12.0 - 16.0 g/dL    POCT Anion Gap, Venous 10.0 10.0 - 25.0 mmol/L    Patient Temperature 37.0 degrees Celsius    FiO2 44 %   ECG 12 lead   Result Value Ref Range    Ventricular Rate 102 BPM    QRS Duration 82 ms    QT Interval 396 ms    QTC Calculation(Bazett) 516 ms    R Axis 109 degrees    T Axis 77 degrees    QRS Count 17 beats    Q Onset 216 ms    T Offset 414 ms    QTC Fredericia 472 ms   Blood Gas Arterial Full Panel   Result Value Ref Range    POCT pH, Arterial 7.36 (L) 7.38 - 7.42 pH    POCT pCO2, Arterial 54 (H) 38 - 42 mm Hg    POCT pO2, Arterial 68 (L) 85 - 95 mm Hg    POCT SO2, Arterial 94 94 - 100 %    POCT Oxy Hemoglobin, Arterial 91.1 (L) 94.0 - 98.0 %    POCT Hematocrit Calculated, Arterial 32.0 (L) 36.0 - 46.0 %    POCT Sodium, Arterial 139 136 - 145 mmol/L    POCT Potassium, Arterial 3.8 3.5 - 5.3 mmol/L    POCT Chloride, Arterial 103 98 - 107 mmol/L    POCT Ionized Calcium, Arterial 1.14 1.10 - 1.33 mmol/L    POCT Glucose, Arterial 139 (H) 74 - 99 mg/dL    POCT Lactate, Arterial 0.7 0.4 - 2.0 mmol/L    POCT Base Excess, Arterial 4.1 (H) -2.0 - 3.0 mmol/L    POCT HCO3 Calculated, Arterial 30.5 (H) 22.0 - 26.0 mmol/L    POCT Hemoglobin, Arterial 10.6 (L) 12.0 - 16.0 g/dL    POCT Anion Gap, Arterial 9 (L) 10 - 25 mmo/L    Patient Temperature 37.0 degrees Celsius    FiO2 44 %    Apparatus CANNULA     Site of Arterial Puncture Radial Right     Kentrell's Test Positive    Troponin I, High Sensitivity   Result Value Ref Range    Troponin I, High Sensitivity 235 (HH) 0 - 13 ng/L   CBC   Result Value Ref Range    WBC  7.3 4.4 - 11.3 x10*3/uL    nRBC 0.0 0.0 - 0.0 /100 WBCs    RBC 3.31 (L) 4.00 - 5.20 x10*6/uL    Hemoglobin 10.3 (L) 12.0 - 16.0 g/dL    Hematocrit 33.3 (L) 36.0 - 46.0 %     (H) 80 - 100 fL    MCH 31.1 26.0 - 34.0 pg    MCHC 30.9 (L) 32.0 - 36.0 g/dL    RDW 16.1 (H) 11.5 - 14.5 %    Platelets 106 (L) 150 - 450 x10*3/uL   Basic metabolic panel   Result Value Ref Range    Glucose 107 (H) 74 - 99 mg/dL    Sodium 142 136 - 145 mmol/L    Potassium 3.8 3.5 - 5.3 mmol/L    Chloride 106 98 - 107 mmol/L    Bicarbonate 29 21 - 32 mmol/L    Anion Gap 11 10 - 20 mmol/L    Urea Nitrogen 34 (H) 6 - 23 mg/dL    Creatinine 1.36 (H) 0.50 - 1.05 mg/dL    eGFR 38 (L) >60 mL/min/1.73m*2    Calcium 8.5 (L) 8.6 - 10.3 mg/dL   Transthoracic Echo (TTE) Limited   Result Value Ref Range    AV pk sarika 1.15 m/s    LVOT diam 2.00 cm    Tricuspid annular plane systolic excursion 2.1 cm    LV EF 58 %    RVSP 59.6 mmHg    AV pk grad 5 mmHg    Aortic Valve Area by Continuity of Peak Velocity 2.22 cm2    LV A4C EF 68.4    CBC   Result Value Ref Range    WBC 6.9 4.4 - 11.3 x10*3/uL    nRBC 0.0 0.0 - 0.0 /100 WBCs    RBC 3.24 (L) 4.00 - 5.20 x10*6/uL    Hemoglobin 10.0 (L) 12.0 - 16.0 g/dL    Hematocrit 32.6 (L) 36.0 - 46.0 %     (H) 80 - 100 fL    MCH 30.9 26.0 - 34.0 pg    MCHC 30.7 (L) 32.0 - 36.0 g/dL    RDW 15.7 (H) 11.5 - 14.5 %    Platelets 126 (L) 150 - 450 x10*3/uL   Basic metabolic panel   Result Value Ref Range    Glucose 107 (H) 74 - 99 mg/dL    Sodium 143 136 - 145 mmol/L    Potassium 4.7 3.5 - 5.3 mmol/L    Chloride 106 98 - 107 mmol/L    Bicarbonate 29 21 - 32 mmol/L    Anion Gap 13 10 - 20 mmol/L    Urea Nitrogen 41 (H) 6 - 23 mg/dL    Creatinine 1.38 (H) 0.50 - 1.05 mg/dL    eGFR 37 (L) >60 mL/min/1.73m*2    Calcium 9.1 8.6 - 10.3 mg/dL   Cardiac Device Check - Remote   Result Value Ref Range    BSA 2.2 m2   CBC   Result Value Ref Range    WBC 6.1 4.4 - 11.3 x10*3/uL    nRBC 0.0 0.0 - 0.0 /100 WBCs    RBC 3.05 (L) 4.00 -  5.20 x10*6/uL    Hemoglobin 9.4 (L) 12.0 - 16.0 g/dL    Hematocrit 30.7 (L) 36.0 - 46.0 %     (H) 80 - 100 fL    MCH 30.8 26.0 - 34.0 pg    MCHC 30.6 (L) 32.0 - 36.0 g/dL    RDW 15.6 (H) 11.5 - 14.5 %    Platelets 124 (L) 150 - 450 x10*3/uL   Basic metabolic panel   Result Value Ref Range    Glucose 109 (H) 74 - 99 mg/dL    Sodium 145 136 - 145 mmol/L    Potassium 4.1 3.5 - 5.3 mmol/L    Chloride 107 98 - 107 mmol/L    Bicarbonate 31 21 - 32 mmol/L    Anion Gap 11 10 - 20 mmol/L    Urea Nitrogen 40 (H) 6 - 23 mg/dL    Creatinine 1.30 (H) 0.50 - 1.05 mg/dL    eGFR 40 (L) >60 mL/min/1.73m*2    Calcium 9.0 8.6 - 10.3 mg/dL     *Note: Due to a large number of results and/or encounters for the requested time period, some results have not been displayed. A complete set of results can be found in Results Review.       Assessment/Plan   Chronic kidney disease stage III renal function is stable will continue to manage medically and monitor renal function very closely I will restart her on diuretics today  Urinary tract infection with antibiotic therapy  Abdominal pain and right flank pain resolved  Syncope and fall  Diabetes mellitus type 2  Hypertension  Atrial fibrillation  Of AV block with pacemaker placement  Congestive heart failure placed back on diuretics  Chronic respiratory failure on home oxygen    Leon Odell MD

## 2025-03-06 NOTE — PROGRESS NOTES
Spiritual Care Visit  Spiritual Care Request    Reason for Visit:  Routine Visit: Introduction     Request Received From:       Focus of Care:  Visited With: Patient         Refer to :          Spiritual Care Assessment    Spiritual Assessment:                      Care Provided:  Intended Effects: Establish rapport and connectedness, Build relationship of care and support, Demonstrate caring and concern  Methods: Offer emotional support  Interventions: Explain  role    Sense of Community and or Rastafari Affiliation:  Taoist         Addressed Needs/Concerns and/or Angeli Through:          Outcome:  Outcome of Spiritual Care Visit: Identifying spiritual/emotional issues, Comfort/healing presence, Identifying patient's strengths/source of hope     Advance Directives:         Spiritual Care Annotation    Annotation:   provided support for Stemi patient upon admission.  introduced the Spiritual care services of [facility].       entered the room and the patient was resting and appears comfortable.  inquired about the patient's support system and Raúl indicated that she has lived with her daughter which is very helpful in her care.  offered emotional support.      reviewed the role of the  in provided emotional and spiritual support.

## 2025-03-06 NOTE — PROGRESS NOTES
Occupational Therapy    OT Treatment    Patient Name: Raúl Jordan  MRN: [id number]  Department: [address]  Room: 01/01-A  Today's Date: 3/6/2025  Time Calculation  Start Time: 1121  Stop Time: 1144  Time Calculation (min): 23 min        Assessment:  OT Assessment: Gradual progress made towards OT goals. Continue with current OT POC to increase strength, balance and functional tolerance to maximize safety and independence during ADLs.  Barriers to Discharge Home: No anticipated barriers  Evaluation/Treatment Tolerance: Patient limited by fatigue  End of Session Communication: Bedside nurse  End of Session Patient Position: Bed, 2 rail up, Alarm on (seated EOB with all needs in reach, RN made aware of pt positioning)  OT Assessment Results: Decreased ADL status, Decreased endurance, Decreased functional mobility, Decreased IADLs  Barriers to Discharge: None  Evaluation/Treatment Tolerance: Patient limited by fatigue  Plan:  Treatment Interventions: ADL retraining, Functional transfer training, Endurance training, Patient/family training, Equipment evaluation/education, Neuromuscular reeducation, Compensatory technique education  OT Frequency: 3 times per week  OT Discharge Recommendations: Low intensity level of continued care  Equipment Recommended upon Discharge: Wheeled walker  OT Recommended Transfer Status: Stand by assist  OT - OK to Discharge: Yes  Treatment Interventions: ADL retraining, Functional transfer training, Endurance training, Patient/family training, Equipment evaluation/education, Neuromuscular reeducation, Compensatory technique education    Subjective   Previous Visit Info:  OT Last Visit  OT Received On: 03/06/25  General:  General  Reason for Referral: Impaired ADL's/Mobility  Past Medical History Relevant to Rehab: afib, complete heart block, pacemaker, LAZARA, CHF, DM, HTN, GIB, PNA, R TKR  Prior to Session Communication: Bedside nurse  Patient Position Received: Bed, 3 rail up, Alarm off, not on  at start of session  General Comment: Pt cleared for OT session per nursing, pleasant and cooperative this date.  Precautions:  Hearing/Visual Limitations: reading glasses  Medical Precautions: Fall precautions, Oxygen therapy device and L/min (5L O2 via NC)  Precautions Comment: telemetry, significant ecchymosis over BUE            Pain:  Pain Assessment  Pain Assessment: 0-10  0-10 (Numeric) Pain Score: 4  Pain Type: Acute pain  Pain Location:  (flank)  Pain Orientation: Right  Clinical Progression: Not changed    Objective    Cognition:  Cognition  Overall Cognitive Status: Within Functional Limits  Orientation Level: Oriented X4  Safety/Judgement: Exceptions to WFL  Insight: Mild  Impulsive: Mildly  Task Initiation: Initiates with cues  Processing Speed: Within funtional limits  Coordination:  Movements are Fluid and Coordinated: No  Upper Body Coordination: slower rate of movements  Lower Body Coordination: slower rate of movements  Activities of Daily Living: Grooming  Grooming Comments: pt declined participation in ADLs d/t reports of task completion prior to OT session.    Toileting  Toileting Comments: toileting simulation completed with close supervision while seated on commode  Functional Standing Tolerance:  Time: ~3min  Activity: static standing  Functional Standing Tolerance Comments: Standing tasks completed this date with FWW and close supervision to increase functional tolerance, strength and challenge balance to maximize safety and independence during ADLs.  Bed Mobility/Transfers: Bed Mobility  Bed Mobility: Yes  Bed Mobility 1  Bed Mobility 1: Supine to sitting, Sitting to supine  Level of Assistance 1: Minimum assistance  Bed Mobility Comments 1: HOB moderately elevated. Ben required for trunk elevation during supine>sit and Ben provided for BLE management during sit>supine    Transfers  Transfer: Yes  Transfer 1  Trials/Comments 1: sit<>stands completed from standard EOB and toilet heights  with FWW and CGA- min verbal/ tactile cues required for proper hand placement to increase safety and decrease risk of falls.    Toilet Transfers  Toilet Transfer From: Bed  Toilet Transfer Type: To and from  Toilet Transfer to: Standard toilet  Toilet Transfer Technique: Ambulating  Toilet Transfers: Contact guard  Toilet Transfers Comments: min verbal cues required for proper hand placement with use of grab bar    Functional Mobility:  Functional Mobility  Functional Mobility Performed: Yes  Functional Mobility 1  Surface 1: Level tile  Device 1: Rolling walker  Assistance 1: Close supervision  Comments 1: Functional mobility trials completed at short household distances to increase functional tolerance and assess safety awareness to increase overall safety and independence prior to home-going. Increased time required to complete.    Therapy/Activity: Therapeutic Activity  Therapeutic Activity Performed: Yes  Therapeutic Activity 1: EOB sitting trials completed x 12min with close supervision to complete functional tasks at min intensity. Fair+ sitting balance observed this date with unilateral UE and B foot support. EOB sitting trials completed to increase functional tolerance, strength and challenge sitting balance to maximize potential during ADLs.      Outcome Measures:[facility] Daily Activity  Putting on and taking off regular lower body clothing: A little  Bathing (including washing, rinsing, drying): A little  Putting on and taking off regular upper body clothing: A little  Toileting, which includes using toilet, bedpan or urinal: A little  Taking care of personal grooming such as brushing teeth: A little  Eating Meals: None  Daily Activity - Total Score: 19        Education Documentation  Body Mechanics, taught by DENIZ Oneil at 3/6/2025  3:20 PM.  Learner: Patient  Readiness: Acceptance  Method: Explanation, Demonstration  Response: Needs Reinforcement  Comment: safety awareness throughout functional  tasks/ transfers    ADL Training, taught by DENIZ Oneil at 3/6/2025  3:20 PM.  Learner: Patient  Readiness: Acceptance  Method: Explanation, Demonstration  Response: Needs Reinforcement  Comment: safety awareness throughout functional tasks/ transfers    Education Comments  Education provided on role of OT/POC, safety awareness throughout functional tasks/transfers, importance of activity/ rest routine, EC/WS techniques, and use of call light for assistance. Questions, comments and concerns addressed regarding OT.      Goals:  Encounter Problems       Encounter Problems (Active)       OT Goals       Pt will complete all ADL's with mod indep/indep using adaptive equipment as needed. (Progressing)       Start:  03/05/25    Expected End:  04/05/25            Pt will complete all functional transfers and mobility with mod indep/indep using a device or no device. (Progressing)       Start:  03/05/25    Expected End:  04/05/25            Pt will tolerate functional mobility for a household distance using a device or no device with mod indep/indep. (Progressing)       Start:  03/05/25    Expected End:  04/05/25

## 2025-03-06 NOTE — PROGRESS NOTES
Raúl Gil is a 84 y.o. female on day 2 of admission presenting with Syncope and collapse.      Subjective   Patient denied fever or chills.  She complained of feeling weak.  Patient is on 5 L oxygen.  Objective     Last Recorded Vitals  /57 (BP Location: Left arm, Patient Position: Sitting)   Pulse 70   Temp 36.6 °C (97.9 °F) (Temporal)   Resp 18   Wt 110 kg (242 lb 8.1 oz)   SpO2 96%   Intake/Output last 3 Shifts:    Intake/Output Summary (Last 24 hours) at 3/6/2025 0809  Last data filed at 3/6/2025 0600  Gross per 24 hour   Intake 110 ml   Output --   Net 110 ml       Admission Weight  Weight: 104 kg (230 lb) (03/03/25 2300)    Daily Weight  03/06/25 : 110 kg (242 lb 8.1 oz)    Image Results  Lower extremity venous duplex [place] [address]             Phone [phone]       Vascular Lab Report     VASC US LOWER EXTREMITY VENOUS DUPLEX LEFT    Patient Name:      RAÚL GIL       Reading Physician: [id number] Jaylyn Lima MD  Study Date:        3/5/2025            Ordering Provider: [id number] RUIZ PAGE  MRN/PID:           [id number]            Fellow:  Accession#:        [id number]        Technologist:      Domi Umana RVT  Date of Birth/Age: 1940 / 84      Technologist 2:    Golden Garcia RVT                     years  Gender:            F                   Encounter#:        [id number]  Admission Status:  Inpatient           Location           [facility]                                         Performed:       Diagnosis/ICD: Pain in left leg-M79.605  CPT Codes:     25317 Peripheral venous duplex scan for DVT Limited       Pertinent History: Pain in left knee.       CONCLUSIONS:  Right Lower Venous: The right common femoral vein demonstrates normal spontaneous and respirophasic flow.  Left Lower Venous: No evidence of acute deep vein thrombus visualized in  the left lower extremity. Additional Findings; Cystic structure left popliteal fossa measuring 1.8 x 1.5cm in transverse; unable to adequately measure in long due to body habitus.  Additional Findings:  Technically difficult bedside exam due to body habitus and position.       Imaging & Doppler Findings:     Right        Flow  CFV   Spontaneous/Phasic       Left                  Compress Thrombus        Flow  Distal External Iliac            None   Spontaneous/Phasic  CFV                     Yes      None   Spontaneous/Phasic  PFV                     Yes      None  FV Proximal             Yes      None   Spontaneous/Phasic  FV Mid                  Yes      None  FV Distal               Yes      None  Popliteal               Yes      None   Spontaneous/Phasic  Peroneal                Yes      None  PTV                     Yes      None       [id number] Jaylyn Lima MD  Electronically signed by [id number] Jaylyn Lima MD on 3/5/2025 at 3:48:30 PM       ** Final **      Physical Exam    General: Morbidly obese, pleasant, cooperating during physical exam.  HEENT: Pupils are equal and reactive to light and commendation , oral mucosa dry, no JVD  Cardiovascular: PMI nondisplaced  Lungs: Clear to auscultation bilaterally, no wheezing, no crackles, no dullness to percussion.  Abdomen: No hepatosplenomegaly appreciated, soft , not tender, positive bowel sounds, positive bowel movement.  Neuro: Alert and oriented x3, strength in upper and lower extremities , sensation intact.  Musculoskeletal: +1 pedal edema both lower extremity  Vascular: Pulses are intact in upper and lower extremities  Skin: No petechiae, ecchymosis or other stigmata for dermatology disease.     Assessment/Plan      Near syncope  2D echo done on 9/20/2024 revealed EF 60 to 65%.  2D echo done yesterday revealed ejection fraction 55 -60%  Chest x-ray revealed mild pulmonary congestion.    Patient has been on Lasix her  Evaluated by cardio  Patient has history of  A-fib.  Patient was slightly orthostatic.  Defer to nephrology when to resume diuretics    Elevated troponin  NSTEMI  Cardiology on case.  Monitor close  Continue with current medication.  Waiting for cardiology to see her today.  Nuclear stress test in a.m.?    Acute respiratory failure with hypoxia  Chest x-ray revealed pulmonary congestion  Pulmonary on the case  VQ scan no evidence of pulmonary emboli  Continue with oxygen to maintain pulse ox more than 92%.  Patient is on 2 L oxygen at home.  Now she is on 5 to 6 L oxygen    Chronic congestive heart failure with diastolic dysfunction  Patient had dose of Lasix yesterday.    Urinary tract infection  Started on ceftriaxone  Urine culture grew E. coli    Chronic atrial fibrillation  Status post pacemaker implant  Resume Eliquis today  Continue with current medication    Chronic kidney disease stage III  Monitor close  Consult Dr. Odell from nephrology service    Obstructive sleep apnea  Patient is on 2 L oxygen at home, now on 5 L oxygen    Deconditioning    Fall precaution  Ambulate with assist  Check CBC and BMP in AM.  PT /OT to see her today  Discussed with care coordination on the case.  Waiting for cardiology to see her today.  Discussed in detail with them, plan for nuclear stress test in a.m.  Discussed in detail with her daughter Jazz Woods her daughter POA.  Patient is DNR CCA/DNI.  Amos Yang MD

## 2025-03-07 ENCOUNTER — PHARMACY VISIT (OUTPATIENT)
Dept: PHARMACY | Facility: CLINIC | Age: 85
End: 2025-03-07
Payer: COMMERCIAL

## 2025-03-07 LAB
ANION GAP SERPL CALCULATED.3IONS-SCNC: 9 MMOL/L (ref 10–20)
BUN SERPL-MCNC: 35 MG/DL (ref 6–23)
CALCIUM SERPL-MCNC: 9.3 MG/DL (ref 8.6–10.3)
CHLORIDE SERPL-SCNC: 105 MMOL/L (ref 98–107)
CO2 SERPL-SCNC: 32 MMOL/L (ref 21–32)
CREAT SERPL-MCNC: 1.12 MG/DL (ref 0.5–1.05)
EGFRCR SERPLBLD CKD-EPI 2021: 48 ML/MIN/1.73M*2
ERYTHROCYTE [DISTWIDTH] IN BLOOD BY AUTOMATED COUNT: 15.8 % (ref 11.5–14.5)
GLUCOSE SERPL-MCNC: 101 MG/DL (ref 74–99)
HCT VFR BLD AUTO: 32.1 % (ref 36–46)
HGB BLD-MCNC: 9.8 G/DL (ref 12–16)
MCH RBC QN AUTO: 30.9 PG (ref 26–34)
MCHC RBC AUTO-ENTMCNC: 30.5 G/DL (ref 32–36)
MCV RBC AUTO: 101 FL (ref 80–100)
NRBC BLD-RTO: 0 /100 WBCS (ref 0–0)
PLATELET # BLD AUTO: 137 X10*3/UL (ref 150–450)
POTASSIUM SERPL-SCNC: 4.5 MMOL/L (ref 3.5–5.3)
RBC # BLD AUTO: 3.17 X10*6/UL (ref 4–5.2)
SODIUM SERPL-SCNC: 141 MMOL/L (ref 136–145)
WBC # BLD AUTO: 7.4 X10*3/UL (ref 4.4–11.3)

## 2025-03-07 PROCEDURE — 2500000005 HC RX 250 GENERAL PHARMACY W/O HCPCS: Performed by: INTERNAL MEDICINE

## 2025-03-07 PROCEDURE — 2500000001 HC RX 250 WO HCPCS SELF ADMINISTERED DRUGS (ALT 637 FOR MEDICARE OP): Performed by: STUDENT IN AN ORGANIZED HEALTH CARE EDUCATION/TRAINING PROGRAM

## 2025-03-07 PROCEDURE — 2500000001 HC RX 250 WO HCPCS SELF ADMINISTERED DRUGS (ALT 637 FOR MEDICARE OP): Performed by: INTERNAL MEDICINE

## 2025-03-07 PROCEDURE — 85027 COMPLETE CBC AUTOMATED: CPT | Performed by: STUDENT IN AN ORGANIZED HEALTH CARE EDUCATION/TRAINING PROGRAM

## 2025-03-07 PROCEDURE — 80048 BASIC METABOLIC PNL TOTAL CA: CPT | Performed by: STUDENT IN AN ORGANIZED HEALTH CARE EDUCATION/TRAINING PROGRAM

## 2025-03-07 PROCEDURE — 99232 SBSQ HOSP IP/OBS MODERATE 35: CPT | Performed by: NURSE PRACTITIONER

## 2025-03-07 PROCEDURE — RXMED WILLOW AMBULATORY MEDICATION CHARGE

## 2025-03-07 PROCEDURE — 2060000001 HC INTERMEDIATE ICU ROOM DAILY

## 2025-03-07 PROCEDURE — 99232 SBSQ HOSP IP/OBS MODERATE 35: CPT | Performed by: INTERNAL MEDICINE

## 2025-03-07 PROCEDURE — 36415 COLL VENOUS BLD VENIPUNCTURE: CPT | Performed by: STUDENT IN AN ORGANIZED HEALTH CARE EDUCATION/TRAINING PROGRAM

## 2025-03-07 PROCEDURE — 2500000002 HC RX 250 W HCPCS SELF ADMINISTERED DRUGS (ALT 637 FOR MEDICARE OP, ALT 636 FOR OP/ED): Performed by: STUDENT IN AN ORGANIZED HEALTH CARE EDUCATION/TRAINING PROGRAM

## 2025-03-07 RX ORDER — CEFUROXIME AXETIL 500 MG/1
250 TABLET ORAL 2 TIMES DAILY
Status: DISCONTINUED | OUTPATIENT
Start: 2025-03-07 | End: 2025-03-08 | Stop reason: HOSPADM

## 2025-03-07 RX ORDER — CEFUROXIME AXETIL 250 MG/1
250 TABLET ORAL 2 TIMES DAILY
Qty: 6 TABLET | Refills: 0 | Status: SHIPPED | OUTPATIENT
Start: 2025-03-07 | End: 2025-03-10

## 2025-03-07 RX ADMIN — ACETAMINOPHEN 650 MG: 325 TABLET, FILM COATED ORAL at 00:29

## 2025-03-07 RX ADMIN — POTASSIUM CHLORIDE 20 MEQ: 1500 TABLET, EXTENDED RELEASE ORAL at 17:38

## 2025-03-07 RX ADMIN — Medication 5 L/MIN: at 08:26

## 2025-03-07 RX ADMIN — Medication 38 MG OF IRON: at 08:55

## 2025-03-07 RX ADMIN — METOPROLOL SUCCINATE 50 MG: 50 TABLET, EXTENDED RELEASE ORAL at 08:54

## 2025-03-07 RX ADMIN — Medication 5 L/MIN: at 19:50

## 2025-03-07 RX ADMIN — POTASSIUM CHLORIDE 20 MEQ: 1500 TABLET, EXTENDED RELEASE ORAL at 08:55

## 2025-03-07 RX ADMIN — APIXABAN 5 MG: 5 TABLET, FILM COATED ORAL at 08:54

## 2025-03-07 RX ADMIN — CEFUROXIME AXETIL 250 MG: 500 TABLET, FILM COATED ORAL at 15:17

## 2025-03-07 RX ADMIN — SIMVASTATIN 10 MG: 10 TABLET, FILM COATED ORAL at 20:38

## 2025-03-07 RX ADMIN — FUROSEMIDE 80 MG: 80 TABLET ORAL at 08:54

## 2025-03-07 RX ADMIN — Medication 5 L/MIN: at 09:40

## 2025-03-07 RX ADMIN — SERTRALINE 100 MG: 100 TABLET, FILM COATED ORAL at 08:54

## 2025-03-07 RX ADMIN — TRAZODONE HYDROCHLORIDE 100 MG: 100 TABLET ORAL at 20:38

## 2025-03-07 RX ADMIN — ROPINIROLE HYDROCHLORIDE 2 MG: 2 TABLET, FILM COATED ORAL at 20:38

## 2025-03-07 RX ADMIN — APIXABAN 5 MG: 5 TABLET, FILM COATED ORAL at 20:38

## 2025-03-07 ASSESSMENT — COGNITIVE AND FUNCTIONAL STATUS - GENERAL
WALKING IN HOSPITAL ROOM: A LITTLE
STANDING UP FROM CHAIR USING ARMS: A LITTLE
DRESSING REGULAR LOWER BODY CLOTHING: A LITTLE
DRESSING REGULAR UPPER BODY CLOTHING: A LITTLE
HELP NEEDED FOR BATHING: A LITTLE
DAILY ACTIVITIY SCORE: 19
PERSONAL GROOMING: A LITTLE
MOVING TO AND FROM BED TO CHAIR: A LITTLE
CLIMB 3 TO 5 STEPS WITH RAILING: A LITTLE
MOBILITY SCORE: 20
TOILETING: A LITTLE

## 2025-03-07 ASSESSMENT — PAIN SCALES - GENERAL
PAINLEVEL_OUTOF10: 0 - NO PAIN
PAINLEVEL_OUTOF10: 9
PAINLEVEL_OUTOF10: 5 - MODERATE PAIN

## 2025-03-07 ASSESSMENT — PAIN - FUNCTIONAL ASSESSMENT
PAIN_FUNCTIONAL_ASSESSMENT: 0-10

## 2025-03-07 ASSESSMENT — PAIN DESCRIPTION - LOCATION: LOCATION: OTHER (COMMENT)

## 2025-03-07 NOTE — CARE PLAN
The patient's goals for the shift include      The clinical goals for the shift include free from falls    Over the shift, the patient did not make progress toward the following goals. Barriers to progression include walker use to ambulate. Recommendations to address these barriers include make sure walker and call light are within reach.      Problem: Skin  Goal: Participates in plan/prevention/treatment measures  Outcome: Progressing     Problem: Skin  Goal: Decreased wound size/increased tissue granulation at next dressing change  Outcome: Progressing

## 2025-03-07 NOTE — PROGRESS NOTES
03/07/25 0952   Discharge Planning   Living Arrangements Children   Support Systems Children   Home or Post Acute Services In home services   Type of Home Care Services Home nursing visits;Home OT;Home PT   Expected Discharge Disposition Home Health   Does the patient need discharge transport arranged? No     Currently on 02 5L.  Baseline at home is 2L.  PT is recommending low intensity rehab. Patient will resume care with [facility].  Will need an external referral.

## 2025-03-07 NOTE — PROGRESS NOTES
"Raúl Jordan is a 84 y.o. female on day 3 of admission presenting with Syncope and collapse.    Subjective      Patient is seen for chronic kidney stage III admitted with right side abdominal pain and flank pain she is positive for urinary tract infection with E. coli started on ceftriaxone patient feels better still little bit weak but not short of breath    Objective     Physical Exam  Constitutional:       General: She is not in acute distress.     Appearance: Normal appearance. She is not toxic-appearing.   Neck:      Vascular: No carotid bruit.   Cardiovascular:      Heart sounds: No murmur heard.     No friction rub. No gallop.   Pulmonary:      Breath sounds: No wheezing, rhonchi or rales.   Abdominal:      Tenderness: There is no abdominal tenderness. There is no right CVA tenderness, left CVA tenderness or rebound.   Musculoskeletal:         General: No tenderness.      Cervical back: Neck supple.      Right lower leg: No edema.      Left lower leg: No edema.   Lymphadenopathy:      Cervical: No cervical adenopathy.         Last Recorded Vitals  Blood pressure 131/58, pulse 84, temperature 36.3 °C (97.3 °F), temperature source Temporal, resp. rate 17, height 1.575 m (5' 2\"), weight 102 kg (224 lb 13.9 oz), SpO2 97%.    Intake/Output last 3 Shifts:  I/O last 3 completed shifts:  In: 590 (5.8 mL/kg) [P.O.:540; IV Piggyback:50]  Out: 3 (0 mL/kg) [Urine:3 (0 mL/kg/hr)]  Weight: 102 kg     Current Facility-Administered Medications:     acetaminophen (Tylenol) tablet 650 mg, 650 mg, oral, q4h PRN, 650 mg at 03/07/25 0029 **OR** acetaminophen (Tylenol) oral liquid 650 mg, 650 mg, nasogastric tube, q4h PRN **OR** acetaminophen (Tylenol) suppository 650 mg, 650 mg, rectal, q4h PRN, Sang Landrum MD    apixaban (Eliquis) tablet 5 mg, 5 mg, oral, BID, Amos Yang MD, 5 mg at 03/07/25 0854    cefTRIAXone (Rocephin) 1 g in dextrose (iso) IV 50 mL, 1 g, intravenous, q24h, Amos Yang MD, Stopped at 03/06/25 " 1616    ferrous gluconate (Fergon) 324 (38 Fe) mg tablet 38 mg of iron, 38 mg of iron, oral, Daily with breakfast, Sang Landrum MD, 38 mg of iron at 03/07/25 0855    furosemide (Lasix) injection 60 mg, 60 mg, intravenous, Once, Leon Odell MD    furosemide (Lasix) tablet 80 mg, 80 mg, oral, Daily, Leon Odell MD, 80 mg at 03/07/25 0854    ipratropium-albuteroL (Duo-Neb) 0.5-2.5 mg/3 mL nebulizer solution 3 mL, 3 mL, nebulization, q6h PRN, Heriberto Monroy MD    metoprolol succinate XL (Toprol-XL) 24 hr tablet 50 mg, 50 mg, oral, Daily, Amos Yang MD, 50 mg at 03/07/25 0854    nitroglycerin (Nitrostat) SL tablet 0.4 mg, 0.4 mg, sublingual, q5 min PRN, Beckie Horan DO    oxygen (O2) therapy, , inhalation, Continuous - Inhalation, Amos Yang MD, Last Rate: 300,000 mL/hr at 03/07/25 0826, 5 L/min at 03/07/25 0826    oxygen (O2) therapy, , inhalation, Continuous - Inhalation, Amos Yang MD, Last Rate: 300,000 mL/hr at 03/07/25 0940, 5 L/min at 03/07/25 0940    polyethylene glycol (Glycolax, Miralax) packet 17 g, 17 g, oral, Daily, Sang Landrum MD, 17 g at 03/06/25 0945    potassium chloride CR (Klor-Con M20) ER tablet 20 mEq, 20 mEq, oral, BID, Sang Landrum MD, 20 mEq at 03/07/25 0855    rOPINIRole (Requip) tablet 2 mg, 2 mg, oral, Nightly, Sang Landrum MD, 2 mg at 03/06/25 2212    sertraline (Zoloft) tablet 100 mg, 100 mg, oral, Daily, Sang Landrum MD, 100 mg at 03/07/25 0854    simvastatin (Zocor) tablet 10 mg, 10 mg, oral, Nightly, Sang Landrum MD, 10 mg at 03/06/25 2023    traZODone (Desyrel) tablet 100 mg, 100 mg, oral, Nightly, Sang Landrum MD, 100 mg at 03/06/25 2023   Relevant Results    Results for orders placed or performed during the hospital encounter of 03/03/25 (from the past 96 hours)   CBC and Auto Differential   Result Value Ref Range    WBC 5.5 4.4 - 11.3 x10*3/uL    nRBC 0.0 0.0 - 0.0 /100 WBCs    RBC 3.71 (L) 4.00 - 5.20 x10*6/uL    Hemoglobin 11.6 (L) 12.0 - 16.0  g/dL    Hematocrit 37.3 36.0 - 46.0 %     (H) 80 - 100 fL    MCH 31.3 26.0 - 34.0 pg    MCHC 31.1 (L) 32.0 - 36.0 g/dL    RDW 16.1 (H) 11.5 - 14.5 %    Platelets 122 (L) 150 - 450 x10*3/uL    Neutrophils % 73.6 40.0 - 80.0 %    Immature Granulocytes %, Automated 1.1 (H) 0.0 - 0.9 %    Lymphocytes % 13.0 13.0 - 44.0 %    Monocytes % 9.9 2.0 - 10.0 %    Eosinophils % 1.5 0.0 - 6.0 %    Basophils % 0.9 0.0 - 2.0 %    Neutrophils Absolute 4.02 1.60 - 5.50 x10*3/uL    Immature Granulocytes Absolute, Automated 0.06 0.00 - 0.50 x10*3/uL    Lymphocytes Absolute 0.71 (L) 0.80 - 3.00 x10*3/uL    Monocytes Absolute 0.54 0.05 - 0.80 x10*3/uL    Eosinophils Absolute 0.08 0.00 - 0.40 x10*3/uL    Basophils Absolute 0.05 0.00 - 0.10 x10*3/uL   Comprehensive Metabolic Panel   Result Value Ref Range    Glucose 171 (H) 74 - 99 mg/dL    Sodium 142 136 - 145 mmol/L    Potassium 3.9 3.5 - 5.3 mmol/L    Chloride 104 98 - 107 mmol/L    Bicarbonate 29 21 - 32 mmol/L    Anion Gap 13 10 - 20 mmol/L    Urea Nitrogen 36 (H) 6 - 23 mg/dL    Creatinine 1.43 (H) 0.50 - 1.05 mg/dL    eGFR 36 (L) >60 mL/min/1.73m*2    Calcium 8.8 8.6 - 10.3 mg/dL    Albumin 3.8 3.4 - 5.0 g/dL    Alkaline Phosphatase 104 33 - 136 U/L    Total Protein 6.0 (L) 6.4 - 8.2 g/dL    AST 25 9 - 39 U/L    Bilirubin, Total 1.0 0.0 - 1.2 mg/dL    ALT 16 7 - 45 U/L   Troponin I, High Sensitivity   Result Value Ref Range    Troponin I, High Sensitivity 27 (H) 0 - 13 ng/L   Protime-INR   Result Value Ref Range    Protime 15.0 (H) 9.3 - 12.7 seconds    INR 1.4 (H) 0.9 - 1.2   Magnesium   Result Value Ref Range    Magnesium 2.26 1.60 - 2.40 mg/dL   B-Type Natriuretic Peptide   Result Value Ref Range     (H) 0 - 99 pg/mL   Blood Gas Venous Full Panel   Result Value Ref Range    POCT pH, Venous 7.30 (L) 7.33 - 7.43 pH    POCT pCO2, Venous 63 (H) 41 - 51 mm Hg    POCT pO2, Venous 20 (L) 35 - 45 mm Hg    POCT SO2, Venous 27 (L) 45 - 75 %    POCT Oxy Hemoglobin, Venous 26.7  (L) 45.0 - 75.0 %    POCT Hematocrit Calculated, Venous 34.0 (L) 36.0 - 46.0 %    POCT Sodium, Venous 140 136 - 145 mmol/L    POCT Potassium, Venous 4.0 3.5 - 5.3 mmol/L    POCT Chloride, Venous 103 98 - 107 mmol/L    POCT Ionized Calicum, Venous 1.14 1.10 - 1.33 mmol/L    POCT Glucose, Venous 160 (H) 74 - 99 mg/dL    POCT Lactate, Venous 1.6 0.4 - 2.0 mmol/L    POCT Base Excess, Venous 3.2 (H) -2.0 - 3.0 mmol/L    POCT HCO3 Calculated, Venous 31.0 (H) 22.0 - 26.0 mmol/L    POCT Hemoglobin, Venous 11.3 (L) 12.0 - 16.0 g/dL    POCT Anion Gap, Venous 10.0 10.0 - 25.0 mmol/L    Patient Temperature 37.0 degrees Celsius    FiO2 44 %   ECG 12 lead   Result Value Ref Range    Ventricular Rate 102 BPM    QRS Duration 82 ms    QT Interval 396 ms    QTC Calculation(Bazett) 516 ms    R Axis 109 degrees    T Axis 77 degrees    QRS Count 17 beats    Q Onset 216 ms    T Offset 414 ms    QTC Fredericia 472 ms   Blood Gas Arterial Full Panel   Result Value Ref Range    POCT pH, Arterial 7.36 (L) 7.38 - 7.42 pH    POCT pCO2, Arterial 54 (H) 38 - 42 mm Hg    POCT pO2, Arterial 68 (L) 85 - 95 mm Hg    POCT SO2, Arterial 94 94 - 100 %    POCT Oxy Hemoglobin, Arterial 91.1 (L) 94.0 - 98.0 %    POCT Hematocrit Calculated, Arterial 32.0 (L) 36.0 - 46.0 %    POCT Sodium, Arterial 139 136 - 145 mmol/L    POCT Potassium, Arterial 3.8 3.5 - 5.3 mmol/L    POCT Chloride, Arterial 103 98 - 107 mmol/L    POCT Ionized Calcium, Arterial 1.14 1.10 - 1.33 mmol/L    POCT Glucose, Arterial 139 (H) 74 - 99 mg/dL    POCT Lactate, Arterial 0.7 0.4 - 2.0 mmol/L    POCT Base Excess, Arterial 4.1 (H) -2.0 - 3.0 mmol/L    POCT HCO3 Calculated, Arterial 30.5 (H) 22.0 - 26.0 mmol/L    POCT Hemoglobin, Arterial 10.6 (L) 12.0 - 16.0 g/dL    POCT Anion Gap, Arterial 9 (L) 10 - 25 mmo/L    Patient Temperature 37.0 degrees Celsius    FiO2 44 %    Apparatus CANNULA     Site of Arterial Puncture Radial Right     Kentrell's Test Positive    Troponin I, High Sensitivity    Result Value Ref Range    Troponin I, High Sensitivity 235 (HH) 0 - 13 ng/L   CBC   Result Value Ref Range    WBC 7.3 4.4 - 11.3 x10*3/uL    nRBC 0.0 0.0 - 0.0 /100 WBCs    RBC 3.31 (L) 4.00 - 5.20 x10*6/uL    Hemoglobin 10.3 (L) 12.0 - 16.0 g/dL    Hematocrit 33.3 (L) 36.0 - 46.0 %     (H) 80 - 100 fL    MCH 31.1 26.0 - 34.0 pg    MCHC 30.9 (L) 32.0 - 36.0 g/dL    RDW 16.1 (H) 11.5 - 14.5 %    Platelets 106 (L) 150 - 450 x10*3/uL   Basic metabolic panel   Result Value Ref Range    Glucose 107 (H) 74 - 99 mg/dL    Sodium 142 136 - 145 mmol/L    Potassium 3.8 3.5 - 5.3 mmol/L    Chloride 106 98 - 107 mmol/L    Bicarbonate 29 21 - 32 mmol/L    Anion Gap 11 10 - 20 mmol/L    Urea Nitrogen 34 (H) 6 - 23 mg/dL    Creatinine 1.36 (H) 0.50 - 1.05 mg/dL    eGFR 38 (L) >60 mL/min/1.73m*2    Calcium 8.5 (L) 8.6 - 10.3 mg/dL   Transthoracic Echo (TTE) Limited   Result Value Ref Range    AV pk sarika 1.15 m/s    LVOT diam 2.00 cm    Tricuspid annular plane systolic excursion 2.1 cm    LV EF 58 %    RVSP 59.6 mmHg    AV pk grad 5 mmHg    Aortic Valve Area by Continuity of Peak Velocity 2.22 cm2    LV A4C EF 68.4    CBC   Result Value Ref Range    WBC 6.9 4.4 - 11.3 x10*3/uL    nRBC 0.0 0.0 - 0.0 /100 WBCs    RBC 3.24 (L) 4.00 - 5.20 x10*6/uL    Hemoglobin 10.0 (L) 12.0 - 16.0 g/dL    Hematocrit 32.6 (L) 36.0 - 46.0 %     (H) 80 - 100 fL    MCH 30.9 26.0 - 34.0 pg    MCHC 30.7 (L) 32.0 - 36.0 g/dL    RDW 15.7 (H) 11.5 - 14.5 %    Platelets 126 (L) 150 - 450 x10*3/uL   Basic metabolic panel   Result Value Ref Range    Glucose 107 (H) 74 - 99 mg/dL    Sodium 143 136 - 145 mmol/L    Potassium 4.7 3.5 - 5.3 mmol/L    Chloride 106 98 - 107 mmol/L    Bicarbonate 29 21 - 32 mmol/L    Anion Gap 13 10 - 20 mmol/L    Urea Nitrogen 41 (H) 6 - 23 mg/dL    Creatinine 1.38 (H) 0.50 - 1.05 mg/dL    eGFR 37 (L) >60 mL/min/1.73m*2    Calcium 9.1 8.6 - 10.3 mg/dL   Cardiac Device Check - Remote   Result Value Ref Range    BSA 2.2 m2    CBC   Result Value Ref Range    WBC 6.1 4.4 - 11.3 x10*3/uL    nRBC 0.0 0.0 - 0.0 /100 WBCs    RBC 3.05 (L) 4.00 - 5.20 x10*6/uL    Hemoglobin 9.4 (L) 12.0 - 16.0 g/dL    Hematocrit 30.7 (L) 36.0 - 46.0 %     (H) 80 - 100 fL    MCH 30.8 26.0 - 34.0 pg    MCHC 30.6 (L) 32.0 - 36.0 g/dL    RDW 15.6 (H) 11.5 - 14.5 %    Platelets 124 (L) 150 - 450 x10*3/uL   Basic metabolic panel   Result Value Ref Range    Glucose 109 (H) 74 - 99 mg/dL    Sodium 145 136 - 145 mmol/L    Potassium 4.1 3.5 - 5.3 mmol/L    Chloride 107 98 - 107 mmol/L    Bicarbonate 31 21 - 32 mmol/L    Anion Gap 11 10 - 20 mmol/L    Urea Nitrogen 40 (H) 6 - 23 mg/dL    Creatinine 1.30 (H) 0.50 - 1.05 mg/dL    eGFR 40 (L) >60 mL/min/1.73m*2    Calcium 9.0 8.6 - 10.3 mg/dL   CBC   Result Value Ref Range    WBC 7.4 4.4 - 11.3 x10*3/uL    nRBC 0.0 0.0 - 0.0 /100 WBCs    RBC 3.17 (L) 4.00 - 5.20 x10*6/uL    Hemoglobin 9.8 (L) 12.0 - 16.0 g/dL    Hematocrit 32.1 (L) 36.0 - 46.0 %     (H) 80 - 100 fL    MCH 30.9 26.0 - 34.0 pg    MCHC 30.5 (L) 32.0 - 36.0 g/dL    RDW 15.8 (H) 11.5 - 14.5 %    Platelets 137 (L) 150 - 450 x10*3/uL   Basic metabolic panel   Result Value Ref Range    Glucose 101 (H) 74 - 99 mg/dL    Sodium 141 136 - 145 mmol/L    Potassium 4.5 3.5 - 5.3 mmol/L    Chloride 105 98 - 107 mmol/L    Bicarbonate 32 21 - 32 mmol/L    Anion Gap 9 (L) 10 - 20 mmol/L    Urea Nitrogen 35 (H) 6 - 23 mg/dL    Creatinine 1.12 (H) 0.50 - 1.05 mg/dL    eGFR 48 (L) >60 mL/min/1.73m*2    Calcium 9.3 8.6 - 10.3 mg/dL     *Note: Due to a large number of results and/or encounters for the requested time period, some results have not been displayed. A complete set of results can be found in Results Review.       Assessment/Plan   Chronic kidney disease stage III renal function is fairly stable with urine output plan to continue oral diuretics okay discharge from renal point of view  Urinary tract infection with antibiotic therapy  Abdominal pain and right  flank pain resolved  Syncope and fall  Diabetes mellitus type 2  Hypertension  Atrial fibrillation  Of AV block with pacemaker placement  Congestive heart failure placed back on diuretics  Chronic respiratory failure on home oxygen    Leon Odell MD

## 2025-03-07 NOTE — SIGNIFICANT EVENT
Pt at rest on RA SpO2 was 85% HR 94 bpm. Titrated pt to 4L NC for SpO2 of 92% at rest. On ambulation she was titrated to 5L NC for a SpO2 was 93%  bpm.

## 2025-03-07 NOTE — PROGRESS NOTES
Physical Therapy                 Therapy Communication Note    Patient Name: Raúl Jordan  MRN: [id number]  Department: [address]  Room: 01/01-A  Today's Date: 3/7/2025     Discipline: Physical Therapy    PT Missed Visit: Yes     Missed Visit Reason: Missed Visit Reason: Other (Comment) (Attempted 2x: 820 + 1355. Pt reporting this AM that she had very poor sleep past 2+ nights and requesting to come back later. Pt soundly sleeping when attempted PM session. Will attempt later if schedule allows.)    Missed Time: Attempt    Comment:

## 2025-03-07 NOTE — PROGRESS NOTES
"Raúl Jordan is a 84 y.o. female on day 3 of admission presenting with Syncope and collapse.    Subjective   Patient sitting up at edge of bed eating breakfast. Denies any SOB, chest discomfort. States she wants to go home. Plan of care discussed. She is agreeable for outpatient stress test.        Objective     Physical Exam  Vitals reviewed.   Constitutional:       General: She is not in acute distress.     Appearance: Normal appearance. She is obese. She is not ill-appearing, toxic-appearing or diaphoretic.      Interventions: Nasal cannula in place.   HENT:      Head: Normocephalic and atraumatic.      Right Ear: External ear normal.      Left Ear: External ear normal.      Nose: Nose normal.      Mouth/Throat:      Mouth: Mucous membranes are moist.   Eyes:      Extraocular Movements: Extraocular movements intact.   Cardiovascular:      Rate and Rhythm: Normal rate. Rhythm irregular.      Pulses: Normal pulses.      Heart sounds: Normal heart sounds.   Pulmonary:      Effort: Pulmonary effort is normal.      Comments: Diminished in bases bilaterally  Abdominal:      General: Bowel sounds are normal. There is no distension.      Palpations: Abdomen is soft.      Tenderness: There is no abdominal tenderness.   Musculoskeletal:         General: Normal range of motion.      Cervical back: Normal range of motion and neck supple.      Right lower le+ Pitting Edema present.      Left lower le+ Pitting Edema present.   Skin:     General: Skin is warm and dry.      Capillary Refill: Capillary refill takes less than 2 seconds.   Neurological:      General: No focal deficit present.      Mental Status: She is alert and oriented to person, place, and time.   Psychiatric:         Mood and Affect: Mood normal.         Behavior: Behavior normal.         Last Recorded Vitals  Blood pressure 131/58, pulse 100, temperature 36.3 °C (97.3 °F), temperature source Temporal, resp. rate 17, height 1.575 m (5' 2\"), weight 102 " kg (224 lb 13.9 oz), SpO2 97%.  Intake/Output last 3 Shifts:  I/O last 3 completed shifts:  In: 590 (5.8 mL/kg) [P.O.:540; IV Piggyback:50]  Out: 3 (0 mL/kg) [Urine:3 (0 mL/kg/hr)]  Weight: 102 kg     Relevant Results         Scheduled medications  apixaban, 5 mg, oral, BID  cefTRIAXone, 1 g, intravenous, q24h  ferrous gluconate, 38 mg of iron, oral, Daily with breakfast  furosemide, 60 mg, intravenous, Once  furosemide, 80 mg, oral, Daily  metoprolol succinate XL, 50 mg, oral, Daily  oxygen, , inhalation, Continuous - Inhalation  oxygen, , inhalation, Continuous - Inhalation  polyethylene glycol, 17 g, oral, Daily  potassium chloride CR, 20 mEq, oral, BID  rOPINIRole, 2 mg, oral, Nightly  sertraline, 100 mg, oral, Daily  simvastatin, 10 mg, oral, Nightly  traZODone, 100 mg, oral, Nightly      Continuous medications     PRN medications  PRN medications: acetaminophen **OR** acetaminophen **OR** acetaminophen, ipratropium-albuteroL, nitroglycerin       Results for orders placed or performed during the hospital encounter of 03/03/25 (from the past 24 hours)   CBC   Result Value Ref Range    WBC 7.4 4.4 - 11.3 x10*3/uL    nRBC 0.0 0.0 - 0.0 /100 WBCs    RBC 3.17 (L) 4.00 - 5.20 x10*6/uL    Hemoglobin 9.8 (L) 12.0 - 16.0 g/dL    Hematocrit 32.1 (L) 36.0 - 46.0 %     (H) 80 - 100 fL    MCH 30.9 26.0 - 34.0 pg    MCHC 30.5 (L) 32.0 - 36.0 g/dL    RDW 15.8 (H) 11.5 - 14.5 %    Platelets 137 (L) 150 - 450 x10*3/uL   Basic metabolic panel   Result Value Ref Range    Glucose 101 (H) 74 - 99 mg/dL    Sodium 141 136 - 145 mmol/L    Potassium 4.5 3.5 - 5.3 mmol/L    Chloride 105 98 - 107 mmol/L    Bicarbonate 32 21 - 32 mmol/L    Anion Gap 9 (L) 10 - 20 mmol/L    Urea Nitrogen 35 (H) 6 - 23 mg/dL    Creatinine 1.12 (H) 0.50 - 1.05 mg/dL    eGFR 48 (L) >60 mL/min/1.73m*2    Calcium 9.3 8.6 - 10.3 mg/dL     *Note: Due to a large number of results and/or encounters for the requested time period, some results have not been  displayed. A complete set of results can be found in Results Review.                   Assessment/Plan   Assessment & Plan  Syncope and collapse    Chronic diastolic heart failure    Longstanding persistent atrial fibrillation (Multi)    LAZARA (obstructive sleep apnea)    Stage 3a chronic kidney disease (Multi)    Acute on chronic respiratory failure with hypoxia (Multi)    Syncope  Longstanding persistent atrial fibrillation  Primary hypertension  Hyperlipidemia  Chronic kidney disease IIIa  Micra leadless pacemaker insertion November 2018 for complete heart block     3/3: The patient was seen in the emergency department following her syncopal episode.  Again she describes no chest pain or anginal type symptoms.  EKG in the emergency department revealed atrial fibrillation and no significant ST segment changes.  She was hemodynamically stable in the emergency department but the etiology of the syncope remains unclear.  Echocardiogram in September revealed normal left ventricular systolic function with ejection fraction estimated at 65 to 70%.  The patient also has implanted a leadless Micra pacemaker.  Thus a bradycardia arrhythmia would be very unlikely.  Would monitor the patient's rhythm on telemetry and follow blood pressures as well.  Will follow with you.     3/4: As above. Labs this AM with sodium 142, creatinine at baseline 1.46, troponin 235, hemoglobin 10.3. Blood pressures have been mildly hypotensive with most recent reading of 104/92. SpO2 100% on 6L NC. Baseline O2 use is 2L NC. Telemetry with atrial fibrillation with rates mid 40s-low 60s. She is not currently on any rate control agents; metoprolol being held. Continue to hold diuretics and antihypertensives for low pressures. NM lung perfusion CT ordered for today. Will order device interrogation of her pacemaker. Check limited TTE to assess LV. Check orthostatic VS. Continue to hold Eliquis for now until we see TTE results. Will discuss with my  collaborating physician Dr. Moser. We will continue to follow with you.      3/5: Patient resting comfortably bed this morning.  Denies chest pain, pressure or palpitations.  She denies lightheadedness or dizziness.  Echocardiogram completed yesterday revealing a preserved ejection fraction of 55 to 60%, no regional wall motion abnormalities, grade 1 impaired relaxation pattern of left ventricular diastolic filling with normal left atrial filling pressure and normal right ventricular global systolic function.  Remains in atrial fibrillation on telemetry which is no longer bradycardic at this time and has actually occasionally been tachycardic with rates in the low 100s.  NM lung perfusion CT completed yesterday with low probability of acute pulmonary emboli.  Reviewed lab work this morning which revealed a sodium of 143, potassium 4.7, creatinine 1.38 and hemoglobin 10.0.  Blood pressures have improved with last recorded at 110/61.  The patient is currently on 6 L nasal cannula which is up from her baseline 2 L which she just wears at night at home.  Believe this patient would benefit from diuresis, but will defer this to nephrology services who are following this patient.  Pacemaker interrogation completed yesterday without any abnormal findings.  Metoprolol has been resumed this morning per the admitting service with parameters for blood pressure and heart rate.  Will continue to follow the patient with you.     3/6: Patient resting comfortably in bed this morning.  Reports feeling fatigued and that she did not sleep well last night.  Denies any chest pain or pressure.  Remains in a demand paced rhythm on telemetry without significant ectopy.  She has been weaned to 5 L nasal cannula this morning with adequate pulse oximetry.  Reviewed her lab work this morning and her creatinine has improved slightly to 1.30.  Sodium 145 potassium 4.1, hemoglobin of 9.4.  High-sensitivity troponin I levels on admission did rise  from , and while this is likely due to the patient's acute hypoxic respiratory failure she did have some concerning EKG changes on admission and eventually would like to proceed with some sort of ischemic workup, potentially a nuclear stress test but would like her to recover from the respiratory perspective first. Discussed with pulmonary medicine as well as nephrology and nephrology will give further IV diuretics today. Eliquis has been resumed given there are no plans for invasive procedures at this time. Will continue to follow this patient with you.        3/7: As above. Would like to pursue ischemic work up at some point, but she should recover from her acute hypoxia first; plan for work up outpatient. She will need to follow up with Dr. Lozano after discharge. Labs this AM with sodium 141, potassium 4.5, creatinine 1.12, hemoglobin 9.8. Urine culture from yesterday growing e. coli. She is currently on IV ceftriaxone for UTI. Diuretic management per nephrology; yesterday she was ordered IV Lasix 60 mg x 1 but it appears she did not receive this per MAR. Today she has been ordered to start Lasix 80 mg PO daily by nephrology. I+Os are not being recorded. I feel she would benefit from further diuresis but will defer this to nephrology. Blood pressures today are normotensive with most recent reading of 131/59. SpO2 97% on 5L NC. Telemetry with demand paced rhythm without significant ectopy. We will continue to follow with you. Will discuss with my collaborating physician Dr. Moser.     3/7 @ 1400: Discussed with Dr. Moser. We will sign off at this point. She will need to follow up with Dr. Lozano after discharge to pursue ischemic work up. Please contact us with any questions or requests.          I spent 35 minutes in the professional and overall care of this patient.      Kecia Hinson, APRN-CNP

## 2025-03-07 NOTE — CARE PLAN
The patient's goals for the shift include  breatrh better and go home soon    The clinical goals for the shift include decrease oxygen needs, diuresis    Problem: Safety - Adult  Goal: Free from fall injury  Outcome: Progressing     Problem: Discharge Planning  Goal: Discharge to home or other facility with appropriate resources  Outcome: Progressing     Problem: Chronic Conditions and Co-morbidities  Goal: Patient's chronic conditions and co-morbidity symptoms are monitored and maintained or improved  Outcome: Progressing     Problem: Nutrition  Goal: Nutrient intake appropriate for maintaining nutritional needs  Outcome: Progressing     Problem: Skin  Goal: Decreased wound size/increased tissue granulation at next dressing change  Outcome: Progressing  Flowsheets (Taken 3/7/2025 1716)  Decreased wound size/increased tissue granulation at next dressing change:   Promote sleep for wound healing   Protective dressings over bony prominences   Utilize specialty bed per algorithm  Goal: Participates in plan/prevention/treatment measures  Outcome: Progressing  Flowsheets (Taken 3/7/2025 1716)  Participates in plan/prevention/treatment measures:   Discuss with provider PT/OT consult   Elevate heels   Increase activity/out of bed for meals  Goal: Prevent/manage excess moisture  Outcome: Progressing  Flowsheets (Taken 3/7/2025 1716)  Prevent/manage excess moisture:   Cleanse incontinence/protect with barrier cream   Monitor for/manage infection if present   Follow provider orders for dressing changes   Moisturize dry skin  Goal: Prevent/minimize sheer/friction injuries  Outcome: Progressing  Flowsheets (Taken 3/7/2025 1716)  Prevent/minimize sheer/friction injuries:   Complete micro-shifts as needed if patient unable. Adjust patient position to relieve pressure points, not a full turn   Increase activity/out of bed for meals   Use pull sheet   HOB 30 degrees or less   Turn/reposition every 2 hours/use positioning/transfer  devices   Utilize specialty bed per algorithm  Goal: Promote/optimize nutrition  Outcome: Progressing  Flowsheets (Taken 3/7/2025 1716)  Promote/optimize nutrition:   Assist with feeding   Monitor/record intake including meals   Consume > 50% meals/supplements   Offer water/supplements/favorite foods   Discuss with provider if NPO > 2 days   Reassess MST if dietician not consulted  Goal: Promote skin healing  Outcome: Progressing  Flowsheets (Taken 3/7/2025 1716)  Promote skin healing:   Assess skin/pad under line(s)/device(s)   Protective dressings over bony prominences   Turn/reposition every 2 hours/use positioning/transfer devices   Ensure correct size (line/device) and apply per  instructions   Rotate device position/do not position patient on device     Problem: Fall/Injury  Goal: Not fall by end of shift  Outcome: Progressing  Goal: Be free from injury by end of the shift  Outcome: Progressing  Goal: Verbalize understanding of personal risk factors for fall in the hospital  Outcome: Progressing  Goal: Verbalize understanding of risk factor reduction measures to prevent injury from fall in the home  Outcome: Progressing  Goal: Use assistive devices by end of the shift  Outcome: Progressing  Goal: Pace activities to prevent fatigue by end of the shift  Outcome: Progressing     Problem: Diabetes  Goal: Achieve decreasing blood glucose levels by end of shift  Outcome: Progressing  Goal: Increase stability of blood glucose readings by end of shift  Outcome: Progressing  Goal: Decrease in ketones present in urine by end of shift  Outcome: Progressing  Goal: Maintain electrolyte levels within acceptable range throughout shift  Outcome: Progressing  Goal: Maintain glucose levels >70mg/dl to <250mg/dl throughout shift  Outcome: Progressing  Goal: No changes in neurological exam by end of shift  Outcome: Progressing  Goal: Learn about and adhere to nutrition recommendations by end of shift  Outcome:  Progressing  Goal: Vital signs within normal range for age by end of shift  Outcome: Progressing  Goal: Increase self care and/or family involovement by end of shift  Outcome: Progressing  Goal: Receive DSME education by end of shift  Outcome: Progressing     Problem: Heart Failure  Goal: Improved gas exchange this shift  Outcome: Progressing  Goal: Improved urinary output this shift  Outcome: Progressing  Goal: Reduction in peripheral edema within 24 hours  Outcome: Progressing  Goal: Report improvement of dyspnea/breathlessness this shift  Outcome: Progressing  Goal: Weight from fluid excess reduced over 2-3 days, then stabilize  Outcome: Progressing  Goal: Increase self care and/or family involvement in 24 hours  Outcome: Progressing

## 2025-03-07 NOTE — PROGRESS NOTES
Raúl Gil is a 84 y.o. female on day 3 of admission presenting with Syncope and collapse.      Subjective   Patient remain on 5 L oxygen  She denied chest pain.    Objective     Last Recorded Vitals  /58 (BP Location: Right arm, Patient Position: Sitting)   Pulse 100   Temp 36.3 °C (97.3 °F) (Temporal)   Resp 17   Wt 102 kg (224 lb 13.9 oz)   SpO2 97%   Intake/Output last 3 Shifts:    Intake/Output Summary (Last 24 hours) at 3/7/2025 0825  Last data filed at 3/7/2025 0549  Gross per 24 hour   Intake 480 ml   Output 3 ml   Net 477 ml       Admission Weight  Weight: 104 kg (230 lb) (03/03/25 2300)    Daily Weight  03/07/25 : 102 kg (224 lb 13.9 oz)    Image Results  Lower extremity venous duplex [place] [address]             Phone [phone]       Vascular Lab Report     VASC US LOWER EXTREMITY VENOUS DUPLEX LEFT    Patient Name:      RAÚL GIL       Reading Physician: [id number] Jaylyn Lima MD  Study Date:        3/5/2025            Ordering Provider: [id number] RUIZ PAGE  MRN/PID:           [id number]            Fellow:  Accession#:        [id number]        Technologist:      Domi Umana RVT  Date of Birth/Age: 1940 / 84      Technologist 2:    Golden Garcia RVT                     years  Gender:            F                   Encounter#:        [id number]  Admission Status:  Inpatient           Location           [facility]                                         Performed:       Diagnosis/ICD: Pain in left leg-M79.605  CPT Codes:     50191 Peripheral venous duplex scan for DVT Limited       Pertinent History: Pain in left knee.       CONCLUSIONS:  Right Lower Venous: The right common femoral vein demonstrates normal spontaneous and respirophasic flow.  Left Lower Venous: No evidence of acute deep vein thrombus visualized in the left lower extremity.  Additional Findings; Cystic structure left popliteal fossa measuring 1.8 x 1.5cm in transverse; unable to adequately measure in long due to body habitus.  Additional Findings:  Technically difficult bedside exam due to body habitus and position.       Imaging & Doppler Findings:     Right        Flow  CFV   Spontaneous/Phasic       Left                  Compress Thrombus        Flow  Distal External Iliac            None   Spontaneous/Phasic  CFV                     Yes      None   Spontaneous/Phasic  PFV                     Yes      None  FV Proximal             Yes      None   Spontaneous/Phasic  FV Mid                  Yes      None  FV Distal               Yes      None  Popliteal               Yes      None   Spontaneous/Phasic  Peroneal                Yes      None  PTV                     Yes      None       [id number] Jaylyn Lima MD  Electronically signed by [id number] Jaylyn Lima MD on 3/5/2025 at 3:48:30 PM       ** Final **      Physical Exam    General: Morbidly obese, pleasant, cooperating during physical exam.  HEENT: Pupils are equal and reactive to light and commendation , oral mucosa dry, no JVD  Cardiovascular: PMI nondisplaced  Lungs: Clear to auscultation bilaterally, no wheezing, no crackles, no dullness to percussion.  Abdomen: No hepatosplenomegaly appreciated, soft , not tender, positive bowel sounds, positive bowel movement.  Neuro: Alert and oriented x3, strength in upper and lower extremities , sensation intact.  Musculoskeletal: +1 pedal edema both lower extremity  Vascular: Pulses are intact in upper and lower extremities  Skin: Ecchymotic lesion in upper extremities.     Assessment/Plan      Elevated troponin  NSTEMI  Cardiology on case.  Monitor close  Continue with current medication.  Waiting for cardiology to see her today.  Nuclear stress test in a.m.?.  If patient is not candidate for nuclear stress test today she may get discharge    Near syncope  2D echo done on 9/20/2024 revealed EF 60  to 65%.  2D echo done yesterday revealed ejection fraction 55 -60%  Chest x-ray revealed mild pulmonary congestion.    Patient has been on Lasix her  Evaluated by cardio  Patient has history of A-fib.  Patient was slightly orthostatic.    Acute respiratory failure with hypoxia  Chest x-ray revealed pulmonary congestion  Pulmonary on the case  VQ scan no evidence of pulmonary emboli  Continue with oxygen to maintain pulse ox more than 92%.  Patient is on 2 L oxygen at home.  Now she is on 5 liters oxygen    Chronic congestive heart failure with diastolic dysfunction  Patient had dose of Lasix yesterday.    Urinary tract infection  Started on ceftriaxone  Urine culture grew E. coli    Chronic atrial fibrillation  Status post pacemaker implant  Resume Eliquis today  Continue with current medication    Chronic kidney disease stage III  Monitor close  Consult Dr. Odell from nephrology service    Obstructive sleep apnea  Patient is on 2 L oxygen at home, now on 5 L oxygen    Deconditioning    Fall precaution  Ambulate with assist  Check CBC and BMP in AM.  PT /OT to see her today  Discussed with care coordination on the case.  Waiting for cardiology to see her today.  Discussed in detail with her daughter Jazz Woods her daughter POA.  Patient is DNR CCA/DNI.  Waiting for cardio to see her today to determine plan of treatment.  Amos Yang MD

## 2025-03-08 VITALS
DIASTOLIC BLOOD PRESSURE: 51 MMHG | BODY MASS INDEX: 44.18 KG/M2 | RESPIRATION RATE: 17 BRPM | OXYGEN SATURATION: 95 % | TEMPERATURE: 97.7 F | SYSTOLIC BLOOD PRESSURE: 132 MMHG | WEIGHT: 240.08 LBS | HEART RATE: 70 BPM | HEIGHT: 62 IN

## 2025-03-08 LAB
ALBUMIN SERPL BCP-MCNC: 3.3 G/DL (ref 3.4–5)
ANION GAP SERPL CALCULATED.3IONS-SCNC: 9 MMOL/L (ref 10–20)
BUN SERPL-MCNC: 35 MG/DL (ref 6–23)
CALCIUM SERPL-MCNC: 8.9 MG/DL (ref 8.6–10.3)
CHLORIDE SERPL-SCNC: 104 MMOL/L (ref 98–107)
CO2 SERPL-SCNC: 35 MMOL/L (ref 21–32)
CREAT SERPL-MCNC: 1.03 MG/DL (ref 0.5–1.05)
EGFRCR SERPLBLD CKD-EPI 2021: 53 ML/MIN/1.73M*2
ERYTHROCYTE [DISTWIDTH] IN BLOOD BY AUTOMATED COUNT: 15.8 % (ref 11.5–14.5)
GLUCOSE SERPL-MCNC: 149 MG/DL (ref 74–99)
HCT VFR BLD AUTO: 28.2 % (ref 36–46)
HGB BLD-MCNC: 8.4 G/DL (ref 12–16)
MCH RBC QN AUTO: 30.5 PG (ref 26–34)
MCHC RBC AUTO-ENTMCNC: 29.8 G/DL (ref 32–36)
MCV RBC AUTO: 103 FL (ref 80–100)
NRBC BLD-RTO: 0 /100 WBCS (ref 0–0)
PHOSPHATE SERPL-MCNC: 3 MG/DL (ref 2.5–4.9)
PLATELET # BLD AUTO: 117 X10*3/UL (ref 150–450)
POTASSIUM SERPL-SCNC: 3.9 MMOL/L (ref 3.5–5.3)
RBC # BLD AUTO: 2.75 X10*6/UL (ref 4–5.2)
SODIUM SERPL-SCNC: 144 MMOL/L (ref 136–145)
WBC # BLD AUTO: 6.5 X10*3/UL (ref 4.4–11.3)

## 2025-03-08 PROCEDURE — 2500000001 HC RX 250 WO HCPCS SELF ADMINISTERED DRUGS (ALT 637 FOR MEDICARE OP): Performed by: INTERNAL MEDICINE

## 2025-03-08 PROCEDURE — 2500000002 HC RX 250 W HCPCS SELF ADMINISTERED DRUGS (ALT 637 FOR MEDICARE OP, ALT 636 FOR OP/ED): Performed by: STUDENT IN AN ORGANIZED HEALTH CARE EDUCATION/TRAINING PROGRAM

## 2025-03-08 PROCEDURE — 99232 SBSQ HOSP IP/OBS MODERATE 35: CPT | Performed by: INTERNAL MEDICINE

## 2025-03-08 PROCEDURE — 36415 COLL VENOUS BLD VENIPUNCTURE: CPT | Performed by: STUDENT IN AN ORGANIZED HEALTH CARE EDUCATION/TRAINING PROGRAM

## 2025-03-08 PROCEDURE — 99239 HOSP IP/OBS DSCHRG MGMT >30: CPT | Performed by: INTERNAL MEDICINE

## 2025-03-08 PROCEDURE — 80069 RENAL FUNCTION PANEL: CPT | Performed by: INTERNAL MEDICINE

## 2025-03-08 PROCEDURE — 2500000005 HC RX 250 GENERAL PHARMACY W/O HCPCS: Performed by: INTERNAL MEDICINE

## 2025-03-08 PROCEDURE — 2500000001 HC RX 250 WO HCPCS SELF ADMINISTERED DRUGS (ALT 637 FOR MEDICARE OP): Performed by: STUDENT IN AN ORGANIZED HEALTH CARE EDUCATION/TRAINING PROGRAM

## 2025-03-08 PROCEDURE — 85027 COMPLETE CBC AUTOMATED: CPT | Performed by: STUDENT IN AN ORGANIZED HEALTH CARE EDUCATION/TRAINING PROGRAM

## 2025-03-08 RX ADMIN — Medication 5 L/MIN: at 08:08

## 2025-03-08 RX ADMIN — POTASSIUM CHLORIDE 20 MEQ: 1500 TABLET, EXTENDED RELEASE ORAL at 08:21

## 2025-03-08 RX ADMIN — METOPROLOL SUCCINATE 50 MG: 50 TABLET, EXTENDED RELEASE ORAL at 08:21

## 2025-03-08 RX ADMIN — SERTRALINE 100 MG: 100 TABLET, FILM COATED ORAL at 08:21

## 2025-03-08 RX ADMIN — CEFUROXIME AXETIL 250 MG: 500 TABLET, FILM COATED ORAL at 08:18

## 2025-03-08 RX ADMIN — Medication 38 MG OF IRON: at 08:21

## 2025-03-08 RX ADMIN — Medication 5 L/MIN: at 08:04

## 2025-03-08 RX ADMIN — APIXABAN 5 MG: 5 TABLET, FILM COATED ORAL at 08:22

## 2025-03-08 RX ADMIN — FUROSEMIDE 80 MG: 80 TABLET ORAL at 08:20

## 2025-03-08 ASSESSMENT — PAIN SCALES - GENERAL: PAINLEVEL_OUTOF10: 0 - NO PAIN

## 2025-03-08 NOTE — PROGRESS NOTES
03/08/25 1033   Discharge Planning   Expected Discharge Disposition Home Health  ([facility])     Referral and AVS sent to agency     NO BARRIERS TO DISCHARGE FROM CARE TRANSITIONS

## 2025-03-08 NOTE — DISCHARGE SUMMARY
Discharge Diagnosis  Syncope and collapse    Issues Requiring Follow-Up  Acute on chronic CHF with diastolic dysfunction  Syncope  Urinary tract infection  Acute respiratory failure on chronic respiratory failure.  Morbid obese  Atrial fibrillation      Discharge Meds     Medication List      START taking these medications     cefuroxime 250 mg tablet; Commonly known as: Ceftin; Take 1 tablet (250   mg) by mouth 2 times a day for 3 days.     CHANGE how you take these medications     oxygen gas therapy; Commonly known as: O2; Inhale 5 L/min every 12   hours. Continue with 4 L oxygen at rest and 5 L oxygen with ambulation;   What changed: how much to take, when to take this, additional instructions     CONTINUE taking these medications     albuterol 90 mcg/actuation inhaler; Inhale 2 puffs every 6 hours if   needed for wheezing.   apixaban 5 mg tablet; Commonly known as: Eliquis; Take 1 tablet (5 mg)   by mouth 2 times a day. Do not start before December 20, 2024.   ferrous gluconate 324 (38 Fe) mg tablet; Commonly known as: Fergon; Take   1 tablet (38 mg of iron) by mouth once daily with breakfast.   furosemide 80 mg tablet; Commonly known as: Lasix; Take 1 tablet (80 mg)   by mouth once daily in the morning.  AS DIRECTED   metoprolol succinate XL 50 mg 24 hr tablet; Commonly known as:   Toprol-XL; Take 1 tablet (50 mg) by mouth once daily.   potassium chloride CR 20 mEq ER tablet; Commonly known as: Klor-Con M20;   Take 1 tablet (20 mEq) by mouth 2 times a day. Do not crush or chew.   rOPINIRole 2 mg tablet; Commonly known as: Requip; Take 1 tablet (2 mg)   by mouth once daily at bedtime.   sertraline 100 mg tablet; Commonly known as: Zoloft; TAKE ONE TABLET BY   MOUTH EVERY DAY   simvastatin 10 mg tablet; Commonly known as: Zocor; Take 1 tablet (10   mg) by mouth once daily at bedtime.   traZODone 100 mg tablet; Commonly known as: Desyrel; Take 2 tablets (200   mg) by mouth once daily at bedtime.     STOP taking  these medications     acetaminophen 325 mg capsule; Commonly known as: Tylenol   pramipexole 1.5 mg tablet; Commonly known as: Mirapex       Test Results Pending At Discharge  Pending Labs       No current pending labs.            Hospital Course       Patient is an 84 years old  female past medical history of persistent A-fib, history of pacemaker implant, chronic diastolic heart failure chronic respiratory failure on 2 L oxygen, diabetes mellitus type 2.  Patient presented to ER with syncope.  Patient was found to have +1 pedal edema bilaterally.  Patient has slight changes on EKGs, elevated troponin.  Patient was evaluated by Dr. Moser from cardiology service.  Patient was found to have acute respiratory failure on chronic respiratory failure.  She is on 2 L oxygen.  During hospital stay patient required 6 L oxygen, amount of oxygen decreased to 5 L oxygen.  She was evaluated by pulmonary.  Patient was slightly orthostatics.  Labs revealed acute kidney injury on chronic kidney disease.  Dr. Odell was consulted from nephrology services.  Patient has been on Lasix Lasix was on hold for few days.  Her she was found to have urinary tract infection urine culture grew E. coli treated with ceftriaxone.  I advised the patient to continue with Ceftin for few more days.  Patient was evaluated by physical therapy.  Physical therapy revealed suggested low intensity of care.  She can get discharged home with wheeled walker.  Regarding A-fib patient is on Eliquis.  Patient had ecchymosis in both upper extremities patient was advised to ambulate with caution.  Patient was evaluated by respiratory therapy.  Patient will need 4 L oxygen at rest and 5 L oxygen with ambulation.  Respiratory therapist to arrange for oxygen at home.  Patient is clinically hemodynamic stable to get discharged today.  Discussed in detail with cardiology on the case.  Patient will see her cardiology as outpatient for possible nuclear stress  test as outpatient.      Pertinent Physical Exam At Time of Discharge    Physical Exam  General: In non acute distress, cooperating during physical exam.  HEENT: Pupils are equal and reactive to light and commendation , oral mucosa moist, no JVD oral mucosa is moist.  Cardiovascular: PMI nondisplaced  Lungs: Clear to auscultation bilaterally, no wheezing, no crackles, no dullness to percussion.  Abdomen: No hepatosplenomegaly appreciated, soft , not tender, positive bowel sounds, positive bowel movement.  Neuro: Alert and oriented x3, strength in upper and lower extremities , sensation intact.  Musculoskeletal: +1 pedal edema both lower extremities   vascular: Pulses are intact in upper and lower extremities  Skin: Ecchymotic lesion in upper extremities  Outpatient Follow-Up  Future Appointments   Date Time Provider Department Center   3/17/2025 12:00 PM Abraham Cabrera MD [id number] [facility]   4/2/2025  4:00 PM Misbah Lozano DO [id number] [facility]   5/7/2025 11:00 AM Beverly Ricketts MD [id number] [facility]     Follow-up with Dr. Mclaughlin in 2 weeks  Follow-up with nephrology in 2 weeks  Follow-up with pulmonary in 3 weeks  Follow-up with primary care physician in 2 to 3 weeks    Time spent discharging patient 40 minutes      Amos Yang MD

## 2025-03-08 NOTE — CARE PLAN
The patient's goals for the shift include  rest and go home soon    The clinical goals for the shift include Rest and remain safe

## 2025-03-08 NOTE — PROGRESS NOTES
"Raúl Jordan is a 84 y.o. female on day 4 of admission presenting with Syncope and collapse.    Subjective      Patient is seen for chronic kidney stage III patient is feeling better less short of breath still on oxygen supplements 3 and half liters nasal cannula denies any fever chills chest pain nausea vomiting or diarrhea.    Objective     Physical Exam  Constitutional:       General: She is not in acute distress.     Appearance: Normal appearance. She is not toxic-appearing.   Neck:      Vascular: No carotid bruit.   Cardiovascular:      Heart sounds: No murmur heard.     No friction rub. No gallop.   Pulmonary:      Breath sounds: No wheezing, rhonchi or rales.   Abdominal:      Tenderness: There is no abdominal tenderness. There is no right CVA tenderness, left CVA tenderness or rebound.   Musculoskeletal:         General: No tenderness.      Cervical back: Neck supple.      Right lower leg: No edema.      Left lower leg: No edema.   Lymphadenopathy:      Cervical: No cervical adenopathy.         Last Recorded Vitals  Blood pressure 132/51, pulse 70, temperature 36.5 °C (97.7 °F), temperature source Temporal, resp. rate 17, height 1.575 m (5' 2\"), weight 109 kg (240 lb 1.3 oz), SpO2 95%.    Intake/Output last 3 Shifts:  I/O last 3 completed shifts:  In: 480 (4.4 mL/kg) [P.O.:480]  Out: 2153 (19.8 mL/kg) [Urine:2153 (0.5 mL/kg/hr)]  Weight: 108.9 kg     Current Facility-Administered Medications:     acetaminophen (Tylenol) tablet 650 mg, 650 mg, oral, q4h PRN, 650 mg at 03/07/25 0029 **OR** acetaminophen (Tylenol) oral liquid 650 mg, 650 mg, nasogastric tube, q4h PRN **OR** acetaminophen (Tylenol) suppository 650 mg, 650 mg, rectal, q4h PRN, Sang Landrum MD    apixaban (Eliquis) tablet 5 mg, 5 mg, oral, BID, Amos Yang MD, 5 mg at 03/08/25 0822    cefuroxime (Ceftin) tablet 250 mg, 250 mg, oral, BID, Amos Yang MD, 250 mg at 03/08/25 0818    ferrous gluconate (Fergon) 324 (38 Fe) mg tablet 38 mg " of iron, 38 mg of iron, oral, Daily with breakfast, Sang Landrum MD, 38 mg of iron at 03/08/25 0821    furosemide (Lasix) injection 60 mg, 60 mg, intravenous, Once, Leon Odell MD    furosemide (Lasix) tablet 80 mg, 80 mg, oral, Daily, Leon Odell MD, 80 mg at 03/08/25 0820    ipratropium-albuteroL (Duo-Neb) 0.5-2.5 mg/3 mL nebulizer solution 3 mL, 3 mL, nebulization, q6h PRN, Heriberto Monroy MD    metoprolol succinate XL (Toprol-XL) 24 hr tablet 50 mg, 50 mg, oral, Daily, Amos Yang MD, 50 mg at 03/08/25 0821    nitroglycerin (Nitrostat) SL tablet 0.4 mg, 0.4 mg, sublingual, q5 min PRN, Beckie Horan DO    oxygen (O2) therapy, , inhalation, Continuous - Inhalation, Amos Yang MD, Last Rate: 300,000 mL/hr at 03/08/25 0804, 5 L/min at 03/08/25 0804    oxygen (O2) therapy, , inhalation, Continuous - Inhalation, Amos Yang MD, Last Rate: 300,000 mL/hr at 03/08/25 0808, 5 L/min at 03/08/25 0808    polyethylene glycol (Glycolax, Miralax) packet 17 g, 17 g, oral, Daily, Sang Landrum MD, 17 g at 03/06/25 0945    potassium chloride CR (Klor-Con M20) ER tablet 20 mEq, 20 mEq, oral, BID, Sang Landrum MD, 20 mEq at 03/08/25 0821    rOPINIRole (Requip) tablet 2 mg, 2 mg, oral, Nightly, Sang Landrum MD, 2 mg at 03/07/25 2038    sertraline (Zoloft) tablet 100 mg, 100 mg, oral, Daily, Sang Landrum MD, 100 mg at 03/08/25 0821    simvastatin (Zocor) tablet 10 mg, 10 mg, oral, Nightly, Sang Landrum MD, 10 mg at 03/07/25 2038    traZODone (Desyrel) tablet 100 mg, 100 mg, oral, Nightly, Sang Landrum MD, 100 mg at 03/07/25 2038   Relevant Results    Results for orders placed or performed during the hospital encounter of 03/03/25 (from the past 96 hours)   Transthoracic Echo (TTE) Limited   Result Value Ref Range    AV pk sarika 1.15 m/s    LVOT diam 2.00 cm    Tricuspid annular plane systolic excursion 2.1 cm    LV EF 58 %    RVSP 59.6 mmHg    AV pk grad 5 mmHg    Aortic Valve Area by Continuity of Peak  Velocity 2.22 cm2    LV A4C EF 68.4    CBC   Result Value Ref Range    WBC 6.9 4.4 - 11.3 x10*3/uL    nRBC 0.0 0.0 - 0.0 /100 WBCs    RBC 3.24 (L) 4.00 - 5.20 x10*6/uL    Hemoglobin 10.0 (L) 12.0 - 16.0 g/dL    Hematocrit 32.6 (L) 36.0 - 46.0 %     (H) 80 - 100 fL    MCH 30.9 26.0 - 34.0 pg    MCHC 30.7 (L) 32.0 - 36.0 g/dL    RDW 15.7 (H) 11.5 - 14.5 %    Platelets 126 (L) 150 - 450 x10*3/uL   Basic metabolic panel   Result Value Ref Range    Glucose 107 (H) 74 - 99 mg/dL    Sodium 143 136 - 145 mmol/L    Potassium 4.7 3.5 - 5.3 mmol/L    Chloride 106 98 - 107 mmol/L    Bicarbonate 29 21 - 32 mmol/L    Anion Gap 13 10 - 20 mmol/L    Urea Nitrogen 41 (H) 6 - 23 mg/dL    Creatinine 1.38 (H) 0.50 - 1.05 mg/dL    eGFR 37 (L) >60 mL/min/1.73m*2    Calcium 9.1 8.6 - 10.3 mg/dL   CBC   Result Value Ref Range    WBC 6.1 4.4 - 11.3 x10*3/uL    nRBC 0.0 0.0 - 0.0 /100 WBCs    RBC 3.05 (L) 4.00 - 5.20 x10*6/uL    Hemoglobin 9.4 (L) 12.0 - 16.0 g/dL    Hematocrit 30.7 (L) 36.0 - 46.0 %     (H) 80 - 100 fL    MCH 30.8 26.0 - 34.0 pg    MCHC 30.6 (L) 32.0 - 36.0 g/dL    RDW 15.6 (H) 11.5 - 14.5 %    Platelets 124 (L) 150 - 450 x10*3/uL   Basic metabolic panel   Result Value Ref Range    Glucose 109 (H) 74 - 99 mg/dL    Sodium 145 136 - 145 mmol/L    Potassium 4.1 3.5 - 5.3 mmol/L    Chloride 107 98 - 107 mmol/L    Bicarbonate 31 21 - 32 mmol/L    Anion Gap 11 10 - 20 mmol/L    Urea Nitrogen 40 (H) 6 - 23 mg/dL    Creatinine 1.30 (H) 0.50 - 1.05 mg/dL    eGFR 40 (L) >60 mL/min/1.73m*2    Calcium 9.0 8.6 - 10.3 mg/dL   CBC   Result Value Ref Range    WBC 7.4 4.4 - 11.3 x10*3/uL    nRBC 0.0 0.0 - 0.0 /100 WBCs    RBC 3.17 (L) 4.00 - 5.20 x10*6/uL    Hemoglobin 9.8 (L) 12.0 - 16.0 g/dL    Hematocrit 32.1 (L) 36.0 - 46.0 %     (H) 80 - 100 fL    MCH 30.9 26.0 - 34.0 pg    MCHC 30.5 (L) 32.0 - 36.0 g/dL    RDW 15.8 (H) 11.5 - 14.5 %    Platelets 137 (L) 150 - 450 x10*3/uL   Basic metabolic panel   Result Value Ref  Range    Glucose 101 (H) 74 - 99 mg/dL    Sodium 141 136 - 145 mmol/L    Potassium 4.5 3.5 - 5.3 mmol/L    Chloride 105 98 - 107 mmol/L    Bicarbonate 32 21 - 32 mmol/L    Anion Gap 9 (L) 10 - 20 mmol/L    Urea Nitrogen 35 (H) 6 - 23 mg/dL    Creatinine 1.12 (H) 0.50 - 1.05 mg/dL    eGFR 48 (L) >60 mL/min/1.73m*2    Calcium 9.3 8.6 - 10.3 mg/dL   CBC   Result Value Ref Range    WBC 6.5 4.4 - 11.3 x10*3/uL    nRBC 0.0 0.0 - 0.0 /100 WBCs    RBC 2.75 (L) 4.00 - 5.20 x10*6/uL    Hemoglobin 8.4 (L) 12.0 - 16.0 g/dL    Hematocrit 28.2 (L) 36.0 - 46.0 %     (H) 80 - 100 fL    MCH 30.5 26.0 - 34.0 pg    MCHC 29.8 (L) 32.0 - 36.0 g/dL    RDW 15.8 (H) 11.5 - 14.5 %    Platelets 117 (L) 150 - 450 x10*3/uL   Renal function panel   Result Value Ref Range    Glucose 149 (H) 74 - 99 mg/dL    Sodium 144 136 - 145 mmol/L    Potassium 3.9 3.5 - 5.3 mmol/L    Chloride 104 98 - 107 mmol/L    Bicarbonate 35 (H) 21 - 32 mmol/L    Anion Gap 9 (L) 10 - 20 mmol/L    Urea Nitrogen 35 (H) 6 - 23 mg/dL    Creatinine 1.03 0.50 - 1.05 mg/dL    eGFR 53 (L) >60 mL/min/1.73m*2    Calcium 8.9 8.6 - 10.3 mg/dL    Phosphorus 3.0 2.5 - 4.9 mg/dL    Albumin 3.3 (L) 3.4 - 5.0 g/dL     *Note: Due to a large number of results and/or encounters for the requested time period, some results have not been displayed. A complete set of results can be found in Results Review.       Assessment/Plan   Chronic kidney disease stage III renal function continue to improve creatinine down to 1.01 continue diuretics  Urinary tract infection with antibiotic therapy  Abdominal pain and right flank pain resolved  Syncope and fall  Diabetes mellitus type 2  Hypertension  Atrial fibrillation  Of AV block with pacemaker placement  Congestive heart failure placed back on diuretics  Chronic respiratory failure on home oxygen    Leon Odell MD

## 2025-03-08 NOTE — CARE PLAN
The patient's goals for the shift include      The clinical goals for the shift include Rest and remain safe      Problem: Safety - Adult  Goal: Free from fall injury  Outcome: Progressing     Problem: Discharge Planning  Goal: Discharge to home or other facility with appropriate resources  Outcome: Progressing     Problem: Chronic Conditions and Co-morbidities  Goal: Patient's chronic conditions and co-morbidity symptoms are monitored and maintained or improved  Outcome: Progressing     Problem: Nutrition  Goal: Nutrient intake appropriate for maintaining nutritional needs  Outcome: Progressing     Problem: Skin  Goal: Decreased wound size/increased tissue granulation at next dressing change  Outcome: Progressing  Flowsheets (Taken 3/8/2025 1019)  Decreased wound size/increased tissue granulation at next dressing change:   Promote sleep for wound healing   Utilize specialty bed per algorithm   Protective dressings over bony prominences  Goal: Participates in plan/prevention/treatment measures  Outcome: Progressing  Flowsheets (Taken 3/8/2025 1019)  Participates in plan/prevention/treatment measures:   Discuss with provider PT/OT consult   Elevate heels   Increase activity/out of bed for meals  Goal: Prevent/manage excess moisture  Outcome: Progressing  Flowsheets (Taken 3/8/2025 1019)  Prevent/manage excess moisture:   Cleanse incontinence/protect with barrier cream   Moisturize dry skin   Use wicking fabric (obtain order)   Monitor for/manage infection if present   Follow provider orders for dressing changes  Goal: Prevent/minimize sheer/friction injuries  Outcome: Progressing  Flowsheets (Taken 3/8/2025 1019)  Prevent/minimize sheer/friction injuries:   Complete micro-shifts as needed if patient unable. Adjust patient position to relieve pressure points, not a full turn   Increase activity/out of bed for meals   Use pull sheet   Utilize specialty bed per algorithm   HOB 30 degrees or less  Goal: Promote/optimize  nutrition  Outcome: Progressing  Flowsheets (Taken 3/8/2025 1019)  Promote/optimize nutrition:   Assist with feeding   Monitor/record intake including meals   Consume > 50% meals/supplements   Discuss with provider if NPO > 2 days  Goal: Promote skin healing  Outcome: Progressing  Flowsheets (Taken 3/8/2025 1019)  Promote skin healing:   Assess skin/pad under line(s)/device(s)   Protective dressings over bony prominences   Turn/reposition every 2 hours/use positioning/transfer devices   Ensure correct size (line/device) and apply per  instructions     Problem: Fall/Injury  Goal: Not fall by end of shift  Outcome: Progressing  Goal: Be free from injury by end of the shift  Outcome: Progressing  Goal: Verbalize understanding of personal risk factors for fall in the hospital  Outcome: Progressing  Goal: Verbalize understanding of risk factor reduction measures to prevent injury from fall in the home  Outcome: Progressing  Goal: Use assistive devices by end of the shift  Outcome: Progressing  Goal: Pace activities to prevent fatigue by end of the shift  Outcome: Progressing     Problem: Diabetes  Goal: Achieve decreasing blood glucose levels by end of shift  Outcome: Progressing  Goal: Increase stability of blood glucose readings by end of shift  Outcome: Progressing  Goal: Decrease in ketones present in urine by end of shift  Outcome: Progressing  Goal: Maintain electrolyte levels within acceptable range throughout shift  Outcome: Progressing  Goal: Maintain glucose levels >70mg/dl to <250mg/dl throughout shift  Outcome: Progressing  Goal: No changes in neurological exam by end of shift  Outcome: Progressing  Goal: Learn about and adhere to nutrition recommendations by end of shift  Outcome: Progressing  Goal: Vital signs within normal range for age by end of shift  Outcome: Progressing  Goal: Increase self care and/or family involovement by end of shift  Outcome: Progressing  Goal: Receive DSME education  by end of shift  Outcome: Progressing     Problem: Heart Failure  Goal: Improved gas exchange this shift  Outcome: Progressing  Goal: Improved urinary output this shift  Outcome: Progressing  Goal: Reduction in peripheral edema within 24 hours  Outcome: Progressing  Goal: Report improvement of dyspnea/breathlessness this shift  Outcome: Progressing  Goal: Weight from fluid excess reduced over 2-3 days, then stabilize  Outcome: Progressing  Goal: Increase self care and/or family involvement in 24 hours  Outcome: Progressing

## 2025-03-08 NOTE — PROGRESS NOTES
Department of Medicine  Division of Pulmonary, Critical Care, and Sleep Medicine  Progress Note    Subjective     Raúl Jordan is a 84 y.o. female on day 4 of admission presenting with Syncope and collapse.    Pt was seen today resting comfortably in a bedside chair wearing 4 L nasal cannula.  Patient states that she is tired that she did not get enough sleep last night.  Patient states that she is going home today.  Advised me that she does have home oxygen at home.      Objective     Vitals:      3/7/2025     2:00 PM 3/7/2025     3:00 PM 3/7/2025     4:10 PM 3/7/2025     8:26 PM 3/8/2025     1:56 AM 3/8/2025     4:21 AM 3/8/2025     7:29 AM   Vitals   Systolic   118 118 109 130 122   Diastolic   54 64 56 59 62   BP Location   Right arm Left arm Left arm Right arm Left arm   Heart Rate 69 87 70 95 83 87 66   Temp   36.2 °C (97.2 °F) 36.8 °C (98.3 °F) 36.8 °C (98.2 °F) 36.6 °C (97.9 °F) 36.5 °C (97.7 °F)   Resp   16 18 18 17 17   Weight (lb)      240.08    BMI      43.91 kg/m2    BSA (m2)      2.18 m2         Oxygen Therapy:  SpO2: 96 %  Medical Gas Therapy: Supplemental oxygen (4l)  Medical Gas Delivery Method: Nasal cannula       Intake/Output::  I/O last 3 completed shifts:  In: 480 (4.4 mL/kg) [P.O.:480]  Out: 2153 (19.8 mL/kg) [Urine:2153 (0.5 mL/kg/hr)]  Weight: 108.9 kg     Physical Exam:  Physical Exam  Constitutional:       General: She is not in acute distress.     Appearance: She is obese. She is not toxic-appearing.      Comments: Frail elderly female   HENT:      Head: Normocephalic and atraumatic.      Nose: Nose normal.      Comments: Nasal cannula in place     Mouth/Throat:      Pharynx: Oropharynx is clear.   Eyes:      Extraocular Movements: Extraocular movements intact.   Neck:      Comments: No visualized masses  Cardiovascular:      Rate and Rhythm: Tachycardia present. Rhythm irregular.      Heart sounds: No murmur heard.     No friction rub. No gallop.   Pulmonary:      Effort: No  respiratory distress.      Breath sounds: Rales present. No wheezing or rhonchi.   Abdominal:      General: There is no distension.      Palpations: Abdomen is soft.      Tenderness: There is no abdominal tenderness. There is no guarding.   Musculoskeletal:         General: No tenderness.      Right lower leg: Edema present.      Left lower leg: Edema present.      Comments: Left greater than right   Skin:     General: Skin is warm and dry.      Findings: Rash (On right lower extremity) present.   Neurological:      Mental Status: She is alert and oriented to person, place, and time.   Psychiatric:         Mood and Affect: Mood normal.         Inpatient Medications:  apixaban, 5 mg, oral, BID  cefuroxime, 250 mg, oral, BID  ferrous gluconate, 38 mg of iron, oral, Daily with breakfast  furosemide, 60 mg, intravenous, Once  furosemide, 80 mg, oral, Daily  metoprolol succinate XL, 50 mg, oral, Daily  oxygen, , inhalation, Continuous - Inhalation  oxygen, , inhalation, Continuous - Inhalation  polyethylene glycol, 17 g, oral, Daily  potassium chloride CR, 20 mEq, oral, BID  rOPINIRole, 2 mg, oral, Nightly  sertraline, 100 mg, oral, Daily  simvastatin, 10 mg, oral, Nightly  traZODone, 100 mg, oral, Nightly         PRN medications: acetaminophen **OR** acetaminophen **OR** acetaminophen, ipratropium-albuteroL, nitroglycerin    Lab/Radiology/Diagnostic Review:  All labs and Imaging have been personally reviewed.     Assessment/Plan       Raúl Jordan is a Raúl Jordan is a 84 y.o. 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  Pulmonary was consulted for acute hypoxic respiratory failure.     Patient was seen and assessed by myself and his chart was reviewed for previous pulmonary visits, Labs and imaging.  Patient  has chronic respiratory failure and wears 2 L nasal cannula nightly.  Furthermore patient does have a history of HFpEF with multiple other cardiac comorbidities.  Patient is likely fluid overloaded.  Do not have concern for acute infection or pneumonia currently.  Echo during this hospitalization shows preserved ejection fraction with diastolic filling defects.  Nuclear perfusion scan is negative for PE.    Of note Home Lasix dose of 80 mg daily has been held.  But she did get a dose of 40 mg Lasix IV yesterday.  Patient will need continued diuresis in order to improve her oxygenation status.  Per review of her CT scan and her chest x-ray she has a right sided small pleural effusion.  Furthermore she appears to be vascular congestion within her chest.  Patient likely unable to coordinate I-S.  Will continue to encourage her.  Can consider hyperinflation therapy with respiratory therapy but again diuresis would be optimal here.  I have discussed the case with Dr. SONIYA Odell who agrees with diuresis today.     Assessment:  #Syncope  #LAZARA requiring 2 L nasal cannula nightly  #Acute on chronic hypoxic respiratory failure  #HFpEF  # JC on CKD  #Bilateral lower extremity swelling     Recommendations are as follows:  -Titrate FiO2 to SpO2 greater than 92%; currently on 4 L nasal cannula.  Patient does have home oxygen.  Will need to continue to wean oxygen as diuresis allows.  -Nephrology directing diuresis.  -Bronchopulmonary hygiene with incentive spirometer; advised patient to take at home with her  -As needed bronchodilators; not on home inhalers.    Pulmonary will sign off from here.      I spent 35 minutes in the professional and overall care of this patient.      Heriberto Monroy MD, MPH  Pulmonary and Critical Care Medicine     Please excuse any typographical or unwanted errors as voice recognition software was used to complete this note.

## 2025-03-10 ENCOUNTER — PATIENT OUTREACH (OUTPATIENT)
Dept: PRIMARY CARE | Facility: CLINIC | Age: 85
End: 2025-03-10
Payer: MEDICARE

## 2025-03-10 NOTE — PROGRESS NOTES
Discharge Facility:   [facility]   Discharge Diagnosis:   Syncope and collapse.   Admission Date:   3/3/25   Discharge Date: 3/8/25     PCP Appointment Date:  3/12/25   Specialist Appointment Date:  neuro 3/17/25  4/2 cardio   Hospital Encounter and Summary Linked: Yes    ED to Hosp-Admission (Discharged) with Amos Yang MD; Beckie Horan DO (03/03/2025)       Issues Requiring Follow-Up Acute on chronic CHF with diastolic dysfunction  Syncope  Urinary tract infection  Acute respiratory failure on chronic respiratory failure.  Morbid obese  Atrial fibrillation    Wrap Up  Wrap Up Additional Comments: This CM spoke with pt's daughter via phone. Dtr reports pt is on 5 l of O2 since discharge.dtr reports having [facility] number incase she doesnt hear from them today. . New meds reviewed.  Denies CP. CM provided dtr  my contact info for any additional concerns or questions. (3/10/2025 12:16 PM)    Medications  Medications reviewed with patient/caregiver?: Yes (cm SPOKE WITH PTS DTR) (3/10/2025 12:16 PM)  Is the patient having any side effects they believe may be caused by any medication additions or changes?: No (3/10/2025 12:16 PM)  Does the patient have all medications ordered at discharge?: Yes (3/10/2025 12:16 PM)  Care Management Interventions: No intervention needed (3/10/2025 12:16 PM)  Prescription Comments: New scripts gievn to pt at discharge : -  CEFTIN (3/10/2025 12:16 PM)  Is the patient taking all medications as directed (includes completed medication regime)?: Yes (3/10/2025 12:16 PM)  Care Management Interventions: Provided patient education (3/10/2025 12:16 PM)  Medication Comments: Pt denies problems obtaining or affording medication  . (3/10/2025 12:16 PM)    Appointments  Does the patient have a primary care provider?: Yes (3/10/2025 12:16 PM)  Care Management Interventions: Verified appointment date/time/provider (3/10/2025 12:16 PM)  Has the patient kept scheduled appointments  due by today?: Yes (3/10/2025 12:16 PM)  Care Management Interventions: Advised patient to keep appointment (3/10/2025 12:16 PM)    Self Management  What is the home health agency?: Home Health  ([facility]) (3/10/2025 12:16 PM)  Has home health visited the patient within 72 hours of discharge?: Call prior to 72 hours (3/10/2025 12:16 PM)  What Durable Medical Equipment (DME) was ordered?: denies need (3/10/2025 12:16 PM)    Patient Teaching  Does the patient have access to their discharge instructions?: Yes (3/10/2025 12:16 PM)  Care Management Interventions: Reviewed instructions with patient (3/10/2025 12:16 PM)  What is the patient's perception of their health status since discharge?: Improving (3/10/2025 12:16 PM)  Is the patient/caregiver able to teach back the hierarchy of who to call/visit for symptoms/problems? PCP, Specialist, Home Health nurse, Urgent Care, ED, 911: Yes (3/10/2025 12:16 PM)

## 2025-03-12 ENCOUNTER — OFFICE VISIT (OUTPATIENT)
Dept: PRIMARY CARE | Facility: CLINIC | Age: 85
End: 2025-03-12
Payer: MEDICARE

## 2025-03-12 VITALS
HEIGHT: 62 IN | OXYGEN SATURATION: 95 % | BODY MASS INDEX: 43.61 KG/M2 | TEMPERATURE: 97.5 F | WEIGHT: 237 LBS | HEART RATE: 64 BPM | DIASTOLIC BLOOD PRESSURE: 72 MMHG | SYSTOLIC BLOOD PRESSURE: 126 MMHG

## 2025-03-12 DIAGNOSIS — D50.0 IRON DEFICIENCY ANEMIA DUE TO CHRONIC BLOOD LOSS: ICD-10-CM

## 2025-03-12 DIAGNOSIS — N18.31 STAGE 3A CHRONIC KIDNEY DISEASE (MULTI): ICD-10-CM

## 2025-03-12 DIAGNOSIS — E66.9 TYPE 2 DIABETES MELLITUS WITH OBESITY (MULTI): ICD-10-CM

## 2025-03-12 DIAGNOSIS — Z09 HOSPITAL DISCHARGE FOLLOW-UP: Primary | ICD-10-CM

## 2025-03-12 DIAGNOSIS — F41.9 ANXIETY: ICD-10-CM

## 2025-03-12 DIAGNOSIS — E11.69 TYPE 2 DIABETES MELLITUS WITH OBESITY (MULTI): ICD-10-CM

## 2025-03-12 DIAGNOSIS — D50.9 MICROCYTIC ANEMIA: ICD-10-CM

## 2025-03-12 DIAGNOSIS — J96.11 CHRONIC RESPIRATORY FAILURE WITH HYPOXIA (MULTI): ICD-10-CM

## 2025-03-12 PROCEDURE — 1159F MED LIST DOCD IN RCRD: CPT | Performed by: INTERNAL MEDICINE

## 2025-03-12 PROCEDURE — 3074F SYST BP LT 130 MM HG: CPT | Performed by: INTERNAL MEDICINE

## 2025-03-12 PROCEDURE — 3078F DIAST BP <80 MM HG: CPT | Performed by: INTERNAL MEDICINE

## 2025-03-12 PROCEDURE — 1111F DSCHRG MED/CURRENT MED MERGE: CPT | Performed by: INTERNAL MEDICINE

## 2025-03-12 PROCEDURE — 99496 TRANSJ CARE MGMT HIGH F2F 7D: CPT | Performed by: INTERNAL MEDICINE

## 2025-03-12 PROCEDURE — 1125F AMNT PAIN NOTED PAIN PRSNT: CPT | Performed by: INTERNAL MEDICINE

## 2025-03-12 PROCEDURE — 1157F ADVNC CARE PLAN IN RCRD: CPT | Performed by: INTERNAL MEDICINE

## 2025-03-12 ASSESSMENT — PAIN SCALES - GENERAL: PAINLEVEL_OUTOF10: 3

## 2025-03-12 ASSESSMENT — ENCOUNTER SYMPTOMS
DEPRESSION: 0
LOSS OF SENSATION IN FEET: 0
OCCASIONAL FEELINGS OF UNSTEADINESS: 0

## 2025-03-12 NOTE — PROGRESS NOTES
"Patient: Raúl Jordan  : 1940  PCP: Beverly Ricketts MD  MRN: [id number]  Program: Transitional Care Management  Status: Enrolled  Effective Dates: 3/10/2025 - present  Responsible Staff: Sophie Mckinnon RN  Social Drivers to be Addressed: Physical Activity, Social Connections         Raúl Jordan is a 84 y.o. female presenting today for follow-up after being discharged from the hospital 4 days ago. The main problem requiring admission was syncope and collapse. The discharge summary and/or Transitional Care Management documentation was reviewed. Medication reconciliation was performed as indicated via the \"Sumeet as Reviewed\" timestamp.     Raúl Jordan was contacted by Transitional Care Management services two days after her discharge. This encounter and supporting documentation was reviewed.    Discharge Diagnosis  Syncope and collapse     Issues Requiring Follow-Up  Acute on chronic CHF with diastolic dysfunction  Syncope  Urinary tract infection  Acute respiratory failure on chronic respiratory failure.  Morbid obese  Atrial fibrillation        Discharge Meds     Medication List      START taking these medications     cefuroxime 250 mg tablet; Commonly known as: Ceftin; Take 1 tablet (250   mg) by mouth 2 times a day for 3 days.     CHANGE how you take these medications     oxygen gas therapy; Commonly known as: O2; Inhale 5 L/min every 12   hours. Continue with 4 L oxygen at rest and 5 L oxygen with ambulation;   What changed: how much to take, when to take this, additional instructions     CONTINUE taking these medications     albuterol 90 mcg/actuation inhaler; Inhale 2 puffs every 6 hours if   needed for wheezing.   apixaban 5 mg tablet; Commonly known as: Eliquis; Take 1 tablet (5 mg)   by mouth 2 times a day. Do not start before 2024.   ferrous gluconate 324 (38 Fe) mg tablet; Commonly known as: Fergon; Take   1 tablet (38 mg of iron) by mouth once daily with breakfast.   furosemide 80 mg " tablet; Commonly known as: Lasix; Take 1 tablet (80 mg)   by mouth once daily in the morning.  AS DIRECTED   metoprolol succinate XL 50 mg 24 hr tablet; Commonly known as:   Toprol-XL; Take 1 tablet (50 mg) by mouth once daily.   potassium chloride CR 20 mEq ER tablet; Commonly known as: Klor-Con M20;   Take 1 tablet (20 mEq) by mouth 2 times a day. Do not crush or chew.   rOPINIRole 2 mg tablet; Commonly known as: Requip; Take 1 tablet (2 mg)   by mouth once daily at bedtime.   sertraline 100 mg tablet; Commonly known as: Zoloft; TAKE ONE TABLET BY   MOUTH EVERY DAY   simvastatin 10 mg tablet; Commonly known as: Zocor; Take 1 tablet (10   mg) by mouth once daily at bedtime.   traZODone 100 mg tablet; Commonly known as: Desyrel; Take 2 tablets (200   mg) by mouth once daily at bedtime.     STOP taking these medications     acetaminophen 325 mg capsule; Commonly known as: Tylenol   pramipexole 1.5 mg tablet; Commonly known as: Mirapex        Test Results Pending At Discharge  Pending Labs         No current pending labs.                Hospital Course     Patient is an 84 years old  female past medical history of persistent A-fib, history of pacemaker implant, chronic diastolic heart failure chronic respiratory failure on 2 L oxygen, diabetes mellitus type 2.  Patient presented to ER with syncope.  Patient was found to have +1 pedal edema bilaterally.  Patient has slight changes on EKGs, elevated troponin.  Patient was evaluated by Dr. Moser from cardiology service.  Patient was found to have acute respiratory failure on chronic respiratory failure.  She is on 2 L oxygen.  During hospital stay patient required 6 L oxygen, amount of oxygen decreased to 5 L oxygen.  She was evaluated by pulmonary.  Patient was slightly orthostatics.  Labs revealed acute kidney injury on chronic kidney disease.  Dr. Odell was consulted from nephrology services.  Patient has been on Lasix Lasix was on hold for few days.  Her  she was found to have urinary tract infection urine culture grew E. coli treated with ceftriaxone.  I advised the patient to continue with Ceftin for few more days.  Patient was evaluated by physical therapy.  Physical therapy revealed suggested low intensity of care.  She can get discharged home with wheeled walker.  Regarding A-fib patient is on Eliquis.  Patient had ecchymosis in both upper extremities patient was advised to ambulate with caution.  Patient was evaluated by respiratory therapy.  Patient will need 4 L oxygen at rest and 5 L oxygen with ambulation.  Respiratory therapist to arrange for oxygen at home.  Patient is clinically hemodynamic stable to get discharged today.  Discussed in detail with cardiology on the case.  Patient will see her cardiology as outpatient for possible nuclear stress test as outpatient.    Visit 03/12/2025    Since hospital discharge stated symptoms stable, denied any worsening shortness of breath  No change in weight  RLE open wound, stated it was small in the hospital, blister has gotten better, no discharge  Last day of antibiotics for UTI  Oxygen requirement went up from 2/3 to 5 liers/min during hospitalization  Right hip pain since last few months  Accompanied by daughter, who is very concerned about multiple appointments and that she is unable to take her herself  Had a lengthy discussion with family and patient regarding CODE STATUS, patient prefers DNR CCA, not ready for DNRCC  Daughter is going to have family discussion at home referral to hospice placed in case family decides    Review of Systems   Constitutional:  Negative for chills, fatigue and unexpected weight change.   HENT:  Negative for postnasal drip, sinus pressure and trouble swallowing.    Respiratory:  Positive for shortness of breath. Negative for cough and wheezing.    Cardiovascular:  Negative for chest pain, palpitations and leg swelling.   Gastrointestinal:  Negative for abdominal pain, blood in  "stool, nausea and vomiting.   Genitourinary:  Negative for dysuria and frequency.   Musculoskeletal:  Positive for arthralgias. Negative for back pain and myalgias.   Skin:  Negative for rash.   Neurological:  Negative for tremors, seizures and numbness.   Psychiatric/Behavioral:  Negative for behavioral problems.        /72 (BP Location: Left arm, Patient Position: Sitting, BP Cuff Size: Large adult)   Pulse 64   Temp 36.4 °C (97.5 °F) (Temporal)   Ht 1.575 m (5' 2\")   Wt 108 kg (237 lb)   SpO2 95%   BMI 43.35 kg/m²     Physical Exam  Constitutional:       General: She is not in acute distress.     Comments: Oxygen through nasal cannula, in wheelchair   HENT:      Head: Normocephalic and atraumatic.   Eyes:      Extraocular Movements: Extraocular movements intact.      Conjunctiva/sclera: Conjunctivae normal.   Cardiovascular:      Rate and Rhythm: Normal rate and regular rhythm.      Pulses: Normal pulses.      Heart sounds: No murmur heard.  Pulmonary:      Effort: Pulmonary effort is normal.      Breath sounds: Normal breath sounds. No wheezing or rales.   Abdominal:      General: Bowel sounds are normal.      Palpations: Abdomen is soft. There is no mass.      Tenderness: There is no abdominal tenderness. There is no guarding.   Musculoskeletal:      Comments: Trace pedal edema bilaterally, right lower extremity mid shin area approximately 12 x 5 cm blister with serous discharge   Skin:     Capillary Refill: Capillary refill takes less than 2 seconds.   Neurological:      General: No focal deficit present.      Mental Status: She is alert and oriented to person, place, and time.      Cranial Nerves: No cranial nerve deficit.   Psychiatric:         Mood and Affect: Mood normal.         The complexity of medical decision making for this patient's transitional care is high.    Assessment/Plan         Raúl was seen today for tcm.  Diagnoses and all orders for this visit:  Hospital discharge follow-up " "(Primary)  Iron deficiency anemia due to chronic blood loss  -     CBC and Auto Differential; Future  -     Iron and TIBC; Future  -     CBC and Auto Differential  -     Iron and TIBC  Stage 3a chronic kidney disease (Multi)  -     Basic Metabolic Panel; Future  -     Basic Metabolic Panel  Chronic respiratory failure with hypoxia (Multi)  -     Referral to Hospice; Future  Acute on chronic respiratory failure with hypoxia (Multi)  Anxiety  Microcytic anemia  Type 2 diabetes mellitus with obesity (Multi)      More than 10 minutes spent discussing CODE STATUS with family and patient  Signed DNR CCA forms    Transthoracic Echo (TTE) Limited With Doppler, Color And Contrast    Height: 1.575 m (5' 2\")   Weight: 108 kg (237 lb 3.4 oz)   Blood Pressure: Not recorded    Date of Study: 3/4/25   Ordering Provider: THERESA Moralez-CNP   Clinical Indications: Sycnope       Reading Physicians  Performing Staff   Cardiology: Alejo Moser DO    Tech: Dionne Fitzgerald, RT        Indications  Priority: Routine  Sycnope   Dx: Syncope, unspecified syncope type [R55 (ICD-10-CM)]     PACS Images     Show images for Transthoracic Echo (TTE) Limited  Inter[place] [address]             Phone [phone]     TRANSTHORACIC ECHOCARDIOGRAM REPORT     Patient Name:       PATRICIO Bose Physician:    [id number] Alejo Moser DO  Study Date:         3/4/2025             Ordering Provider:    [id number] KOSTA BERRY  MRN/PID:            [id number]             Fellow:  Accession#:         [id number]         Nurse:  Date of Birth/Age:  1940 / 84 years Sonographer:          Dionne Fitzgerald                                                                 RDCS  Gender Assigned at  F                    Additional " Staff:  Birth:  Height:             157.48 cm            Admit Date:  Weight:             107.50 kg            Admission Status:     Inpatient -                                                                 Routine  BSA / BMI:          2.05 m2 / 43.35      Department Location:  [facility] Step                      kg/m2                                      Down  Blood Pressure: 104 /92 mmHg     Study Type:    TRANSTHORACIC ECHO (TTE) LIMITED  Diagnosis/ICD: Syncope and collapse-R55  Indication:    syncope  CPT Codes:     Echo Limited-44368; Color Doppler-47851; Doppler Limited-27762     Patient History:  Diabetes:          Yes  BMI:               Obese >30  Pacer/Defib:       Permanent pacemaker  Pertinent History: Syncope, CHF, Chest Pain, HTN, A-Fib and COPD. ldt syncope,                     bmi 41, cor pulm, heart failure, nstemi, fran, chr pain, resp                     failure, pleural effusion, ascites, s/p mitra oa.     Study Detail: The following Echo studies were performed: 2D, M-Mode, Doppler and                color flow. Technically challenging study due to poor acoustic                windows, prominent lung artifact and body habitus. Definity used                as a contrast agent for endocardial border definition. Total                contrast used for this procedure was 3 mL via IV push.        PHYSICIAN INTERPRETATION:  Left Ventricle: Left ventricular ejection fraction is normal, by visual estimate at 55-60%. There are no regional wall motion abnormalities. The left ventricular cavity size is normal. Spectral Doppler shows a Grade I (impaired relaxation pattern) of left ventricular diastolic filling with normal left atrial filling pressure.  Left Atrium: The left atrial size is normal.  Right Ventricle: The right ventricle is normal in size. There is normal right ventricular global systolic function.  Right Atrium: The right atrial size is normal.  Aortic Valve: The aortic valve was not well  visualized. There is no evidence of aortic valve regurgitation. The peak instantaneous gradient of the aortic valve is 5 mmHg.  Mitral Valve: The mitral valve was not well visualized. There is no evidence of mitral valve regurgitation.  Tricuspid Valve: The tricuspid valve was not well visualized. Tricuspid regurgitation was not assessed.  Pulmonic Valve: The pulmonic valve is not well visualized. The pulmonic valve regurgitation was not well visualized.  Pericardium: No pericardial effusion noted.  Aorta: The aortic root is normal.        CONCLUSIONS:   1. Poorly visualized anatomical structures due to suboptimal image quality.   2. Left ventricular ejection fraction is normal, by visual estimate at 55-60%.   3. Spectral Doppler shows a Grade I (impaired relaxation pattern) of left ventricular diastolic filling with normal left atrial filling pressure.   4. There is normal right ventricular global systolic function.     QUANTITATIVE DATA SUMMARY:     2D MEASUREMENTS:         Normal Ranges:  LAs:             4.00 cm (2.7-4.0cm)        LV SYSTOLIC FUNCTION:                       Normal Ranges:  EF-A4C View:    68 % (>=55%)  EF-Visual:      58 %  LV EF Reported: 58 %        LV DIASTOLIC FUNCTION:          Normal Ranges:  MV Peak E:             1.21 m/s (0.7-1.2 m/s)        MITRAL VALVE:          Normal Ranges:  MV DT:        159 msec (150-240msec)        AORTIC VALVE:           Normal Ranges:  AoV Vmax:      1.15 m/s (<=1.7m/s)  AoV Peak P.3 mmHg (<20mmHg)  LVOT Max Ravin:  0.81 m/s (<=1.1m/s)  LVOT Diameter: 2.00 cm  (1.8-2.4cm)  AoV Area,Vmax: 2.22 cm2 (2.5-4.5cm2)        RIGHT VENTRICLE:  TAPSE: 20.8 mm        TRICUSPID VALVE/RVSP:          Normal Ranges:  Peak TR Velocity:     3.34 m/s  RV Syst Pressure:     60 mmHg  (< 30mmHg)  IVC Diam:             2.32 cm        PULMONIC VALVE:          Normal Ranges:  PV Max Ravin:     0.8 m/s  (0.6-0.9m/s)  PV Max P.7 mmHg        [id number] Alejo Moser  DO  Electronically signed on 3/4/2025 at 3:35:49 PM    Narrative & Impression   Interpreted By:  Car Morse and Booth Cameron   STUDY:  NM LUNG PERFUSION WITH SPECT/CT;  3/4/2025 9:25 am      INDICATION:  Signs/Symptoms:Acute on chronic respiratory failure.      COMPARISON:  Chest x-ray from 03/03/2025      ACCESSION NUMBER(S):  [id number]      ORDERING CLINICIAN:  KRANTHI CLIFTON      TECHNIQUE:  DIVISION OF NUCLEAR MEDICINE  PERFUSION LUNG SCANS      Multiple perfusion images of the lungs were acquired after the  intravenous administration of 4.2 mCi of Tc-99m macroaggregated  albumin (MAA). In addition, SPECT/CT of the chest was performed.      FINDINGS:  Perfusion images of both lungs demonstrate mild heterogeneity  throughout the lung fields bilaterally. Nonsegmental perfusion defect  extending from left upper to left lower lobe likely corresponds with  major fissure rather than true perfusion defect. No evidence of  segmental perfusion defects.      IMPRESSION:  There is nonspecific heterogeneity in perfusion of both lungs and an  apparent fissure sign in the left lung indicating low probability for  acute pulmonary embolism.      The interpretation above is based on modified PIOPED II and PISAPED  criteria.      This study was analyzed and interpreted at [place], [place].      MACRO:  None      Signed by: Car Morse 3/4/2025 9:37 AM  Dictation workstation:   [id number]        Narrative & Impression   STUDY:  CT Abdomen and Pelvis without IV Contrast; 3/3/2025 at 6:34 p.m.  INDICATION:  Right flank pain.  COMPARISON:  CT chest 9/29/2024.  XR right hip with pelvis 7/15/2024.  ACCESSION NUMBER(S):  [id number]  ORDERING CLINICIAN:  ESTRELLA BABCOCK  TECHNIQUE:  CT of the abdomen and pelvis was performed.  Contiguous axial images  were obtained at 3 mm slice thickness through the abdomen and pelvis.   Coronal and sagittal reconstructions at 3 mm slice thickness were  performed.  No intravenous contrast was administered.    Automated mA/kV exposure control was utilized and patient examination  was performed in strict accordance with principles of ALARA.  FINDINGS:  Please note that the evaluation of vessels, lymph nodes and organs is  limited without intravenous contrast.      LOWER CHEST:  The heart size is enlarged with multichamber enlargement.  No  pericardial effusion.  Small pleural effusion layers dependently in  the right hemithorax.  Subsegmental dependent atelectasis identified  at the right lower lobe     ABDOMEN:     LIVER:  No hepatomegaly.  Smooth surface contour.  Normal attenuation.     BILE DUCTS:  No intrahepatic or extrahepatic biliary ductal dilatation.     GALLBLADDER:  The gallbladder is surgically absent.  STOMACH:  No abnormalities identified.     PANCREAS:  No masses or ductal dilatation.     SPLEEN:  No splenomegaly or focal splenic lesion.     ADRENAL GLANDS:  No thickening or nodules.     KIDNEYS AND URETERS:  Kidneys are normal in size and location.  No renal or ureteral  calculi.     PELVIS:     BLADDER:  No abnormalities identified.     REPRODUCTIVE ORGANS:  No abnormalities identified.     BOWEL:  Scattered diverticula in the large bowel represent diverticulosis.   The loops of small and large bowel demonstrate no abnormal dilatation  or focal wall thickening.     VESSELS:  No abnormalities identified.  Abdominal aorta is normal in caliber.      PERITONEUM/RETROPERITONEUM/LYMPH NODES:  Free fluid adjacent to the liver represents trace peritoneal ascites.   No pneumoperitoneum.  No lymphadenopathy.     ABDOMINAL WALL:  No abnormalities identified.  SOFT TISSUES:   Subcutaneous fat stranding throughout the soft tissues suggestive of  anasarca.     BONES:  No acute fracture or aggressive osseous lesion.  IMPRESSION:  1.  No evidence for bowel obstruction.  2.  No evidence for nephrolithiasis or obstructive uropathy.  3.  Colonic diverticulosis.  4.  Small right  pleural effusion and trace peritoneal ascites.  5.  Cardiomegaly with multichamber enlargement.  6.  Status post cholecystectomy.  Signed by Khanh Degroot MD        Lab Results   Component Value Date    WBC 6.5 03/08/2025    HGB 8.4 (L) 03/08/2025    HCT 28.2 (L) 03/08/2025     (H) 03/08/2025     (L) 03/08/2025     Lab Results   Component Value Date    GLUCOSE 149 (H) 03/08/2025    CALCIUM 8.9 03/08/2025     03/08/2025    K 3.9 03/08/2025    CO2 35 (H) 03/08/2025     03/08/2025    BUN 35 (H) 03/08/2025    CREATININE 1.03 03/08/2025     Current Outpatient Medications   Medication Instructions    albuterol 90 mcg/actuation inhaler 2 puffs, inhalation, Every 6 hours PRN    apixaban (Eliquis) 5 mg tablet Take 1 tablet (5 mg) by mouth 2 times a day. Do not start before December 20, 2024.    ferrous gluconate (Fergon) 324 (38 Fe) mg tablet 38 mg of iron, oral, Daily with breakfast    furosemide (LASIX) 80 mg, oral, Every morning,  AS DIRECTED    metoprolol succinate XL (TOPROL-XL) 50 mg, oral, Daily    oxygen (O2) 5 L/min, inhalation, Every 12 hours, Continue with 4 L oxygen at rest and 5 L oxygen with ambulation    potassium chloride CR (Klor-Con M20) 20 mEq ER tablet 20 mEq, oral, 2 times daily, Do not crush or chew.    rOPINIRole (REQUIP) 2 mg, oral, Nightly    sertraline (ZOLOFT) 100 mg, oral, Daily    simvastatin (ZOCOR) 10 mg, oral, Nightly    traZODone (DESYREL) 200 mg, oral, Nightly

## 2025-03-14 ASSESSMENT — ENCOUNTER SYMPTOMS
ABDOMINAL PAIN: 0
FATIGUE: 0
NAUSEA: 0
SHORTNESS OF BREATH: 1
UNEXPECTED WEIGHT CHANGE: 0
BLOOD IN STOOL: 0
MYALGIAS: 0
PALPITATIONS: 0
WHEEZING: 0
COUGH: 0
CHILLS: 0
TREMORS: 0
VOMITING: 0
FREQUENCY: 0
SEIZURES: 0
DYSURIA: 0
NUMBNESS: 0
BACK PAIN: 0
SINUS PRESSURE: 0
TROUBLE SWALLOWING: 0
ARTHRALGIAS: 1

## 2025-03-17 ENCOUNTER — APPOINTMENT (OUTPATIENT)
Dept: NEUROLOGY | Facility: CLINIC | Age: 85
End: 2025-03-17
Payer: MEDICARE

## 2025-03-17 NOTE — PROGRESS NOTES
"   Neurological [facility]   Raúl Jordan is a 84 y.o. year old female presenting for {Reason for Visit:69385::\"neurologic evaluation\"}.   Referred by: No ref. provider found  PCP: Beverly Ricketts MD    12/11/2024: was seen for RLS as virtual visit, tripoint admission for chest discomfort. Was started on ropinirole 2mg on admission, trial of lower dose 1mg did not help so back to 2mg. Home pramipexole 1.5mg continued.     3/3/2025: readmission for syncope, EKG changes, also respiratory failure with HF and LAZARA. Orthostatics positive. Lasix held for few days. Has afib on eliquis but pacemaker interrogation negative. Discharged home.     03/17/25:     Review of imaging, echocardiogram, labs and other data:   MRI:  Vessel imaging:  Echo:            Ejection Fraction:          LA size:          Bubble study:  LDL: No data recorded  A1c:     Relevant ROS, Problem list, Past Medical/ Surgical/ Family/ Social history- reviewed and pertinent details noted in history.     Objective     Visit Vitals  OB Status Postmenopausal   Smoking Status Never             Neuro Scores/ Scales:           NIHSS:     ***  MMSE:    ***    No CT head results found for the past 12 months  No MRI head results found for the past 12 months  Encounter Date: 03/03/25   ECG 12 lead   Result Value    Ventricular Rate 102    QRS Duration 82    QT Interval 396    QTC Calculation(Bazett) 516    R Axis 109    T Axis 77    QRS Count 17    Q Onset 216    T Offset 414    QTC Fredericia 472     No echocardiogram results found for the past 12 months     Lab Results   Component Value Date    CHOL 131 01/08/2025    TRIG 67 01/08/2025    HDL 41.0 01/08/2025    CHHDL 3.2 01/08/2025    VLDL 13 01/08/2025    NHDL 90 01/08/2025     Lab Results   Component Value Date     (H) 03/03/2025    HGBA1C 5.5 12/08/2024    HGBA1C 6.0 06/05/2024     Lab Results   Component Value Date    GLUCOSE 149 (H) 03/08/2025    GLUCOSE 104 (H) 02/25/2025     " 03/08/2025     02/25/2025    K 3.9 03/08/2025    K 3.8 02/25/2025     03/08/2025     02/25/2025    CO2 35 (H) 03/08/2025    CO2 31 02/25/2025    ANIONGAP 9 (L) 03/08/2025    ANIONGAP 12 02/25/2025    BUN 35 (H) 03/08/2025    BUN 30 (H) 02/25/2025    CREATININE 1.03 03/08/2025    CREATININE 1.27 (H) 02/25/2025    CALCIUM 8.9 03/08/2025    CALCIUM 9.4 02/25/2025    PHOS 3.0 03/08/2025    ALBUMIN 3.3 (L) 03/08/2025     Lab Results   Component Value Date    CALCIUM 8.9 03/08/2025    CALCIUM 9.4 02/25/2025    PHOS 3.0 03/08/2025    PROT 6.0 (L) 03/03/2025    ALBUMIN 3.3 (L) 03/08/2025    AST 25 03/03/2025    ALT 16 03/03/2025    ALKPHOS 104 03/03/2025    BILITOT 1.0 03/03/2025     Lab Results   Component Value Date    WBC 6.5 03/08/2025    WBC 6.2 02/25/2025    HGB 8.4 (L) 03/08/2025    HGB 11.0 (L) 02/25/2025    HCT 28.2 (L) 03/08/2025    HCT 34.8 (L) 02/25/2025     (H) 03/08/2025    MCV 95.6 02/25/2025     (L) 03/08/2025     02/25/2025     INR   Date Value Ref Range Status   03/03/2025 1.4 (H) 0.9 - 1.2 Final     Lab Results   Component Value Date    TSH 1.66 11/08/2022       Assessment/Plan   No diagnosis found.    PLAN   ***      No orders of the defined types were placed in this encounter.      CODING and DOCUMENTATION DETERMINATION  For the Evaluation and Management of this patient, the level of Medical Decision Making for this visit was determined based on the following:    The level of COMPLEXITY AND NUMBER OF PROBLEMS ADDRESSED was:  {codingcomplexity:13148}    The AMOUNT/COMPLEXITY OF DATA TO REVIEW (reviewed, ordered or call for) was:  {codingdatareview:99382}    The level of RISK OF COMPLICATIONS was:  {codingcomplicationrisk:26928}    Thus, the level of medical decision making (based on the lower of the two highest elements) was determined to be [{high/mod/low:08011}]. Therefore the appropriate E/M code for this encounter is [{neurovisitcodes:17758}].  I personally  spent *** minutes today, exclusive of procedures, providing care for this patient, including preparation, face to face time, documentation and other services such as review of medical records, diagnostic result, patient education, counseling, coordination of care as specified in the encounter.

## 2025-03-17 NOTE — DOCUMENTATION CLARIFICATION NOTE
"    PATIENT:               PATRICIO GIL  ACCT #:                  [id number]  MRN:                       [id number]  :                       1940  ADMIT DATE:       3/3/2025 10:44 PM  DISCH DATE:        3/8/2025 12:55 PM  RESPONDING PROVIDER #:        [id number]          PROVIDER RESPONSE TEXT:    Syncope related to acute hypoxic respiratory failure    CDI QUERY TEXT:    Clarification        Instruction:    Based on your assessment of the patient and the clinical information, please provide the requested documentation by clicking on the appropriate radio button and enter any additional information if prompted.    Question: Please clarify if a relationship exists between    When answering this query, please exercise your independent professional judgment. The fact that a question is being asked, does not imply that any particular answer is desired or expected.    The patient's clinical indicators include:  Clinical Information: 84 y.o. female presenting with syncope and collapse.    Clinical Indicators:    : Dr Yang PN: \"Elevated troponin: NSTEMI\"  \"Urinary tract infection: Started on ceftriaxone\"  Troponins: 27, 235    : Dr Yang PN: \"Near syncope  2D echo done on 2024 revealed EF 60 to 65%.  2D echo done yesterday revealed ejection fraction 55 -60%  Chest x-ray revealed mild pulmonary congestion.\"    : DS: \"Patient was slightly orthostatic. Labs revealed acute kidney injury on chronic kidney disease.\"    : DS: \"Discharge Diagnosis: Syncope and collapse.  Issues Requiring Follow-Up  Acute on chronic CHF with diastolic dysfunction  Syncope  Urinary tract infection  Acute respiratory failure on chronic respiratory failure.\"    Treatment: cardiology consult, Pulmonary consult, Renal consult, Supplemental oxygen 6 l nc, Iv Lasix 40 mg iv x 1, ATB for UTI: ceftriaxone 1 gm iv q 24 hrs, 2D echo    Risk Factors: CHF, HTN, COPD, Atrial Fibrillation  Options provided:  -- Syncope related to " JC  -- Syncope related to NSTEMI  -- Syncope related to CHF  -- Syncope related to acute hypoxic respiratory failure  -- Syncope related to UTI  -- Other - I will add my own diagnosis  -- Refer to Clinical Documentation Reviewer    Query created by: Alden Arenas on 3/14/2025 1:36 PM      Electronically signed by:  RUIZ PAGE MD 3/17/2025 7:11 AM

## 2025-03-18 ENCOUNTER — OFFICE VISIT (OUTPATIENT)
Facility: CLINIC | Age: 85
End: 2025-03-18
Payer: MEDICARE

## 2025-03-20 ENCOUNTER — PATIENT OUTREACH (OUTPATIENT)
Dept: PRIMARY CARE | Facility: CLINIC | Age: 85
End: 2025-03-20
Payer: MEDICARE

## 2025-04-02 ENCOUNTER — APPOINTMENT (OUTPATIENT)
Facility: CLINIC | Age: 85
End: 2025-04-02
Payer: MEDICARE

## 2025-05-07 ENCOUNTER — APPOINTMENT (OUTPATIENT)
Dept: PRIMARY CARE | Facility: CLINIC | Age: 85
End: 2025-05-07
Payer: MEDICARE

## 2025-05-08 ENCOUNTER — APPOINTMENT (OUTPATIENT)
Facility: CLINIC | Age: 85
End: 2025-05-08
Payer: MEDICARE

## 2025-05-22 ENCOUNTER — APPOINTMENT (OUTPATIENT)
Facility: CLINIC | Age: 85
End: 2025-05-22
Payer: MEDICARE

## (undated) DEVICE — KIT, NAMIC STANDARD LEFT HEART, CUSTOM, LWMC

## (undated) DEVICE — GUIDEWIRE, J TIP, 3 MM, 0.035 IN X 180 CM, PTFE

## (undated) DEVICE — PACK, ANGIO P2, CUSTOM, LAKE

## (undated) DEVICE — INFLATION KIT, ADVANTAGE, ENCORE 26 (1/BOX)

## (undated) DEVICE — INTRODUCER, SHEATH, AVANTI PLUS, W/MINI WIRE, STANDARD, 6MS FR, 11 CM